# Patient Record
Sex: MALE | Race: OTHER | HISPANIC OR LATINO | Employment: FULL TIME | URBAN - METROPOLITAN AREA
[De-identification: names, ages, dates, MRNs, and addresses within clinical notes are randomized per-mention and may not be internally consistent; named-entity substitution may affect disease eponyms.]

---

## 2017-02-14 ENCOUNTER — HOSPITAL ENCOUNTER (EMERGENCY)
Facility: HOSPITAL | Age: 42
Discharge: HOME | End: 2017-02-14
Admitting: EMERGENCY MEDICINE
Payer: COMMERCIAL

## 2017-02-14 VITALS
HEART RATE: 73 BPM | SYSTOLIC BLOOD PRESSURE: 137 MMHG | OXYGEN SATURATION: 96 % | DIASTOLIC BLOOD PRESSURE: 82 MMHG | RESPIRATION RATE: 18 BRPM | WEIGHT: 225 LBS | TEMPERATURE: 98.3 F

## 2017-02-14 DIAGNOSIS — J02.0 STREP PHARYNGITIS: Primary | ICD-10-CM

## 2017-02-14 LAB — S PYO AG THROAT QL: POSITIVE

## 2017-02-14 PROCEDURE — 87430 STREP A AG IA: CPT

## 2017-02-14 PROCEDURE — 99283 EMERGENCY DEPT VISIT LOW MDM: CPT

## 2017-02-14 RX ORDER — AMOXICILLIN 500 MG/1
500 TABLET, FILM COATED ORAL 2 TIMES DAILY
Qty: 20 TABLET | Refills: 0 | Status: SHIPPED | OUTPATIENT
Start: 2017-02-14 | End: 2017-02-24

## 2017-02-14 RX ADMIN — DEXAMETHASONE SODIUM PHOSPHATE 10 MG: 10 INJECTION INTRAMUSCULAR; INTRAVENOUS at 09:14

## 2017-03-04 ENCOUNTER — HOSPITAL ENCOUNTER (EMERGENCY)
Facility: HOSPITAL | Age: 42
Discharge: HOME | End: 2017-03-04
Admitting: EMERGENCY MEDICINE
Payer: COMMERCIAL

## 2017-03-04 VITALS
HEART RATE: 69 BPM | TEMPERATURE: 97.8 F | RESPIRATION RATE: 18 BRPM | OXYGEN SATURATION: 98 % | WEIGHT: 225 LBS | SYSTOLIC BLOOD PRESSURE: 148 MMHG | DIASTOLIC BLOOD PRESSURE: 85 MMHG

## 2017-03-04 DIAGNOSIS — Z76.0 ENCOUNTER FOR MEDICATION REFILL: Primary | ICD-10-CM

## 2017-03-04 PROCEDURE — 99281 EMR DPT VST MAYX REQ PHY/QHP: CPT

## 2017-03-04 RX ORDER — ACYCLOVIR 400 MG/1
400 TABLET ORAL 3 TIMES DAILY
Qty: 21 TABLET | Refills: 0 | Status: SHIPPED | OUTPATIENT
Start: 2017-03-04 | End: 2017-10-26

## 2017-06-01 ENCOUNTER — HOSPITAL ENCOUNTER (EMERGENCY)
Facility: HOSPITAL | Age: 42
Discharge: HOME | End: 2017-06-01
Payer: COMMERCIAL

## 2017-06-01 VITALS
OXYGEN SATURATION: 98 % | RESPIRATION RATE: 18 BRPM | WEIGHT: 225 LBS | DIASTOLIC BLOOD PRESSURE: 71 MMHG | TEMPERATURE: 99.4 F | HEART RATE: 97 BPM | SYSTOLIC BLOOD PRESSURE: 137 MMHG

## 2017-06-01 DIAGNOSIS — S86.009A: Primary | ICD-10-CM

## 2017-06-01 PROCEDURE — 99283 EMERGENCY DEPT VISIT LOW MDM: CPT

## 2017-06-01 RX ORDER — NAPROXEN 500 MG/1
500 TABLET ORAL 2 TIMES DAILY WITH MEALS
Qty: 14 TABLET | Refills: 0 | Status: SHIPPED | OUTPATIENT
Start: 2017-06-01 | End: 2017-10-26

## 2017-06-01 RX ORDER — NAPROXEN 250 MG/1
500 TABLET ORAL ONCE
Status: COMPLETED | OUTPATIENT
Start: 2017-06-01 | End: 2017-06-01

## 2017-06-01 RX ADMIN — NAPROXEN 500 MG: 250 TABLET ORAL at 19:39

## 2017-07-19 ENCOUNTER — HOSPITAL ENCOUNTER (EMERGENCY)
Facility: HOSPITAL | Age: 42
Discharge: HOME | End: 2017-07-19
Admitting: EMERGENCY MEDICINE
Payer: COMMERCIAL

## 2017-07-19 ENCOUNTER — APPOINTMENT (EMERGENCY)
Dept: RADIOLOGY | Facility: HOSPITAL | Age: 42
End: 2017-07-19
Payer: COMMERCIAL

## 2017-07-19 VITALS
RESPIRATION RATE: 16 BRPM | TEMPERATURE: 98.4 F | OXYGEN SATURATION: 98 % | SYSTOLIC BLOOD PRESSURE: 128 MMHG | WEIGHT: 227 LBS | DIASTOLIC BLOOD PRESSURE: 89 MMHG | HEART RATE: 89 BPM

## 2017-07-19 DIAGNOSIS — M76.62 TENDONITIS, ACHILLES, LEFT: Primary | ICD-10-CM

## 2017-07-19 PROCEDURE — 99283 EMERGENCY DEPT VISIT LOW MDM: CPT

## 2017-07-19 PROCEDURE — 73610 X-RAY EXAM OF ANKLE: CPT

## 2017-07-19 PROCEDURE — 96372 THER/PROPH/DIAG INJ SC/IM: CPT

## 2017-07-19 PROCEDURE — 73590 X-RAY EXAM OF LOWER LEG: CPT

## 2017-07-19 RX ORDER — KETOROLAC TROMETHAMINE 30 MG/ML
15 INJECTION, SOLUTION INTRAMUSCULAR; INTRAVENOUS ONCE
Status: COMPLETED | OUTPATIENT
Start: 2017-07-19 | End: 2017-07-19

## 2017-07-19 RX ORDER — TRAMADOL HYDROCHLORIDE 50 MG/1
50 TABLET ORAL EVERY 6 HOURS PRN
Qty: 12 TABLET | Refills: 0 | Status: SHIPPED | OUTPATIENT
Start: 2017-07-19 | End: 2017-07-22

## 2017-07-19 RX ORDER — METHYLPREDNISOLONE 4 MG/1
TABLET ORAL
Qty: 21 TABLET | Refills: 0 | Status: SHIPPED | OUTPATIENT
Start: 2017-07-19 | End: 2017-10-26

## 2017-07-19 RX ADMIN — KETOROLAC TROMETHAMINE 15 MG: 30 INJECTION, SOLUTION INTRAMUSCULAR at 17:27

## 2017-10-26 ENCOUNTER — HOSPITAL ENCOUNTER (EMERGENCY)
Facility: HOSPITAL | Age: 42
Discharge: HOME | End: 2017-10-26
Admitting: EMERGENCY MEDICINE
Payer: COMMERCIAL

## 2017-10-26 VITALS
OXYGEN SATURATION: 99 % | SYSTOLIC BLOOD PRESSURE: 155 MMHG | DIASTOLIC BLOOD PRESSURE: 75 MMHG | WEIGHT: 227.51 LBS | HEART RATE: 76 BPM | TEMPERATURE: 98.3 F | RESPIRATION RATE: 20 BRPM

## 2017-10-26 DIAGNOSIS — B00.9 HERPES: ICD-10-CM

## 2017-10-26 DIAGNOSIS — Z20.2 POSSIBLE EXPOSURE TO STD: ICD-10-CM

## 2017-10-26 DIAGNOSIS — N48.1 BALANITIS: Primary | ICD-10-CM

## 2017-10-26 LAB
BACTERIA UR QL AUTO: ABNORMAL /HPF
BILIRUB UR QL STRIP: NEGATIVE
CLARITY UR: ABNORMAL
COLOR UR: YELLOW
GLUCOSE UR STRIP-MCNC: ABNORMAL MG/DL
HGB UR QL STRIP.AUTO: ABNORMAL
KETONES UR STRIP-MCNC: NEGATIVE MG/DL
LEUKOCYTE ESTERASE UR QL STRIP: ABNORMAL
NITRITE UR QL STRIP: NEGATIVE
NON-SQ EPI CELLS URNS QL MICRO: ABNORMAL /HPF
OTHER STN SPEC: ABNORMAL
PH UR STRIP.AUTO: 5.5 [PH] (ref 4.5–8)
PROT UR STRIP-MCNC: NEGATIVE MG/DL
RBC #/AREA URNS AUTO: ABNORMAL /HPF
SP GR UR STRIP.AUTO: 1.01 (ref 1–1.03)
UROBILINOGEN UR QL STRIP.AUTO: 0.2 E.U./DL
WBC #/AREA URNS AUTO: ABNORMAL /HPF

## 2017-10-26 PROCEDURE — 81002 URINALYSIS NONAUTO W/O SCOPE: CPT | Performed by: PHYSICIAN ASSISTANT

## 2017-10-26 PROCEDURE — 87086 URINE CULTURE/COLONY COUNT: CPT

## 2017-10-26 PROCEDURE — 81001 URINALYSIS AUTO W/SCOPE: CPT

## 2017-10-26 PROCEDURE — 96372 THER/PROPH/DIAG INJ SC/IM: CPT

## 2017-10-26 PROCEDURE — 99283 EMERGENCY DEPT VISIT LOW MDM: CPT

## 2017-10-26 PROCEDURE — 87491 CHLMYD TRACH DNA AMP PROBE: CPT | Performed by: PHYSICIAN ASSISTANT

## 2017-10-26 PROCEDURE — 87591 N.GONORRHOEAE DNA AMP PROB: CPT | Performed by: PHYSICIAN ASSISTANT

## 2017-10-26 RX ORDER — AZITHROMYCIN 250 MG/1
1000 TABLET, FILM COATED ORAL ONCE
Status: COMPLETED | OUTPATIENT
Start: 2017-10-26 | End: 2017-10-26

## 2017-10-26 RX ORDER — VALACYCLOVIR HYDROCHLORIDE 500 MG/1
500 TABLET, FILM COATED ORAL 2 TIMES DAILY
Qty: 6 TABLET | Refills: 0 | Status: SHIPPED | OUTPATIENT
Start: 2017-10-26 | End: 2018-06-18

## 2017-10-26 RX ORDER — CLOTRIMAZOLE 1 %
CREAM (GRAM) TOPICAL 2 TIMES DAILY
Qty: 30 G | Refills: 0 | Status: SHIPPED | OUTPATIENT
Start: 2017-10-26 | End: 2018-06-18

## 2017-10-26 RX ADMIN — AZITHROMYCIN 1000 MG: 250 TABLET, FILM COATED ORAL at 16:32

## 2017-10-26 RX ADMIN — CEFTRIAXONE SODIUM 250 MG: 250 INJECTION, POWDER, FOR SOLUTION INTRAMUSCULAR; INTRAVENOUS at 16:32

## 2017-10-26 NOTE — ED NOTES
Pt reports hx of genital herpes x 20 years  Pt reports recently developed new outbreak a few days ago   Pt reports "its different" because lesions itch and he is having yellow penile drainage     Lindsay Rivera RN  10/26/17 1518

## 2017-10-26 NOTE — DISCHARGE INSTRUCTIONS
Balanitis   WHAT YOU NEED TO KNOW:   Balanitis is inflammation of the glans (head) of the penis  It is usually caused by bacteria or a fungus  DISCHARGE INSTRUCTIONS:   Medicines:   · Medicines  help fight or prevent an infection caused by bacteria or a fungus  This medicine may be given as a pill or a cream     · Take your medicine as directed  Contact your healthcare provider if you think your medicine is not helping or if you have side effects  Tell him of her if you are allergic to any medicine  Keep a list of the medicines, vitamins, and herbs you take  Include the amounts, and when and why you take them  Bring the list or the pill bottles to follow-up visits  Carry your medicine list with you in case of an emergency  Clean your penis carefully:  Gently push back the foreskin 2 to 3 times a day and wash the infected area well with soap and water  If you have a catheter, ask how to keep it clean  Take a sitz bath:  Fill a bathtub with 4 to 6 inches of warm water  You may also use a sitz bath pan that fits over a toilet  Sit in the sitz bath for 20 minutes  Do this 2 to 3 times a day, or as directed  The warm water can help decrease pain and swelling  Maintain a healthy weight:  Ask your healthcare provider how much you should weigh  Ask him to help you create a weight loss plan if you are overweight  Follow up with your healthcare provider in 1 to 2 weeks:  Write down your questions so you remember to ask them during your visits  Contact your healthcare provider if:   · You have a fever  · You have pain when you urinate  · You have questions or concerns about your condition or care  Return to the emergency department if:   · You are not able to urinate  © 2017 [employer] Information is for End User's use only and may not be sold, redistributed or otherwise used for commercial purposes   All illustrations and images included in CareNotes® are the copyrighted property of A  [employer] M [employer] Inc  or Yomi Gorman  The above information is an  only  It is not intended as medical advice for individual conditions or treatments  Talk to your doctor, nurse or pharmacist before following any medical regimen to see if it is safe and effective for you  Postexposure Prophylaxis   WHAT YOU NEED TO KNOW:   Postexposure prophylaxis (PEP) is medical care given to prevent HIV, hepatitis B, and other diseases  PEP may include first aid, testing, and medicines  Exposure can occur when you have contact with certain body fluids from another person  These fluids include blood, semen, and vaginal fluid  They also include any other body fluid that may have blood in it, such as saliva or urine  You are exposed if these fluids touch an open area of your skin, such as a cut  You also are exposed if the fluids touch a mucus membrane  This is a moist area, such as in the eyes, nose, and mouth  You can be exposed by a needlestick through the skin  DISCHARGE INSTRUCTIONS:   Medicines:   · Antiretrovirals: These medicines help prevent HIV  They must be taken for 28 days, unless your healthcare provider tells you otherwise  You may be given a starter pack with enough medicine for 1 to 7 days  You may be given medicine for the full 28 days instead  If you receive a starter pack, you must return to your healthcare provider in 1 to 7 days  At this visit, you will receive the rest of the medicine  · Hepatitis B vaccine: This medicine helps prevent hepatitis B  You will need 3 doses (shots) of the vaccine  The second dose should be taken 1 to 2 months after the first dose  The third dose should be taken 4 to 6 months after the first dose  · Antibiotics: These are germ-killing medicines to help treat or prevent sexually transmitted diseases, such as gonorrhea  · Take your medicine as directed    Contact your healthcare provider if you think your medicine is not helping or if you have side effects  Tell him or her if you are allergic to any medicine  Keep a list of the medicines, vitamins, and herbs you take  Include the amounts, and when and why you take them  Bring the list or the pill bottles to follow-up visits  Carry your medicine list with you in case of an emergency  Follow up with your healthcare provider as directed: If you did not receive any treatment after the exposure, you will need to follow up with your healthcare provider within 1 week  If you were given antiretrovirals, you will need a follow-up visit in about 2 weeks  Further HIV testing will be needed 6 weeks, 3 months, and 6 months after the exposure  You may have HIV testing up to 12 months after the exposure  Precautions: In case you are infected, you will need to take steps to keep others from getting sick  These steps can help you avoid being exposed again:  · Avoid sex, or have safe sex  Safe sex means having sex only with one person who is not infected and who is only having sex with you  It also means using condoms each time you have sex  · Avoid injection drug use  If you cannot do this, always use needles that are sterile (germ-free)  · Follow all safety rules at your workplace if you are at risk of exposure  Be sure to use safety equipment as needed  · Get the hepatitis B vaccine if you have not already  · Do not breastfeed unless you know you are not infected  In case of future exposure: If you think you have been exposed, get first aid right away  If you are at work, follow work policy to report the exposure  The type of first aid you need depends on what part of your body was exposed:  · Open skin:  Wash the area right away with soap and water  Use a gel hand  if you do not have soap or running water  Do not use anything harsh, such as bleach  Do not squeeze or rub the skin  · Eyes:  Rinse your eyes with water or saline (a salt solution) right away   Be sure to clean well by moving your eyelids with your fingers as you rinse  Keep contact lenses in while rinsing your eyes, then remove and clean them as usual  Avoid soap or other   · Mouth:  Spit out the blood or body fluid right away  Rinse your mouth with water or saline several times  Avoid putting soap or other  in your mouth  For support and more information: It can be hard to cope if you think you have been exposed to a disease  You may feel scared and concerned about your health  This is normal  It can be helpful to find out all you can about the risk of exposure  Counseling or joining a support group also can help  Talk to your healthcare provider, or contact the following:   · [facility] for HIV/AIDS, Viral Hepatitis, STD, and TB Prevention, [facility]  Web Address: www cdc gov/[facility]/  · The [employer] Post-Exposure Prophylaxis Hotline  Web Address: www [facility]/about_[facility]/pepline/  Contact your healthcare provider if:   · You have nausea or diarrhea  · You are more tired than usual      · You have new headaches, or you feel dizzy  · You have trouble sleeping  · Your eyes or skin turn yellow  · You are not eating because of appetite loss  · You are or may be pregnant  Return to the emergency department if:   · You have a fever  · You have a rash  · You have new muscle pain or pain in your back or abdomen  · You urinate more often than usual, have blood in your urine, or have pain while urinating  · You are more thirsty than usual      · You have trouble swallowing or breathing  © 2017 [address] [employer] Information is for End User's use only and may not be sold, redistributed or otherwise used for commercial purposes  All illustrations and images included in CareNotes® are the copyrighted property of [employer]  or Yomi Gorman  The above information is an  only   It is not intended as medical advice for individual conditions or treatments  Talk to your doctor, nurse or pharmacist before following any medical regimen to see if it is safe and effective for you

## 2017-10-27 LAB
BACTERIA UR CULT: NORMAL
CHLAMYDIA DNA CVX QL NAA+PROBE: ABNORMAL
N GONORRHOEA DNA GENITAL QL NAA+PROBE: ABNORMAL

## 2017-10-28 NOTE — PROGRESS NOTES
Call patient regarding test results  Aware that he tested positive for gonorrhea but has been treated  Instructed to let any sexual partners no to get tested and/or treated

## 2018-01-31 ENCOUNTER — HOSPITAL ENCOUNTER (EMERGENCY)
Facility: HOSPITAL | Age: 43
Discharge: HOME | End: 2018-01-31
Attending: EMERGENCY MEDICINE
Payer: COMMERCIAL

## 2018-01-31 VITALS
RESPIRATION RATE: 16 BRPM | TEMPERATURE: 97.7 F | DIASTOLIC BLOOD PRESSURE: 63 MMHG | HEART RATE: 74 BPM | SYSTOLIC BLOOD PRESSURE: 127 MMHG | WEIGHT: 217.38 LBS | OXYGEN SATURATION: 99 %

## 2018-01-31 DIAGNOSIS — R68.89 FLU-LIKE SYMPTOMS: Primary | ICD-10-CM

## 2018-01-31 PROCEDURE — 99283 EMERGENCY DEPT VISIT LOW MDM: CPT

## 2018-01-31 RX ORDER — ONDANSETRON 4 MG/1
4 TABLET, ORALLY DISINTEGRATING ORAL EVERY 6 HOURS PRN
Qty: 20 TABLET | Refills: 0 | Status: SHIPPED | OUTPATIENT
Start: 2018-01-31 | End: 2018-06-18

## 2018-01-31 RX ORDER — ONDANSETRON 4 MG/1
4 TABLET, ORALLY DISINTEGRATING ORAL ONCE
Status: COMPLETED | OUTPATIENT
Start: 2018-01-31 | End: 2018-01-31

## 2018-01-31 RX ORDER — OSELTAMIVIR PHOSPHATE 75 MG/1
75 CAPSULE ORAL EVERY 12 HOURS
Qty: 10 CAPSULE | Refills: 0 | Status: SHIPPED | OUTPATIENT
Start: 2018-01-31 | End: 2018-02-05

## 2018-01-31 RX ADMIN — ONDANSETRON 4 MG: 4 TABLET, ORALLY DISINTEGRATING ORAL at 07:35

## 2018-01-31 NOTE — DISCHARGE INSTRUCTIONS
Since you have had symptoms for less than 48 hours, Tamiflu may be benificial  You can take this twice daily for the next 5 days  Take the zofran as needed for nausea, this can be placed under the tongue to dissolve if you are vomiting  Make sure to drink plenty of fluids to avoid dehydration  Influenza   WHAT YOU NEED TO KNOW:   Influenza (the flu) is an infection caused by the influenza virus  The flu is easily spread when an infected person coughs, sneezes, or has close contact with others  You may be able to spread the flu to others for 1 week or longer after signs or symptoms appear  DISCHARGE INSTRUCTIONS:   Call 911 for any of the following:   · You have trouble breathing, and your lips look purple or blue  · You have a seizure  Return to the emergency department if:   · You are dizzy, or you are urinating less or not at all  · You have a headache with a stiff neck, and you feel tired or confused  · You have new pain or pressure in your chest     · Your symptoms, such as shortness of breath, vomiting, or diarrhea, get worse  · Your symptoms, such as fever and coughing, seem to get better, but then get worse  Contact your healthcare provider if:   · You have new muscle pain or weakness  · You have questions or concerns about your condition or care  Medicines: You may need any of the following:  · Acetaminophen  decreases pain and fever  It is available without a doctor's order  Ask how much to take and how often to take it  Follow directions  Acetaminophen can cause liver damage if not taken correctly  · NSAIDs , such as ibuprofen, help decrease swelling, pain, and fever  This medicine is available with or without a doctor's order  NSAIDs can cause stomach bleeding or kidney problems in certain people  If you take blood thinner medicine, always ask your healthcare provider if NSAIDs are safe for you  Always read the medicine label and follow directions      · Antivirals help fight a viral infection  · Take your medicine as directed  Contact your healthcare provider if you think your medicine is not helping or if you have side effects  Tell him or her if you are allergic to any medicine  Keep a list of the medicines, vitamins, and herbs you take  Include the amounts, and when and why you take them  Bring the list or the pill bottles to follow-up visits  Carry your medicine list with you in case of an emergency  Rest  as much as you can to help you recover  Drink liquids as directed  to help prevent dehydration  Ask how much liquid to drink each day and which liquids are best for you  Prevent the spread of influenza:   · Wash your hands often  Use soap and water  Wash your hands after you use the bathroom, change a child's diapers, or sneeze  Wash your hands before you prepare or eat food  Use gel hand cleanser when soap and water are not available  Do not touch your eyes, nose, or mouth unless you have washed your hands first            · Cover your mouth when you sneeze or cough  Cough into a tissue or the bend of your arm  · Clean shared items with a germ-killing   Clean table surfaces, doorknobs, and light switches  Do not share towels, silverware, and dishes with people who are sick  Wash bed sheets, towels, silverware, and dishes with soap and water  · Wear a mask  over your mouth and nose if you are sick or are near anyone who is sick  · Stay away from others  if you are sick  · Influenza vaccine  helps prevent influenza (flu)  Everyone older than 6 months should get a yearly influenza vaccine  Get the vaccine as soon as it is available, usually in September or October each year  Follow up with your healthcare provider as directed:  Write down your questions so you remember to ask them during your visits     © 2017 [employer] Information is for End User's use only and may not be sold, redistributed or otherwise used for commercial purposes  All illustrations and images included in CareNotes® are the copyrighted property of A D A M , Inc  or Yomi Gorman  The above information is an  only  It is not intended as medical advice for individual conditions or treatments  Talk to your doctor, nurse or pharmacist before following any medical regimen to see if it is safe and effective for you

## 2018-01-31 NOTE — ED PROVIDER NOTES
History  Chief Complaint   Patient presents with    Flu Symptoms     Started yesterday  36 YO male presents with flu symptoms for the last day  States this began yesterday morning with fatigue, general malaise, body aches, diarrhea, subjective fevers and chills  States he did rest yesterday, got up to go to work but continues to not feel well  Pt states the diarrhea does seem to be improving and he is drinking fluids to avoid dehydration  Does have nausea, denies vomiting  Pt has had decreased appetite  No known sick contacts  Pt denies CP/SOB/V, no dysuria, burning on urination or blood in urine  History provided by:  Patient   used: No    Flu Symptoms   Presenting symptoms: diarrhea, fatigue, fever, myalgias and nausea    Presenting symptoms: no shortness of breath, no sore throat and no vomiting    Diarrhea:     Quality:  Semi-solid    Severity:  Moderate    Duration:  1 day    Timing:  Constant    Progression:  Improving  Fatigue:     Severity:  Moderate    Duration:  1 day    Timing:  Constant    Progression:  Unchanged  Fever:     Timing:  Intermittent    Temp source:  Subjective    Progression:  Unchanged  Myalgias:     Location:  Generalized    Quality:  Aching    Severity:  Moderate    Timing:  Constant    Progression:  Waxing and waning  Nausea:     Severity:  Mild    Onset quality:  Gradual    Timing:  Constant    Progression:  Waxing and waning  Severity:  Moderate  Onset quality:  Gradual  Progression:  Unchanged  Chronicity:  New  Relieved by:  Nothing  Worsened by:  Nothing  Ineffective treatments:  None tried  Associated symptoms: chills, decreased appetite and decreased physical activity    Associated symptoms: no mental status change, no neck stiffness and no syncope        Prior to Admission Medications   Prescriptions Last Dose Informant Patient Reported? Taking?    clotrimazole (LOTRIMIN) 1 % cream   No No   Sig: Apply topically 2 (two) times a day   metFORMIN (GLUCOPHAGE) 500 mg tablet   Yes No   Sig: Take 500 mg by mouth 2 (two) times a day with meals   sitaGLIPtin (JANUVIA) 100 mg tablet   Yes No   Sig: Take 100 mg by mouth daily   valACYclovir (VALTREX) 500 mg tablet   No No   Sig: Take 1 tablet by mouth 2 (two) times a day for 3 days      Facility-Administered Medications: None       Past Medical History:   Diagnosis Date    Diabetes mellitus ([facility] [address] )     Type II    Herpes        History reviewed  No pertinent surgical history  History reviewed  No pertinent family history  I have reviewed and agree with the history as documented  Social History   Substance Use Topics    Smoking status: Never Smoker    Smokeless tobacco: Never Used    Alcohol use No        Review of Systems   Constitutional: Positive for chills, decreased appetite, fatigue and fever  HENT: Negative for dental problem and sore throat  Eyes: Negative for visual disturbance  Respiratory: Negative for shortness of breath  Cardiovascular: Negative for chest pain  Gastrointestinal: Positive for diarrhea and nausea  Negative for abdominal pain and vomiting  Genitourinary: Negative for dysuria and frequency  Musculoskeletal: Positive for myalgias  Negative for neck pain and neck stiffness  Skin: Negative for rash  Neurological: Negative for dizziness, weakness and light-headedness  Psychiatric/Behavioral: Negative for agitation, behavioral problems and confusion  All other systems reviewed and are negative        Physical Exam  ED Triage Vitals [01/31/18 0722]   Temperature Pulse Respirations Blood Pressure SpO2   97 7 °F (36 5 °C) 74 16 127/63 99 %      Temp Source Heart Rate Source Patient Position - Orthostatic VS BP Location FiO2 (%)   Oral -- Sitting Right arm --      Pain Score       8           Orthostatic Vital Signs  Vitals:    01/31/18 0722   BP: 127/63   Pulse: 74   Patient Position - Orthostatic VS: Sitting       Physical Exam   Constitutional: He is oriented to person, place, and time  He appears well-developed and well-nourished  HENT:   Head: Normocephalic and atraumatic  Eyes: EOM are normal    Neck: Normal range of motion  Cardiovascular: Normal rate, regular rhythm and normal heart sounds  Pulmonary/Chest: Effort normal and breath sounds normal    Abdominal: Soft  There is no tenderness  Musculoskeletal: Normal range of motion  Neurological: He is alert and oriented to person, place, and time  Skin: Skin is warm and dry  Psychiatric: He has a normal mood and affect  His behavior is normal    Nursing note and vitals reviewed  ED Medications  Medications   ondansetron (ZOFRAN-ODT) dispersible tablet 4 mg (4 mg Oral Given 1/31/18 0735)       Diagnostic Studies  Results Reviewed     None                 No orders to display              Procedures  Procedures       Phone Contacts  ED Phone Contact    ED Course  ED Course                                MDM  Number of Diagnoses or Management Options  Flu-like symptoms: new and requires workup  Diagnosis management comments: 1  Flu like symptoms - Pt with mild symptoms, taking PO without difficulty  States diarrhea is improving  Will give Rx for Tamiflu as pt has had symptoms for less than 48 hours  Zofran for nausea  Patient Progress  Patient progress: stable    CritCare Time    Disposition  Final diagnoses:   Flu-like symptoms     Time reflects when diagnosis was documented in both MDM as applicable and the Disposition within this note     Time User Action Codes Description Comment    1/31/2018  7:30 AM [place] GIL Add [M35 10] Flu-like symptoms       ED Disposition     ED Disposition Condition Comment    Discharge  Verlena Zunilda discharge to home/self care      Condition at discharge: Stable        Follow-up Information    None       Patient's Medications   Discharge Prescriptions    ONDANSETRON (ZOFRAN-ODT) 4 MG DISINTEGRATING TABLET    Take 1 tablet (4 mg total) by mouth every 6 (six) hours as needed for nausea or vomiting       Start Date: 1/31/2018 End Date: --       Order Dose: 4 mg       Quantity: 20 tablet    Refills: 0    OSELTAMIVIR (TAMIFLU) 75 MG CAPSULE    Take 1 capsule (75 mg total) by mouth every 12 (twelve) hours for 5 days       Start Date: 1/31/2018 End Date: 2/5/2018       Order Dose: 75 mg       Quantity: 10 capsule    Refills: 0     No discharge procedures on file      ED Provider  Electronically Signed by           Neeru Lemons MD  01/31/18 7213

## 2018-04-04 LAB
ABSOL LYMPHOCYTES (HISTORICAL): 2.4 K/UL (ref 0.5–4)
ACETONE, QUANT (HISTORICAL): NEGATIVE
ALBUMIN SERPL BCP-MCNC: 4 G/DL (ref 3–5.2)
ALP SERPL-CCNC: 69 U/L (ref 43–122)
ALT SERPL W P-5'-P-CCNC: 30 U/L (ref 9–52)
ANION GAP SERPL CALCULATED.3IONS-SCNC: 10 MMOL/L (ref 5–14)
AST SERPL W P-5'-P-CCNC: 22 U/L (ref 17–59)
BASOPHILS # BLD AUTO: 0.1 K/UL (ref 0–0.1)
BASOPHILS # BLD AUTO: 1 % (ref 0–1)
BILIRUB SERPL-MCNC: 0.4 MG/DL
BUN SERPL-MCNC: 11 MG/DL (ref 5–25)
CALCIUM SERPL-MCNC: 9 MG/DL (ref 8.4–10.2)
CHLORIDE SERPL-SCNC: 100 MEQ/L (ref 97–108)
CO2 SERPL-SCNC: 25 MMOL/L (ref 22–30)
CREATINE, SERUM (HISTORICAL): 0.52 MG/DL (ref 0.7–1.5)
DEPRECATED RDW RBC AUTO: 13 %
EGFR (HISTORICAL): >60 ML/MIN/1.73 M2
EOSINOPHIL # BLD AUTO: 0.1 K/UL (ref 0–0.4)
EOSINOPHIL NFR BLD AUTO: 1 % (ref 0–6)
GLUCOSE SERPL-MCNC: 278 MG/DL (ref 70–99)
GLUCOSE SERPL-MCNC: 280 MG/DL (ref 70–99)
HCT VFR BLD AUTO: 42.7 % (ref 41–53)
HGB BLD-MCNC: 14.1 G/DL (ref 13.5–17.5)
LIPASE SERPL-CCNC: 283 U/L (ref 23–300)
LYMPHOCYTES NFR BLD AUTO: 33 % (ref 25–45)
MCH RBC QN AUTO: 26.7 PG (ref 26–34)
MCHC RBC AUTO-ENTMCNC: 32.9 % (ref 31–36)
MCV RBC AUTO: 81 FL (ref 80–100)
MONOCYTES # BLD AUTO: 0.5 K/UL (ref 0.2–0.9)
MONOCYTES NFR BLD AUTO: 6 % (ref 1–10)
NEUTROPHILS ABS COUNT (HISTORICAL): 4.4 K/UL (ref 1.8–7.8)
NEUTS SEG NFR BLD AUTO: 59 % (ref 45–65)
PLATELET # BLD AUTO: 212 K/MCL (ref 150–450)
POTASSIUM SERPL-SCNC: 4.2 MEQ/L (ref 3.6–5)
RBC # BLD AUTO: 5.27 M/MCL (ref 4.5–5.9)
SODIUM SERPL-SCNC: 135 MEQ/L (ref 137–147)
TOTAL PROTEIN (HISTORICAL): 6.9 G/DL (ref 5.9–8.4)
WBC # BLD AUTO: 7.4 K/MCL (ref 4.5–11)

## 2018-04-23 ENCOUNTER — HOSPITAL ENCOUNTER (EMERGENCY)
Facility: HOSPITAL | Age: 43
Discharge: HOME | End: 2018-04-23
Attending: EMERGENCY MEDICINE | Admitting: EMERGENCY MEDICINE
Payer: COMMERCIAL

## 2018-04-23 VITALS
TEMPERATURE: 97.2 F | WEIGHT: 220 LBS | HEART RATE: 75 BPM | DIASTOLIC BLOOD PRESSURE: 73 MMHG | OXYGEN SATURATION: 97 % | SYSTOLIC BLOOD PRESSURE: 129 MMHG | RESPIRATION RATE: 16 BRPM

## 2018-04-23 DIAGNOSIS — M27.3 DRY TOOTH SOCKET: Primary | ICD-10-CM

## 2018-04-23 PROCEDURE — 99282 EMERGENCY DEPT VISIT SF MDM: CPT

## 2018-04-23 RX ORDER — HYDROCODONE BITARTRATE AND ACETAMINOPHEN 5; 325 MG/1; MG/1
1 TABLET ORAL EVERY 6 HOURS PRN
Qty: 10 TABLET | Refills: 0 | Status: SHIPPED | OUTPATIENT
Start: 2018-04-23 | End: 2018-05-03

## 2018-04-23 RX ORDER — PENICILLIN V POTASSIUM 500 MG/1
500 TABLET ORAL 4 TIMES DAILY
Qty: 40 TABLET | Refills: 0 | Status: SHIPPED | OUTPATIENT
Start: 2018-04-23 | End: 2018-05-03

## 2018-04-23 NOTE — ED PROVIDER NOTES
History  Chief Complaint   Patient presents with    Dental Pain     Patient reports upper left dental pain, reports had tooth removed but didnt take care of surgical site  Denies fevers  This is a 24-year-old male patient who 5 days ago had tooth extracted tooth 4  By a dentist   Over the last 2 days he has had increased pain to the point and is now throbbing and radiating into his ear  Not relieved by over-the-counter remedies  No fever no chills  No blurred vision or double vision  No difficulty swallowing  No cough no congestion  Nothing makes it better or worse  Just constantly throbs  Patient has no other symptoms  At this point I will put dry socket paste start him on antibiotics and give him something for pain  Prior to Admission Medications   Prescriptions Last Dose Informant Patient Reported? Taking? clotrimazole (LOTRIMIN) 1 % cream   No No   Sig: Apply topically 2 (two) times a day   metFORMIN (GLUCOPHAGE) 500 mg tablet   Yes No   Sig: Take 500 mg by mouth 2 (two) times a day with meals   ondansetron (ZOFRAN-ODT) 4 mg disintegrating tablet   No No   Sig: Take 1 tablet (4 mg total) by mouth every 6 (six) hours as needed for nausea or vomiting   sitaGLIPtin (JANUVIA) 100 mg tablet   Yes No   Sig: Take 100 mg by mouth daily   valACYclovir (VALTREX) 500 mg tablet   No No   Sig: Take 1 tablet by mouth 2 (two) times a day for 3 days      Facility-Administered Medications: None       Past Medical History:   Diagnosis Date    Diabetes mellitus ([facility] [address] )     Type II    Herpes        History reviewed  No pertinent surgical history  History reviewed  No pertinent family history  I have reviewed and agree with the history as documented  Social History   Substance Use Topics    Smoking status: Never Smoker    Smokeless tobacco: Never Used    Alcohol use No        Review of Systems   All other systems reviewed and are negative        Physical Exam  ED Triage Vitals [04/23/18 1808] Temperature Pulse Respirations Blood Pressure SpO2   (!) 97 2 °F (36 2 °C) 75 16 129/73 97 %      Temp Source Heart Rate Source Patient Position - Orthostatic VS BP Location FiO2 (%)   Temporal Monitor Sitting Right arm --      Pain Score       Worst Possible Pain           Orthostatic Vital Signs  Vitals:    04/23/18 1808   BP: 129/73   Pulse: 75   Patient Position - Orthostatic VS: Sitting       Physical Exam   Constitutional: He appears well-developed and well-nourished  HENT:   Head: Normocephalic and atraumatic  Right Ear: External ear normal    Left Ear: External ear normal    Nose: Nose normal    Mouth/Throat: Oropharynx is clear and moist        Eyes: Conjunctivae are normal  Pupils are equal, round, and reactive to light  Neck: Normal range of motion  Neck supple  Cardiovascular: Normal rate and regular rhythm  Pulmonary/Chest: Effort normal and breath sounds normal    Abdominal: Soft  Bowel sounds are normal  There is no tenderness  Neurological: He is alert  Skin: Skin is warm  Psychiatric: He has a normal mood and affect  His behavior is normal    Nursing note and vitals reviewed  ED Medications  Medications - No data to display    Diagnostic Studies  Results Reviewed     None                 No orders to display              Procedures  Procedures       Phone Contacts  ED Phone Contact    ED Course  ED Course                                MDM  CritCare Time    Disposition  Final diagnoses:   Dry tooth socket     Time reflects when diagnosis was documented in both MDM as applicable and the Disposition within this note     Time User Action Codes Description Comment    4/23/2018  6:54 PM [place] [facility] Add [M27 3] Dry tooth socket       ED Disposition     ED Disposition Condition Comment    Discharge  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      Condition at discharge: Good        Follow-up Information     Follow up With Specialties Details Why Contact Info Follow-up with her dentist in next 1-2 days for recheck upper tooth         Patient's Medications   Discharge Prescriptions    DICLOFENAC SODIUM (VOLTAREN) 50 MG EC TABLET    Take 1 tablet (50 mg total) by mouth 2 (two) times a day for 5 days       Start Date: 4/23/2018 End Date: 4/28/2018       Order Dose: 50 mg       Quantity: 10 tablet    Refills: 0    HYDROCODONE-ACETAMINOPHEN (NORCO) 5-325 MG PER TABLET    Take 1 tablet by mouth every 6 (six) hours as needed for pain for up to 10 days Max Daily Amount: 4 tablets       Start Date: 4/23/2018 End Date: 5/3/2018       Order Dose: 1 tablet       Quantity: 10 tablet    Refills: 0    PENICILLIN V POTASSIUM (VEETID) 500 MG TABLET    Take 1 tablet (500 mg total) by mouth 4 (four) times a day for 10 days       Start Date: 4/23/2018 End Date: 5/3/2018       Order Dose: 500 mg       Quantity: 40 tablet    Refills: 0     No discharge procedures on file      ED Provider  Electronically Signed by           Rebeca Whittaker PA-C  04/23/18 2979

## 2018-04-23 NOTE — DISCHARGE INSTRUCTIONS
Dry Socket   WHAT YOU NEED TO KNOW:   A dry socket is a painful condition that develops 1 to 3 days after a permanent tooth has been extracted (removed)  It happens when the blood clot at the site of the extraction dissolves, exposing your jawbone  DISCHARGE INSTRUCTIONS:   Medicines:   · NSAIDs  help decrease swelling and pain or fever  This medicine is available with or without a doctor's order  NSAIDs can cause stomach bleeding or kidney problems in certain people  If you take blood thinner medicine, always ask your healthcare provider if NSAIDs are safe for you  Always read the medicine label and follow directions  · Prescription pain medicine  may be given  Ask your healthcare provider how to take this medicine safely  · Antibiotics  may be prescribed if you have an infection  · Take your medicine as directed  Contact your healthcare provider if you think your medicine is not helping or if you have side effects  Tell him of her if you are allergic to any medicine  Keep a list of the medicines, vitamins, and herbs you take  Include the amounts, and when and why you take them  Bring the list or the pill bottles to follow-up visits  Carry your medicine list with you in case of an emergency  Follow up with your dentist within 2 days, or as directed: Your dentist may need to change or take out the dressing  He will also check to see how your dry socket is healing  Write down your questions so you remember to ask them during your visits  Care for your dry socket:   · Do not smoke  Nicotine in cigarettes may prevent your blood from clotting properly  If you smoke, it is never too late to quit  Ask for information if you need help quitting  · Irrigate your dry socket  if you do not have a dressing  Cleanse your dry socket with a chlorhexidine mouthwash to help remove dead tissue or food  Talk with your dentist about how to use a syringe to irrigate your dry socket   Ask him where you can find an oral solution with chlorhexidine  Contact your dentist if:   · You continue to have pain even after you take pain medicine  · You have a fever  · You have questions or concerns about your condition or care  Return to the emergency department if:   · Your swelling is so bad that you cannot close or open your mouth  · You have trouble breathing  © 2017 [address] Information is for End User's use only and may not be sold, redistributed or otherwise used for commercial purposes  All illustrations and images included in CareNotes® are the copyrighted property of [employer] , [place]  or Yomi Gorman  The above information is an  only  It is not intended as medical advice for individual conditions or treatments  Talk to your doctor, nurse or pharmacist before following any medical regimen to see if it is safe and effective for you

## 2018-06-18 ENCOUNTER — HOSPITAL ENCOUNTER (EMERGENCY)
Facility: HOSPITAL | Age: 43
Discharge: HOME | End: 2018-06-19
Attending: EMERGENCY MEDICINE | Admitting: EMERGENCY MEDICINE
Payer: COMMERCIAL

## 2018-06-18 ENCOUNTER — APPOINTMENT (EMERGENCY)
Dept: RADIOLOGY | Facility: HOSPITAL | Age: 43
End: 2018-06-18
Payer: COMMERCIAL

## 2018-06-18 DIAGNOSIS — R73.9 HYPERGLYCEMIA: ICD-10-CM

## 2018-06-18 DIAGNOSIS — E86.0 DEHYDRATION: ICD-10-CM

## 2018-06-18 DIAGNOSIS — R07.89 LEFT-SIDED CHEST WALL PAIN: Primary | ICD-10-CM

## 2018-06-18 LAB
ALBUMIN SERPL BCP-MCNC: 3.6 G/DL (ref 3.5–5)
ALP SERPL-CCNC: 74 U/L (ref 46–116)
ALT SERPL W P-5'-P-CCNC: 31 U/L (ref 12–78)
ANION GAP SERPL CALCULATED.3IONS-SCNC: 11 MMOL/L (ref 4–13)
AST SERPL W P-5'-P-CCNC: 14 U/L (ref 5–45)
BASOPHILS # BLD AUTO: 0.04 THOUSANDS/ΜL (ref 0–0.1)
BASOPHILS NFR BLD AUTO: 0 % (ref 0–1)
BILIRUB SERPL-MCNC: 0.47 MG/DL (ref 0.2–1)
BUN SERPL-MCNC: 10 MG/DL (ref 5–25)
CALCIUM SERPL-MCNC: 8.7 MG/DL (ref 8.3–10.1)
CHLORIDE SERPL-SCNC: 99 MMOL/L (ref 100–108)
CO2 SERPL-SCNC: 25 MMOL/L (ref 21–32)
CREAT SERPL-MCNC: 1.35 MG/DL (ref 0.6–1.3)
DEPRECATED D DIMER PPP: <270 NG/ML (FEU) (ref 0–424)
EOSINOPHIL # BLD AUTO: 0.1 THOUSAND/ΜL (ref 0–0.61)
EOSINOPHIL NFR BLD AUTO: 1 % (ref 0–6)
ERYTHROCYTE [DISTWIDTH] IN BLOOD BY AUTOMATED COUNT: 13.1 % (ref 11.6–15.1)
GFR SERPL CREATININE-BSD FRML MDRD: 64 ML/MIN/1.73SQ M
GLUCOSE SERPL-MCNC: 419 MG/DL (ref 65–140)
HCT VFR BLD AUTO: 39.1 % (ref 36.5–49.3)
HGB BLD-MCNC: 13.4 G/DL (ref 12–17)
LYMPHOCYTES # BLD AUTO: 3.84 THOUSANDS/ΜL (ref 0.6–4.47)
LYMPHOCYTES NFR BLD AUTO: 42 % (ref 14–44)
MCH RBC QN AUTO: 28.2 PG (ref 26.8–34.3)
MCHC RBC AUTO-ENTMCNC: 34.3 G/DL (ref 31.4–37.4)
MCV RBC AUTO: 82 FL (ref 82–98)
MONOCYTES # BLD AUTO: 0.67 THOUSAND/ΜL (ref 0.17–1.22)
MONOCYTES NFR BLD AUTO: 7 % (ref 4–12)
NEUTROPHILS # BLD AUTO: 4.59 THOUSANDS/ΜL (ref 1.85–7.62)
NEUTS SEG NFR BLD AUTO: 50 % (ref 43–75)
NRBC BLD AUTO-RTO: 0 /100 WBCS
PLATELET # BLD AUTO: 216 THOUSANDS/UL (ref 149–390)
PMV BLD AUTO: 10.2 FL (ref 8.9–12.7)
POTASSIUM SERPL-SCNC: 3.5 MMOL/L (ref 3.5–5.3)
PROT SERPL-MCNC: 7.2 G/DL (ref 6.4–8.2)
RBC # BLD AUTO: 4.76 MILLION/UL (ref 3.88–5.62)
SODIUM SERPL-SCNC: 135 MMOL/L (ref 136–145)
TROPONIN I SERPL-MCNC: <0.02 NG/ML
WBC # BLD AUTO: 9.24 THOUSAND/UL (ref 4.31–10.16)

## 2018-06-18 PROCEDURE — 80053 COMPREHEN METABOLIC PANEL: CPT | Performed by: EMERGENCY MEDICINE

## 2018-06-18 PROCEDURE — 85379 FIBRIN DEGRADATION QUANT: CPT | Performed by: EMERGENCY MEDICINE

## 2018-06-18 PROCEDURE — 85025 COMPLETE CBC W/AUTO DIFF WBC: CPT | Performed by: EMERGENCY MEDICINE

## 2018-06-18 PROCEDURE — 71046 X-RAY EXAM CHEST 2 VIEWS: CPT

## 2018-06-18 PROCEDURE — 96374 THER/PROPH/DIAG INJ IV PUSH: CPT

## 2018-06-18 PROCEDURE — 93005 ELECTROCARDIOGRAM TRACING: CPT

## 2018-06-18 PROCEDURE — 84484 ASSAY OF TROPONIN QUANT: CPT | Performed by: EMERGENCY MEDICINE

## 2018-06-18 PROCEDURE — 96361 HYDRATE IV INFUSION ADD-ON: CPT

## 2018-06-18 PROCEDURE — 36415 COLL VENOUS BLD VENIPUNCTURE: CPT | Performed by: EMERGENCY MEDICINE

## 2018-06-18 RX ORDER — NAPROXEN 500 MG/1
500 TABLET ORAL 2 TIMES DAILY PRN
Qty: 20 TABLET | Refills: 0 | Status: SHIPPED | OUTPATIENT
Start: 2018-06-18 | End: 2018-07-16 | Stop reason: ALTCHOICE

## 2018-06-18 RX ORDER — KETOROLAC TROMETHAMINE 30 MG/ML
30 INJECTION, SOLUTION INTRAMUSCULAR; INTRAVENOUS ONCE
Status: COMPLETED | OUTPATIENT
Start: 2018-06-18 | End: 2018-06-18

## 2018-06-18 RX ADMIN — SODIUM CHLORIDE 1000 ML: 0.9 INJECTION, SOLUTION INTRAVENOUS at 23:15

## 2018-06-18 RX ADMIN — SODIUM CHLORIDE 1000 ML: 0.9 INJECTION, SOLUTION INTRAVENOUS at 22:38

## 2018-06-18 RX ADMIN — KETOROLAC TROMETHAMINE 30 MG: 30 INJECTION, SOLUTION INTRAMUSCULAR at 22:38

## 2018-06-19 VITALS
DIASTOLIC BLOOD PRESSURE: 68 MMHG | OXYGEN SATURATION: 95 % | RESPIRATION RATE: 16 BRPM | WEIGHT: 210 LBS | HEART RATE: 82 BPM | SYSTOLIC BLOOD PRESSURE: 129 MMHG | TEMPERATURE: 99.1 F

## 2018-06-19 LAB
ATRIAL RATE: 88 BPM
P AXIS: 72 DEGREES
PR INTERVAL: 154 MS
QRS AXIS: 57 DEGREES
QRSD INTERVAL: 86 MS
QT INTERVAL: 348 MS
QTC INTERVAL: 421 MS
T WAVE AXIS: 49 DEGREES
VENTRICULAR RATE: 88 BPM

## 2018-06-19 PROCEDURE — 99285 EMERGENCY DEPT VISIT HI MDM: CPT

## 2018-06-19 PROCEDURE — 93010 ELECTROCARDIOGRAM REPORT: CPT | Performed by: INTERNAL MEDICINE

## 2018-06-19 NOTE — DISCHARGE INSTRUCTIONS
Chest Wall Pain   WHAT YOU NEED TO KNOW:   Chest wall pain may be caused by problems with the muscles, cartilage, or bones of the chest wall  Chest wall pain may also be caused by pain that spreads to your chest from another part of your body  The pain may be aching, severe, dull, or sharp  It may come and go, or it may be constant  The pain may be worse when you move in certain ways, breathe deeply, or cough  DISCHARGE INSTRUCTIONS:   Call 911 if:   · You have any of the following signs of a heart attack:      ¨ Squeezing, pressure, or pain in your chest that lasts longer than 5 minutes or returns    ¨ Discomfort or pain in your back, neck, jaw, stomach, or arm     ¨ Trouble breathing    ¨ Nausea or vomiting    ¨ Lightheadedness or a sudden cold sweat, especially with chest pain or trouble breathing    Return to the emergency department if:   · You have severe pain  Contact your healthcare provider if:   · You develop a rash  · You have other new symptoms  · Your pain does not improve, even with treatment  · You have questions or concerns about your condition or care  Medicines: You may need any of the following:  · NSAIDs , such as ibuprofen, help decrease swelling, pain, and fever  This medicine is available with or without a doctor's order  NSAIDs can cause stomach bleeding or kidney problems in certain people  If you take blood thinner medicine, always ask your healthcare provider if NSAIDs are safe for you  Always read the medicine label and follow directions  · Acetaminophen  decreases pain  It is available without a doctor's order  Ask how much to take and how often to take it  Follow directions  Acetaminophen can cause liver damage if not taken correctly  · A cream  may be applied to your chest to decrease pain  · Take your medicine as directed  Contact your healthcare provider if you think your medicine is not helping or if you have side effects   Tell him of her if you are allergic to any medicine  Keep a list of the medicines, vitamins, and herbs you take  Include the amounts, and when and why you take them  Bring the list or the pill bottles to follow-up visits  Carry your medicine list with you in case of an emergency  Follow up with your healthcare provider as directed:  Write down your questions so you remember to ask them during your visits  Self-care:   · Rest  as needed  Avoid activities that make your chest wall pain worse  · Apply heat  on your chest for 20 to 30 minutes every 2 hours for as many days as directed  Heat helps decrease pain and muscle spasms  · Apply ice  on your chest for 15 to 20 minutes every hour or as directed  Use an ice pack, or put crushed ice in a plastic bag  Cover it with a towel  Ice helps prevent tissue damage and decreases swelling and pain  © 2017 [employer] [employer] Information is for End User's use only and may not be sold, redistributed or otherwise used for commercial purposes  All illustrations and images included in CareNotes® are the copyrighted property of A D A M [employer]  or Yomi Gorman  The above information is an  only  It is not intended as medical advice for individual conditions or treatments  Talk to your doctor, nurse or pharmacist before following any medical regimen to see if it is safe and effective for you  Dehydration   WHAT YOU NEED TO KNOW:   Dehydration is a condition that develops when your body does not have enough fluid  You may become dehydrated if you do not drink enough water or lose too much fluid  Fluid loss may also cause loss of electrolytes (minerals), such as sodium  DISCHARGE INSTRUCTIONS:   Return to the emergency department if:   · You have a seizure  · You are confused or cannot think clearly  · You are extremely sleepy, or another person cannot wake you  · You become dizzy or faint when you stand  · You are not able to urinate      · You have trouble breathing  · You have a fast or irregular heartbeat  · Your hands or feet are cold, or your face is pale  Contact your healthcare provider if:   · You have trouble drinking liquids because you are vomiting  · Your symptoms get worse  · You have a fever  · You feel very weak or tired  · You have questions or concerns about your condition or care  Follow up with your healthcare provider as directed:  Write down your questions so you remember to ask them during your visits  Prevent or manage dehydration:   · Drink liquids as directed  Liquids that contain water, sugar, and minerals can help your body hold in fluid and help prevent dehydration  Drink liquids throughout the day, not just when you feel thirsty  Men should drink about 3 liters (13 eight-ounce cups) of liquid each day  Women should drink about 2 liters (9 eight-ounce cups) of liquid each day  Drink even more liquid if you will be outdoors, in the sun for a long time, or exercising  · Stay cool  Limit the time you spend outdoors during the hottest part of the day  Dress in lightweight clothes  · Keep track of how often you urinate  If you urinate less than usual or your urine is darker, drink more liquids  © 2017 [address]  Information is for End User's use only and may not be sold, redistributed or otherwise used for commercial purposes  All illustrations and images included in CareNotes® are the copyrighted property of [employer]  or Yomi Gorman  The above information is an  only  It is not intended as medical advice for individual conditions or treatments  Talk to your doctor, nurse or pharmacist before following any medical regimen to see if it is safe and effective for you  How to Check Your Blood Sugar   WHAT YOU NEED TO KNOW:   High blood sugar levels increase your risk for heart attack, stroke, eye problems, and kidney problems   You can decrease your risk by controlling your blood sugar levels  Low blood sugar levels can also lead to serious health problems and must be treated right away  Check your blood sugar to help you learn how food, exercise, stress, and medicines affect your levels  Keep a record of your blood sugar levels  It can be used to adjust your meal plan, exercise routine, insulin doses, or diabetes medicine if needed  DISCHARGE INSTRUCTIONS:   How to check your blood sugar level:   · Check your blood sugar level with a glucose meter  This device uses a small drop of blood to measure your blood sugar level  Some glucose meters measure a drop of blood taken from your finger using a special lancet device  Other meters will also measure a drop of blood taken from your thigh, forearm, or the palm of your hand  · Blood sugar levels change quickly after meals, after you take insulin, during exercise, and when you feel stressed or ill  It is best to use blood from your finger to check your blood sugar level during these times  Your healthcare provider will teach you how to use a glucose meter to check your blood sugar level  Ask your healthcare provider for more information about taking blood samples from areas other than your finger  When and how often to check your blood sugar level:  Ask your healthcare provider when and how often you should check your blood sugar levels  If you check your blood sugar level before a meal , it should be between 80 and 130 mg/dL  If you check your blood sugar level 2 hours after a meal , it should be less than 180 mg/dL  Ask your healthcare provider if these are good goals for you  Blood sugar levels need to be checked more often when you are sick, there is a change to your medicine, or if you change your daily routine  Test your blood sugar level if you feel like it may be too high (hyperglycemia) or too low (hypoglycemia)     Keep a record of your blood sugar levels:  Write down your blood sugar level each time you test it  Write down the date, the time of the test (including if it was before or after a meal), and the result  Write down the time you took your insulin or diabetes pills  Record the type and amount of insulin or diabetes medicine you took  Write comments about anything that may have made your blood sugar level go up or down  Your blood sugar level can be affected by exercise, eating more or less than usual, or stress  Bring this record with you to your follow-up visits  How to care for your glucose meter and test strips:  Ask your healthcare provider how and how often to check the accuracy of your meter  You may need to do the following:  · Store your equipment properly  Keep the test strips away from heat, cold, and moisture  Do not take a test strip out of the container until you are ready to use it  Put the lid back tightly on the container  Do not use test strips that are damaged, wet, or bent  · Check the expiration date  on the test strip container to be sure the test strips have not   Your blood sugar readings may be wrong if you use  test strips  Use only the type of glucose test strips that work with your glucose meter  Wear medical alert identification:  Wear medical alert jewelry or carry a card that says you have diabetes  Ask your healthcare provider where to get these items  Follow up with your healthcare provider as directed:  Write down your questions so you remember to ask them during your visits  © 2017 [employer] Information is for End User's use only and may not be sold, redistributed or otherwise used for commercial purposes  All illustrations and images included in CareNotes® are the copyrighted property of A [employer] M [employer]  or Yomi Gorman  The above information is an  only  It is not intended as medical advice for individual conditions or treatments   Talk to your doctor, nurse or pharmacist before following any medical regimen to see if it is safe and effective for you  Diabetic Hyperglycemia   WHAT YOU NEED TO KNOW:   Diabetic hyperglycemia is a blood glucose (sugar) level that is higher than your healthcare provider recommends  You may have increased thirst and urinate more often than usual  Over time, uncontrolled diabetes can damage your nerves, blood vessels, tissues, and organs  That is why it is important to manage diabetic hyperglycemia  Without treatment, diabetic hyperglycemia can lead to diabetic ketoacidosis (DKA) or hyperglycemic hyperosmolar state (HHS)  These are serious conditions that can become life-threatening  DISCHARGE INSTRUCTIONS:   Seek care immediately if:   · You have shortness of breath  · Your breath smells fruity  · You have nausea and vomiting  · You have symptoms of dehydration, such as dark yellow urine, dry mouth and lips, and dry skin  Contact your healthcare provider if:   · You continue to have higher blood sugar levels than your healthcare provider recommends  · Your blood sugar level is over 240 mg/dl and  you have ketones in your urine  · You have questions or concerns about your condition or care  Medicines:   · Medicines  such as insulin and hypoglycemic medicine decrease blood sugar levels  · Take your medicine as directed  Contact your healthcare provider if you think your medicine is not helping or if you have side effects  Tell him or her if you are allergic to any medicine  Keep a list of the medicines, vitamins, and herbs you take  Include the amounts, and when and why you take them  Bring the list or the pill bottles to follow-up visits  Carry your medicine list with you in case of an emergency  Follow up with your healthcare provider or specialist as directed: Your healthcare provider may refer you to a dietitian or diabetes specialist  Write down your questions so you remember to ask them during your visits     Manage diabetic hyperglycemia:   · If you take diabetes medicine or insulin, take it as directed  Missed or wrong doses can cause your blood sugar to go up  · Tell your healthcare provider if you continue to have trouble managing your blood sugar  He may change the type, amount, or timing of your diabetes medicine or insulin  If you do not take diabetes medicine or insulin, you may need to start  · Work with your healthcare provider to develop a sick day plan  Illness can cause your blood sugar to rise  A sick day plan helps you control your blood sugar level when you are sick  Prevent diabetic hyperglycemia:   · Check your blood sugar levels regularly  Ask your healthcare provider how often to check your blood sugar and what your levels should be  · Follow your meal plan  Your blood sugar can go up if you eat a large meal or you eat more carbohydrates than recommended  Work with a dietitian to develop a meal plan that is right for you  · Exercise regularly  to help lower your blood sugar when it is high  It can also keep your blood sugar levels steady over time  Exercise for at least 30 minutes, 5 days a week  Include muscle strengthening activities 2 days each week  Do not sit for longer than 90 minutes at a time  Work with your healthcare provider to create an exercise plan  Children should get at least 60 minutes of physical activity each day  · Check your ketones before exercise  if your blood sugar level is above 240 mg/dl  Do not exercise if you have ketones in your urine,  because your blood sugar level may rise even more  Ask your healthcare provider how to lower your blood sugar when you have ketones  © 2017 [address] [employer] Information is for End User's use only and may not be sold, redistributed or otherwise used for commercial purposes  All illustrations and images included in CareNotes® are the copyrighted property of [employer]  or Yomi Gorman    The above information is an  only  It is not intended as medical advice for individual conditions or treatments  Talk to your doctor, nurse or pharmacist before following any medical regimen to see if it is safe and effective for you

## 2018-06-19 NOTE — ED PROVIDER NOTES
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 - worked from [address] [facility] today and it was extremely hot due to very high humidity outside, then went to second job as  man for [employer] and around 1600 noted some L sided chest discomfort that has persisted with mild SOB  A few hours ago noted it radiated from L ACW to L anterior shoulder/upper arm  History provided by:  Patient   used: No    Chest Pain   Pain location:  L chest  Pain quality: aching and dull    Pain radiates to:  L shoulder and L arm  Pain radiates to the back: no    Pain severity:  Moderate  Onset quality:  Gradual  Duration:  7 hours  Timing:  Constant  Progression:  Unchanged  Chronicity:  New  Context: movement    Relieved by:  Nothing  Worsened by: Movement  Ineffective treatments:  None tried  Associated symptoms: shortness of breath    Associated symptoms: no abdominal pain, no anxiety, no back pain, no claudication, no cough, no diaphoresis, no dizziness, no fatigue, no fever, no headache, no lower extremity edema, no nausea, no numbness, no palpitations, no syncope, not vomiting and no weakness    Risk factors: diabetes mellitus (niddm) and male sex    Risk factors: no coronary artery disease, no high cholesterol, no hypertension, not obese and no prior DVT/PE    Shortness of Breath   Associated symptoms: chest pain (L sided)    Associated symptoms: no abdominal pain, no claudication, no cough, no diaphoresis, no fever, no headaches, no neck pain, no rash, no sore throat, no syncope, no vomiting and no wheezing        Prior to Admission Medications   Prescriptions Last Dose Informant Patient Reported? Taking?   metFORMIN (GLUCOPHAGE) 500 mg tablet   Yes Yes   Sig: Take 500 mg by mouth 2 (two) times a day with meals   sitaGLIPtin (JANUVIA) 100 mg tablet   Yes Yes   Sig: Take 100 mg by mouth daily      Facility-Administered Medications: None       Past Medical History:   Diagnosis Date    [facility]     Type II    Herpes        History reviewed  No pertinent surgical history  History reviewed  No pertinent family history  I have reviewed and agree with the history as documented  Social History   Substance Use Topics    Smoking status: Never Smoker    Smokeless tobacco: Never Used    Alcohol use No        Review of Systems   Constitutional: Negative for chills, diaphoresis, fatigue and fever  HENT: Negative for congestion and sore throat  Eyes: Negative for visual disturbance  Respiratory: Positive for shortness of breath  Negative for cough and wheezing  Cardiovascular: Positive for chest pain (L sided)  Negative for palpitations, claudication and syncope  Gastrointestinal: Negative for abdominal pain, diarrhea, nausea and vomiting  Genitourinary: Negative for dysuria  Musculoskeletal: Negative for back pain, neck pain and neck stiffness  Skin: Negative for pallor and rash  Neurological: Negative for dizziness, weakness, numbness and headaches  Psychiatric/Behavioral: Negative for confusion  All other systems reviewed and are negative  Physical Exam  Physical Exam   Constitutional: He is oriented to person, place, and time  He appears well-developed and well-nourished  No distress  HENT:   Head: Normocephalic and atraumatic  Right Ear: External ear normal    Left Ear: External ear normal    Mouth/Throat: Oropharynx is clear and moist    Eyes: EOM are normal  Pupils are equal, round, and reactive to light  Neck: Normal range of motion  Neck supple  Cardiovascular: Normal rate and regular rhythm  No murmur heard    Pulmonary/Chest: Effort normal and breath sounds normal  He exhibits tenderness (L ACW tenderness)  Abdominal: Soft  Bowel sounds are normal  He exhibits no distension  There is no tenderness  Musculoskeletal: Normal range of motion  He exhibits no edema  Neurological: He is alert and oriented to person, place, and time  Skin: Skin is warm  Capillary refill takes less than 2 seconds  No rash noted  No pallor  Psychiatric: He has a normal mood and affect  His behavior is normal    Nursing note and vitals reviewed        Vital Signs  ED Triage Vitals   Temperature Pulse Respirations Blood Pressure SpO2   06/18/18 2222 06/18/18 2222 06/18/18 2222 06/18/18 2222 06/18/18 2222   99 1 °F (37 3 °C) 86 16 150/78 97 %      Temp Source Heart Rate Source Patient Position - Orthostatic VS BP Location FiO2 (%)   06/18/18 2222 06/18/18 2222 06/18/18 2222 06/19/18 0000 --   Oral Monitor Lying Right arm       Pain Score       06/18/18 2315       4           Vitals:    06/18/18 2222 06/19/18 0000   BP: 150/78 129/68   Pulse: 86 82   Patient Position - Orthostatic VS: Lying Lying       Visual Acuity      ED Medications  Medications   sodium chloride 0 9 % bolus 1,000 mL (0 mL Intravenous Stopped 6/19/18 0010)   ketorolac (TORADOL) injection 30 mg (30 mg Intravenous Given 6/18/18 2238)   sodium chloride 0 9 % bolus 1,000 mL (0 mL Intravenous Stopped 6/19/18 0010)       Diagnostic Studies  Results Reviewed     Procedure Component Value Units Date/Time    D-dimer, quantitative [[id number]]  (Normal) Collected:  06/18/18 2232    Lab Status:  Final result Specimen:  Blood from Arm, Left Updated:  06/18/18 2317     D-Dimer, Quant <270 ng/ml (FEU)     Troponin I [[id number]]  (Normal) Collected:  06/18/18 2232    Lab Status:  Final result Specimen:  Blood from Arm, Left Updated:  06/18/18 2256     Troponin I <0 02 ng/mL     Comprehensive metabolic panel [[id number]]  (Abnormal) Collected:  06/18/18 2232    Lab Status:  Final result Specimen:  Blood from Arm, Left Updated: 06/18/18 2254     Sodium 135 (L) mmol/L      Potassium 3 5 mmol/L      Chloride 99 (L) mmol/L      CO2 25 mmol/L      Anion Gap 11 mmol/L      BUN 10 mg/dL      Creatinine 1 35 (H) mg/dL      Glucose 419 (H) mg/dL      Calcium 8 7 mg/dL      AST 14 U/L      ALT 31 U/L      Alkaline Phosphatase 74 U/L      Total Protein 7 2 g/dL      Albumin 3 6 g/dL      Total Bilirubin 0 47 mg/dL      eGFR 64 ml/min/1 73sq m     Narrative:         National Kidney Disease Education Program recommendations are as follows:  GFR calculation is accurate only with a steady state creatinine  Chronic Kidney disease less than 60 ml/min/1 73 sq  meters  Kidney failure less than 15 ml/min/1 73 sq  meters  CBC and differential [[id number]] Collected:  06/18/18 2232    Lab Status:  Final result Specimen:  Blood from Arm, Left Updated:  06/18/18 2239     WBC 9 24 Thousand/uL      RBC 4 76 Million/uL      Hemoglobin 13 4 g/dL      Hematocrit 39 1 %      MCV 82 fL      MCH 28 2 pg      MCHC 34 3 g/dL      RDW 13 1 %      MPV 10 2 fL      Platelets 857 Thousands/uL      nRBC 0 /100 WBCs      Neutrophils Relative 50 %      Lymphocytes Relative 42 %      Monocytes Relative 7 %      Eosinophils Relative 1 %      Basophils Relative 0 %      Neutrophils Absolute 4 59 Thousands/µL      Lymphocytes Absolute 3 84 Thousands/µL      Monocytes Absolute 0 67 Thousand/µL      Eosinophils Absolute 0 10 Thousand/µL      Basophils Absolute 0 04 Thousands/µL                  X-ray chest 2 views    (Results Pending)              Procedures  Procedures       Phone Contacts  ED Phone Contact    ED Course  ED Course as of Jun 19 0204 Mon Jun 18, 2018   2227 Pt seen and examined  38 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 - worked from [facility] - [phone] today and it was extremely hot due to very high humidity outside, then went to second job as  man for [employer] and around 1600 noted some L sided chest discomfort that has persisted with mild SOB  A few hours ago noted it radiated from L ACW to L anterior shoulder/upper arm  Will check cardiac labs, ddimer, give IVF, toradol for what appears to be reproducible CP      2251 CBC WNL  2301 Na 135, creaT 1 35, glucose 419  2nd L IVF ordered  2310 Chest xray reviewed and WNL, labs ok - awaiting ddimer  2317 Ddimer neg, 2nd L IVF infusing  Pt pain free  Will d/c home after 2nd L IVF and promises to start checking blood sugars in am as he was doing and f/u with PCP  [facility]  [facility] Time    Disposition  Final diagnoses:   Left-sided chest wall pain   Hyperglycemia   Dehydration     Time reflects when diagnosis was documented in both MDM as applicable and the Disposition within this note     Time User Action Codes Description Comment    6/18/2018 11:18 PM Kenny Patient Add [R07 89] Left-sided chest wall pain     6/18/2018 11:18 PM Kenny Patient Add [R73 9] Hyperglycemia     6/18/2018 11:18 PM Kenny Patient Add [E86 0] Dehydration       ED Disposition     ED Disposition Condition Comment    Discharge  [place] discharge to home/self care      Condition at discharge: Good        Follow-up Information     Follow up With Specialties Details Why [address] Hasmukh Garrett MD Family Medicine Call  [address]  [phone]            Discharge Medication List as of 6/18/2018 11:19 PM      START taking these medications    Details   naproxen (NAPROSYN) 500 mg tablet Take 1 tablet (500 mg total) by mouth 2 (two) times a day as needed for mild pain or moderate pain for up to 10 days, Starting Mon 6/18/2018, Until u 6/28/2018, Print         CONTINUE these medications which have NOT CHANGED    Details   metFORMIN (GLUCOPHAGE) 500 mg tablet Take 500 mg by mouth 2 (two) times a day with meals, Until Discontinued, Historical Med      sitaGLIPtin (JANUVIA) 100 mg tablet Take 100 mg by mouth daily, Until Discontinued, Historical Med No discharge procedures on file      ED Provider  Electronically Signed by           Lori Dunlap DO  06/19/18 4025

## 2018-07-16 ENCOUNTER — HOSPITAL ENCOUNTER (EMERGENCY)
Facility: HOSPITAL | Age: 43
Discharge: HOME | End: 2018-07-16
Attending: EMERGENCY MEDICINE
Payer: COMMERCIAL

## 2018-07-16 VITALS
WEIGHT: 218.06 LBS | TEMPERATURE: 96.1 F | RESPIRATION RATE: 18 BRPM | HEART RATE: 60 BPM | OXYGEN SATURATION: 100 % | DIASTOLIC BLOOD PRESSURE: 78 MMHG | SYSTOLIC BLOOD PRESSURE: 135 MMHG

## 2018-07-16 DIAGNOSIS — S33.5XXA LUMBAR SPRAIN, INITIAL ENCOUNTER: Primary | ICD-10-CM

## 2018-07-16 PROCEDURE — 99283 EMERGENCY DEPT VISIT LOW MDM: CPT

## 2018-07-16 RX ORDER — METHOCARBAMOL 500 MG/1
500 TABLET, FILM COATED ORAL 2 TIMES DAILY
Qty: 20 TABLET | Refills: 0 | Status: SHIPPED | OUTPATIENT
Start: 2018-07-16 | End: 2021-12-21 | Stop reason: SDUPTHER

## 2018-07-16 RX ORDER — IBUPROFEN 600 MG/1
600 TABLET ORAL EVERY 6 HOURS PRN
Qty: 30 TABLET | Refills: 0 | Status: SHIPPED | OUTPATIENT
Start: 2018-07-16 | End: 2018-09-10 | Stop reason: ALTCHOICE

## 2018-07-16 NOTE — DISCHARGE INSTRUCTIONS
Acute Low Back Pain, Ambulatory Care   GENERAL INFORMATION:   Acute low back pain  is discomfort in your lower back area that lasts for less than 12 weeks  The word acute is used to describe pain that starts suddenly, worsens quickly, and lasts for a short time  Common symptoms include the following:   · Back stiffness or spasms    · Pain down the back or side of one leg    · Holding yourself in an unusual position or posture to decrease your back pain    · Not being able to find a sitting position that is comfortable    · Slow increase in your pain for 24 to 48 hours after you stress your back    · Tenderness on your lower back or severe pain when you move your back  Seek immediate care for the following symptoms:   · Severe pain    · Sudden stiffness and heaviness in both buttocks down to both legs    · Numbness or weakness in one leg, or pain in both legs    · Numbness in your genital area or across your lower back    · Unable to control your urine or bowel movements  Treatment for acute low back pain  may include any of the following:  · Medicines:      ¨ NSAIDs  help decrease swelling and pain or fever  This medicine is available with or without a doctor's order  NSAIDs can cause stomach bleeding or kidney problems in certain people  If you take blood thinner medicine, always ask your healthcare provider if NSAIDs are safe for you  Always read the medicine label and follow directions  ¨ Muscle relaxers  help decrease muscle spasms pain  ¨ Prescription pain medicine  may be given  Ask how to take this medicine safely  · Surgery  may be needed if your pain is severe and other treatments do not work  Surgery may be needed for conditions of the lumbar spine, such as herniated disc or spinal stenosis  Manage your symptoms:   · Sleep on a firm mattress  If you do not have a firm mattress, have someone move your mattress to the floor for a few days   A piece of plywood under your mattress can also help make it firmer  · Apply ice  on your lower back for 15 to 20 minutes every hour or as directed  Use an ice pack, or put crushed ice in a plastic bag  Cover it with a towel  Ice helps prevent tissue damage and decreases swelling and pain  You can alternate ice and heat  · Apply heat  on your lower back for 20 to 30 minutes every 2 hours for as many days as directed  Heat helps decrease pain and muscle spasms  · Go to physical therapy  A physical therapist teaches you exercises to help improve movement and strength, and to decrease pain  Prevent acute low back pain:   · Use proper body mechanics  ¨ Bend at the hips and knees when you  objects  Do not bend from the waist  Use your leg muscles as you lift the load  Do not use your back  Keep the object close to your chest as you lift it  Try not to twist or lift anything above your waist     ¨ Change your position often when you stand for long periods of time  Rest one foot on a small box or footrest, and then switch to the other foot often  ¨ Try not to sit for long periods of time  When you do, sit in a straight-backed chair with your feet flat on the floor  Never reach, pull, or push while you are sitting  · Exercise regularly  Warm up before you exercise  Do exercises that strengthen your back muscles  Ask about the best exercise plan for you  · Maintain a healthy weight  Ask your healthcare provider how much you should weigh  Ask him to help you create a weight loss plan if you are overweight  Follow up with your healthcare provider as directed:  Return for a follow-up visit if you still have pain after 1 to 3 weeks of treatment  You may need to visit an orthopedist if your back pain lasts more than 6 to 12 weeks  Write down your questions so you remember to ask them during your visits  CARE AGREEMENT:   You have the right to help plan your care  Learn about your health condition and how it may be treated   Discuss treatment options with your caregivers to decide what care you want to receive  You always have the right to refuse treatment  The above information is an  only  It is not intended as medical advice for individual conditions or treatments  Talk to your doctor, nurse or pharmacist before following any medical regimen to see if it is safe and effective for you  © 2014 [address] is for End User's use only and may not be sold, redistributed or otherwise used for commercial purposes  All illustrations and images included in CareNotes® are the copyrighted property of A D A M , Inc  or Yomi Gorman

## 2018-07-16 NOTE — ED PROVIDER NOTES
History  Chief Complaint   Patient presents with    Back Pain     I started with right lower back pain around 5 PM yesterday; no injury; does not radiate; I do not lift heavy objects but do work 2 jobs  No previous history of back pain  History provided by:  Patient   used: No    Medical Problem   Location:  Pt with right low back pain  Severity:  Mild  Duration:  3 days  Timing:  Intermittent  Associated symptoms: no abdominal pain, no chest pain, no congestion, no cough, no diarrhea, no ear pain, no fatigue, no fever, no headaches, no loss of consciousness, no myalgias, no nausea, no rash, no rhinorrhea, no shortness of breath, no sore throat, no vomiting and no wheezing        Prior to Admission Medications   Prescriptions Last Dose Informant Patient Reported? Taking?   metFORMIN (GLUCOPHAGE) 1000 MG tablet today  Yes Yes   Sig: Take 1,000 mg by mouth 2 (two) times a day with meals   sitaGLIPtin (JANUVIA) 100 mg tablet today  Yes Yes   Sig: Take 100 mg by mouth daily      Facility-Administered Medications: None       Past Medical History:   Diagnosis Date    [facility]     Type II    Herpes        History reviewed  No pertinent surgical history  History reviewed  No pertinent family history  I have reviewed and agree with the history as documented  Social History   Substance Use Topics    Smoking status: Never Smoker    Smokeless tobacco: Never Used    Alcohol use No        Review of Systems   Constitutional: Negative  Negative for fatigue and fever  HENT: Negative  Negative for congestion, ear pain, rhinorrhea and sore throat  Eyes: Negative  Respiratory: Negative  Negative for cough, shortness of breath and wheezing  Cardiovascular: Negative  Negative for chest pain  Gastrointestinal: Negative  Negative for abdominal pain, diarrhea, nausea and vomiting  Endocrine: Negative  Genitourinary: Negative      Musculoskeletal: Positive for back pain  Negative for myalgias  Skin: Negative  Negative for rash  Allergic/Immunologic: Negative  Neurological: Negative  Negative for loss of consciousness and headaches  Hematological: Negative  Psychiatric/Behavioral: Negative  All other systems reviewed and are negative  Physical Exam  Physical Exam   Constitutional: He appears well-developed and well-nourished  HENT:   Head: Normocephalic and atraumatic  Right Ear: External ear normal    Left Ear: External ear normal    Nose: Nose normal    Mouth/Throat: Oropharynx is clear and moist    Eyes: Conjunctivae and EOM are normal  Pupils are equal, round, and reactive to light  Neck: Normal range of motion  Neck supple  Cardiovascular: Normal rate, regular rhythm and normal heart sounds  Pulmonary/Chest: Effort normal and breath sounds normal    Abdominal: Soft  Bowel sounds are normal    Musculoskeletal: Normal range of motion  Right paraspinal tender  No sciatic notch pain dtr wnl great toe ext stong no paresthesias dp pulses strong bilat    Neurological: He is alert  Skin: Skin is warm  Psychiatric: He has a normal mood and affect  His behavior is normal  Judgment and thought content normal    Nursing note and vitals reviewed        Vital Signs  ED Triage Vitals [07/16/18 0701]   Temperature Pulse Respirations Blood Pressure SpO2   (!) 96 1 °F (35 6 °C) 60 18 135/78 100 %      Temp Source Heart Rate Source Patient Position - Orthostatic VS BP Location FiO2 (%)   Temporal Monitor Sitting Left arm --      Pain Score       9           Vitals:    07/16/18 0701   BP: 135/78   Pulse: 60   Patient Position - Orthostatic VS: Sitting       Visual Acuity      ED Medications  Medications - No data to display    Diagnostic Studies  Results Reviewed     None                 No orders to display              Procedures  Procedures       Phone Contacts  ED Phone Contact    ED Course                               MAKENNA Wise Time    Disposition  Final diagnoses:   Lumbar sprain, initial encounter     Time reflects when diagnosis was documented in both MDM as applicable and the Disposition within this note     Time User Action Codes Description Comment    7/16/2018  7:20 AM Nyal Griffin [L54  5XXA] Lumbar sprain, initial encounter       ED Disposition     ED Disposition Condition Comment    Discharge  [place] discharge to home/self care  Condition at discharge: Good        Follow-up Information     Follow up With Specialties Details Why [address], [address] Medicine Schedule an appointment as soon as possible for a visit  [address] [place] [address] 305, [address] [place] [id number] [phone]          Discharge Medication List as of 7/16/2018  7:23 AM      START taking these medications    Details   ibuprofen (MOTRIN) 600 mg tablet Take 1 tablet (600 mg total) by mouth every 6 (six) hours as needed (pain), Starting Mon 7/16/2018, Print      methocarbamol (ROBAXIN) 500 mg tablet Take 1 tablet (500 mg total) by mouth 2 (two) times a day, Starting Mon 7/16/2018, Print         CONTINUE these medications which have NOT CHANGED    Details   metFORMIN (GLUCOPHAGE) 1000 MG tablet Take 1,000 mg by mouth 2 (two) times a day with meals, Historical Med      sitaGLIPtin (JANUVIA) 100 mg tablet Take 100 mg by mouth daily, Until Discontinued, Historical Med           No discharge procedures on file      ED Provider  Electronically Signed by           Sujey Hdez PA-C  07/16/18 199 [facility] Sugey Asnari PA-C  07/16/18 3713

## 2018-07-30 ENCOUNTER — HOSPITAL ENCOUNTER (EMERGENCY)
Facility: HOSPITAL | Age: 43
Discharge: HOME | End: 2018-07-31
Attending: EMERGENCY MEDICINE
Payer: COMMERCIAL

## 2018-07-30 VITALS
DIASTOLIC BLOOD PRESSURE: 74 MMHG | RESPIRATION RATE: 16 BRPM | WEIGHT: 217.4 LBS | TEMPERATURE: 99.8 F | SYSTOLIC BLOOD PRESSURE: 114 MMHG | HEART RATE: 96 BPM | OXYGEN SATURATION: 98 %

## 2018-07-30 DIAGNOSIS — J02.9 PHARYNGITIS, UNSPECIFIED ETIOLOGY: Primary | ICD-10-CM

## 2018-07-30 DIAGNOSIS — R50.9 FEVER: ICD-10-CM

## 2018-07-30 LAB — S PYO AG THROAT QL: NEGATIVE

## 2018-07-30 PROCEDURE — 87430 STREP A AG IA: CPT | Performed by: NURSE PRACTITIONER

## 2018-07-30 RX ORDER — IBUPROFEN 400 MG/1
400 TABLET ORAL ONCE
Status: COMPLETED | OUTPATIENT
Start: 2018-07-30 | End: 2018-07-30

## 2018-07-30 RX ADMIN — IBUPROFEN 400 MG: 400 TABLET ORAL at 22:48

## 2018-07-31 PROCEDURE — 99283 EMERGENCY DEPT VISIT LOW MDM: CPT

## 2018-07-31 NOTE — ED PROVIDER NOTES
History  Chief Complaint   Patient presents with    Sore Throat     sore throat x2dasy  chills, HA started 5hrs ago     Patient is a 49-year-old male presenting to the emergency department complaining of sore throat, headache, fatigue, muscle aches, fever and chills  Patient states the symptoms started approximately 5:00 p m  today and progressively became worse  Patient states very painful swallowing  He denies any chest pain or shortness of breath  No cough  No abdominal pain  No nausea, vomiting, diarrhea  He reports his 9year-old daughter also has similar symptoms  Prior to Admission Medications   Prescriptions Last Dose Informant Patient Reported? Taking?   ibuprofen (MOTRIN) 600 mg tablet   No No   Sig: Take 1 tablet (600 mg total) by mouth every 6 (six) hours as needed (pain)   metFORMIN (GLUCOPHAGE) 1000 MG tablet   Yes No   Sig: Take 1,000 mg by mouth 2 (two) times a day with meals   methocarbamol (ROBAXIN) 500 mg tablet   No No   Sig: Take 1 tablet (500 mg total) by mouth 2 (two) times a day   sitaGLIPtin (JANUVIA) 100 mg tablet   Yes No   Sig: Take 100 mg by mouth daily      Facility-Administered Medications: None       Past Medical History:   Diagnosis Date    Diabetes mellitus ([facility] [address] )     Type II    Herpes        Past Surgical History:   Procedure Laterality Date    NO PAST SURGERIES         History reviewed  No pertinent family history  I have reviewed and agree with the history as documented  Social History   Substance Use Topics    Smoking status: Never Smoker    Smokeless tobacco: Never Used    Alcohol use No        Review of Systems   Constitutional: Positive for chills, fatigue and fever  Negative for appetite change  HENT: Positive for ear pain, sore throat and trouble swallowing  Negative for congestion, drooling, ear discharge, mouth sores, postnasal drip, rhinorrhea and voice change  Eyes: Negative for pain, discharge, redness and itching     Respiratory: Negative for cough, chest tightness, shortness of breath and wheezing  Cardiovascular: Negative for chest pain and palpitations  Gastrointestinal: Negative for constipation, diarrhea, nausea and vomiting  Genitourinary: Negative for dysuria  Musculoskeletal: Positive for arthralgias and myalgias  Negative for joint swelling, neck pain and neck stiffness  Skin: Negative for rash  Neurological: Negative for dizziness, seizures, syncope, weakness, light-headedness and headaches  Hematological: Negative for adenopathy  Does not bruise/bleed easily  Physical Exam  Physical Exam   Constitutional: He is oriented to person, place, and time  He appears well-developed and well-nourished  No distress  HENT:   Right Ear: Tympanic membrane, external ear and ear canal normal  Tympanic membrane is not injected, not erythematous and not bulging  Left Ear: Tympanic membrane, external ear and ear canal normal  Tympanic membrane is not injected, not erythematous and not bulging  Nose: Nose normal    Mouth/Throat: Uvula is midline and mucous membranes are normal  No trismus in the jaw  No uvula swelling  Posterior oropharyngeal erythema present  No oropharyngeal exudate, posterior oropharyngeal edema or tonsillar abscesses  Tonsils are 2+ on the right  Tonsils are 2+ on the left  No tonsillar exudate  Eyes: Conjunctivae are normal    Neck: Normal range of motion  Neck supple  Cardiovascular: Normal rate and regular rhythm  Pulmonary/Chest: Effort normal and breath sounds normal  No respiratory distress  Abdominal: Soft  Bowel sounds are normal  There is no tenderness  Lymphadenopathy:     He has no cervical adenopathy  Neurological: He is alert and oriented to person, place, and time  Skin: Skin is warm and dry  No rash noted  Psychiatric: He has a normal mood and affect  Nursing note and vitals reviewed        Vital Signs  ED Triage Vitals   Temperature Pulse Respirations Blood Pressure SpO2   07/30/18 2231 07/30/18 2231 07/30/18 2231 07/30/18 2231 07/30/18 2231   (!) 102 2 °F (39 °C) 90 18 114/74 97 %      Temp Source Heart Rate Source Patient Position - Orthostatic VS BP Location FiO2 (%)   07/30/18 2231 07/30/18 2333 07/30/18 2231 07/30/18 2231 --   Temporal Monitor Sitting Right arm       Pain Score       07/30/18 2231       Worst Possible Pain           Vitals:    07/30/18 2231 07/30/18 2333   BP: 114/74    Pulse: 90 96   Patient Position - Orthostatic VS: Sitting        Visual Acuity      ED Medications  Medications   ibuprofen (MOTRIN) tablet 400 mg (400 mg Oral Given 7/30/18 2248)       Diagnostic Studies  Results Reviewed     Procedure Component Value Units Date/Time    Rapid Strep A Screen Only, Adults [[id number]]  (Normal) Collected:  07/30/18 2251    Lab Status:  Final result Specimen:  Throat from Throat Updated:  07/30/18 2326     Rapid Strep A Screen Negative                 No orders to display              Procedures  Procedures       Phone Contacts  ED Phone Contact    ED Course  ED Course as of Jul 31 0007 Mon Jul 30, 2018   2356 Patient states feeling much better after receiving ibuprofen and resting  States his sore throat has reduced from a 10/10 to a 5/10, headache has improved, muscle aches have improved  Fever has decreased from 102 2-99 8  Patient is tolerating oral liquids without any difficulty  Patient continues report no difficulty breathing, chest discomfort, or abdominal pain/discomfort  Continues to deny nausea  Discussed negative group a strep result  Discussed likely cause of symptoms is viral in nature  Patient was instructed to use ibuprofen 600 mg up to 3 times daily with food for fevers and complaints of pain  Instructed to gargle with warm salt water, drink decaffeinated tea with lemon and honey  Patient was instructed to follow up with primary care provider as needed    Instructed to return to the ER if he develops difficulty swallowing, changes in voice, fevers above 102, or any other concerning symptom  Patient verbalized understanding of instructions  He agrees with discharge at this time  MDM  Number of Diagnoses or Management Options  Diagnosis management comments: A differential diagnosis would include, but was not limited to: viral infection, viral pharyngitis, strep pharyngitis, tonsillitis, influenza, otitis media, serous otitis  Pt's symptoms are consistent with viral phayrngitis  No evidence for more malignant etiology at this time  Amount and/or Complexity of Data Reviewed  Clinical lab tests: ordered and reviewed    Risk of Complications, Morbidity, and/or Mortality  Presenting problems: low  Diagnostic procedures: low  Management options: low    Patient Progress  Patient progress: stable    CritCare Time    Disposition  Final diagnoses:   Pharyngitis, unspecified etiology   Fever     Time reflects when diagnosis was documented in both MDM as applicable and the Disposition within this note     Time User Action Codes Description Comment    7/31/2018 12:02 AM Verl Liming Add [J02 9] Pharyngitis, unspecified etiology     7/31/2018 12:02 AM Verl Liming Add [R50 9] Fever       ED Disposition     ED Disposition Condition Comment    Discharge  Violet Narrow discharge to home/self care  Condition at discharge: Stable        Follow-up Information     Follow up With Specialties Details Why Contact Info Additional [address] [facility], [place] Family Medicine  As needed [address]  [address] Emergency Department Emergency Medicine  As needed, If symptoms worsen [address] [facility]  [phone] [place] ED, [address], [place], [address]          Patient's Medications   Discharge Prescriptions    No medications on file     No discharge procedures on file      ED Provider  Electronically Signed by           REDD Velez  07/31/18 5394

## 2018-07-31 NOTE — DISCHARGE INSTRUCTIONS
Ibuprofen (Advil or Motrin) - take up to 600 mg (3 tablets of over-the-counter Ibuprofen), up to 3 times daily (approx  every 8 hours), with food as needed for fever/chills, muslce aches/pain, and sore throat  Gargle with warm salt water  Drink decaffeinated tea with lemon and honey  Get plenty of rest  Drink plenty of clear fluids to keep yourself well hydrated  Pharyngitis   WHAT YOU NEED TO KNOW:   Pharyngitis, or sore throat, is inflammation of the tissues and structures in your pharynx (throat)  Pharyngitis is most often caused by bacteria  It may also be caused by a cold or flu virus  Other causes include smoking, allergies, or acid reflux  DISCHARGE INSTRUCTIONS:   Call 911 for any of the following:   · You have trouble breathing or swallowing because your throat is swollen or sore  Return to the emergency department if:   · You are drooling because it hurts too much to swallow  · Your fever is higher than 102? F (39?C) or lasts longer than 3 days  · You are confused  · You taste blood in your throat  Contact your healthcare provider if:   · Your throat pain gets worse  · You have a painful lump in your throat that does not go away after 5 days  · Your symptoms do not improve after 5 days  · You have questions or concerns about your condition or care  Medicines:  Viral pharyngitis will go away on its own without treatment  Your sore throat should start to feel better in 3 to 5 days for both viral and bacterial infections  You may need any of the following:  · Antibiotics  treat a bacterial infection  · NSAIDs , such as ibuprofen, help decrease swelling, pain, and fever  NSAIDs can cause stomach bleeding or kidney problems in certain people  If you take blood thinner medicine, always ask your healthcare provider if NSAIDs are safe for you  Always read the medicine label and follow directions  · Acetaminophen  decreases pain and fever   It is available without a doctor's order  Ask how much to take and how often to take it  Follow directions  Acetaminophen can cause liver damage if not taken correctly  · Take your medicine as directed  Contact your healthcare provider if you think your medicine is not helping or if you have side effects  Tell him or her if you are allergic to any medicine  Keep a list of the medicines, vitamins, and herbs you take  Include the amounts, and when and why you take them  Bring the list or the pill bottles to follow-up visits  Carry your medicine list with you in case of an emergency  Manage your symptoms:   · Gargle salt water  Mix ¼ teaspoon salt in an 8 ounce glass of warm water and gargle  This may help decrease swelling in your throat  · Drink liquids as directed  You may need to drink more liquids than usual  Liquids may help soothe your throat and prevent dehydration  Ask how much liquid to drink each day and which liquids are best for you  · Use a cool-steam humidifier  to help moisten the air in your room and calm your cough  · Soothe your throat  with cough drops, ice, soft foods, or popsicles  Prevent the spread of pharyngitis:  Cover your mouth and nose when you cough or sneeze  Do not share food or drinks  Wash your hands often  Use soap and water  If soap and water are unavailable, use an alcohol based hand   Follow up with your healthcare provider as directed:  Write down your questions so you remember to ask them during your visits  © 2017 [address] Information is for End User's use only and may not be sold, redistributed or otherwise used for commercial purposes  All illustrations and images included in CareNotes® are the copyrighted property of [employer] , [employer]  or Yomi Gorman  The above information is an  only  It is not intended as medical advice for individual conditions or treatments   Talk to your doctor, nurse or pharmacist before following any medical regimen to see if it is safe and effective for you  Fever in Adults   WHAT YOU NEED TO KNOW:   A fever is an increase in your body temperature  Normal body temperature is 98 6°F (37°C)  Fever is generally defined as greater than 100 4°F (38°C)  Common causes include an infection, injury, or disease such as arthritis  DISCHARGE INSTRUCTIONS:   Return to the emergency department if:   · Your fever does not go away or gets worse even after treatment  · You have a stiff neck and a bad headache  · You are confused  You may not be able to think clearly or remember things like you normally do  · Your heart beats faster than usual even after treatment  · You have shortness of breath or chest pain when you breathe  · You urinate small amounts or not at all  · Your skin, lips, or nails turn blue  Contact your healthcare provider if:   · You have abdominal pain or you feel bloated  · You have nausea or are vomiting  · You have pain or burning when you urinate, or you have pain in your back  · You have questions or concerns about your condition or care  Medicines: You may need any of the following:  · NSAIDs , such as ibuprofen, help decrease swelling, pain, and fever  This medicine is available with or without a doctor's order  NSAIDs can cause stomach bleeding or kidney problems in certain people  If you take blood thinner medicine, always ask if NSAIDs are safe for you  Always read the medicine label and follow directions  Do not give these medicines to children under 10months of age without direction from your child's healthcare provider  · Acetaminophen  decreases pain and fever  It is available without a doctor's order  Ask how much to take and how often to take it  Follow directions  Read the labels of all other medicines you are using to see if they also contain acetaminophen, or ask your doctor or pharmacist  Acetaminophen can cause liver damage if not taken correctly   Do not use more than 4 grams (4,000 milligrams) total of acetaminophen in one day  · Antibiotics  may be given if you have an infection caused by bacteria  · Take your medicine as directed  Contact your healthcare provider if you think your medicine is not helping or if you have side effects  Tell him of her if you are allergic to any medicine  Keep a list of the medicines, vitamins, and herbs you take  Include the amounts, and when and why you take them  Bring the list or the pill bottles to follow-up visits  Carry your medicine list with you in case of an emergency  Follow up with your healthcare provider as directed:  Write down your questions so you remember to ask them during your visits  Self-care:   · Drink more liquids as directed  A fever makes you sweat  This can increase your risk for dehydration  Liquids can help prevent dehydration  ¨ Drink at least 6 to 8 eight-ounce cups of clear liquids each day  Drink water, juice, or broth  Do not drink sports drinks  They may contain caffeine  ¨ Ask your healthcare provider if you should drink an oral rehydration solution (ORS)  An ORS has the right amounts of water, salts, and sugar you need to replace body fluids  · Dress in lightweight clothes  Shivers may be a sign that your fever is rising  Do not put extra blankets or clothes on  This may cause your fever to rise even higher  Dress in light, comfortable clothing  Use a lightweight blanket or sheet when you sleep  Change your clothes, blanket, or sheets if they get wet  · Cool yourself safely  Take a bath in cool or lukewarm water  Use an ice pack wrapped in a small towel or wet a washcloth with cool water  Place the ice pack or wet washcloth on your forehead or the back of your neck  © 2017 [facility] Information is for End User's use only and may not be sold, redistributed or otherwise used for commercial purposes   All illustrations and images included in CareNotes® are the copyrighted property of [employer]  or Yomi Gorman  The above information is an  only  It is not intended as medical advice for individual conditions or treatments  Talk to your doctor, nurse or pharmacist before following any medical regimen to see if it is safe and effective for you

## 2018-09-10 ENCOUNTER — APPOINTMENT (EMERGENCY)
Dept: RADIOLOGY | Facility: HOSPITAL | Age: 43
End: 2018-09-10
Payer: COMMERCIAL

## 2018-09-10 ENCOUNTER — HOSPITAL ENCOUNTER (EMERGENCY)
Facility: HOSPITAL | Age: 43
Discharge: HOME | End: 2018-09-10
Attending: EMERGENCY MEDICINE | Admitting: EMERGENCY MEDICINE
Payer: COMMERCIAL

## 2018-09-10 VITALS
WEIGHT: 210 LBS | DIASTOLIC BLOOD PRESSURE: 68 MMHG | RESPIRATION RATE: 17 BRPM | HEART RATE: 68 BPM | SYSTOLIC BLOOD PRESSURE: 118 MMHG | OXYGEN SATURATION: 97 %

## 2018-09-10 DIAGNOSIS — R07.89 CHEST WALL PAIN: Primary | ICD-10-CM

## 2018-09-10 LAB
ANION GAP BLD CALC-SCNC: 20 MMOL/L (ref 4–13)
BUN BLD-MCNC: 12 MG/DL (ref 5–25)
CA-I BLD-SCNC: 1.14 MMOL/L (ref 1.12–1.32)
CHLORIDE BLD-SCNC: 98 MMOL/L (ref 100–108)
CREAT BLD-MCNC: 0.5 MG/DL (ref 0.6–1.3)
GFR SERPL CREATININE-BSD FRML MDRD: 133 ML/MIN/1.73SQ M
GLUCOSE SERPL-MCNC: 269 MG/DL (ref 65–140)
HCT VFR BLD CALC: 46 % (ref 36.5–49.3)
HGB BLDA-MCNC: 15.6 G/DL (ref 12–17)
PCO2 BLD: 26 MMOL/L (ref 21–32)
POTASSIUM BLD-SCNC: 4.2 MMOL/L (ref 3.5–5.3)
SODIUM BLD-SCNC: 139 MMOL/L (ref 136–145)
SPECIMEN SOURCE: ABNORMAL
TROPONIN I SERPL-MCNC: <0.02 NG/ML

## 2018-09-10 PROCEDURE — 80047 BASIC METABLC PNL IONIZED CA: CPT

## 2018-09-10 PROCEDURE — 84484 ASSAY OF TROPONIN QUANT: CPT | Performed by: EMERGENCY MEDICINE

## 2018-09-10 PROCEDURE — 96374 THER/PROPH/DIAG INJ IV PUSH: CPT

## 2018-09-10 PROCEDURE — 93005 ELECTROCARDIOGRAM TRACING: CPT

## 2018-09-10 PROCEDURE — 36415 COLL VENOUS BLD VENIPUNCTURE: CPT | Performed by: EMERGENCY MEDICINE

## 2018-09-10 PROCEDURE — 85014 HEMATOCRIT: CPT

## 2018-09-10 PROCEDURE — 99285 EMERGENCY DEPT VISIT HI MDM: CPT

## 2018-09-10 PROCEDURE — 71046 X-RAY EXAM CHEST 2 VIEWS: CPT

## 2018-09-10 RX ORDER — NAPROXEN 500 MG/1
500 TABLET ORAL EVERY 12 HOURS PRN
Qty: 15 TABLET | Refills: 0 | Status: SHIPPED | OUTPATIENT
Start: 2018-09-10 | End: 2021-12-21 | Stop reason: HOSPADM

## 2018-09-10 RX ORDER — KETOROLAC TROMETHAMINE 30 MG/ML
30 INJECTION, SOLUTION INTRAMUSCULAR; INTRAVENOUS ONCE
Status: COMPLETED | OUTPATIENT
Start: 2018-09-10 | End: 2018-09-10

## 2018-09-10 RX ADMIN — KETOROLAC TROMETHAMINE 30 MG: 30 INJECTION, SOLUTION INTRAMUSCULAR at 07:34

## 2018-09-10 NOTE — DISCHARGE INSTRUCTIONS
Chest Pain   WHAT YOU NEED TO KNOW:   Chest pain can be caused by a range of conditions, from not serious to life-threatening  Chest pain can be a symptom of a digestive problem, such as acid reflux or a stomach ulcer  An anxiety attack or a strong emotion, such as anger, can also cause chest pain  Infection, inflammation, or a fracture in the bones or cartilage in your chest can cause pain or discomfort  Sometimes chest pain or pressure is caused by poor blood flow to your heart (angina)  Chest pain may also be caused by life-threatening conditions such as a heart attack or blood clot in your lungs  DISCHARGE INSTRUCTIONS:   Call 911 if:   · You have any of the following signs of a heart attack:      ¨ Squeezing, pressure, or pain in your chest that lasts longer than 5 minutes or returns    ¨ Discomfort or pain in your back, neck, jaw, stomach, or arm     ¨ Trouble breathing    ¨ Nausea or vomiting    ¨ Lightheadedness or a sudden cold sweat, especially with chest pain or trouble breathing    Return to the emergency department if:   · You have chest discomfort that gets worse, even with medicine  · You cough or vomit blood  · Your bowel movements are black or bloody  · You cannot stop vomiting, or it hurts to swallow  Contact your healthcare provider if:   · You have questions or concerns about your condition or care  Medicines:   · Medicines  may be given to treat the cause of your chest pain  Examples include pain medicine, anxiety medicine, or medicines to increase blood flow to your heart  · Do not take certain medicines without asking your healthcare provider first   These include NSAIDs, herbal or vitamin supplements, or hormones (estrogen or progestin)  · Take your medicine as directed  Contact your healthcare provider if you think your medicine is not helping or if you have side effects  Tell him or her if you are allergic to any medicine   Keep a list of the medicines, vitamins, and herbs you take  Include the amounts, and when and why you take them  Bring the list or the pill bottles to follow-up visits  Carry your medicine list with you in case of an emergency  Follow up with your healthcare provider within 72 hours, or as directed: You may need to return for more tests to find the cause of your chest pain  You may be referred to a specialist, such as a cardiologist or gastroenterologist  Write down your questions so you remember to ask them during your visits  Healthy living tips: The following are general healthy guidelines  If your chest pain is caused by a heart problem, your healthcare provider will give you specific guidelines to follow  · Do not smoke  Nicotine and other chemicals in cigarettes and cigars can cause lung and heart damage  Ask your healthcare provider for information if you currently smoke and need help to quit  E-cigarettes or smokeless tobacco still contain nicotine  Talk to your healthcare provider before you use these products  · Eat a variety of healthy, low-fat foods  Healthy foods include fruits, vegetables, whole-grain breads, low-fat dairy products, beans, lean meats, and fish  Ask for more information about a heart healthy diet  · Ask about activity  Your healthcare provider will tell you which activities to limit or avoid  Ask when you can drive, return to work, and have sex  Ask about the best exercise plan for you  · Maintain a healthy weight  Ask your healthcare provider how much you should weigh  Ask him or her to help you create a weight loss plan if you are overweight  · Get the flu and pneumonia vaccines  All adults should get the influenza (flu) vaccine  Get it every year as soon as it becomes available  The pneumococcal vaccine is given to adults aged 72 years or older  The vaccine is given every 5 years to prevent pneumococcal disease, such as pneumonia    © 2017 [employer] Information is for End User's use only and may not be sold, redistributed or otherwise used for commercial purposes  All illustrations and images included in CareNotes® are the copyrighted property of [employer]  or Yomi Gorman  The above information is an  only  It is not intended as medical advice for individual conditions or treatments  Talk to your doctor, nurse or pharmacist before following any medical regimen to see if it is safe and effective for you

## 2018-09-10 NOTE — ED PROCEDURE NOTE
PROCEDURE  ECG 12 Lead Documentation  Date/Time: 9/10/2018 7:01 AM  Performed by: Shawna Ceballos  Authorized by: Shawna Ceballos     Indications / Diagnosis:  Chest pain  ECG reviewed by me, the ED Provider: yes    Patient location:  ED  Interpretation:     Interpretation: non-specific    Rate:     ECG rate assessment: normal    Rhythm:     Rhythm: sinus rhythm    Ectopy:     Ectopy: none    Conduction:     Conduction: normal    ST segments:     ST segments:  Normal  T waves:     T waves: inverted      Inverted:  III         Arnaldo Willams DO  09/10/18 8870

## 2018-09-10 NOTE — ED PROVIDER NOTES
History  Chief Complaint   Patient presents with    Chest Pain     approx 4 hrs ago started with left sided chest pain  Denies radiation  Reports intermittent shortness of breath  No meds PTA     60-year-old male with a history of diabetes presents to the emergency department with sharp left-sided chest pain that started this morning after he woke  He states the pain is sharp in fleeting only lasting a few seconds at a time  It does not radiate  No shortness of breath or diaphoresis  He states it is worse if he turns towards the left  He did not take anything for the discomfort  History provided by:  Patient   used: No    Chest Pain   Pain location:  L chest  Pain quality: sharp    Pain radiates to:  Does not radiate  Pain radiates to the back: no    Pain severity:  Unable to specify  Onset quality:  Gradual  Duration: Seconds  Timing:  Intermittent  Chronicity:  New  Context: movement and at rest    Context: not eating, not lifting, no stress and no trauma    Relieved by:  None tried  Worsened by:  Certain positions  Ineffective treatments:  None tried  Associated symptoms: no abdominal pain, no altered mental status, no anorexia, no anxiety, no back pain, no claudication, no cough, no diaphoresis, no dizziness, no dysphagia, no fatigue, no fever, no headache, no heartburn, no lower extremity edema, no nausea, no near-syncope, no numbness, no orthopnea, no palpitations, no PND, no shortness of breath, no syncope, not vomiting and no weakness    Risk factors: diabetes mellitus and male sex    Risk factors: no coronary artery disease, no high cholesterol, no hypertension, no immobilization, not obese, no prior DVT/PE, no smoking and no surgery        Prior to Admission Medications   Prescriptions Last Dose Informant Patient Reported?  Taking?   ibuprofen (MOTRIN) 600 mg tablet   No No   Sig: Take 1 tablet (600 mg total) by mouth every 6 (six) hours as needed (pain)   metFORMIN (GLUCOPHAGE) 1000 MG tablet   Yes No   Sig: Take 1,000 mg by mouth 2 (two) times a day with meals   methocarbamol (ROBAXIN) 500 mg tablet   No No   Sig: Take 1 tablet (500 mg total) by mouth 2 (two) times a day   sitaGLIPtin (JANUVIA) 100 mg tablet   Yes No   Sig: Take 100 mg by mouth daily      Facility-Administered Medications: None       Past Medical History:   Diagnosis Date    Diabetes mellitus ([facility] [address] )     Type II    Herpes        Past Surgical History:   Procedure Laterality Date    NO PAST SURGERIES         History reviewed  No pertinent family history  I have reviewed and agree with the history as documented  Social History   Substance Use Topics    Smoking status: Never Smoker    Smokeless tobacco: Never Used    Alcohol use No        Review of Systems   Constitutional: Negative  Negative for diaphoresis, fatigue and fever  HENT: Negative  Negative for trouble swallowing  Eyes: Negative  Respiratory: Negative  Negative for cough and shortness of breath  Cardiovascular: Positive for chest pain  Negative for palpitations, orthopnea, claudication, leg swelling, syncope, PND and near-syncope  Gastrointestinal: Negative  Negative for abdominal pain, anorexia, heartburn, nausea and vomiting  Genitourinary: Negative  Musculoskeletal: Negative for back pain and neck pain  Skin: Negative  Allergic/Immunologic: Negative  Neurological: Negative  Negative for dizziness, weakness, numbness and headaches  Hematological: Negative  Psychiatric/Behavioral: Negative  All other systems reviewed and are negative  Physical Exam  Physical Exam   Constitutional: He is oriented to person, place, and time  He appears well-developed and well-nourished  No distress  HENT:   Head: Normocephalic and atraumatic     Right Ear: External ear normal    Left Ear: External ear normal    Mouth/Throat: Oropharynx is clear and moist    Eyes: Conjunctivae and EOM are normal  Pupils are equal, round, and reactive to light  Neck: Normal range of motion  Neck supple  No JVD present  No thyromegaly present  Cardiovascular: Normal rate, regular rhythm and normal heart sounds  Pulmonary/Chest: Effort normal and breath sounds normal    Abdominal: Soft  Bowel sounds are normal  He exhibits no distension and no mass  There is no tenderness  No hernia  Musculoskeletal: Normal range of motion  He exhibits no edema, tenderness or deformity  Lymphadenopathy:     He has no cervical adenopathy  Neurological: He is alert and oriented to person, place, and time  He has normal strength and normal reflexes  He exhibits normal muscle tone  Skin: Skin is warm and dry  No rash noted  He is not diaphoretic  No erythema  No pallor  Psychiatric: He has a normal mood and affect  Nursing note and vitals reviewed        Vital Signs  ED Triage Vitals [09/10/18 0648]   Temp Pulse Respirations Blood Pressure SpO2   -- 72 18 169/92 99 %      Temp src Heart Rate Source Patient Position - Orthostatic VS BP Location FiO2 (%)   -- Monitor Sitting -- --      Pain Score       6           Vitals:    09/10/18 0648   BP: 169/92   Pulse: 72   Patient Position - Orthostatic VS: Sitting       Visual Acuity      ED Medications  Medications   ketorolac (TORADOL) injection 30 mg (30 mg Intravenous Given 9/10/18 0734)       Diagnostic Studies  Results Reviewed     Procedure Component Value Units Date/Time    Troponin I [[id number]]  (Normal) Collected:  09/10/18 0713    Lab Status:  Final result Specimen:  Blood from Arm, Right Updated:  09/10/18 0801     Troponin I <0 02 ng/mL     POCT Chem 8+ [[id number]]  (Abnormal) Collected:  09/10/18 0718    Lab Status:  Final result Updated:  09/10/18 0721     SODIUM, I-STAT 139 mmol/l      Potassium, i-STAT 4 2 mmol/L      Chloride, istat 98 (L) mmol/L      CO2, i-STAT 26 mmol/L      Anion Gap, Istat 20 (H) mmol/L      Calcium, Ionized i-STAT 1 14 mmol/L      BUN, I-STAT 12 mg/dl      Creatinine, i-STAT 0 5 (L) mg/dl      eGFR 133 ml/min/1 73sq m      Glucose, i-STAT 269 (H) mg/dl      Hct, i-STAT 46 %      Hgb, i-STAT 15 6 g/dl      Specimen Type VENOUS                 XR chest 2 views   ED Interpretation by Arianne Ny DO (87/19 [id number])   nad                 Procedures  Procedures       Phone Contacts  ED Phone Contact    ED Course         HEART Risk Score      Most Recent Value   History  0 Filed at: 09/10/2018 0713   ECG  1 Filed at: 09/10/2018 2950   Age  0 Filed at: 09/10/2018 9691   Risk Factors  1 Filed at: 09/10/2018 0713   Troponin  0 Filed at: 09/10/2018 0713   Heart Score Risk Calculator   History  0 Filed at: 09/10/2018 0713   ECG  1 Filed at: 09/10/2018 2814   Age  0 Filed at: 09/10/2018 0161   Risk Factors  1 Filed at: 09/10/2018 0713   Troponin  0 Filed at: 09/10/2018 9396   HEART Score  2 Filed at: 09/10/2018 3850   HEART Score  2 Filed at: 09/10/2018 8668                      Wells' Criteria for PE      Most Recent Value   Wells' Criteria for PE   Clinical signs and symptoms of DVT  0 Filed at: 09/10/2018 0683   PE is primary diagnosis or equally likely  0 Filed at: 09/10/2018 0713   HR >100  0 Filed at: 09/10/2018 0713   Immobilization at least 3 days or Surgery in the previous 4 weeks  0 Filed at: 09/10/2018 2802   Previous, objectively diagnosed PE or DVT  0 Filed at: 09/10/2018 0713   Hemoptysis  0 Filed at: 09/10/2018 3958   Malignancy with treatment within 6 months or palliative  0 Filed at: 09/10/2018 5190   Wells' Criteria Total  0 Filed at: 09/10/2018 5131            MDM  Number of Diagnoses or Management Options  Diagnosis management comments: 77-year-old male presents with intermittent, sharp left-sided chest pain that started this morning  No exertional component  No associated symptoms  On exam he appears well in no distress  His EKG is normal other than 1 isolated flipped T-wave in lead 3  He does have a history of diabetes    Will do troponin, but given the fact that the pain is sharp and fleeting this does not sound like acute coronary syndrome  This is most likely chest wall pain  Amount and/or Complexity of Data Reviewed  Clinical lab tests: ordered and reviewed  Tests in the radiology section of CPT®: ordered and reviewed  Independent visualization of images, tracings, or specimens: yes      CritCare Time    Disposition  Final diagnoses:   Chest wall pain     Time reflects when diagnosis was documented in both MDM as applicable and the Disposition within this note     Time User Action Codes Description Comment    9/10/2018  8:45 AM Sergio Dickey Add [R07 89] Chest wall pain       ED Disposition     ED Disposition Condition Comment    Discharge  Franc Warren discharge to home/self care  Condition at discharge: Good        Follow-up Information     Follow up With Specialties Details Why Duey Boxer, MD Family Medicine Schedule an appointment as soon as possible for a visit in 2 days  [address]  [phone]            Patient's Medications   Discharge Prescriptions    NAPROXEN (NAPROSYN) 500 MG TABLET    Take 1 tablet (500 mg total) by mouth every 12 (twelve) hours as needed for mild pain or moderate pain       Start Date: 9/10/2018 End Date: --       Order Dose: 500 mg       Quantity: 15 tablet    Refills: 0       Outpatient Discharge Orders  Stress test only, exercise   Standing Status: Future  Standing Exp   Date: 09/10/22         ED Provider  Electronically Signed by           Dominick Tinoco DO  09/10/18 9802

## 2018-09-12 LAB
ATRIAL RATE: 65 BPM
P AXIS: 20 DEGREES
PR INTERVAL: 160 MS
QRS AXIS: 20 DEGREES
QRSD INTERVAL: 86 MS
QT INTERVAL: 406 MS
QTC INTERVAL: 422 MS
T WAVE AXIS: 10 DEGREES
VENTRICULAR RATE: 65 BPM

## 2018-09-12 PROCEDURE — 93010 ELECTROCARDIOGRAM REPORT: CPT | Performed by: INTERNAL MEDICINE

## 2019-01-07 ENCOUNTER — HOSPITAL ENCOUNTER (EMERGENCY)
Facility: HOSPITAL | Age: 44
Discharge: HOME | End: 2019-01-07
Attending: EMERGENCY MEDICINE

## 2019-01-07 VITALS
OXYGEN SATURATION: 100 % | TEMPERATURE: 97.8 F | SYSTOLIC BLOOD PRESSURE: 146 MMHG | BODY MASS INDEX: 29.78 KG/M2 | DIASTOLIC BLOOD PRESSURE: 83 MMHG | RESPIRATION RATE: 16 BRPM | HEIGHT: 71 IN | HEART RATE: 89 BPM | WEIGHT: 212.74 LBS

## 2019-01-07 DIAGNOSIS — J32.9 SINUSITIS: ICD-10-CM

## 2019-01-07 DIAGNOSIS — R73.9 HYPERGLYCEMIA: ICD-10-CM

## 2019-01-07 DIAGNOSIS — K52.9 GASTROENTERITIS: Primary | ICD-10-CM

## 2019-01-07 DIAGNOSIS — J40 BRONCHITIS: ICD-10-CM

## 2019-01-07 LAB — GLUCOSE SERPL-MCNC: 233 MG/DL (ref 70–99)

## 2019-01-07 PROCEDURE — 99283 EMERGENCY DEPT VISIT LOW MDM: CPT

## 2019-01-07 PROCEDURE — 82948 REAGENT STRIP/BLOOD GLUCOSE: CPT

## 2019-01-07 RX ORDER — ONDANSETRON 4 MG/1
4 TABLET, FILM COATED ORAL EVERY 6 HOURS
Qty: 12 TABLET | Refills: 0 | Status: SHIPPED | OUTPATIENT
Start: 2019-01-07 | End: 2021-12-21 | Stop reason: HOSPADM

## 2019-01-07 RX ORDER — GUAIFENESIN 100 MG/5ML
200 SYRUP ORAL 3 TIMES DAILY PRN
Qty: 120 ML | Refills: 0 | Status: SHIPPED | OUTPATIENT
Start: 2019-01-07 | End: 2019-01-17

## 2019-01-07 RX ORDER — AZITHROMYCIN 250 MG/1
TABLET, FILM COATED ORAL
Qty: 6 TABLET | Refills: 0 | Status: SHIPPED | OUTPATIENT
Start: 2019-01-07 | End: 2019-01-11

## 2019-01-07 NOTE — DISCHARGE INSTRUCTIONS
Acute Bronchitis   WHAT YOU NEED TO KNOW:   What is acute bronchitis? Acute bronchitis is swelling and irritation in the air passages of your lungs  This irritation may cause you to cough or have other breathing problems  Acute bronchitis often starts because of another illness, such as a cold or the flu  The illness spreads from your nose and throat to your windpipe and airways  Bronchitis is often called a chest cold  Acute bronchitis lasts about 3 to 6 weeks and is usually not a serious illness  What causes acute bronchitis? · Infection  caused by a virus, bacteria, or a fungus    · Polluted air  caused by chemical fumes, dust, smoke, allergens, or pollution  What increases my risk for acute bronchitis? · Age, usually older adults    · Smoking cigarettes or being around cigarette smoke    · Chronic lung diseases or chronic sinus infections    · Weakened immune system    · Gastroesophageal reflux disease    · Allergies and environmental changes  What are the signs and symptoms of acute bronchitis? · A cough with sputum that may be clear, yellow, or green    · Feeling more tired than usual, and body aches    · A fever and chills    · Wheezing when you breathe    · A tight chest or pain when you breathe or cough  How is acute bronchitis diagnosed? Your healthcare provider may diagnose bronchitis by your symptoms  If he is not sure, you may need the following:  · Blood tests  will be done to see if your symptoms are caused by an infection  · X-ray  pictures of your lungs and heart may show signs of infection, such as pneumonia  Chest x-rays may also show fluid around your heart and lungs  How is acute bronchitis treated? Your healthcare provider will treat any condition that has caused your acute bronchitis  He may also give you any of the following:  · Ibuprofen or acetaminophen  are medicines that help lower your fever  They are available without a doctor's order   Ask your healthcare provider which medicine is right for you  Ask how much to take and how often to take it  Follow directions  These medicines can cause stomach bleeding if not taken correctly  Ibuprofen can cause kidney damage  Do not take ibuprofen if you have kidney disease, an ulcer, or allergies to aspirin  Acetaminophen can cause liver damage  Do not take more than 4,000 milligrams in 24 hours  · Decongestants  help loosen mucus in your lungs and make it easier to cough up  This can help you breathe easier  · Cough suppressants  decrease your urge to cough  If your cough produces mucus, do not take a cough suppressant unless your healthcare provider tells you to  Your healthcare provider may suggest that you take a cough suppressant at night so you can rest     · Inhalers  may be given  Your healthcare provider may give you one or more inhalers to help you breathe easier and cough less  An inhaler gives your medicine to open your airways  Ask your healthcare provider to show you how to use your inhaler correctly  How can I care for myself when I have acute bronchitis? · Get more rest   Rest helps your body to heal  Slowly start to do more each day  Rest when you feel it is needed  · Avoid irritants in the air  Avoid chemicals, fumes, and dust  Wear a face mask if you must work around dust or fumes  Stay inside on days when air pollution levels are high  If you have allergies, stay inside when pollen counts are high  Do not use aerosol products, such as spray-on deodorant, bug spray, and hair spray  · Do not smoke or be around others who smoke  Nicotine and other chemicals in cigarettes and cigars damages the cilia that move mucus out of your lungs  Ask your healthcare provider for information if you currently smoke and need help to quit  E-cigarettes or smokeless tobacco still contain nicotine  Talk to your healthcare provider before you use these products  · Drink liquids as directed    Liquids help keep your air passages moist and help you cough up mucus  You may need to drink more liquids when you have acute bronchitis  Ask how much liquid to drink each day and which liquids are best for you  · Use a humidifier or vaporizer  Use a cool mist humidifier or a vaporizer to increase air moisture in your home  This may make it easier for you to breathe and help decrease your cough  How can I decrease my risk for acute bronchitis? · Get the vaccinations you need  Ask your healthcare provider if you should get vaccinated against the flu or pneumonia  · Prevent the spread of germs  You can decrease your risk of acute bronchitis and other illnesses by doing the following:     [facility] [place] often with soap and water  Carry germ-killing hand lotion or gel with you  You can use the lotion or gel to clean your hands when soap and water are not available  ¨ Do not touch your eyes, nose, or mouth unless you have washed your hands first     ¨ Always cover your mouth when you cough to prevent the spread of germs  It is best to cough into a tissue or your shirt sleeve instead of into your hand  Ask those around you cover their mouths when they cough  ¨ Try to avoid people who have a cold or the flu  If you are sick, stay away from others as much as possible  When should I seek immediate care? · You cough up blood  · Your lips or fingernails turn blue  · You feel like you are not getting enough air when you breathe  When should I contact my healthcare provider? · You have a fever  · Your breathing problems do not go away or get worse  · Your cough does not get better within 4 weeks  · You have questions or concerns about your condition or care  CARE AGREEMENT:   You have the right to help plan your care  Learn about your health condition and how it may be treated  Discuss treatment options with your caregivers to decide what care you want to receive  You always have the right to refuse treatment  The above information is an  only  It is not intended as medical advice for individual conditions or treatments  Talk to your doctor, nurse or pharmacist before following any medical regimen to see if it is safe and effective for you  © 2017 [employer] Information is for End User's use only and may not be sold, redistributed or otherwise used for commercial purposes  All illustrations and images included in CareNotes® are the copyrighted property of [employer]  or Yomi Dewitt   WHAT YOU NEED TO KNOW:   Gastroenteritis, or stomach flu, is an infection of the stomach and intestines  DISCHARGE INSTRUCTIONS:   Call 911 for any of the following:   · You have trouble breathing or a very fast pulse  Return to the emergency department if:   · You see blood in your diarrhea  · You cannot stop vomiting  · You have not urinated for 12 hours  · You feel like you are going to faint  Contact your healthcare provider if:   · You have a fever  · You continue to vomit or have diarrhea, even after treatment  · You see worms in your diarrhea  · Your mouth or eyes are dry  You are not urinating as much or as often  · You have questions or concerns about your condition or care  Medicines:   · Medicines  may be given to stop vomiting or diarrhea, decrease abdominal cramps, or treat an infection  · Take your medicine as directed  Contact your healthcare provider if you think your medicine is not helping or if you have side effects  Tell him or her if you are allergic to any medicine  Keep a list of the medicines, vitamins, and herbs you take  Include the amounts, and when and why you take them  Bring the list or the pill bottles to follow-up visits  Carry your medicine list with you in case of an emergency  Manage your symptoms:   · Drink liquids as directed    Ask your healthcare provider how much liquid to drink each day, and which liquids are best for you  You may also need to drink an oral rehydration solution (ORS)  An ORS has the right amounts of sugar, salt, and minerals in water to replace body fluids  · Eat bland foods  When you feel hungry, begin eating soft, bland foods  Examples are bananas, clear soup, potatoes, and applesauce  Do not have dairy products, alcohol, sugary drinks, or drinks with caffeine until you feel better  · Rest as much as possible  Slowly start to do more each day when you begin to feel better  Prevent the spread of gastroenteritis:  Gastroenteritis can spread easily  Keep yourself, your family, and your surroundings clean to help prevent the spread of gastroenteritis:  · Wash your hands often  Use soap and water  Wash your hands after you use the bathroom, change a child's diapers, or sneeze  Wash your hands before you prepare or eat food  · Clean surfaces and do laundry often  Wash your clothes and towels separately from the rest of the laundry  Clean surfaces in your home with antibacterial  or bleach  · Clean food thoroughly and cook safely  Wash raw vegetables before you cook  Cook meat, fish, and eggs fully  Do not use the same dishes for raw meat as you do for other foods  Refrigerate any leftover food immediately  · Be aware when you camp or travel  Drink only clean water  Do not drink from rivers or lakes unless you purify or boil the water first  When you travel, drink bottled water and do not add ice  Do not eat fruit that has not been peeled  Do not eat raw fish or meat that is not fully cooked  Follow up with your healthcare provider as directed:  Write down your questions so you remember to ask them during your visits  © 2017 [employer] Information is for End User's use only and may not be sold, redistributed or otherwise used for commercial purposes   All illustrations and images included in CareNotes® are the copyrighted property of [employer]  or Yomi Gorman  The above information is an  only  It is not intended as medical advice for individual conditions or treatments  Talk to your doctor, nurse or pharmacist before following any medical regimen to see if it is safe and effective for you  Hyperglycemia, Non-Diabetic   WHAT YOU NEED TO KNOW:   Hyperglycemia is a blood glucose (sugar) level that is higher than normal  Hyperglycemia can be short-term, or it can become a long-term condition that leads to diabetes  DISCHARGE INSTRUCTIONS:   Medicines: You may  need any of the following:  · Hypoglycemic medicine  helps to decrease the amount of sugar in your blood  This medicine helps your body move the sugar to your cells, where it is needed for energy  Your healthcare provider will tell you how often to take this medicine and how long to take it  · Insulin  helps to decrease blood sugar levels  You may need 1 or more shots of insulin each day  You or a family member will be taught how to give the insulin shots  Your healthcare provider will tell you how often you need to inject insulin each day  He will also tell you how long you will need to take it  · Take your medicine as directed  Contact your healthcare provider if you think your medicine is not helping or if you have side effects  Tell him or her if you are allergic to any medicine  Keep a list of the medicines, vitamins, and herbs you take  Include the amounts, and when and why you take them  Bring the list or the pill bottles to follow-up visits  Carry your medicine list with you in case of an emergency  Follow up with your healthcare provider as directed: You may need to return for more tests  Your healthcare provider may also need to refer you to a specialist, such as a dietitian  Write down your questions so you remember to ask them during your visits     Manage hyperglycemia:  The following may help keep your blood sugar at the level recommended by your healthcare provider:  · Get regular exercise  This can help to lower your blood sugar levels  It can also improve your heart health and help you stay at a healthy weight  Get at least 30 minutes of exercise 5 days each week  Ask your healthcare provider about the best exercise plan for you  · Lose weight if you are overweight  Even a small loss of 5% to 10% of your body weight can help to decrease your blood sugar levels  It can also improve your heart health  · Eat healthy foods  Include foods that are high in fiber, such as vegetables, fruits, whole grains, and beans  Also include foods that are low in fat, such as low-fat dairy (milk, yogurt, and cheese), fish, and lean meat  Limit foods that are high in calories and sugar, such as sweet desserts, potato chips, and candy  Limit foods that are high in sodium, such as table salt and salty foods  Your healthcare provider may suggest that you limit carbohydrates to lower your blood sugar levels  · Do not smoke  If you smoke, it is never too late to quit  Smoking can worsen the problems that can occur with hyperglycemia  Ask your healthcare provider for information if you need help quitting  · Limit alcohol  Women should limit alcohol to 1 drink a day  Men should limit alcohol to 2 drinks a day  A drink of alcohol is 12 ounces of beer, 5 ounces of wine, or 1½ ounces of liquor  How to check your blood sugar level: You may need to check your blood sugar level with a blood glucose meter  If you take insulin, you may need to check your blood sugar level at least 3 times each day  Ask your healthcare provider when and how often to check during the day  Ask what your blood sugar levels should be before and after you eat  You may need to check for ketones in your urine if your blood sugar level is high  Write down your results and show them to your healthcare provider  Contact your healthcare provider if:   · You have a fever       · Your blood sugar levels continue to be higher than you were told they should be  · You continue to urinate more often than usual      · You continue to be more thirsty than usual      · You continue to have nausea and vomiting  · You have a wound that has signs of infection, such as redness, swelling, and pus  · You have questions or concerns about your condition or care  Seek care immediately or call 911 if:   · Your arm or leg feels warm, tender, and painful  It may look swollen and red  · You feel lightheaded, short of breath, and have chest pain  · You cough up blood  © 2017 [address] [employer]  Information is for End User's use only and may not be sold, redistributed or otherwise used for commercial purposes  All illustrations and images included in CareNotes® are the copyrighted property of [employer]  or Yomi Gorman  The above information is an  only  It is not intended as medical advice for individual conditions or treatments  Talk to your doctor, nurse or pharmacist before following any medical regimen to see if it is safe and effective for you  Sinusitis   AMBULATORY CARE:   Sinusitis  is inflammation or infection of your sinuses  It is most often caused by a virus  Acute sinusitis may last up to 12 weeks  Chronic sinusitis lasts longer than 12 weeks  Recurrent sinusitis means you have 4 or more times in 1 year  Common symptoms include the following:   · Fever    · Pain, pressure, redness, or swelling around the forehead, cheeks, or eyes    · Thick yellow or green discharge from your nose    · Tenderness when you touch your face over your sinuses    · Dry cough that happens mostly at night or when you lie down    · Headache and face pain that is worse when you lean forward    · Tooth pain, or pain when you chew  Seek care immediately if:   · Your eye and eyelid are red, swollen, and painful  · You cannot open your eye       · You have vision changes, such as double vision  · Your eyeball bulges out or you cannot move your eye  · You are more sleepy than normal, or you notice changes in your ability to think, move, or talk  · You have a stiff neck, a fever, or a bad headache  · You have swelling of your forehead or scalp  Contact your healthcare provider if:   · Your symptoms do not improve after 3 days  · Your symptoms do not go away after 10 days  · You have nausea and are vomiting  · Your nose is bleeding  · You have questions or concerns about your condition or care  Treatment for sinusitis:  Your symptoms may go away on their own  Your healthcare provider may recommend watchful waiting for up to 10 days before starting antibiotics  You may  need any of the following:  · Acetaminophen  decreases pain and fever  It is available without a doctor's order  Ask how much to take and how often to take it  Follow directions  Read the labels of all other medicines you are using to see if they also contain acetaminophen, or ask your doctor or pharmacist  Acetaminophen can cause liver damage if not taken correctly  Do not use more than 4 grams (4,000 milligrams) total of acetaminophen in one day  · NSAIDs , such as ibuprofen, help decrease swelling, pain, and fever  This medicine is available with or without a doctor's order  NSAIDs can cause stomach bleeding or kidney problems in certain people  If you take blood thinner medicine, always ask your healthcare provider if NSAIDs are safe for you  Always read the medicine label and follow directions  · Nasal steroid sprays  may help decrease inflammation in your nose and sinuses  · Decongestants  help reduce swelling and drain mucus in the nose and sinuses  They may help you breathe easier  · Antihistamines  help dry mucus in the nose and relieve sneezing  · Antibiotics  help treat or prevent a bacterial infection  · Take your medicine as directed    Contact your healthcare provider if you think your medicine is not helping or if you have side effects  Tell him or her if you are allergic to any medicine  Keep a list of the medicines, vitamins, and herbs you take  Include the amounts, and when and why you take them  Bring the list or the pill bottles to follow-up visits  Carry your medicine list with you in case of an emergency  Self-care:   · Rinse your sinuses  Use a sinus rinse device to rinse your nasal passages with a saline (salt water) solution or distilled water  Do not use tap water  This will help thin the mucus in your nose and rinse away pollen and dirt  It will also help reduce swelling so you can breathe normally  Ask your healthcare provider how often to do this  · Breathe in steam   Heat a bowl of water until you see steam  Lean over the bowl and make a tent over your head with a large towel  Breathe deeply for about 20 minutes  Be careful not to get too close to the steam or burn yourself  Do this 3 times a day  You can also breathe deeply when you take a hot shower  · Sleep with your head elevated  Place an extra pillow under your head before you go to sleep to help your sinuses drain  · Drink liquids as directed  Ask your healthcare provider how much liquid to drink each day and which liquids are best for you  Liquids will thin the mucus in your nose and help it drain  Avoid drinks that contain alcohol or caffeine  · Do not smoke, and avoid secondhand smoke  Nicotine and other chemicals in cigarettes and cigars can make your symptoms worse  Ask your healthcare provider for information if you currently smoke and need help to quit  E-cigarettes or smokeless tobacco still contain nicotine  Talk to your healthcare provider before you use these products  Prevent the spread of germs that cause sinusitis:  Wash your hands often with soap and water  Wash your hands after you use the bathroom, change a child's diaper, or sneeze   Wash your hands before you prepare or eat food  Follow up with your healthcare provider as directed: You may be referred to an ear, nose, and throat specialist  Write down your questions so you remember to ask them during your visits  © 2017 [address] [employer] Information is for End User's use only and may not be sold, redistributed or otherwise used for commercial purposes  All illustrations and images included in CareNotes® are the copyrighted property of [employer]  or Yomi Gorman  The above information is an  only  It is not intended as medical advice for individual conditions or treatments  Talk to your doctor, nurse or pharmacist before following any medical regimen to see if it is safe and effective for you

## 2019-01-07 NOTE — ED PROVIDER NOTES
History  Chief Complaint   Patient presents with    Vomiting     "I was on my way to work this morning and I started with vomiting and diarrhea and body aches  History provided by:  Patient   used: No    Medical Problem   Location:  Pt with sore throat nasal congestion cough nausea vomiting  Severity:  Mild  Onset quality:  Gradual  Duration:  1 day  Timing:  Constant  Progression:  Unchanged  Chronicity:  New  Associated symptoms: congestion, cough, diarrhea, nausea and vomiting    Associated symptoms: no abdominal pain, no chest pain, no ear pain, no fatigue, no fever, no headaches, no loss of consciousness, no myalgias, no rash, no rhinorrhea, no shortness of breath, no sore throat and no wheezing        Prior to Admission Medications   Prescriptions Last Dose Informant Patient Reported? Taking?   metFORMIN (GLUCOPHAGE) 1000 MG tablet   Yes No   Sig: Take 1,000 mg by mouth 2 (two) times a day with meals   methocarbamol (ROBAXIN) 500 mg tablet   No No   Sig: Take 1 tablet (500 mg total) by mouth 2 (two) times a day   naproxen (NAPROSYN) 500 mg tablet   No No   Sig: Take 1 tablet (500 mg total) by mouth every 12 (twelve) hours as needed for mild pain or moderate pain   sitaGLIPtin (JANUVIA) 100 mg tablet   Yes No   Sig: Take 100 mg by mouth daily      Facility-Administered Medications: None       Past Medical History:   Diagnosis Date    Diabetes mellitus ([facility] [address] )     Type II    Herpes     Hyperlipidemia     Hypertension     Neoplasm of kidney     Obesity     BMI 37 2       Past Surgical History:   Procedure Laterality Date    NO PAST SURGERIES         Family History   Problem Relation Age of Onset    Diabetes Mother     Diabetes Father     Diabetes Family      I have reviewed and agree with the history as documented      Social History   Substance Use Topics    Smoking status: Never Smoker    Smokeless tobacco: Never Used    Alcohol use No        Review of Systems Constitutional: Negative  Negative for fatigue and fever  HENT: Positive for congestion  Negative for ear pain, rhinorrhea and sore throat  Eyes: Negative  Respiratory: Positive for cough  Negative for shortness of breath and wheezing  Cardiovascular: Negative  Negative for chest pain  Gastrointestinal: Positive for diarrhea, nausea and vomiting  Negative for abdominal pain  Endocrine: Negative  Genitourinary: Negative  Musculoskeletal: Negative  Negative for myalgias  Skin: Negative  Negative for rash  Allergic/Immunologic: Negative  Neurological: Negative  Negative for loss of consciousness and headaches  Hematological: Negative  Psychiatric/Behavioral: Negative  All other systems reviewed and are negative  Physical Exam  Physical Exam   Constitutional: He is oriented to person, place, and time  He appears well-developed and well-nourished  HENT:   Head: Normocephalic and atraumatic  Right Ear: External ear normal    Left Ear: External ear normal    Mouth/Throat: Oropharynx is clear and moist    Boggy nasal mucosa  Pharyngeal erythema    Eyes: Pupils are equal, round, and reactive to light  Conjunctivae and EOM are normal    Neck: Normal range of motion  Neck supple  Cardiovascular: Normal rate, regular rhythm and normal heart sounds  Pulmonary/Chest: Effort normal and breath sounds normal    Minor coarse sounds    Abdominal: Soft  Bowel sounds are normal    Musculoskeletal: Normal range of motion  Neurological: He is alert and oriented to person, place, and time  Skin: Skin is warm  Psychiatric: He has a normal mood and affect  Nursing note and vitals reviewed        Vital Signs  ED Triage Vitals [01/07/19 0830]   Temperature Pulse Respirations Blood Pressure SpO2   97 8 °F (36 6 °C) 89 16 146/83 100 %      Temp Source Heart Rate Source Patient Position - Orthostatic VS BP Location FiO2 (%)   Tympanic Monitor Standing Left arm --      Pain Score 7           Vitals:    01/07/19 0830   BP: 146/83   Pulse: 89   Patient Position - Orthostatic VS: Standing       Visual Acuity      ED Medications  Medications - No data to display    Diagnostic Studies  Results Reviewed     Procedure Component Value Units Date/Time    Fingerstick Glucose (POCT) [[id number]]  (Abnormal) Collected:  01/07/19 0848    Lab Status:  Final result Updated:  01/07/19 0909     POC Glucose 233 (H) mg/dl                  No orders to display              Procedures  Procedures       Phone Contacts  ED Phone Contact    ED Course                               MDM  CritCare Time    Disposition  Final diagnoses:   Gastroenteritis   Sinusitis   Bronchitis   Hyperglycemia     Time reflects when diagnosis was documented in both MDM as applicable and the Disposition within this note     Time User Action Codes Description Comment    1/7/2019  8:41 AM Jotabatha Jock  Add [K52 9] Gastroenteritis     1/7/2019  8:41 AM Farshad Johnson Lyudmila Cables  Add [J32 9] Sinusitis     1/7/2019  8:41 AM Vi Dancer Lyudmila Cables  Add [J40] Bronchitis     1/7/2019  8:50 AM Vi Dancer Lyudmila Cables  Add [R73 9] Hyperglycemia       ED Disposition     ED Disposition Condition Comment    Discharge  Suzangalen Celso discharge to home/self care      Condition at discharge: Good        Follow-up Information     Follow up With Specialties Details Why Contact Info    Lori Narayan MD Family Medicine Schedule an appointment as soon as possible for a visit  [address] [facility]  [phone]            Discharge Medication List as of 1/7/2019  8:52 AM      START taking these medications    Details   azithromycin (ZITHROMAX Z-RAUL) 250 mg tablet Take 2 tablets today then 1 tablet daily x 4 days, Print      guaiFENesin (ROBITUSSIN) 100 mg/5 mL syrup Take 10 mL (200 mg total) by mouth 3 (three) times a day as needed for cough for up to 10 days, Starting Mon 1/7/2019, Until Thu 1/17/2019, Print      ondansetron (ZOFRAN) 4 mg tablet Take 1 tablet (4 mg total) by mouth every 6 (six) hours, Starting Mon 1/7/2019, Print         CONTINUE these medications which have NOT CHANGED    Details   metFORMIN (GLUCOPHAGE) 1000 MG tablet Take 1,000 mg by mouth 2 (two) times a day with meals, Historical Med      methocarbamol (ROBAXIN) 500 mg tablet Take 1 tablet (500 mg total) by mouth 2 (two) times a day, Starting Mon 7/16/2018, Print      naproxen (NAPROSYN) 500 mg tablet Take 1 tablet (500 mg total) by mouth every 12 (twelve) hours as needed for mild pain or moderate pain, Starting Mon 9/10/2018, Print      sitaGLIPtin (JANUVIA) 100 mg tablet Take 100 mg by mouth daily, Until Discontinued, Historical Med           No discharge procedures on file      ED Provider  Electronically Signed by           Dilma Navarrete PA-C  01/07/19 7995

## 2019-05-23 ENCOUNTER — APPOINTMENT (EMERGENCY)
Dept: ULTRASOUND IMAGING | Facility: HOSPITAL | Age: 44
End: 2019-05-23

## 2019-05-23 ENCOUNTER — HOSPITAL ENCOUNTER (EMERGENCY)
Facility: HOSPITAL | Age: 44
Discharge: HOME | End: 2019-05-23
Attending: EMERGENCY MEDICINE | Admitting: EMERGENCY MEDICINE

## 2019-05-23 VITALS
WEIGHT: 209.66 LBS | RESPIRATION RATE: 18 BRPM | HEART RATE: 80 BPM | BODY MASS INDEX: 29.24 KG/M2 | SYSTOLIC BLOOD PRESSURE: 142 MMHG | OXYGEN SATURATION: 99 % | TEMPERATURE: 97.7 F | DIASTOLIC BLOOD PRESSURE: 75 MMHG

## 2019-05-23 DIAGNOSIS — N43.3 HYDROCELE, LEFT: Primary | ICD-10-CM

## 2019-05-23 DIAGNOSIS — N50.819 TESTICLE PAIN: ICD-10-CM

## 2019-05-23 LAB
BACTERIA UR QL AUTO: ABNORMAL /HPF
BILIRUB UR QL STRIP: NEGATIVE
CLARITY UR: CLEAR
COLOR UR: ABNORMAL
GLUCOSE UR STRIP-MCNC: ABNORMAL MG/DL
HGB UR QL STRIP.AUTO: NEGATIVE
KETONES UR STRIP-MCNC: NEGATIVE MG/DL
LEUKOCYTE ESTERASE UR QL STRIP: 25
NITRITE UR QL STRIP: NEGATIVE
NON-SQ EPI CELLS URNS QL MICRO: ABNORMAL /HPF
PH UR STRIP.AUTO: 6 [PH]
PROT UR STRIP-MCNC: NEGATIVE MG/DL
RBC #/AREA URNS AUTO: ABNORMAL /HPF
SP GR UR STRIP.AUTO: 1.01 (ref 1–1.04)
UROBILINOGEN UA: NEGATIVE MG/DL
WBC #/AREA URNS AUTO: ABNORMAL /HPF

## 2019-05-23 PROCEDURE — 76870 US EXAM SCROTUM: CPT

## 2019-05-23 PROCEDURE — 99284 EMERGENCY DEPT VISIT MOD MDM: CPT

## 2019-05-23 PROCEDURE — 99283 EMERGENCY DEPT VISIT LOW MDM: CPT | Performed by: EMERGENCY MEDICINE

## 2019-05-23 PROCEDURE — 81003 URINALYSIS AUTO W/O SCOPE: CPT | Performed by: EMERGENCY MEDICINE

## 2019-05-23 PROCEDURE — 81001 URINALYSIS AUTO W/SCOPE: CPT | Performed by: EMERGENCY MEDICINE

## 2019-08-27 ENCOUNTER — HOSPITAL ENCOUNTER (EMERGENCY)
Facility: HOSPITAL | Age: 44
Discharge: HOME | End: 2019-08-27
Attending: EMERGENCY MEDICINE

## 2019-08-27 VITALS
OXYGEN SATURATION: 98 % | WEIGHT: 205 LBS | BODY MASS INDEX: 28.7 KG/M2 | HEART RATE: 87 BPM | HEIGHT: 71 IN | SYSTOLIC BLOOD PRESSURE: 127 MMHG | RESPIRATION RATE: 16 BRPM | DIASTOLIC BLOOD PRESSURE: 92 MMHG | TEMPERATURE: 98.3 F

## 2019-08-27 DIAGNOSIS — R30.0 DYSURIA: ICD-10-CM

## 2019-08-27 DIAGNOSIS — Z20.2 POSSIBLE EXPOSURE TO STD: Primary | ICD-10-CM

## 2019-08-27 DIAGNOSIS — N48.89 PENILE IRRITATION: ICD-10-CM

## 2019-08-27 LAB
BACTERIA UR QL AUTO: ABNORMAL /HPF
BILIRUB UR QL STRIP: NEGATIVE
CLARITY UR: CLEAR
COLOR UR: YELLOW
GLUCOSE SERPL-MCNC: 302 MG/DL (ref 65–140)
GLUCOSE UR STRIP-MCNC: ABNORMAL MG/DL
HGB UR QL STRIP.AUTO: NEGATIVE
HIV 1+2 AB+HIV1 P24 AG SERPL QL IA: NORMAL
HIV1 P24 AG SER QL: NORMAL
HYALINE CASTS #/AREA URNS LPF: ABNORMAL /LPF
KETONES UR STRIP-MCNC: NEGATIVE MG/DL
LEUKOCYTE ESTERASE UR QL STRIP: ABNORMAL
NITRITE UR QL STRIP: NEGATIVE
NON-SQ EPI CELLS URNS QL MICRO: ABNORMAL /HPF
PH UR STRIP.AUTO: 5.5 [PH]
PROT UR STRIP-MCNC: NEGATIVE MG/DL
RBC #/AREA URNS AUTO: ABNORMAL /HPF
SP GR UR STRIP.AUTO: 1.04 (ref 1–1.03)
UROBILINOGEN UR QL STRIP.AUTO: 0.2 E.U./DL
WBC #/AREA URNS AUTO: ABNORMAL /HPF

## 2019-08-27 PROCEDURE — 87491 CHLMYD TRACH DNA AMP PROBE: CPT | Performed by: EMERGENCY MEDICINE

## 2019-08-27 PROCEDURE — 99283 EMERGENCY DEPT VISIT LOW MDM: CPT

## 2019-08-27 PROCEDURE — 36415 COLL VENOUS BLD VENIPUNCTURE: CPT | Performed by: EMERGENCY MEDICINE

## 2019-08-27 PROCEDURE — 86593 SYPHILIS TEST NON-TREP QUANT: CPT | Performed by: EMERGENCY MEDICINE

## 2019-08-27 PROCEDURE — 99284 EMERGENCY DEPT VISIT MOD MDM: CPT | Performed by: EMERGENCY MEDICINE

## 2019-08-27 PROCEDURE — 87806 HIV AG W/HIV1&2 ANTB W/OPTIC: CPT | Performed by: EMERGENCY MEDICINE

## 2019-08-27 PROCEDURE — 86780 TREPONEMA PALLIDUM: CPT | Performed by: EMERGENCY MEDICINE

## 2019-08-27 PROCEDURE — 82948 REAGENT STRIP/BLOOD GLUCOSE: CPT

## 2019-08-27 PROCEDURE — 96372 THER/PROPH/DIAG INJ SC/IM: CPT

## 2019-08-27 PROCEDURE — 86592 SYPHILIS TEST NON-TREP QUAL: CPT | Performed by: EMERGENCY MEDICINE

## 2019-08-27 PROCEDURE — 87591 N.GONORRHOEAE DNA AMP PROB: CPT | Performed by: EMERGENCY MEDICINE

## 2019-08-27 PROCEDURE — 81001 URINALYSIS AUTO W/SCOPE: CPT | Performed by: EMERGENCY MEDICINE

## 2019-08-27 RX ORDER — AZITHROMYCIN 250 MG/1
1000 TABLET, FILM COATED ORAL ONCE
Status: COMPLETED | OUTPATIENT
Start: 2019-08-27 | End: 2019-08-27

## 2019-08-27 RX ORDER — AZITHROMYCIN 250 MG/1
2000 TABLET, FILM COATED ORAL ONCE
Status: DISCONTINUED | OUTPATIENT
Start: 2019-08-27 | End: 2019-08-27

## 2019-08-27 RX ADMIN — LIDOCAINE HYDROCHLORIDE 250 MG: 10 INJECTION, SOLUTION EPIDURAL; INFILTRATION; INTRACAUDAL; PERINEURAL at 19:50

## 2019-08-27 RX ADMIN — AZITHROMYCIN 1000 MG: 250 TABLET, FILM COATED ORAL at 19:50

## 2019-08-27 NOTE — ED PROVIDER NOTES
History  Chief Complaint   Patient presents with    Exposure to STD     pt reports burning when he urinates but denies discharge  pt reports hes had STD's in the past and "this is definitely one"      77-year-old male with past medical history of type 2 diabetes mellitus and hyperlipidemia who is presenting with possible exposure to STD  Patient states that last night, he began to feel an aching sensation in his bilateral testes and penis  He also reports that he had dysuria  He states that he had similar symptoms with prior STDs  Patient reports that in the past he has had both gonorrhea and chlamydia  He denies a history of HIV or syphilis  Patient otherwise denies any fever, chills, nausea, vomiting, abdominal pain, rectal pain, diarrhea, constipation, hematuria, or any other complaints at this time  Patient reports that he has been having unprotected sex with his current significant other  He is not certain if she has any STDs  Patient also reports chronic irritation of his penis  Patient is uncircumcised and has had swelling of his foreskin and open wounds on his foreskin for the past several years  No other complaints noted on review of systems  Prior to Admission Medications   Prescriptions Last Dose Informant Patient Reported?  Taking?   metFORMIN (GLUCOPHAGE) 1000 MG tablet   Yes No   Sig: Take 1,000 mg by mouth 2 (two) times a day with meals   methocarbamol (ROBAXIN) 500 mg tablet   No No   Sig: Take 1 tablet (500 mg total) by mouth 2 (two) times a day   naproxen (NAPROSYN) 500 mg tablet   No No   Sig: Take 1 tablet (500 mg total) by mouth every 12 (twelve) hours as needed for mild pain or moderate pain   ondansetron (ZOFRAN) 4 mg tablet   No No   Sig: Take 1 tablet (4 mg total) by mouth every 6 (six) hours   sitaGLIPtin (JANUVIA) 100 mg tablet   Yes No   Sig: Take 100 mg by mouth daily      Facility-Administered Medications: None       Past Medical History:   Diagnosis Date    Diabetes mellitus ([facility] [address] )     Type II    Herpes     Hyperlipidemia     Hypertension     Neoplasm of kidney     Obesity     BMI 37 2       Past Surgical History:   Procedure Laterality Date    NO PAST SURGERIES         Family History   Problem Relation Age of Onset    Diabetes Mother     Diabetes Father     Diabetes Family      I have reviewed and agree with the history as documented  Social History     Tobacco Use    Smoking status: Never Smoker    Smokeless tobacco: Never Used   Substance Use Topics    Alcohol use: No    Drug use: No        Review of Systems   Constitutional: Negative for diaphoresis, fever and unexpected weight change  HENT: Negative for congestion, rhinorrhea and sore throat  Eyes: Negative for pain, discharge and visual disturbance  Respiratory: Negative for cough, shortness of breath and wheezing  Cardiovascular: Negative for chest pain, palpitations and leg swelling  Gastrointestinal: Negative for abdominal pain, blood in stool, constipation, diarrhea, nausea and vomiting  Genitourinary: Positive for dysuria, penile pain and testicular pain (bilateral)  Negative for flank pain and hematuria  Musculoskeletal: Negative for arthralgias and myalgias  Skin: Negative for rash and wound  Allergic/Immunologic: Negative for environmental allergies and food allergies  Neurological: Negative for dizziness, seizures, weakness and numbness  Hematological: Negative for adenopathy  Psychiatric/Behavioral: Negative for confusion and hallucinations         Physical Exam  ED Triage Vitals [08/27/19 1900]   Temperature Pulse Respirations Blood Pressure SpO2   98 3 °F (36 8 °C) 87 16 127/92 98 %      Temp src Heart Rate Source Patient Position - Orthostatic VS BP Location FiO2 (%)   -- -- Sitting Right arm --      Pain Score       Worst Possible Pain             Orthostatic Vital Signs  Vitals:    08/27/19 1900   BP: 127/92   Pulse: 87   Patient Position - Orthostatic VS: Sitting       Physical Exam   Constitutional: He is oriented to person, place, and time  He appears well-developed and well-nourished  HENT:   Head: Normocephalic and atraumatic  Right Ear: External ear normal    Left Ear: External ear normal    Nose: Nose normal    Eyes: Pupils are equal, round, and reactive to light  EOM are normal    Neck: Normal range of motion  Neck supple  Cardiovascular: Normal rate, regular rhythm and normal heart sounds  No murmur heard  Pulmonary/Chest: Effort normal and breath sounds normal  No respiratory distress  He has no wheezes  He has no rales  Abdominal: Soft  Bowel sounds are normal  He exhibits no distension  There is no tenderness  There is no guarding  Genitourinary:   Genitourinary Comments: There is erythema and irritation of the foreskin with open wounds of the foreskin  Testes are nontender bilaterally  There is no scrotal swelling  Cremasteric reflex present bilaterally  Musculoskeletal: Normal range of motion  He exhibits no deformity  Neurological: He is alert and oriented to person, place, and time  No gross motor deficits noted  Cranial nerves II-XII are intact  Speech is fluent without dysarthria or aphasia  Skin: Skin is warm and dry  Capillary refill takes less than 2 seconds  He is not diaphoretic  There is erythema  There is erythema and irritation of the foreskin  There are numerous open wounds noted to the foreskin  No purulent drainage  Psychiatric: He has a normal mood and affect  His behavior is normal    Nursing note and vitals reviewed        ED Medications  Medications   cefTRIAXone (ROCEPHIN) 250 mg in lidocaine (PF) (XYLOCAINE-MPF) 1 % IM only syringe (250 mg Intramuscular Given 8/27/19 1950)   azithromycin (ZITHROMAX) tablet 1,000 mg (1,000 mg Oral Given 8/27/19 1950)       Diagnostic Studies  Results Reviewed     Procedure Component Value Units Date/Time    Rapid HIV 1/2 AB-AG Combo [[id number]]  (Normal) Collected: 08/27/19 1944    Lab Status:  Final result Specimen:  Blood from Arm, Right Updated:  08/27/19 2034     Rapid HIV 1 AND 2 Non-Reactive     HIV-1 P24 Ag Screen Non-Reactive    Narrative:       Negative for HIV-1 p24 Antigen  Negative for HIV-1 and/or HIV-2 Antibody  [address] [place] [address] amplified DNA by PCR [[id number]] Collected:  08/27/19 1959    Lab Status: In process Specimen:  Urine, Other Updated:  08/27/19 2005    RPR [[id number]] Collected:  08/27/19 1944    Lab Status: In process Specimen:  Blood from Arm, Right Updated:  08/27/19 1947    Fingerstick Glucose (POCT) [[id number]]  (Abnormal) Collected:  08/27/19 1924    Lab Status:  Final result Updated:  08/27/19 1925     POC Glucose 302 mg/dl     Urine Microscopic [[id number]]  (Abnormal) Collected:  08/27/19 1904    Lab Status:  Final result Specimen:  Urine, Clean Catch Updated:  08/27/19 1919     RBC, UA None Seen /hpf      WBC, UA 4-10 /hpf      Epithelial Cells None Seen /hpf      Bacteria, UA None Seen /hpf      Hyaline Casts, UA None Seen /lpf     UA w Reflex to Microscopic w Reflex to Culture [[id number]]  (Abnormal) Collected:  08/27/19 1904    Lab Status:  Final result Specimen:  Urine, Clean Catch Updated:  08/27/19 1916     Color, UA Yellow     Clarity, UA Clear     Specific Gravity, UA 1 043     pH, UA 5 5     Leukocytes, UA Elevated glucose may cause decreased leukocyte values   See urine microscopic for [facility] result/     Nitrite, UA Negative     Protein, UA Negative mg/dl      Glucose, UA >=1000 (1%) mg/dl      Ketones, UA Negative mg/dl      Urobilinogen, UA 0 2 E U /dl      Bilirubin, UA Negative     Blood, UA Negative                 No orders to display         Procedures  Procedures        ED Course  ED Course as of Aug 28 0000   Tue Aug 27, 2019   1921 WBC, UA(!): 4-10   1930 POC Glucose(!): 302                               MDM  Number of Diagnoses or Management Options  Dysuria: new and requires workup  Penile irritation: established and worsening  Possible exposure to STD: new and requires workup  Diagnosis management comments:     As above, patient presented due to concern for STD exposure  Triage nurse ordered urinalysis which demonstrated sterile pyuria  Patient was agreeable to HIV and syphilis screening; these were sent as well  Patient provided a 2nd urine sample for GC and chlamydia testing  He was empirically treated with Rocephin and azithromycin  Advised patient to refrain from sexual activity until he receives the results of his STD screening  Also advised that his sexual partners be tested and treated  Incidentally, patient also reported a several year history of foreskin irritation with open wounds  I provided the patient with Urology follow-up for this issue as needed  Amount and/or Complexity of Data Reviewed  Clinical lab tests: ordered and reviewed  Decide to obtain previous medical records or to obtain history from someone other than the patient: yes  Review and summarize past medical records: yes    Risk of Complications, Morbidity, and/or Mortality  Presenting problems: low  Diagnostic procedures: minimal  Management options: minimal    Patient Progress  Patient progress: improved      Disposition  Final diagnoses:   Possible exposure to STD   Dysuria   Penile irritation     Time reflects when diagnosis was documented in both MDM as applicable and the Disposition within this note     Time User Action Codes Description Comment    8/27/2019  8:11 PM El Wilkinson Add [Z20 2] Possible exposure to STD     8/27/2019  8:11 PM El Wilkinson Add [R30 0] Dysuria     8/27/2019  8:11 PM El Wilkinson Add [N48 89] Penile irritation       ED Disposition     ED Disposition Condition Date/Time Comment    Discharge Good [place] Aug 27, 2019  8:11 PM Lubna Ricketts discharge to home/self care              Follow-up Information     Follow up With Specialties Details Why Contact Info Additional Information    Dread Connor Marv Luong MD Family Medicine Call  As needed  [address]  [phone]       [facility] Call  Call as needed for follow-up regarding your penile irritation  [address] [facility] [address]  70  [facility], [address], [place], [place], [address] [facility] Emergency Medicine Go to  If symptoms worsen  [address]  [phone]  ED, [address] [facility] [address] [facility] [address], [place], [address]          Discharge Medication List as of 8/27/2019  8:12 PM      CONTINUE these medications which have NOT CHANGED    Details   metFORMIN (GLUCOPHAGE) 1000 MG tablet Take 1,000 mg by mouth 2 (two) times a day with meals, Historical Med      methocarbamol (ROBAXIN) 500 mg tablet Take 1 tablet (500 mg total) by mouth 2 (two) times a day, Starting Mon 7/16/2018, Print      naproxen (NAPROSYN) 500 mg tablet Take 1 tablet (500 mg total) by mouth every 12 (twelve) hours as needed for mild pain or moderate pain, Starting Mon 9/10/2018, Print      ondansetron (ZOFRAN) 4 mg tablet Take 1 tablet (4 mg total) by mouth every 6 (six) hours, Starting Mon 1/7/2019, Print      sitaGLIPtin (JANUVIA) 100 mg tablet Take 100 mg by mouth daily, Until Discontinued, Historical Med           No discharge procedures on file  ED Provider  Attending physically available and evaluated Josephine Oshea I managed the patient along with the ED Attending      Electronically Signed by         Cecilia Tan MD  08/28/19 9861

## 2019-08-28 LAB
C TRACH DNA SPEC QL NAA+PROBE: POSITIVE
N GONORRHOEA DNA SPEC QL NAA+PROBE: NEGATIVE
RPR SER QL: REACTIVE
RPR SER-TITR: ABNORMAL {TITER}

## 2019-08-28 NOTE — DISCHARGE INSTRUCTIONS
Today, you were treated prophylactically for gonorrhea and chlamydia  We will call you if the results of your HIV and/or syphilis screening tests are positive  You may follow up with Urology as needed regarding the irritation on your foreskin

## 2019-08-29 LAB — T PALLIDUM AB SER QL IF: REACTIVE

## 2019-09-12 NOTE — ED ATTENDING ATTESTATION
Mulu Reynaga MD, saw and evaluated the patient  I have discussed the patient with the resident/non-physician practitioner and agree with the resident's/non-physician practitioner's findings, Plan of Care, and MDM as documented in the resident's/non-physician practitioner's note, except where noted  All available labs and Radiology studies were reviewed  I was present for key portions of any procedure(s) performed by the resident/non-physician practitioner and I was immediately available to provide assistance  At this point I agree with the current assessment done in the Emergency Department  I have conducted an independent evaluation of this patient a history and physical is as follows:    Patient presents for evaluation due to burning with urination  Patient is concerned he may have been exposed to an STD  He denies any penile discharge or rashes  Patient is uncircumcised and does report having recurrent problems with skin tears around his ureteral meatus as well as pain with urination and pain with intercourse  No additional complaints  Exam: AAOx3, NAD, no penile rash, multiple skin tears and open wounds on of foreskin  A/P:  Possible STD exposure  Will check for GC/chlamydia, syphilis, and HIV per patient request   Recommend Urology follow-up for likely need for circumcision      Critical Care Time  Procedures

## 2020-10-15 ENCOUNTER — OFFICE VISIT (OUTPATIENT)
Dept: URGENT CARE | Age: 45
End: 2020-10-15
Payer: COMMERCIAL

## 2020-10-15 VITALS
DIASTOLIC BLOOD PRESSURE: 80 MMHG | TEMPERATURE: 99 F | RESPIRATION RATE: 20 BRPM | SYSTOLIC BLOOD PRESSURE: 130 MMHG | HEART RATE: 80 BPM | OXYGEN SATURATION: 96 %

## 2020-10-15 DIAGNOSIS — Z20.822 COVID-19 RULED OUT: Primary | ICD-10-CM

## 2020-10-15 DIAGNOSIS — J02.9 SORE THROAT: ICD-10-CM

## 2020-10-15 LAB — S PYO AG THROAT QL: NEGATIVE

## 2020-10-15 PROCEDURE — 99203 OFFICE O/P NEW LOW 30 MIN: CPT | Performed by: NURSE PRACTITIONER

## 2020-10-15 PROCEDURE — G0382 LEV 3 HOSP TYPE B ED VISIT: HCPCS | Performed by: NURSE PRACTITIONER

## 2020-10-15 PROCEDURE — 87070 CULTURE OTHR SPECIMN AEROBIC: CPT | Performed by: NURSE PRACTITIONER

## 2020-10-15 PROCEDURE — U0003 INFECTIOUS AGENT DETECTION BY NUCLEIC ACID (DNA OR RNA); SEVERE ACUTE RESPIRATORY SYNDROME CORONAVIRUS 2 (SARS-COV-2) (CORONAVIRUS DISEASE [COVID-19]), AMPLIFIED PROBE TECHNIQUE, MAKING USE OF HIGH THROUGHPUT TECHNOLOGIES AS DESCRIBED BY CMS-2020-01-R: HCPCS | Performed by: NURSE PRACTITIONER

## 2020-10-15 PROCEDURE — 87880 STREP A ASSAY W/OPTIC: CPT | Performed by: NURSE PRACTITIONER

## 2020-10-15 PROCEDURE — 99283 EMERGENCY DEPT VISIT LOW MDM: CPT | Performed by: NURSE PRACTITIONER

## 2020-10-16 LAB — SARS-COV-2 RNA SPEC QL NAA+PROBE: NOT DETECTED

## 2020-10-17 ENCOUNTER — TELEPHONE (OUTPATIENT)
Dept: OTHER | Facility: OTHER | Age: 45
End: 2020-10-17

## 2020-10-17 LAB — BACTERIA THROAT CULT: NORMAL

## 2020-11-05 ENCOUNTER — OPTICAL OFFICE (OUTPATIENT)
Dept: URBAN - METROPOLITAN AREA CLINIC 143 | Facility: CLINIC | Age: 45
Setting detail: OPHTHALMOLOGY
End: 2020-11-05
Payer: COMMERCIAL

## 2020-11-05 ENCOUNTER — DOCTOR'S OFFICE (OUTPATIENT)
Dept: URBAN - METROPOLITAN AREA CLINIC 136 | Facility: CLINIC | Age: 45
Setting detail: OPHTHALMOLOGY
End: 2020-11-05
Payer: COMMERCIAL

## 2020-11-05 DIAGNOSIS — E11.9: ICD-10-CM

## 2020-11-05 DIAGNOSIS — H52.223: ICD-10-CM

## 2020-11-05 DIAGNOSIS — H52.13: ICD-10-CM

## 2020-11-05 PROCEDURE — V2020 VISION SVCS FRAMES PURCHASES: HCPCS | Performed by: OPTOMETRIST

## 2020-11-05 PROCEDURE — 92250 FUNDUS PHOTOGRAPHY W/I&R: CPT | Performed by: OPTOMETRIST

## 2020-11-05 PROCEDURE — 92310 CONTACT LENS FITTING OU: CPT | Performed by: OPTOMETRIST

## 2020-11-05 PROCEDURE — 92015 DETERMINE REFRACTIVE STATE: CPT | Performed by: OPTOMETRIST

## 2020-11-05 PROCEDURE — V2103 SPHEROCYLINDR 4.00D/12-2.00D: HCPCS | Performed by: OPTOMETRIST

## 2020-11-05 PROCEDURE — 92004 COMPRE OPH EXAM NEW PT 1/>: CPT | Performed by: OPTOMETRIST

## 2020-11-05 ASSESSMENT — CONFRONTATIONAL VISUAL FIELD TEST (CVF)
OS_FINDINGS: FULL
OD_FINDINGS: FULL

## 2020-11-05 ASSESSMENT — VISUAL ACUITY
OS_BCVA: 20/20-1
OD_BCVA: 20/30-1

## 2020-11-05 ASSESSMENT — REFRACTION_MANIFEST
OS_VA1: 20/20
OD_SPHERE: -2.50
OD_VA1: 20/20
OD_AXIS: 090
OS_AXIS: 090
OU_VA: 20/20
OS_SPHERE: -2.75
OD_CYLINDER: -0.50
OS_CYLINDER: -0.50

## 2020-11-05 ASSESSMENT — REFRACTION_CURRENTRX
OS_SPHERE: -2.50
OD_SPHERE: -2.50
OS_AXIS: 090
OS_VPRISM_DIRECTION: SV
OD_AXIS: 090
OD_CYLINDER: -0.50
OS_CYLINDER: -0.50
OS_OVR_VA: 20/
OD_VPRISM_DIRECTION: SV
OD_OVR_VA: 20/

## 2020-11-05 ASSESSMENT — REFRACTION_AUTOREFRACTION
OD_CYLINDER: SPHERE
OS_SPHERE: -3.75
OD_SPHERE: -3.00
OS_CYLINDER: SPHERE

## 2020-11-05 ASSESSMENT — SPHEQUIV_DERIVED
OS_SPHEQUIV: -3
OD_SPHEQUIV: -2.75

## 2020-11-05 ASSESSMENT — TONOMETRY
OD_IOP_MMHG: 11
OS_IOP_MMHG: 14

## 2020-12-12 ENCOUNTER — OFFICE VISIT (OUTPATIENT)
Dept: URGENT CARE | Age: 45
End: 2020-12-12
Payer: COMMERCIAL

## 2020-12-12 VITALS
HEIGHT: 71 IN | OXYGEN SATURATION: 98 % | BODY MASS INDEX: 28 KG/M2 | RESPIRATION RATE: 18 BRPM | HEART RATE: 78 BPM | WEIGHT: 200 LBS | TEMPERATURE: 98.6 F

## 2020-12-12 DIAGNOSIS — Z20.822 COVID-19 RULED OUT: Primary | ICD-10-CM

## 2020-12-12 PROCEDURE — U0003 INFECTIOUS AGENT DETECTION BY NUCLEIC ACID (DNA OR RNA); SEVERE ACUTE RESPIRATORY SYNDROME CORONAVIRUS 2 (SARS-COV-2) (CORONAVIRUS DISEASE [COVID-19]), AMPLIFIED PROBE TECHNIQUE, MAKING USE OF HIGH THROUGHPUT TECHNOLOGIES AS DESCRIBED BY CMS-2020-01-R: HCPCS

## 2020-12-12 PROCEDURE — 99283 EMERGENCY DEPT VISIT LOW MDM: CPT | Performed by: PREVENTIVE MEDICINE

## 2020-12-12 PROCEDURE — 99213 OFFICE O/P EST LOW 20 MIN: CPT | Performed by: PREVENTIVE MEDICINE

## 2020-12-12 PROCEDURE — G0382 LEV 3 HOSP TYPE B ED VISIT: HCPCS | Performed by: PREVENTIVE MEDICINE

## 2020-12-21 LAB — SARS-COV-2 RNA SPEC QL NAA+PROBE: NOT DETECTED

## 2021-04-17 NOTE — PROGRESS NOTES
[id number]        NAME: Jason Stallworth is a 55 y o  male  : 1975    MRN: [id number]  DATE: 2021  TIME: 9:46 AM    Assessment and Plan   COVID-19 ruled out [Z03 818]  1  COVID-19 ruled out  Novel Coronavirus (COVID-19), PCR [facility] Office Collection         Patient Instructions       Follow up with PCP in 3-5 days  Proceed to  ER if symptoms worsen  Chief Complaint     Chief Complaint   Patient presents with    Headache     and diarrhea just a couple of hours work told him he needed to be tested          History of Present Illness       Headache   This is a new problem  The current episode started today  The problem occurs constantly  The problem has been unchanged  The pain is located in the frontal region  The pain does not radiate  The pain quality is not similar to prior headaches  The quality of the pain is described as shooting and throbbing  The pain is at a severity of 5/10  The pain is moderate  Associated symptoms comments: diarrhea  Nothing aggravates the symptoms  He has tried nothing for the symptoms  The treatment provided no relief  Review of Systems   Review of Systems   Constitutional: Negative  Respiratory: Negative  Cardiovascular: Negative  Gastrointestinal: Positive for diarrhea  Neurological: Positive for headaches  All other systems reviewed and are negative          Current Medications       Current Outpatient Medications:     sitaGLIPtin (JANUVIA) 100 mg tablet, Take 100 mg by mouth daily, Disp: , Rfl:     metFORMIN (GLUCOPHAGE) 1000 MG tablet, Take 1,000 mg by mouth 2 (two) times a day with meals, Disp: , Rfl:     methocarbamol (ROBAXIN) 500 mg tablet, Take 1 tablet (500 mg total) by mouth 2 (two) times a day (Patient not taking: Reported on 2020), Disp: 20 tablet, Rfl: 0    naproxen (NAPROSYN) 500 mg tablet, Take 1 tablet (500 mg total) by mouth every 12 (twelve) hours as needed for mild pain or moderate pain (Patient not taking: Reported on 12/12/2020), Disp: 15 tablet, Rfl: 0    ondansetron (ZOFRAN) 4 mg tablet, Take 1 tablet (4 mg total) by mouth every 6 (six) hours (Patient not taking: Reported on 12/12/2020), Disp: 12 tablet, Rfl: 0    Current Allergies     Allergies as of 12/12/2020 - Reviewed 12/12/2020   Allergen Reaction Noted    Metformin Diarrhea 12/14/2015            The following portions of the patient's history were reviewed and updated as appropriate: allergies, current medications, past family history, past medical history, past social history, past surgical history and problem list      Past Medical History:   Diagnosis Date    Diabetes mellitus ([facility] [address] )     Type II    Herpes     Hyperlipidemia     Hypertension     Neoplasm of kidney     Obesity     BMI 37 2       Past Surgical History:   Procedure Laterality Date    NO PAST SURGERIES         Family History   Problem Relation Age of Onset    Diabetes Mother     Diabetes Father     Diabetes Family          Medications have been verified  Objective   Pulse 78   Temp 98 6 °F (37 °C)   Resp 18   Ht 5' 11" (1 803 m)   Wt 90 7 kg (200 lb)   SpO2 98%   BMI 27 89 kg/m²        Physical Exam     Physical Exam  Vitals signs and nursing note reviewed  Constitutional:       Appearance: He is well-developed  HENT:      Head: Normocephalic  Right Ear: Tympanic membrane normal       Left Ear: Tympanic membrane normal       Nose: Nose normal       Mouth/Throat:      Mouth: Mucous membranes are moist    Cardiovascular:      Rate and Rhythm: Normal rate and regular rhythm  Pulmonary:      Effort: Pulmonary effort is normal       Breath sounds: Normal breath sounds  Abdominal:      General: Bowel sounds are normal       Palpations: Abdomen is soft

## 2021-04-21 ENCOUNTER — TELEPHONE (OUTPATIENT)
Dept: SURGERY | Facility: CLINIC | Age: 46
End: 2021-04-21

## 2021-04-21 NOTE — TELEPHONE ENCOUNTER
Patient called and requested to speak with Dorie  Pt asked information about the program and housing assistance  Pt was very emotional and expressed how he felt like he was drowning in worry  I listened to the pt and gave him words of encouragement  I told the patient he was in good hands and I will have alex reach out to him  Pt stated he was recently diagnosed with HIV and currently receives care at [facility]  Pt would like to relocate to [place] because he would like his 8year old daughter to attend the [facility] district  Pt is willing to have care in either office [place] or [place]  Pt expressed that his family is unaware of his diagnosis and wishes to remain discreet because of his mother being judgmental  He feels ashamed but has been educated on his diagnosis and is aware that there is hope and he can live a normal life as long as he takes care of himself  Pt chart is updated with his insurance info and medication  I told him someone will reach out to him soon and to remain calm because he is in good hands  Pt ws very appreciative of the time we spent talking and feels a little more at ease

## 2021-04-22 ENCOUNTER — PATIENT OUTREACH (OUTPATIENT)
Dept: SURGERY | Facility: CLINIC | Age: 46
End: 2021-04-22

## 2021-04-22 NOTE — PROGRESS NOTES
CM called and spoke with CT  Intake was scheduled for next Tuesday at 3:30pm  CT was told what supporting documents were needed in order to complete intake

## 2021-04-23 ENCOUNTER — PATIENT OUTREACH (OUTPATIENT)
Dept: SURGERY | Facility: CLINIC | Age: 46
End: 2021-04-23

## 2021-04-26 ENCOUNTER — PATIENT OUTREACH (OUTPATIENT)
Dept: SURGERY | Facility: CLINIC | Age: 46
End: 2021-04-26
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
## 2021-04-28 ENCOUNTER — PATIENT OUTREACH (OUTPATIENT)
Dept: SURGERY | Facility: CLINIC | Age: 46
End: 2021-04-28

## 2021-04-28 NOTE — PROGRESS NOTES
New Intake    1  Name: Kimberly LAMB#                         OYN:[id number]  2  : 1975  3  Gender: Male  4  Intake Date: 2021  5  Preferred language:English  6  Insurance (including anything pending): Medical Assistance  7  Medications: Biktarvy   8   What services are they going to receive: CM Services    9  New  to [place]?: No unknown mechanism

## 2021-04-30 ENCOUNTER — PATIENT OUTREACH (OUTPATIENT)
Dept: SURGERY | Facility: CLINIC | Age: 46
End: 2021-04-30

## 2021-04-30 NOTE — PROGRESS NOTES
Cm spoke to client in regards to housing referral that was sent by Bunny Ochoa CM went over income and expenses with client  Client is eligible for TBRA and was placed on TBRA waitlist   The client with be Charity's housing client as he wants to be seen in [facility]  DARRELL notified Therese Hansen on the update and phone call he had with the client

## 2021-05-03 ENCOUNTER — PATIENT OUTREACH (OUTPATIENT)
Dept: SURGERY | Facility: CLINIC | Age: 46
End: 2021-05-03

## 2021-05-03 NOTE — PROGRESS NOTES
Eleuterio sent housing referral to [facility]  Cm was rerouted to Eleuterio Antonio due to location of Ct  Eleuterio Owusu called states that he will work with Ct and follow up regarding TBRA assistance

## 2021-05-21 ENCOUNTER — PATIENT OUTREACH (OUTPATIENT)
Dept: SURGERY | Facility: CLINIC | Age: 46
End: 2021-05-21

## 2021-05-21 NOTE — PROGRESS NOTES
Cm called Ct to check in  Ct overall doing well still struggling mentally with his diagnosis  Cm referred to [facility]  Ct states he is interested in mobile market program  Cm will refer to dietician

## 2021-05-24 ENCOUNTER — TELEPHONE (OUTPATIENT)
Dept: SURGERY | Facility: CLINIC | Age: 46
End: 2021-05-24

## 2021-05-24 NOTE — TELEPHONE ENCOUNTER
Called pt per CM's referral re:struggling mentally with new d/x  Pt was at work and said he will call back later

## 2021-06-03 ENCOUNTER — PATIENT OUTREACH (OUTPATIENT)
Dept: SURGERY | Facility: CLINIC | Age: 46
End: 2021-06-03

## 2021-06-03 NOTE — PROGRESS NOTES
Eleuterio calls Ct to check in  Ct states he wants to keep his medical care with [facility] states he did apply for food stamps and was denied even though he has primary custody of his daughter    states will get in touch with Dietician to follow up about mobile market program

## 2021-07-06 ENCOUNTER — PATIENT OUTREACH (OUTPATIENT)
Dept: SURGERY | Facility: CLINIC | Age: 46
End: 2021-07-06

## 2021-07-06 NOTE — PROGRESS NOTES
Ct called Eleuterio and requested more information on his housing wait list      Eleuterio called Eleuterio Antonio to follow up waiting to hear back

## 2021-07-08 ENCOUNTER — PATIENT OUTREACH (OUTPATIENT)
Dept: SURGERY | Facility: CLINIC | Age: 46
End: 2021-07-08

## 2021-07-08 NOTE — PROGRESS NOTES
Cm had discussion with Eleuterio Antonio regarding housing  Cm had discussion with Rossana Bustamante regarding housing  Cm called Ct to follow up who states he was considering moving  Cm states that housing market is saturated and suggests to look into more before making that decision

## 2021-07-08 NOTE — PROGRESS NOTES
[facility] spoke with DARRELL Moe in regards to the client  HC informed Payal that he sent the client's contact information to Alissa Chow and she will be getting in touch with the client in the next few weeks to set up an appointment for housing  Alissa Chow will be setting up an appointment to start the client's TBRA

## 2021-07-08 NOTE — PROGRESS NOTES
[facility]) spoke with client in regards to housing waitlist  [facility]  informed the client that the client's [facility]  Rudolph Duong would be in contact with him in the next few weeks to begin the process of potentially starting TBRA  HC gave Rudolph Filter the client's information to reach out to him

## 2021-07-12 ENCOUNTER — PATIENT OUTREACH (OUTPATIENT)
Dept: SURGERY | Facility: OTHER | Age: 46
End: 2021-07-12

## 2021-07-12 ENCOUNTER — PATIENT OUTREACH (OUTPATIENT)
Dept: SURGERY | Facility: CLINIC | Age: 46
End: 2021-07-12

## 2021-07-12 NOTE — PROGRESS NOTES
Cm had a discussion with ct's tristan Moe regarding client's living situation  Cm will call client by the end of this week to schedule TBRA appointment

## 2021-07-15 ENCOUNTER — PATIENT OUTREACH (OUTPATIENT)
Dept: SURGERY | Facility: CLINIC | Age: 46
End: 2021-07-15

## 2021-07-15 ENCOUNTER — PATIENT OUTREACH (OUTPATIENT)
Dept: SURGERY | Facility: OTHER | Age: 46
End: 2021-07-15

## 2021-07-15 NOTE — PROGRESS NOTES
Cm called client to speak about future TBRA assistance  Ct and cm went over client's income vs expenses in detail  Ct is eligible for the TBRA program  Ct stated he has not mentioned this assitance to his landlord yet  Ct has to renew his lease agreement starting August 1st,2021 so he will call the landlord this weekend to renew the lease agreement and to let him know about the [place] assistance  Ct expressed concern that he does not wish the landlord to know of his HIV status  Cm ensured client that client's status will not be disclosed  Cm explained to client that the landlord will only know that the assistance is coming from [facility] will follow up with cm on Monday, July 19 after he speaks to the landlord  Ct will provide cm with landlord's contact information, and cm will reach out to the landlord to discuss Residents to [address] [place] [address]  Ct requested for cm to send him a text message with the list of required documentation ct needs to gather for the [place] application  Cm sent client the text with detailed list of required documentation for the TBRA application  Cm updated client's cm Payal on the above

## 2021-07-15 NOTE — PROGRESS NOTES
Eleuterio had discussion with Eleuterio Garcia regarding Ct's housing  Eleuterio updated Ct that he will be getting contacted by Darrel Brennan soon  Eleuterio Garcia updated and states she spoke with him and he will need to talk to his landlord regarding the new assistance

## 2021-07-16 ENCOUNTER — PATIENT OUTREACH (OUTPATIENT)
Dept: SURGERY | Facility: CLINIC | Age: 46
End: 2021-07-16

## 2021-07-16 ENCOUNTER — PATIENT OUTREACH (OUTPATIENT)
Dept: SURGERY | Facility: OTHER | Age: 46
End: 2021-07-16

## 2021-07-16 NOTE — PROGRESS NOTES
Ct stated he talked to the landlord regarding the rent assistance he will be receiving if approved  Ct stated he provided the landlord with cm's name and told him to expect a call on Monday, July 19,2021  Ct stated the landlord replied to his text stating "sounds good thanks for the heads up"  Cm asked client to provide the landlord's name and phone number so cm can contact him on Monday  Ct sent text message stating "[employer] [place] [phone]"  Cm will reach out to the landlord next week on Monday

## 2021-07-19 ENCOUNTER — PATIENT OUTREACH (OUTPATIENT)
Dept: SURGERY | Facility: OTHER | Age: 46
End: 2021-07-19

## 2021-07-19 NOTE — PROGRESS NOTES
Ct called stating he has all TBRA required documentation ready  Eleuterio told client that cm will be calling ct's landlord today  Once the landlord returns the signed residence to rent form cm will schedule an appointment with client to complete the [facility] application  Cm called client's landlord  Landlord's name is DJ  Eleuterio told landlord that if approved ct will be getting rent assistance from [address] agreed to work with   Landlord stated he will fill out the residence to rent form and have client sign the new lease agreement and e-mail them back to cm  Eleuterio sent residence to rent form to ct's landlord via e-mail stating "Clance Peabody is the residence to rent form  Please fill it out and e-mail it back to me  Also the new lease agreement when ready  Thank you for your time  Regards"  Ct called to ask if cm spoke to his landlord  Cm told client that ct's landlord agreed to work with the funding agency and eleuterio is waiting on the paperwork deana needs to complete

## 2021-07-29 ENCOUNTER — PATIENT OUTREACH (OUTPATIENT)
Dept: SURGERY | Facility: CLINIC | Age: 46
End: 2021-07-29

## 2021-07-29 ENCOUNTER — PATIENT OUTREACH (OUTPATIENT)
Dept: SURGERY | Facility: OTHER | Age: 46
End: 2021-07-29

## 2021-07-29 NOTE — PROGRESS NOTES
[facility] spoke with [facility] [employer] [facility]  in regards to client's housing  She stated that the landlord isnt going to renew the client's lease  The client will need to look for a new place  The landlord will allow the client time to find a new place

## 2021-07-29 NOTE — PROGRESS NOTES
Ct called to ask if cm has received the completed residence to rent form from the landlord  Cm said cm has not received any form from his landlord yet  Ct stated the landlord has not tried to contact him regarding renewing the lease agreement  Ct will call the landlord today and get back to cm  Ct talked to his landlord regarding renewing his lease agreement  Per ct, the landlord stated that ct's lease ends by the end of August  Landlorjd confirmed speaking to writer  Monse told client that at this point since ct will be getting rental assistance, it's best for ct to find another apartment  Landlord stated that he needs to catch up and renovate the property  Landlord told client he will work with him and allow extra time until ct finds a new unit  Ct is not very surprised by this landlord's actions since ct states that the landlord has been trying to increase the rent pricing by a lot  Since ct has to look for another apartment, cm went over [place] requirements with client  Cm stated ct has to look for a 2 bedroom unit and went over [facility] pricing  Ct will keep cm updated on apartment search process  Cm updated housing tristan Antonio

## 2021-07-30 ENCOUNTER — PATIENT OUTREACH (OUTPATIENT)
Dept: SURGERY | Facility: CLINIC | Age: 46
End: 2021-07-30

## 2021-07-30 NOTE — PROGRESS NOTES
Eleuterio called Ct to check in  Ct reports that his landlord has stated its best that Ct move due to his lease being up and he wants to make renovations  Ct and Eleuterio Garcia working together to possibly find a new apartment  Eleuterio called Eleuterio garcia and discussed Ct's housing case

## 2021-08-03 ENCOUNTER — PATIENT OUTREACH (OUTPATIENT)
Dept: SURGERY | Facility: OTHER | Age: 46
End: 2021-08-03

## 2021-08-03 ENCOUNTER — PATIENT OUTREACH (OUTPATIENT)
Dept: SURGERY | Facility: CLINIC | Age: 46
End: 2021-08-03

## 2021-08-03 NOTE — PROGRESS NOTES
Cm had discussion with Eleuterio Guerrero regarding Ct's housing case  Cm notified Ct may have found a home in [facility]  Ct still working with Eleuterio Guerrero Cm updated Cm supervisor

## 2021-08-03 NOTE — PROGRESS NOTES
Cm had missed call from ct, so cm returned ct's phone call  Ct stated that he found a 2 bedroom house in [facility]  Ct stated that he drove past the house and it looked good from the outside  Ct states the house is in a nice neighborhood with lots of yard room for his kid to play  Ct is going to look at the house tomorrow afternoon and will let cm know if he likes it  Ct stated that the house has 2 bedrooms and the monthly rent is $1,000  Ct stated he already spoke to the landlord regarding potential rent assistance from Diann Summers and the landlord is willing to work with him on this and has no issues  Cm updated ct's  Payal on the above  Cm wrote e-mail to Gissel Zee to double check FMR and eligibility for ct's unit stating "[place],  So I have a  potential TBRA client who found a 2 bedroom house in [facility] for him and his daughter  Client says the rent will be $1,000 per month  I just want to make sure that the unit will be eligible   What is the [facility] for a 2-bedroom house in [place]? "

## 2021-08-09 ENCOUNTER — PATIENT OUTREACH (OUTPATIENT)
Dept: SURGERY | Facility: OTHER | Age: 46
End: 2021-08-09

## 2021-08-09 ENCOUNTER — PATIENT OUTREACH (OUTPATIENT)
Dept: SURGERY | Facility: CLINIC | Age: 46
End: 2021-08-09

## 2021-08-09 NOTE — PROGRESS NOTES
Frantz Murphy replied to cm's e-mail regarding [employer] FMR and utility stating "[address] [place] [address],  [facility]  [address] [place], [address] [facility]  We should use [employer]  So sorry  I will fax to you now "  Cm received the [employer] from Shameka Alcantara also called cm and cm and Shameka Alcantara had a discussion regarding [employer] and Utility  Shameka Alcantara stated the [facility] for [employer] is $1,139 for a 2 bedroom unit  Shameka Alcantara stated we can add 10% if unit is above $1,139 with utilities, which will make the max $1,252 90  Shameka Alcantara and eleuterio discussed HUD Waivers which  on 2021  Cm will contact Shameka Alcantara if ct's unit is above $1,252 90 so we can utilize the [employer]  Ct called stating the house he looked at [employer] is a no go  Ct will continue looking for another unit and keep cm updated  Eleuterio discussed above with ct's cm Payal Mcclellan updated  Rodrigue Perez on client's apartment search

## 2021-08-09 NOTE — PROGRESS NOTES
[facility] spoke with Sal Mehta in regards to the client's apartment search  She informed  that the client went to see a 2 bedroom house in [address] but it fell through  Client is going to continue to search for a place

## 2021-08-10 ENCOUNTER — PATIENT OUTREACH (OUTPATIENT)
Dept: SURGERY | Facility: CLINIC | Age: 46
End: 2021-08-10

## 2021-08-10 NOTE — PROGRESS NOTES
Eleuterio had discussion with Roxanne Noyola regarding ct's case  Cm called Ct to check in who states he's still on a search

## 2021-08-16 ENCOUNTER — PATIENT OUTREACH (OUTPATIENT)
Dept: SURGERY | Facility: OTHER | Age: 46
End: 2021-08-16

## 2021-08-17 ENCOUNTER — PATIENT OUTREACH (OUTPATIENT)
Dept: SURGERY | Facility: CLINIC | Age: 46
End: 2021-08-17

## 2021-08-17 NOTE — PROGRESS NOTES
Eleuterio Garcia called Cm to update that Ct is still in search of apartment and having a hard time  Cm text Ct to check in to see if he needed anything else

## 2021-08-20 ENCOUNTER — PATIENT OUTREACH (OUTPATIENT)
Dept: SURGERY | Facility: OTHER | Age: 46
End: 2021-08-20

## 2021-08-20 NOTE — PROGRESS NOTES
Ct called stating he worked things out with his landlord  Monse agreed to renew ct's lease agreement, however the rent will go up to $1200 per month  Cm stated that's fine and it still fits TBRA requirements  Ct said he will be renewing his lease agreement today  Ct also said monse is already working on completing the Residence to [facility] form tristan had e-mailed to him previously  Monse will submit new lease and other completed documentation to cm by next week  Once tristan receives all docs from monse, cm will call client and go over required TBRA documentation again and schedule an appointment to complete the application

## 2021-08-23 ENCOUNTER — PATIENT OUTREACH (OUTPATIENT)
Dept: SURGERY | Facility: OTHER | Age: 46
End: 2021-08-23

## 2021-08-23 ENCOUNTER — PATIENT OUTREACH (OUTPATIENT)
Dept: SURGERY | Facility: CLINIC | Age: 46
End: 2021-08-23

## 2021-08-23 NOTE — PROGRESS NOTES
Eleuterio had discussion with Eleuterio Garcia regarding Ct's housing  Ct's landlord has agreed to [facility] assistance but will be increasing the rent

## 2021-08-23 NOTE — PROGRESS NOTES
Cm received ct's completed Residence to Rent form and updated lease agreement from ct's landlorjd MCKEON  Cm looked over the documentation and everything looks good  Ct called to ask if cm received all the paperwork  Cm stated that cm received everything and it looks good  Cm encouraged client to start gathering recent bills and docs for [facility] application  Cm will call client tomorrow and go over required TBRA paperwork in detail  Cm discussed above with ct's cm Payal

## 2021-08-24 ENCOUNTER — PATIENT OUTREACH (OUTPATIENT)
Dept: SURGERY | Facility: OTHER | Age: 46
End: 2021-08-24

## 2021-08-24 ENCOUNTER — PATIENT OUTREACH (OUTPATIENT)
Dept: SURGERY | Facility: CLINIC | Age: 46
End: 2021-08-24

## 2021-08-24 NOTE — PROGRESS NOTES
Cm called client and went over required TBRA documentation client will have to submit  Cm and ct scheduled an appointment to complete TBRA application on Thursday August 26 at 3:30pm over the phone  Ct called stating he has all required documentation ready to go and will stop by [facility] office today to drop it off  Ct will give the paperwork to his cm Payal, and Payal will scan it to   Cm called Payal and discussed above  Payal will scan all paperwork once ct brings it in  Cm and Arely Estrella had a discussion regarding client's TBRA  Based off FMR +10%+ utilities ct has to pay, ct's unit cannot exceed $1315 60 per month  Lyndsay and eleuterio made calculations based off ct's residence to rent form and lease agreement  Ct's unit adds up to $1334 monthly which is above the maximum allowance  Lul Palacio stated that we can use Hud waivers for this TBRA,however in order to obtain permission to use the waivers, we have to find rent reasonable apartments for rent in client's area  Lyndsay and eleuterio browsed for 2 bedroom apartments near by client's unit,but were not able to find anything  Cm will continue to work on looking for apartments  Cm received ct's TBRA required paperwork from [place] [address]  See [facility] file for both  Cm called client and discussed the above  Eleuterio told client that his unit is $19 above the maximum allowance  Eleuterio stated that cm will try to obtain HUD waivers  Cm will keep client updated on matter  Ct stated that he lives near by [employer] and [employer] in [facility]  Cm will search these areas for apartments

## 2021-08-24 NOTE — PROGRESS NOTES
Eletuerio Garcia called and discussed Ct's housing  Ct came in with documentation for Eleuterio Garcia Cm e-mailed Ct's documents  Cm and Ct checked in and overall doing well

## 2021-08-25 ENCOUNTER — PATIENT OUTREACH (OUTPATIENT)
Dept: SURGERY | Facility: OTHER | Age: 46
End: 2021-08-25

## 2021-08-25 NOTE — PROGRESS NOTES
Eleuterio received multiple e-mails from Ayo Pool with available units for rent in the [place] area near ct's home  Ayo Pool also sent e-mail stating "Kiran Toth,  I tried calling you, but your mailbox is full  I also looked on Craigslist in [place]  I did not find 2-bedroom units that would be in line with amenities in the $1200 price range  They are all falling well below the $1200  Some questions:  1  Review what I did send you and see if it will work regarding the [address]  2  Are you able to speak with the landlord to see if s/he will accept lower than $1200/month? You can explain that the going rental rate in that area does not seem to be in line with $1200  I know this is a big leap, as it could cost the client the unit  3  There are other 2 bedrooms available, but we dont know if the landlord will work with the program   4  What is the clients total household income?"    Eleuterio called Ayo Pool to discuss the above questions  Anibal calculated everything all over again  Client maximum allowance is $1315 60 with 10% above FMR included  With utilities and rent client's rent adds up to $1341  Lyndsay and eleuterio noticed that ct is responsible for paying all electric  The landlord noted on the Residence to rent form that landlord pays cooking which is also electric but ct pays all other electric appliances  Cm will follow up with landlord to see if ct or landlord pays for cooking  If cooking is paid by landlord, ct will be $25 40 over the maximum allowance,but if ct pays for cooking then ct will be $34 above the maximum allowance  Eleuterio will call landlord and ask if the landlord is willing to reduce the rent by $35 dollars  Eleuterio called land and deana stated he made a mistake and that client pays all electric utilities  Cm explained the above to the landlord and he agreed to reduce the rent by $35  Hemanth MCKEON will correct the lease agreement and send it to eleuterio Mcclellan updated Ayo Pool on [employer]   Cm stated landlord agreed to reduce the rent by $35   Cm called client stating his landlord agreed to reduce the unit by $35, and landlord will be sending cm an updated lease agreement

## 2021-08-26 ENCOUNTER — PATIENT OUTREACH (OUTPATIENT)
Dept: SURGERY | Facility: OTHER | Age: 46
End: 2021-08-26

## 2021-08-26 NOTE — PROGRESS NOTES
Cm received ct's updated lease agreement from deana MCKEON  See [address] file  Cm looked through client's documentation for TBRA application and noticed ct's pay stubs are from June/July  Cm called client stating the funding needs most recent pay stubs  Ct said he will e-mail his latest pay stubs to cm today  Cm received two pay stubs from ct for the month of August  See [address] file  Ct called and completed TBRA application with tristan  Cm will work on Sheridan Cheung and get back to client with exact amounts

## 2021-08-31 ENCOUNTER — PATIENT OUTREACH (OUTPATIENT)
Dept: SURGERY | Facility: CLINIC | Age: 46
End: 2021-08-31

## 2021-09-01 ENCOUNTER — PATIENT OUTREACH (OUTPATIENT)
Dept: SURGERY | Facility: CLINIC | Age: 46
End: 2021-09-01

## 2021-09-01 ENCOUNTER — PATIENT OUTREACH (OUTPATIENT)
Dept: SURGERY | Facility: OTHER | Age: 46
End: 2021-09-01

## 2021-09-01 NOTE — PROGRESS NOTES
[facility])Charity updated [facility]  on the client's current housing situation  She is looking to schedule an appointment to do HQS for the client for his [facility] authorization  HC will assist Charity with the home inspection

## 2021-09-01 NOTE — PROGRESS NOTES
Cm worked on ct's TBRA application calculations  Cm called client and went over the calculations regarding potential rent assistance  Cm scheduled housing inspection with ct for September 9,2021 at 3:30 pm  Cm called housing cm Paco to confirm availability for housing inspection   [address] [place] [address] and cm will go do housing inspection together  Cm discussed above with ct's tristan Moe

## 2021-09-02 ENCOUNTER — PATIENT OUTREACH (OUTPATIENT)
Dept: SURGERY | Facility: CLINIC | Age: 46
End: 2021-09-02

## 2021-09-03 ENCOUNTER — PATIENT OUTREACH (OUTPATIENT)
Dept: SURGERY | Facility: CLINIC | Age: 46
End: 2021-09-03

## 2021-09-03 ENCOUNTER — PATIENT OUTREACH (OUTPATIENT)
Dept: SURGERY | Facility: OTHER | Age: 46
End: 2021-09-03

## 2021-09-03 NOTE — PROGRESS NOTES
Cm and  Stephanie Nicole discussed ct's housing inspection  Cm confirmed housing inspection date and time with vazquez Antonio

## 2021-09-03 NOTE — PROGRESS NOTES
[facility] Iqra Ann called Isiahibeth  She is going to be scheduling client for his home inspection for his TBRA application  Iqra Ann will be sending HC date and time via [employer] calendar  HC will assist Charity with the home inspection

## 2021-09-07 NOTE — PROGRESS NOTES
[facility]), Guevara Nicholas spoke with [facility] [place]  Guevara Nicholas wanted to confirm appointment for next week for the client's home inspection  HC's will do home inspection next week on 9/9

## 2021-09-09 ENCOUNTER — PATIENT OUTREACH (OUTPATIENT)
Dept: SURGERY | Facility: OTHER | Age: 46
End: 2021-09-09

## 2021-09-09 NOTE — PROGRESS NOTES
Cm called client to confirm housing inspection address and time for today at 3:30pm   Cm confirmed address and housing inspection time with  [place]   Paco and tristan went to ct's home and completed housing inspection for TBRA application today

## 2021-09-10 ENCOUNTER — PATIENT OUTREACH (OUTPATIENT)
Dept: SURGERY | Facility: CLINIC | Age: 46
End: 2021-09-10

## 2021-09-10 ENCOUNTER — PATIENT OUTREACH (OUTPATIENT)
Dept: SURGERY | Facility: OTHER | Age: 46
End: 2021-09-10

## 2021-09-10 NOTE — PROGRESS NOTES
Cm called Ct to see if he was interested in coats for kids  Ct states he is interested for both of his children  Cm updated Cm supervisor

## 2021-09-10 NOTE — PROGRESS NOTES
Cm submitted ct's Housing Inspection HQS to supervisor [employer]  Cm put together client's TBRA application and submitted it to [employer] today via e-mail  Eleuterio put together ct's [address] file  See [address] file

## 2021-09-13 ENCOUNTER — PATIENT OUTREACH (OUTPATIENT)
Dept: SURGERY | Facility: OTHER | Age: 46
End: 2021-09-13

## 2021-09-13 ENCOUNTER — PATIENT OUTREACH (OUTPATIENT)
Dept: SURGERY | Facility: CLINIC | Age: 46
End: 2021-09-13

## 2021-09-13 NOTE — PROGRESS NOTES
Binh Ambrose from [place] spoke with  [facility]) in regards to the [facility] Alissa Chow sent  She is currently looking over the auth now and will be emailing [facility]  and Alissa Chow questions that she has in regards to the client's TBRA application

## 2021-09-13 NOTE — PROGRESS NOTES
Robin Meraz from [facility] sent e-mail with Julien Lopez application questions stating "Sandip Dumont,  Please call when you can  Based on what I read, the below should be considered:  1  FYI- The $250 IRS child tax credit is not being counted as income  This tax advance payment is typically done during annual tax season, and it is understood as not being income  I did reference the Internet for confirmation  2  Does the client receive child support from the mother- person listed as the defendant within the custody agreement? 3  Client pays child support, and Harsh Friedman mentioned it is for another child  Just confirming this Marquise Burnham and I spoke this morning)  4  How had the client been affording his unit- he has been there four years- prior to needing TBRA? What changed? 5  The daughter seems to be listed as a tenant on the lease, and she is a minor   6  Clause 17, but page 6, is explaining that the lease is a Joint and Several Lease, which typically is verbiage that all tenants are responsible for the legality of the lease  A minor cannot be held responsible  a  Was there/is there another adult living in the household? 7  Client must provide declaration pages of car insurance   8  Client has lived in unit for four years, but the PP&L statement details usage since March of 2021  Whose name was the PPL bill in prior to February/March 2021? 9  Phone bill statement   10  I cannot fully read the bank statements due to how they may have been faxed to you  a  Just observing that the client has additional monthly expenses for eating out and entertainment "    Cm received another e-mail from Robin Meraz stating "Batsheva, also:  1  The Residence to [facility] is completed to show the client rent is $1200 per month  Please have the landlord fully complete, without any crossing out of utilities or rent amounts, with all updates and refax to [facility] when you received the updated document    2  The lease is dated to begin 10/1/2021, so it would be helpful to also have the lease updated with the monthly rent amount without have it just crossed out  If we cannot get this, please express to both the landlord and the client that it is not allowed under this [facility] funded program for the client to pay more in monthly rent than what is determined per the [facility] calculation   "  Eleuterio called client and went over Lyndsay's questions with him  Eleuterio wrote e-mail to ct's landlord DJ attaching a blank residence to rent form and stating "Good morning LINDA,  I submitted Charlie's housing application to the funding agency  They have three requests    -One is they want you to re-write the lease with the updated rent amount instead of the crossing it out with initial  I'm so sorry I know I told you this should be okay, but the  wants it updated so it meets the requirements  - Two she mentioned that the residence to rent form has to be recompleted as well with the new rent amount and correct utilities  I'll attach the residence to rent form here  When you have both these completed please e-mail them to me   -And lastly, the funding mentioned that Clause 17, but page 6 on the lease agreement, is explaining that the lease is a Joint and Several Lease, which typically is verbiage that all tenants are responsible for the legality of the lease  A minor cannot be held responsible  Was there/is there another adult living in the household? (Besides Client's daughter)  Once the application is approved I'll also send you the W-9 form that needs to be completed so [facility] can start making payments  Please let me know if you have any questions  Thank you so much!"  [facility]io also [facility] via text message to be on the look out for eleuterio's e-mail  [facility] stating he will work on lease and residence to rent form and submit them to  as soon as possible  Eleuterio received ct's car declaration policy    Eleuterio wrote e-mail to [facility] with client's responses and with ct's car declaration policy attached stating "Junior Peters,  I called the client and went over the below  1  Thank you for this information  2  No the client does not receive child support  3  Yes client pays child support for his son  This child does not live with client only stays with client four times a month  4  The client rent was $800 until March of 2021  Client was able to afford it  The landlord then increased clients rent to $1000 thats when the client started struggling and contacted [facility]  Client tried looking for other units in [address] but stated it was impossible to find anything right now due to very high rental prices  The landlord increased cts rent to what it is now last month  5  Yes the daughter is a minor  Client has no idea why she is listed as a tenant  6  Client says nobody lives with him besides his daughter  I also e-mailed the landlord regarding this so he can verify nobody else lives there  7  Car insurance declaration was faxed to me by ct today  See attachment  8  client stated that he was behind on paying his PPL bill and was going to be shut off, so he had to transfer the bill to his brothers name  The brother helped client pay for some of the bill  Client also said he then had to put it under his childs mothers name  She helped him with the rest of the bill  That was all before March 2021  In March ct was able to get the bill back to his name  9  Client said he doesnt have a phone bill statement because its a pre-paid phone  I believe its on his bank statements  10  Noted  Also I reached out to the landlord regarding the lease agreement and residence to rent form  Landlord will correct them tonight and e-mail them to me  Rodrigo Peters please call me if you have any additional questions  Ill be in [place] all day tomorrow but Ill try to submit the lease agreement and residence to rent form  Ill be off on Wednesday all day    Thank you!" See [address] file for all e-mail print outs and car declaration policy  Shannen May sent e-mail stating "Sloan Aguirre,  I hope you are feeling well  Thank you for all the answers  Did you review the insurance document? I think we need to ask the client why there is another person on his policy  This implies that the person is driving the car/living at the same address  What are your thoughts? Also, because I cannot read all the monthly bank statements, would you help me on if they show the client pays his PPL and [address] car insurance expenses from the bank account "  Cm called client and client explained that the name on his car declaration is his mom  Ct stated that the car used to be under his mom's name but ct pays the bills now  Cm called Shannen May and Shannen May and cm went over ct's bank statements and car declaration policy  Shannen May noticed that ct's car insurance payment was not paid from his bank account  Shannen May stated that we will use this as income if someone else is paying ct's car insurance  Shannen May also noticed an additional cell phone payment from [facility], so this will be calculated as an additional monthly expense  Cm called client and ct stated that his mom paid the last payment for his car insurance, and two months ago is when they switched the policy to his name  Ct will be paying for it going forward  Cm e-mailed Shannen May stating "Adair Osgood,  I followed up with the client he said that his mom did pay for the car in July  Ct said that they switched the policy to his name only two months ago so going forward he will be paying for it from his bank account  He said he can provide proof that the policy was switched from mom to his name if needed    Let me know ??"

## 2021-09-16 ENCOUNTER — PATIENT OUTREACH (OUTPATIENT)
Dept: SURGERY | Facility: OTHER | Age: 46
End: 2021-09-16

## 2021-09-16 ENCOUNTER — PATIENT OUTREACH (OUTPATIENT)
Dept: SURGERY | Facility: CLINIC | Age: 46
End: 2021-09-16

## 2021-09-16 NOTE — PROGRESS NOTES
Ct's landlord DJ sent the corrected lease agreement and residence to rent form via e-mail to cm  See [address] file  Cm submitted the above to Jasson Sheets from [facility]  Cm received e-mail from Jasson Sheets in reply to previous e-mail regarding ct's car declaration payment stating "Unknown Tavernier,  We dont need proof on the change  People can have anyone they want on their insurance, but we would have to ensure that we recognize the scenario per the [place]OP[place]/TBRA guidelines  What we will need to do is consider it income until he shows proof that he is paying the premium  This is just to be consistent and not penalize anyone  Please update the TBRA calculation to show that the annual premium is income  Thanks so much,"  Cm called client and explained the above  Ct stated he can provide cm with proof that he is the one paying for the insurance  Cm sent e-mail to Jasson Sheets stating "I talked to the client today and explained the below  Client said he can provide me proof that he has been paying the insurance  Ill wait on his fax and submit to you to review and see if we can accept it  If not, Ill do the recalculations  Thank you Jasson Sheets!"  Cm received ct's insurance responsibility card via fax  Cm called client stating that this still does not proof that ct made payments to the insurance company  Ct stated he has been making payments for the past two months so the August payment should definitely be on his bank statement  Cm and ct looked over ct's bank statements and cm was able to find the payment of $53 42 to the insurance company in August  Eleuterio called Jasson Sheets and went over the above  Lyndsay and eleuterio looked over ct's bank statements and Jasson Sheets was able to see the payment to the insurance company made on 8/17/2021  Jasson Sheets stated ct has to apply for public housing once the [place] list opens  Cm received approved [facility] #[id number], expires 4/30/22  See [address] file  Cm called client stating ct was approved   Cm explained the funding will be paying $482 97 towards his rent and ct will be responsible for the rest  Cm explained ct needs to apply for public housing and that he will need to submit proof of income in six months to renew the [facility]  Ct understood  Cm sent e-mail to ct's landlord with a blank W-9 form attached stating "Sharonda Nascimento was approved by the funding for rental assistance  Please complete the attached W-9 form and return to me so Heaven Husbands can start making payments  We will be sending you a check of $482 97 monthly  Jessica Patterson is responsible for the rest of the rent  His application will be reviewed again in six months for renewal  Let me know if you have any questions  Feel free to call me as well  Thank you!"  See [address] [facility] file for docs and e-mail print outs  Cm updated client's cm Payal that ct has been approved for the [place] program   Cm discussed case with  Henri Grant

## 2021-09-17 ENCOUNTER — PATIENT OUTREACH (OUTPATIENT)
Dept: SURGERY | Facility: OTHER | Age: 46
End: 2021-09-17

## 2021-09-17 NOTE — PROGRESS NOTES
Eleuterio received ct's completed W-9 form from ct's landlord  Cm wrote check req and letter for cts October 2021 rent payment and submitted them to supervisor Walter Hart for processing along with Jerald Martinez, W-9 form, and rent page  See [facility] file  Supervisor submitted TBRA October 21 package to accounts payable

## 2021-09-20 ENCOUNTER — PATIENT OUTREACH (OUTPATIENT)
Dept: SURGERY | Facility: CLINIC | Age: 46
End: 2021-09-20

## 2021-09-20 ENCOUNTER — PATIENT OUTREACH (OUTPATIENT)
Dept: SURGERY | Facility: OTHER | Age: 46
End: 2021-09-20

## 2021-09-20 NOTE — PROGRESS NOTES
Supervisor Neal Duffy noticed that there is a SS-4 form attached instead of a W-9 to client's October TBRA rent payment package  Cm realized cm had e-mailed the wrong form to ct's landlord to complete  Cm e-mailed correct W-9 form to landlord LINDA Mcclellan will wait for landlord to return the completed W-9 form and submit it to Jacek from accounts payable  Accounts [facility] is aware that [facility] is working on the W-9 form

## 2021-09-21 ENCOUNTER — PATIENT OUTREACH (OUTPATIENT)
Dept: SURGERY | Facility: OTHER | Age: 46
End: 2021-09-21

## 2021-09-21 NOTE — PROGRESS NOTES
Eleuterio received ct's completed W-9 Form from ct's landlord DJ  See [address] [place] file  Cm submitted the completed W-9 form to Vitaliy Rodriguez from [employer]  Ct called to ask if his landlord has completed the W-9 form  Cm stated that yes,and the check req was sent to [employer] yesterday

## 2021-09-22 ENCOUNTER — PATIENT OUTREACH (OUTPATIENT)
Dept: SURGERY | Facility: CLINIC | Age: 46
End: 2021-09-22

## 2021-09-22 ENCOUNTER — PATIENT OUTREACH (OUTPATIENT)
Dept: SURGERY | Facility: OTHER | Age: 46
End: 2021-09-22

## 2021-09-22 NOTE — PROGRESS NOTES
Ct's eleuterio Moe called to follow up on ct's TBRA status  Eleuterio stated ct's TBRA was approved and eleuterio sent out ct's first check req for payment for October rent

## 2021-09-27 ENCOUNTER — PATIENT OUTREACH (OUTPATIENT)
Dept: SURGERY | Facility: OTHER | Age: 46
End: 2021-09-27

## 2021-09-27 ENCOUNTER — PATIENT OUTREACH (OUTPATIENT)
Dept: SURGERY | Facility: CLINIC | Age: 46
End: 2021-09-27

## 2021-09-27 NOTE — PROGRESS NOTES
Ct called stating he is planning on paying his portion of his TBRA rent to his landlord today  Ct wanted to go over what he owes and make sure [facility] sent the remaining check  Cm stated that the check req for October's rent was sent out to accounts payable on 9/17/2021  Cm and ct went over ct's part of the rent owed to landlord   Paco and cm discussed client's housing  Cm mailed maternity leave notice letter to cts home  See [address] [place] [address] file

## 2021-09-28 NOTE — PROGRESS NOTES
[facility]) spoke with client in regards to housing  HC is calling in regards to a voicemail he received from client  Client was looking to see what his rent portion was  Client stated that he did get it already from [facility], [facility] , [address]

## 2021-09-29 ENCOUNTER — PATIENT OUTREACH (OUTPATIENT)
Dept: SURGERY | Facility: CLINIC | Age: 46
End: 2021-09-29

## 2021-10-04 ENCOUNTER — PATIENT OUTREACH (OUTPATIENT)
Dept: SURGERY | Facility: CLINIC | Age: 46
End: 2021-10-04

## 2021-10-14 ENCOUNTER — PATIENT OUTREACH (OUTPATIENT)
Dept: SURGERY | Facility: CLINIC | Age: 46
End: 2021-10-14

## 2021-10-20 ENCOUNTER — PATIENT OUTREACH (OUTPATIENT)
Dept: SURGERY | Facility: CLINIC | Age: 46
End: 2021-10-20

## 2021-10-28 ENCOUNTER — PATIENT OUTREACH (OUTPATIENT)
Dept: SURGERY | Facility: CLINIC | Age: 46
End: 2021-10-28

## 2021-11-01 ENCOUNTER — PATIENT OUTREACH (OUTPATIENT)
Dept: SURGERY | Facility: CLINIC | Age: 46
End: 2021-11-01

## 2021-11-02 ENCOUNTER — PATIENT OUTREACH (OUTPATIENT)
Dept: SURGERY | Facility: CLINIC | Age: 46
End: 2021-11-02

## 2021-11-08 ENCOUNTER — PATIENT OUTREACH (OUTPATIENT)
Dept: SURGERY | Facility: CLINIC | Age: 46
End: 2021-11-08

## 2021-11-10 ENCOUNTER — PATIENT OUTREACH (OUTPATIENT)
Dept: SURGERY | Facility: CLINIC | Age: 46
End: 2021-11-10

## 2021-11-15 ENCOUNTER — PATIENT OUTREACH (OUTPATIENT)
Dept: SURGERY | Facility: CLINIC | Age: 46
End: 2021-11-15

## 2021-11-23 ENCOUNTER — PATIENT OUTREACH (OUTPATIENT)
Dept: SURGERY | Facility: CLINIC | Age: 46
End: 2021-11-23

## 2021-12-07 ENCOUNTER — PATIENT OUTREACH (OUTPATIENT)
Dept: SURGERY | Facility: CLINIC | Age: 46
End: 2021-12-07

## 2021-12-09 ENCOUNTER — CLINICAL SUPPORT (OUTPATIENT)
Dept: SURGERY | Facility: CLINIC | Age: 46
End: 2021-12-09

## 2021-12-09 DIAGNOSIS — B20 HIV INFECTION, UNSPECIFIED SYMPTOM STATUS (HCC): Primary | ICD-10-CM

## 2021-12-09 RX ORDER — ASPIRIN 81 MG/1
TABLET, COATED ORAL
COMMUNITY
Start: 2021-12-06 | End: 2021-12-21 | Stop reason: SDUPTHER

## 2021-12-10 RX ORDER — IBUPROFEN 600 MG/1
600 TABLET ORAL EVERY 6 HOURS PRN
COMMUNITY
Start: 2021-05-20 | End: 2021-12-21 | Stop reason: SDUPTHER

## 2021-12-10 RX ORDER — DULAGLUTIDE 0.75 MG/.5ML
0.75 INJECTION, SOLUTION SUBCUTANEOUS WEEKLY
COMMUNITY
Start: 2021-12-06 | End: 2021-12-21 | Stop reason: SDUPTHER

## 2021-12-10 RX ORDER — BICTEGRAVIR SODIUM, EMTRICITABINE, AND TENOFOVIR ALAFENAMIDE FUMARATE 50; 200; 25 MG/1; MG/1; MG/1
TABLET ORAL DAILY
COMMUNITY
Start: 2021-11-18 | End: 2021-12-21 | Stop reason: SDUPTHER

## 2021-12-10 RX ORDER — INSULIN GLARGINE 100 [IU]/ML
INJECTION, SOLUTION SUBCUTANEOUS
COMMUNITY
Start: 2021-09-14 | End: 2021-12-21 | Stop reason: SDUPTHER

## 2021-12-10 RX ORDER — VALACYCLOVIR HYDROCHLORIDE 1 G/1
TABLET, FILM COATED ORAL DAILY
COMMUNITY
Start: 2021-12-06 | End: 2021-12-21 | Stop reason: SDUPTHER

## 2021-12-10 RX ORDER — PEN NEEDLE, DIABETIC 32GX 5/32"
NEEDLE, DISPOSABLE MISCELLANEOUS
COMMUNITY
Start: 2021-12-06 | End: 2022-07-19 | Stop reason: SDUPTHER

## 2021-12-10 RX ORDER — ATORVASTATIN CALCIUM 20 MG/1
TABLET, FILM COATED ORAL DAILY
COMMUNITY
Start: 2021-12-06 | End: 2021-12-21 | Stop reason: SDUPTHER

## 2021-12-11 ENCOUNTER — APPOINTMENT (OUTPATIENT)
Dept: LAB | Facility: HOSPITAL | Age: 46
End: 2021-12-11
Payer: COMMERCIAL

## 2021-12-11 DIAGNOSIS — B20 HUMAN IMMUNODEFICIENCY VIRUS (HIV) DISEASE (HCC): Primary | ICD-10-CM

## 2021-12-11 PROCEDURE — 87536 HIV-1 QUANT&REVRSE TRNSCRPJ: CPT

## 2021-12-13 ENCOUNTER — PATIENT OUTREACH (OUTPATIENT)
Dept: SURGERY | Facility: CLINIC | Age: 46
End: 2021-12-13

## 2021-12-14 ENCOUNTER — APPOINTMENT (OUTPATIENT)
Dept: LAB | Facility: HOSPITAL | Age: 46
End: 2021-12-14
Payer: COMMERCIAL

## 2021-12-14 LAB
HIV1 RNA # SERPL NAA+PROBE: <20 COPIES/ML
HIV1 RNA SERPL NAA+PROBE-LOG#: NORMAL LOG10COPY/ML

## 2021-12-16 ENCOUNTER — PATIENT OUTREACH (OUTPATIENT)
Dept: SURGERY | Facility: CLINIC | Age: 46
End: 2021-12-16

## 2021-12-21 ENCOUNTER — OFFICE VISIT (OUTPATIENT)
Dept: SURGERY | Facility: CLINIC | Age: 46
End: 2021-12-21
Payer: COMMERCIAL

## 2021-12-21 VITALS
BODY MASS INDEX: 30.74 KG/M2 | TEMPERATURE: 98.3 F | HEART RATE: 84 BPM | HEIGHT: 71 IN | WEIGHT: 219.6 LBS | DIASTOLIC BLOOD PRESSURE: 87 MMHG | SYSTOLIC BLOOD PRESSURE: 128 MMHG

## 2021-12-21 DIAGNOSIS — E78.2 MIXED HYPERLIPIDEMIA: ICD-10-CM

## 2021-12-21 DIAGNOSIS — M54.50 CHRONIC LOW BACK PAIN, UNSPECIFIED BACK PAIN LATERALITY, UNSPECIFIED WHETHER SCIATICA PRESENT: ICD-10-CM

## 2021-12-21 DIAGNOSIS — B20 HUMAN IMMUNODEFICIENCY VIRUS (HIV) DISEASE (HCC): ICD-10-CM

## 2021-12-21 DIAGNOSIS — E11.65 UNCONTROLLED TYPE 2 DIABETES MELLITUS WITH HYPERGLYCEMIA (HCC): ICD-10-CM

## 2021-12-21 DIAGNOSIS — A60.9 HSV (HERPES SIMPLEX VIRUS) ANOGENITAL INFECTION: ICD-10-CM

## 2021-12-21 DIAGNOSIS — E66.3 OVERWEIGHT (BMI 25.0-29.9): ICD-10-CM

## 2021-12-21 DIAGNOSIS — Z23 NEED FOR PNEUMOCOCCAL VACCINATION: ICD-10-CM

## 2021-12-21 DIAGNOSIS — G89.29 CHRONIC LOW BACK PAIN, UNSPECIFIED BACK PAIN LATERALITY, UNSPECIFIED WHETHER SCIATICA PRESENT: ICD-10-CM

## 2021-12-21 DIAGNOSIS — Z23 NEED FOR INFLUENZA VACCINATION: ICD-10-CM

## 2021-12-21 DIAGNOSIS — S33.5XXA LUMBAR SPRAIN, INITIAL ENCOUNTER: ICD-10-CM

## 2021-12-21 DIAGNOSIS — B20 HIV INFECTION, UNSPECIFIED SYMPTOM STATUS (HCC): Primary | ICD-10-CM

## 2021-12-21 DIAGNOSIS — M79.604 RIGHT LEG PAIN: ICD-10-CM

## 2021-12-21 DIAGNOSIS — M79.672 LEFT FOOT PAIN: ICD-10-CM

## 2021-12-21 PROBLEM — A52.8 SYPHILIS, LATE LATENT: Status: ACTIVE | Noted: 2020-07-24

## 2021-12-21 PROBLEM — N52.9 VASCULOGENIC ERECTILE DYSFUNCTION: Status: ACTIVE | Noted: 2021-09-21

## 2021-12-21 PROBLEM — M76.62 TENDONITIS, ACHILLES, LEFT: Status: ACTIVE | Noted: 2017-06-20

## 2021-12-21 PROBLEM — B35.1 ONYCHOMYCOSIS OF GREAT TOE: Status: ACTIVE | Noted: 2020-11-20

## 2021-12-21 PROBLEM — L84 CORNS AND CALLUS: Status: ACTIVE | Noted: 2020-11-20

## 2021-12-21 PROCEDURE — 90471 IMMUNIZATION ADMIN: CPT

## 2021-12-21 PROCEDURE — 99204 OFFICE O/P NEW MOD 45 MIN: CPT | Performed by: NURSE PRACTITIONER

## 2021-12-21 PROCEDURE — 90472 IMMUNIZATION ADMIN EACH ADD: CPT

## 2021-12-21 PROCEDURE — 90732 PPSV23 VACC 2 YRS+ SUBQ/IM: CPT

## 2021-12-21 PROCEDURE — 90682 RIV4 VACC RECOMBINANT DNA IM: CPT

## 2021-12-21 RX ORDER — METHOCARBAMOL 500 MG/1
500 TABLET, FILM COATED ORAL 2 TIMES DAILY
Qty: 30 TABLET | Refills: 0 | Status: SHIPPED | OUTPATIENT
Start: 2021-12-21 | End: 2022-03-15 | Stop reason: SDUPTHER

## 2021-12-21 RX ORDER — INSULIN GLARGINE 100 [IU]/ML
30 INJECTION, SOLUTION SUBCUTANEOUS DAILY
Qty: 15 ML | Refills: 2 | Status: SHIPPED | OUTPATIENT
Start: 2021-12-21 | End: 2022-03-15 | Stop reason: SDUPTHER

## 2021-12-21 RX ORDER — ATORVASTATIN CALCIUM 20 MG/1
20 TABLET, FILM COATED ORAL DAILY
Qty: 30 TABLET | Refills: 1 | Status: SHIPPED | OUTPATIENT
Start: 2021-12-21 | End: 2022-03-15 | Stop reason: SDUPTHER

## 2021-12-21 RX ORDER — ASPIRIN 81 MG/1
81 TABLET, COATED ORAL DAILY
Qty: 30 TABLET | Refills: 2 | Status: SHIPPED | OUTPATIENT
Start: 2021-12-21 | End: 2022-03-15 | Stop reason: SDUPTHER

## 2021-12-21 RX ORDER — IBUPROFEN 600 MG/1
600 TABLET ORAL EVERY 6 HOURS PRN
Qty: 30 TABLET | Refills: 2 | Status: SHIPPED | OUTPATIENT
Start: 2021-12-21 | End: 2022-03-15 | Stop reason: SDUPTHER

## 2021-12-21 RX ORDER — METHOCARBAMOL 500 MG/1
500 TABLET, FILM COATED ORAL 2 TIMES DAILY
Qty: 20 TABLET | Refills: 0 | Status: SHIPPED | OUTPATIENT
Start: 2021-12-21 | End: 2021-12-21 | Stop reason: SDUPTHER

## 2021-12-21 RX ORDER — DULAGLUTIDE 0.75 MG/.5ML
0.75 INJECTION, SOLUTION SUBCUTANEOUS WEEKLY
Qty: 2 ML | Refills: 0 | Status: SHIPPED | OUTPATIENT
Start: 2021-12-21 | End: 2022-03-07 | Stop reason: SDUPTHER

## 2021-12-21 RX ORDER — VALACYCLOVIR HYDROCHLORIDE 1 G/1
1000 TABLET, FILM COATED ORAL DAILY
Qty: 30 TABLET | Refills: 2 | Status: SHIPPED | OUTPATIENT
Start: 2021-12-21 | End: 2022-01-24 | Stop reason: SDUPTHER

## 2021-12-21 RX ORDER — INSULIN ASPART 100 [IU]/ML
INJECTION, SOLUTION INTRAVENOUS; SUBCUTANEOUS
COMMUNITY
Start: 2021-12-06 | End: 2021-12-21 | Stop reason: HOSPADM

## 2021-12-21 RX ORDER — BICTEGRAVIR SODIUM, EMTRICITABINE, AND TENOFOVIR ALAFENAMIDE FUMARATE 50; 200; 25 MG/1; MG/1; MG/1
1 TABLET ORAL DAILY
Qty: 30 TABLET | Refills: 2 | Status: SHIPPED | OUTPATIENT
Start: 2021-12-21 | End: 2022-03-15 | Stop reason: SDUPTHER

## 2021-12-28 ENCOUNTER — HOSPITAL ENCOUNTER (OUTPATIENT)
Dept: RADIOLOGY | Facility: HOSPITAL | Age: 46
Discharge: HOME | End: 2021-12-28
Payer: COMMERCIAL

## 2021-12-28 DIAGNOSIS — M79.672 LEFT FOOT PAIN: ICD-10-CM

## 2021-12-28 PROCEDURE — 73610 X-RAY EXAM OF ANKLE: CPT

## 2022-01-12 NOTE — ED PROVIDER NOTES
History  Chief Complaint   Patient presents with    Exposure to STD     States "I have and STD"  puss, burning w/urination, itching  hx genital herpes x 20 years  43year old male PMH DM presents today complaining of "genital herpes"  Patient reports yellow discharge and dysuria  He states that his foreskin is cracked and irritated and therefore burns when he urinates  Feels that this appears like his usual herpes, but his PCP does not regularly prescribe his Valtrex because he "doesn't have outbreaks enough," per patient  Denies fevers, pain, redness  Prior to Admission Medications   Prescriptions Last Dose Informant Patient Reported? Taking?   metFORMIN (GLUCOPHAGE) 500 mg tablet   Yes Yes   Sig: Take 500 mg by mouth 2 (two) times a day with meals   sitaGLIPtin (JANUVIA) 100 mg tablet   Yes Yes   Sig: Take 100 mg by mouth daily      Facility-Administered Medications: None       Past Medical History:   Diagnosis Date    [facility]     Type II    Herpes        History reviewed  No pertinent surgical history  History reviewed  No pertinent family history  I have reviewed and agree with the history as documented  Social History   Substance Use Topics    Smoking status: Never Smoker    Smokeless tobacco: Never Used    Alcohol use No        Review of Systems   Genitourinary: Positive for dysuria  Penile irritation   All other systems reviewed and are negative  Physical Exam  ED Triage Vitals [10/26/17 1435]   Temperature Pulse Respirations Blood Pressure SpO2   98 3 °F (36 8 °C) 76 20 155/75 99 %      Temp src Heart Rate Source Patient Position - Orthostatic VS BP Location FiO2 (%)   -- -- -- -- --      Pain Score       5           Orthostatic Vital Signs  Vitals:    10/26/17 1435   BP: 155/75   Pulse: 76       Physical Exam   Constitutional: He appears well-developed and well-nourished  No distress  HENT:   Head: Normocephalic and atraumatic     Eyes: Conjunctivae and EOM are normal    Neck: Normal range of motion  Cardiovascular: Normal rate  Pulmonary/Chest: Effort normal    Abdominal: Soft  Genitourinary:   Genitourinary Comments: Irritation of foreskin without erythema, drainage  No obvious open sores  Musculoskeletal: Normal range of motion  Neurological: He is alert  Skin: Skin is warm and dry  Capillary refill takes less than 2 seconds  He is not diaphoretic  Psychiatric: He has a normal mood and affect  ED Medications  Medications   azithromycin (ZITHROMAX) tablet 1,000 mg (1,000 mg Oral Given 10/26/17 1632)   cefTRIAXone (ROCEPHIN) 250 mg in sterile water IM only syringe (250 mg Intramuscular Given 10/26/17 1632)       Diagnostic Studies  Results Reviewed     Procedure Component Value Units Date/Time    Urine Microscopic [[id number]] Collected:  10/26/17 1627    Lab Status: In process Specimen:  Urine from Urine, Other Updated:  10/26/17 1616    Chlamydia/GC amplified DNA by PCR [[id number]] Collected:  10/26/17 1609    Lab Status:   In process Specimen:  Urine from Urine, Other Updated:  10/26/17 1616    POCT urinalysis dipstick [[id number]]  (Abnormal) Resulted:  10/26/17 1614    Lab Status:  Final result Updated:  10/26/17 1614    ED Urine Macroscopic [[id number]]  (Abnormal) Collected:  10/26/17 1627    Lab Status:  Final result Specimen:  Urine Updated:  10/26/17 1612     Color, UA Yellow     Clarity, UA Cloudy     pH, UA 5 5     Leukocytes, UA Trace (A)     Nitrite, UA Negative     Protein, UA Negative mg/dl      Glucose,  (1/2%) (A) mg/dl      Ketones, UA Negative mg/dl      Urobilinogen, UA 0 2 E U /dl      Bilirubin, UA Negative     Blood, UA Trace (A)     Specific [place], UA 1 010    Narrative:       CLINITEK RESULT                 No orders to display              Procedures  Procedures       Phone Contacts  ED Phone Contact    ED Course  ED Course                                MDM  Number of Diagnoses or Management Options  Balanitis:   Herpes:   Possible exposure to STD:   Diagnosis management comments: Will Rx lotrimin to apply to open cracked areas of foreskin  Follow-up with PCP  Criare Time    Disposition  Final diagnoses:   Balanitis   Herpes   Possible exposure to STD     Time reflects when diagnosis was documented in both MDM as applicable and the Disposition within this note     Time User Action Codes Description Comment    10/26/2017  4:28 PM Inés Cough Add [N48 1] Balanitis     10/26/2017  4:28 PM Pukwana Karen M Add [B00 9] Herpes     10/26/2017  4:28 PM Inés Cough Add [Z20 2] Possible exposure to STD       ED Disposition     ED Disposition Condition Comment    Discharge  Brayden Lentz discharge to home/self care  Condition at discharge: Good        Follow-up Information     Follow up With Specialties Details Why Contact Info    Anali Anaya MD Family Medicine Schedule an appointment as soon as possible for a visit  [address]  [phone]          Discharge Medication List as of 10/26/2017  4:31 PM      START taking these medications    Details   clotrimazole (LOTRIMIN) 1 % cream Apply topically 2 (two) times a day, Starting Thu 10/26/2017, Print      valACYclovir (VALTREX) 500 mg tablet Take 1 tablet by mouth 2 (two) times a day for 3 days, Starting Thu 10/26/2017, Until Sun 10/29/2017, Print         CONTINUE these medications which have NOT CHANGED    Details   metFORMIN (GLUCOPHAGE) 500 mg tablet Take 500 mg by mouth 2 (two) times a day with meals, Until Discontinued, Historical Med      sitaGLIPtin (JANUVIA) 100 mg tablet Take 100 mg by mouth daily, Until Discontinued, Historical Med           No discharge procedures on file      ED Provider  Electronically Signed by           Cherie David PA-C  10/26/17 2606 good minus

## 2022-01-24 ENCOUNTER — TELEPHONE (OUTPATIENT)
Dept: SURGERY | Facility: CLINIC | Age: 47
End: 2022-01-24

## 2022-01-24 DIAGNOSIS — A60.9 HSV (HERPES SIMPLEX VIRUS) ANOGENITAL INFECTION: ICD-10-CM

## 2022-01-24 DIAGNOSIS — N52.9 ERECTILE DYSFUNCTION, UNSPECIFIED ERECTILE DYSFUNCTION TYPE: Primary | ICD-10-CM

## 2022-01-24 RX ORDER — VALACYCLOVIR HYDROCHLORIDE 1 G/1
1000 TABLET, FILM COATED ORAL DAILY
Qty: 30 TABLET | Refills: 2 | Status: SHIPPED | OUTPATIENT
Start: 2022-01-24 | End: 2022-01-24 | Stop reason: SDUPTHER

## 2022-01-24 RX ORDER — VALACYCLOVIR HYDROCHLORIDE 1 G/1
1000 TABLET, FILM COATED ORAL DAILY
Qty: 30 TABLET | Refills: 2 | Status: SHIPPED | OUTPATIENT
Start: 2022-01-24 | End: 2022-03-15 | Stop reason: SDUPTHER

## 2022-01-24 RX ORDER — SILDENAFIL 100 MG/1
100 TABLET, FILM COATED ORAL DAILY PRN
Qty: 10 TABLET | Refills: 0 | Status: SHIPPED | OUTPATIENT
Start: 2022-01-24 | End: 2022-01-24

## 2022-01-24 NOTE — TELEPHONE ENCOUNTER
Pt called about medication refills, but could not remember the name of the medications he needed refilled

## 2022-01-26 ENCOUNTER — PATIENT OUTREACH (OUTPATIENT)
Dept: SURGERY | Facility: CLINIC | Age: 47
End: 2022-01-26

## 2022-01-26 NOTE — PROGRESS NOTES
Ct text Cm for clinic information  Ct then text Cm about information regarding BH  Cm notified that [address] will be returning 2/14/2022

## 2022-01-28 ENCOUNTER — PATIENT OUTREACH (OUTPATIENT)
Dept: SURGERY | Facility: CLINIC | Age: 47
End: 2022-01-28

## 2022-01-31 ENCOUNTER — PATIENT OUTREACH (OUTPATIENT)
Dept: SURGERY | Facility: OTHER | Age: 47
End: 2022-01-31

## 2022-01-31 NOTE — PROGRESS NOTES
Cm and  Javier Habermann discussed clients housing file  Paco updated cm on changes and work done during cms maternity leave  No changes to ct's housing chart

## 2022-02-01 ENCOUNTER — OFFICE VISIT (OUTPATIENT)
Dept: SURGERY | Facility: CLINIC | Age: 47
End: 2022-02-01
Payer: COMMERCIAL

## 2022-02-01 VITALS
BODY MASS INDEX: 30.73 KG/M2 | HEIGHT: 71 IN | TEMPERATURE: 98 F | HEART RATE: 80 BPM | WEIGHT: 219.5 LBS | SYSTOLIC BLOOD PRESSURE: 142 MMHG | DIASTOLIC BLOOD PRESSURE: 90 MMHG | OXYGEN SATURATION: 97 %

## 2022-02-01 DIAGNOSIS — A52.8 SYPHILIS, LATE LATENT: ICD-10-CM

## 2022-02-01 DIAGNOSIS — B20 HUMAN IMMUNODEFICIENCY VIRUS (HIV) DISEASE (HCC): Primary | ICD-10-CM

## 2022-02-01 DIAGNOSIS — E66.9 OBESITY (BMI 30.0-34.9): ICD-10-CM

## 2022-02-01 DIAGNOSIS — Z71.89 EDUCATED ABOUT COVID-19 VIRUS INFECTION: ICD-10-CM

## 2022-02-01 DIAGNOSIS — E11.65 UNCONTROLLED TYPE 2 DIABETES MELLITUS WITH HYPERGLYCEMIA (HCC): ICD-10-CM

## 2022-02-01 PROCEDURE — 99205 OFFICE O/P NEW HI 60 MIN: CPT | Performed by: INTERNAL MEDICINE

## 2022-02-01 NOTE — PROGRESS NOTES
Consultation - Infectious Disease   Glenn Kamara 52 y o  male MRN: [id number]  Unit/Bed#:  Encounter: [id number]      IMPRESSION & RECOMMENDATIONS:   1  HIV-doing well on Biktarvy with an undetectable viral load and a CD4 count of 83  Continue ART, recheck labs in 2 months, follow-up in 3 months  Stressed adherence    2  Diabetes mellitus-poorly controlled  Refer to endocrinology  Discussed this issue with the primary    3  Morbid obesity-continues to stressed the importance of diet and exercise    4  Syphilis-patient status post treatment with good response  Continue to monitor RPR annually      5  Healthcare maintenance-stressed the importance of COVID-19 booster vaccination       Patient was provided medication, adherence and prevention education    HISTORY OF PRESENT ILLNESS:  Reason for Consult: HIV  HPI: Glenn Kamara is a 52y o  year old male here for new patient evaluation for HIV  Patient initially diagnosed in July of 2020 and had initial viral load of over 1,000,000 copies and a CD4 count of 250  According the patient he feels like he acquired HIV through heterosexual contact  He was started on Biktarvy soon after his diagnosis  He has done well with the [facility] [place] [place] with an undetectable viral load and a CD4 count in the 800s  He has struggled with poorly controlled diabetes and obesity  Patient transferred care to our clinic due to convenient proximity  The patient has remained basically healthy overall despite the poorly controlled diabetes  He continues to work full time at [employer]  Denies any fever chills or sweats, denies any nausea vomiting or diarrhea      REVIEW OF SYSTEMS:  A complete review of systems is negative other than that noted in the HPI    PAST MEDICAL HISTORY:  Past Medical History:   Diagnosis Date    Chlamydia     Diabetes mellitus ([facility] )     Type II    Gonorrhea     Herpes     HIV disease ([facility] ) 07/2020    mode of transmission: heterosexual    Hyperlipidemia     Hypertension     Neoplasm of kidney     Obesity     BMI 37 2    Syphilis      Past Surgical History:   Procedure Laterality Date    NO PAST SURGERIES         FAMILY HISTORY:  Non-contributory    SOCIAL HISTORY:  Social History   Social History     Substance and Sexual Activity   Alcohol Use No     Social History     Substance and Sexual Activity   Drug Use No     Social History     Tobacco Use   Smoking Status Never Smoker   Smokeless Tobacco Never Used       ALLERGIES:  Allergies   Allergen Reactions    Metformin Diarrhea       MEDICATIONS:  All current active medications have been reviewed  PHYSICAL EXAM:  Vitals:    02/01/22 1730   BP: 142/90   Pulse: 80   Temp: 98 °F (36 7 °C)   SpO2: 97%   Weight: 99 6 kg (219 lb 8 oz)   Height: 5' 11" (1 803 m)         General Appearance:  Appearing well, nontoxic, and in no distress   Head:  Normocephalic, without obvious abnormality, atraumatic   Eyes:  Conjunctiva pink and sclera anicteric, both eyes   Nose: Nares normal, mucosa normal, no drainage   Throat: Oropharynx moist without lesions   Neck: Supple, symmetrical, no adenopathy, no tenderness/mass/nodules   Back:   Symmetric, no curvature, ROM normal, no CVA tenderness   Lungs:   Clear to auscultation bilaterally, respirations unlabored   Chest Wall:  No tenderness or deformity   Heart:  RRR; no murmur, rub or gallop   Abdomen:   Soft, non-tender, non-distended, positive bowel sounds,    Extremities: No cyanosis, clubbing or edema   Skin: No rashes or lesions  No draining wounds noted  Lymph nodes: Cervical, supraclavicular nodes normal   Neurologic: Alert and oriented times 3, extremity strength 5/5 and symmetric       LABS, IMAGING, & OTHER STUDIES:  Lab Results:  I have personally reviewed pertinent labs    Lab Results   Component Value Date     (L) 04/04/2018    K 4 2 12/11/2021    CL 99 12/11/2021    CO2 31 (H) 12/11/2021    ANIONGAP 10 04/04/2018    BUN 12 12/11/2021 CREATININE 0 65 (L) 12/11/2021    GLUCOSE 269 (H) 09/10/2018    GLUF 217 (H) 12/11/2021    CALCIUM 9 6 12/11/2021    AST 20 12/11/2021    ALT 17 12/11/2021    ALKPHOS 67 12/11/2021    PROT 6 9 04/04/2018    BILITOT 0 4 04/04/2018    EGFR 117 12/11/2021     Lab Results   Component Value Date    WBC 6 90 12/11/2021    WBC 7 0 12/11/2021    HGB 14 3 12/11/2021    HGB 14 6 12/11/2021    HCT 44 1 12/11/2021    HCT 44 4 12/11/2021    MCV 88 12/11/2021    MCV 88 12/11/2021     12/11/2021     12/11/2021     Lab Results   Component Value Date    HEPCAB Non-reactive 12/11/2021     Lab Results   Component Value Date    HAV Reactive (A) 12/11/2021    HEPCAB Non-reactive 12/11/2021     Lab Results   Component Value Date    RPR Reactive (A) 12/11/2021    RPR Reactive 2 dils (A) 12/11/2021     CD4 ABS   Date/Time Value Ref Range Status   12/11/2021 11:45  359 - 1519 /uL Final     HIV-1 RNA by PCR, Qn   Date/Time Value Ref Range Status   12/11/2021 11:45 AM <20 copies/mL Final     Comment:     HIV-1 RNA detected  The reportable range for this assay is 20 to 10,000,000  copies HIV-1 RNA/mL  Imaging Studies:   I have personally reviewed pertinent imaging study reports and images in PACS  Other Studies:   I have personally reviewed pertinent reports

## 2022-02-02 ENCOUNTER — PATIENT OUTREACH (OUTPATIENT)
Dept: SURGERY | Facility: CLINIC | Age: 47
End: 2022-02-02

## 2022-02-02 NOTE — PROGRESS NOTES
Follow-up Nutrition Note:  Patient's weight is stable  He states he is "trying to eat better"  He states he now is only drinking one can of soda/ week  He admits to still drinking gatorade, and loves sweets  I encouraged him to continue to cut back on high sugar beverages and snack items  Offered to meet with him for follow-up when he comes back to clinic to meet with PCP Dona Thompson, he agreed    Ronald Hatch, RDN, LDN, CDCES

## 2022-02-02 NOTE — PROGRESS NOTES
Pastoral Care Progress Note    2022  Patient: Sheri Neff : 1975  Encounter Date & Time: 2022   MRN: [id number]        The  followed up with Mr Nivia Tay from previous visit  Mr Nivia Tay shared he has been crying daily after praying  Mr Nivia Tay shared his prayer with the   He reported feeling disconnected and trapped into a daily routine with no sense of purpose  "I do the same thing everyday, go to work come home and repeat    I need to be around positive people "  The  affirmed his concerns and assisted Mr Nivia Tay identifying spiritual support systems, including reaching out to men in the [employer]reg[employer], volunteering, and getting involved in community  The  gave Mr Nigel Hart a card to call if he needed to talk  As follow-up to above encounter the  reached to case management about a men's support group in [facility]  [facility] None  (Anglican)   Current Jew Involvement Patient active with Congregational   Spiritual Beliefs/Perceptions   Concept of God Accepting   God's Role in Disease Natural   Relationship with God Close   Conflicts/Concerns   (Feels disconnnected and loneness )   Spiritual Strengths   (Forgiveness of self and others are aprt of his margaret )   Psychosocial   Psychosocial (WDL) WDL   Length of Time/Family Visitation 6-15 min   Stress Factors   Patient Stress Factors Other (Comment)  (loss sense of purpose and connection to others)   Coping Responses   Patient Coping Depression; Sadness   Patient Spiritual Assessment   Feelings of Disconnected/Cut-off Doesn't have any positive connections   Feelings of Loneliness I feel alone   Plan of Care   Care Plan Initiated Yes   Monitor Spiritual Support (Phone) 1 time   Assessment Completed by: Unit visit  (Id clinic)        22 1700   Clinical Encounter Type   Visited With Patient   Routine Visit Follow-up   Continue Visiting Yes Scientology Encounters   Scientology Needs Prayer   Patient Spiritual Encounters   Spiritual Assessment 3   Feelings of Loneliness I feel alone   Coping 4   Grief Resolution 3  (Loss of relationship)   Spiritual Encounter Notes See progress note

## 2022-02-03 ENCOUNTER — PATIENT OUTREACH (OUTPATIENT)
Dept: SURGERY | Facility: CLINIC | Age: 47
End: 2022-02-03

## 2022-02-03 ENCOUNTER — TELEPHONE (OUTPATIENT)
Dept: SURGERY | Facility: CLINIC | Age: 47
End: 2022-02-03

## 2022-02-03 NOTE — PROGRESS NOTES
Eleuterio received notice from [employer] that she had some support options for Ct and will follow up  Cm text Ct to check in

## 2022-02-03 NOTE — TELEPHONE ENCOUNTER
The  initiated a call to follow-up on support group referral   Mr  Venice Bonds reported he was interested in attending the men's support group in [facility] at [address] [place] [address]  Following the call the  texted patient the e-mail information  Contact for support group is [email or web address]  The  will follow-up with patient for further support

## 2022-02-03 NOTE — PROGRESS NOTES
Cm received notice that Ct called Clinic in search of a eye doctor  Cm text Ct to follow up he already found one and scheduled  requested more information regarding therapy  Cm updated that Alpa Sotelo will be returning 2/14 and Cm will put the referral in

## 2022-02-11 ENCOUNTER — PATIENT OUTREACH (OUTPATIENT)
Dept: SURGERY | Facility: CLINIC | Age: 47
End: 2022-02-11

## 2022-02-15 ENCOUNTER — PATIENT OUTREACH (OUTPATIENT)
Dept: SURGERY | Facility: CLINIC | Age: 47
End: 2022-02-15

## 2022-02-15 NOTE — PROGRESS NOTES
[facility]

## 2022-02-16 ENCOUNTER — TELEPHONE (OUTPATIENT)
Dept: SURGERY | Facility: CLINIC | Age: 47
End: 2022-02-16

## 2022-02-16 NOTE — TELEPHONE ENCOUNTER
[facility] contacted pt at 9:15 a m  to get acquainted, along with exploring pt's request of [address] services and completing the PHQ-9 screening  Pt disclosed having difficulties to understand what [address] services are since he described that he was raised with the understanding that [address] services are only for "crazy people"  A brief psycho-educational intervention was provided to clarify the definition of [address] services which allowed pt to elaborate more about his psychological needs  Pt described being under "a lot of stress" and being "on his feelings and head" all the time due to current life's circumstances, needing assistance to talk about his core issues in an ongoing basis  It was suggested to patient to coordinate at least 2-3 sessions to explore options towards referral for [address] services  It was explored pt's considerations to get medication management assistance which he refused at the time by mentioning that he "isn't in need to take psych pills"  Before session's completion, patient received positive reinforcements and accolades for his proactive willingness to seek for assistance  A f/u session was coordinated for 2/22/22 at 3:10 p m  Patient expressed gratitude for the approach at the end of contact  No suicidal or homicidal ideations were disclosed, nor displayed throughout contact  PHQ-9 was completed, scoring 14 as a display of mild traits of Major Depression

## 2022-02-22 ENCOUNTER — PATIENT OUTREACH (OUTPATIENT)
Dept: SURGERY | Facility: OTHER | Age: 47
End: 2022-02-22

## 2022-02-22 ENCOUNTER — DOCUMENTATION (OUTPATIENT)
Dept: SURGERY | Facility: CLINIC | Age: 47
End: 2022-02-22

## 2022-02-22 NOTE — PROGRESS NOTES
Cm wrote check req and letter for March 2022 rent payment  Cm submitted [employer] March 2022 package to supervisor for processing  Supervisor submitted [employer] March package to accounts payable  See [address] file
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
## 2022-02-24 ENCOUNTER — PATIENT OUTREACH (OUTPATIENT)
Dept: SURGERY | Facility: CLINIC | Age: 47
End: 2022-02-24

## 2022-03-07 ENCOUNTER — TELEPHONE (OUTPATIENT)
Dept: SURGERY | Facility: CLINIC | Age: 47
End: 2022-03-07

## 2022-03-07 DIAGNOSIS — E11.65 UNCONTROLLED TYPE 2 DIABETES MELLITUS WITH HYPERGLYCEMIA (HCC): ICD-10-CM

## 2022-03-07 RX ORDER — DULAGLUTIDE 0.75 MG/.5ML
0.75 INJECTION, SOLUTION SUBCUTANEOUS WEEKLY
Qty: 2 ML | Refills: 0 | Status: SHIPPED | OUTPATIENT
Start: 2022-03-07 | End: 2022-03-15 | Stop reason: SDUPTHER

## 2022-03-07 NOTE — TELEPHONE ENCOUNTER
Pt called and asked for a refill of his Trulicty, he's been out of it for a couple of weeks    He still uses [facility] [address][[facility]]

## 2022-03-11 ENCOUNTER — TELEPHONE (OUTPATIENT)
Dept: SURGERY | Facility: CLINIC | Age: 47
End: 2022-03-11

## 2022-03-11 DIAGNOSIS — E11.65 UNCONTROLLED TYPE 2 DIABETES MELLITUS WITH HYPERGLYCEMIA (HCC): ICD-10-CM

## 2022-03-11 RX ORDER — INSULIN GLARGINE 100 [IU]/ML
30 INJECTION, SOLUTION SUBCUTANEOUS DAILY
Qty: 15 ML | Refills: 2 | Status: CANCELLED | OUTPATIENT
Start: 2022-03-11

## 2022-03-11 NOTE — TELEPHONE ENCOUNTER
Pt called Thurs  Evening and asked for a refill of his Lantus prescription, but I think that still has refills

## 2022-03-14 ENCOUNTER — PATIENT OUTREACH (OUTPATIENT)
Dept: SURGERY | Facility: CLINIC | Age: 47
End: 2022-03-14

## 2022-03-15 ENCOUNTER — OFFICE VISIT (OUTPATIENT)
Dept: SURGERY | Facility: CLINIC | Age: 47
End: 2022-03-15
Payer: COMMERCIAL

## 2022-03-15 ENCOUNTER — PATIENT OUTREACH (OUTPATIENT)
Dept: SURGERY | Facility: OTHER | Age: 47
End: 2022-03-15

## 2022-03-15 VITALS
HEART RATE: 82 BPM | SYSTOLIC BLOOD PRESSURE: 128 MMHG | HEIGHT: 71 IN | OXYGEN SATURATION: 96 % | TEMPERATURE: 98.9 F | BODY MASS INDEX: 30.57 KG/M2 | DIASTOLIC BLOOD PRESSURE: 68 MMHG | WEIGHT: 218.4 LBS

## 2022-03-15 DIAGNOSIS — Z79.899 OTHER LONG TERM (CURRENT) DRUG THERAPY: ICD-10-CM

## 2022-03-15 DIAGNOSIS — N52.9 ERECTILE DYSFUNCTION, UNSPECIFIED ERECTILE DYSFUNCTION TYPE: ICD-10-CM

## 2022-03-15 DIAGNOSIS — B20 HIV INFECTION, UNSPECIFIED SYMPTOM STATUS (HCC): ICD-10-CM

## 2022-03-15 DIAGNOSIS — S33.5XXA LUMBAR SPRAIN, INITIAL ENCOUNTER: ICD-10-CM

## 2022-03-15 DIAGNOSIS — Z00.00 ANNUAL PHYSICAL EXAM: ICD-10-CM

## 2022-03-15 DIAGNOSIS — G89.29 CHRONIC LOW BACK PAIN, UNSPECIFIED BACK PAIN LATERALITY, UNSPECIFIED WHETHER SCIATICA PRESENT: ICD-10-CM

## 2022-03-15 DIAGNOSIS — Z13.1 SCREENING FOR DIABETES MELLITUS: Primary | ICD-10-CM

## 2022-03-15 DIAGNOSIS — E78.2 MIXED HYPERLIPIDEMIA: ICD-10-CM

## 2022-03-15 DIAGNOSIS — M54.50 CHRONIC LOW BACK PAIN, UNSPECIFIED BACK PAIN LATERALITY, UNSPECIFIED WHETHER SCIATICA PRESENT: ICD-10-CM

## 2022-03-15 DIAGNOSIS — E66.9 OBESITY (BMI 30.0-34.9): ICD-10-CM

## 2022-03-15 DIAGNOSIS — A60.9 HSV (HERPES SIMPLEX VIRUS) ANOGENITAL INFECTION: ICD-10-CM

## 2022-03-15 DIAGNOSIS — B20 HUMAN IMMUNODEFICIENCY VIRUS (HIV) DISEASE (HCC): ICD-10-CM

## 2022-03-15 DIAGNOSIS — E11.65 UNCONTROLLED TYPE 2 DIABETES MELLITUS WITH HYPERGLYCEMIA (HCC): ICD-10-CM

## 2022-03-15 PROBLEM — M76.62 TENDONITIS, ACHILLES, LEFT: Status: RESOLVED | Noted: 2017-06-20 | Resolved: 2022-03-15

## 2022-03-15 PROBLEM — B35.1 ONYCHOMYCOSIS OF GREAT TOE: Status: RESOLVED | Noted: 2020-11-20 | Resolved: 2022-03-15

## 2022-03-15 PROBLEM — L84 CORNS AND CALLUS: Status: RESOLVED | Noted: 2020-11-20 | Resolved: 2022-03-15

## 2022-03-15 PROCEDURE — 99396 PREV VISIT EST AGE 40-64: CPT | Performed by: NURSE PRACTITIONER

## 2022-03-15 RX ORDER — DULAGLUTIDE 0.75 MG/.5ML
0.75 INJECTION, SOLUTION SUBCUTANEOUS WEEKLY
Qty: 2 ML | Refills: 0 | Status: SHIPPED | OUTPATIENT
Start: 2022-03-15 | End: 2022-07-19 | Stop reason: HOSPADM

## 2022-03-15 RX ORDER — ATORVASTATIN CALCIUM 20 MG/1
20 TABLET, FILM COATED ORAL DAILY
Qty: 30 TABLET | Refills: 1 | Status: SHIPPED | OUTPATIENT
Start: 2022-03-15 | End: 2022-07-19 | Stop reason: SDUPTHER

## 2022-03-15 RX ORDER — VALACYCLOVIR HYDROCHLORIDE 1 G/1
1000 TABLET, FILM COATED ORAL DAILY
Qty: 30 TABLET | Refills: 2 | Status: SHIPPED | OUTPATIENT
Start: 2022-03-15 | End: 2022-07-05

## 2022-03-15 RX ORDER — ASPIRIN 81 MG/1
81 TABLET, COATED ORAL DAILY
Qty: 30 TABLET | Refills: 2 | Status: SHIPPED | OUTPATIENT
Start: 2022-03-15 | End: 2022-07-19 | Stop reason: SDUPTHER

## 2022-03-15 RX ORDER — SILDENAFIL 100 MG/1
100 TABLET, FILM COATED ORAL DAILY PRN
Qty: 30 TABLET | Refills: 2 | Status: SHIPPED | OUTPATIENT
Start: 2022-03-15 | End: 2022-07-19 | Stop reason: SDUPTHER

## 2022-03-15 RX ORDER — METHOCARBAMOL 500 MG/1
500 TABLET, FILM COATED ORAL 2 TIMES DAILY
Qty: 30 TABLET | Refills: 0 | Status: SHIPPED | OUTPATIENT
Start: 2022-03-15 | End: 2022-07-19 | Stop reason: SDUPTHER

## 2022-03-15 RX ORDER — BICTEGRAVIR SODIUM, EMTRICITABINE, AND TENOFOVIR ALAFENAMIDE FUMARATE 50; 200; 25 MG/1; MG/1; MG/1
1 TABLET ORAL DAILY
Qty: 30 TABLET | Refills: 2 | Status: SHIPPED | OUTPATIENT
Start: 2022-03-15 | End: 2022-07-19 | Stop reason: SDUPTHER

## 2022-03-15 RX ORDER — IBUPROFEN 600 MG/1
600 TABLET ORAL EVERY 6 HOURS PRN
Qty: 30 TABLET | Refills: 2 | Status: SHIPPED | OUTPATIENT
Start: 2022-03-15 | End: 2022-07-19 | Stop reason: SDUPTHER

## 2022-03-15 RX ORDER — INSULIN GLARGINE 100 [IU]/ML
30 INJECTION, SOLUTION SUBCUTANEOUS DAILY
Qty: 15 ML | Refills: 2 | Status: SHIPPED | OUTPATIENT
Start: 2022-03-15 | End: 2022-07-19 | Stop reason: SDUPTHER

## 2022-03-15 NOTE — PROGRESS NOTES
[address]    NAME: Mary Fairbanks  AGE: 52 y o  SEX: male  : 1975     DATE: 3/15/2022     Assessment and Plan:     Problem List Items Addressed This Visit        Endocrine    Uncontrolled type 2 diabetes mellitus with hyperglycemia ([facility] )       Lab Results   Component Value Date    HGBA1C 10 5 (H) 2021     Lab Results   Component Value Date/Time    HGBA1C 10 5 (H) 2021 11:45 AM    HGBA1C 13 7 (H) 09/15/2021 03:42 PM        Continue current medication  Not taking Trulicity weekly  Instructed to take medication every week  Schedule for endocrinology later this month  Medication adjustment per their guidance  Educated to follow diabetic diet, maintain a healthy weight, and exercise regularly  Referred to dietician for additional education  Foot exam at next office visit  Relevant Medications    Dulaglutide (Trulicity) 2 68 CC/6 7AA SOPN    Aspirin Low Dose 81 MG EC tablet    Empagliflozin 25 MG TABS    Lantus SoloStar 100 units/mL injection pen       Other    Human immunodeficiency virus (HIV) disease (HCC)    Relevant Medications    Biktarvy -25 MG tablet    valACYclovir (VALTREX) 1,000 mg tablet    Obesity (BMI 30 0-34  9)    HSV (herpes simplex virus) anogenital infection    Relevant Medications    Biktarvy -25 MG tablet    valACYclovir (VALTREX) 1,000 mg tablet      Other Visit Diagnoses     Screening for diabetes mellitus    -  Primary    Other long term (current) drug therapy        Mixed hyperlipidemia        Relevant Medications    atorvastatin (LIPITOR) 20 mg tablet    HIV infection, unspecified symptom status (HCC)        Relevant Medications    Biktarvy -25 MG tablet    valACYclovir (VALTREX) 1,000 mg tablet    Chronic low back pain, unspecified back pain laterality, unspecified whether sciatica present        Relevant Medications    ibuprofen (MOTRIN) 600 mg tablet    Lumbar sprain, initial encounter        Relevant Medications    methocarbamol (ROBAXIN) 500 mg tablet    Erectile dysfunction, unspecified erectile dysfunction type        Relevant Medications    sildenafil (VIAGRA) 100 mg tablet    Annual physical exam              Immunizations and preventive care screenings were discussed with patient today  Appropriate education was printed on patient's after visit summary  Counseling:  · Exercise: the importance of regular exercise/physical activity was discussed  Recommend exercise 3-5 times per week for at least 30 minutes  No follow-ups on file  Chief Complaint:     Chief Complaint   Patient presents with    Follow-up     PT OFFERS NOO C/O AT [facility]  History of Present Illness:     Adult Annual Physical   Patient here for a comprehensive physical exam  The patient reports no problems  Diet and Physical Activity  · Diet/Nutrition: well balanced diet  · Exercise: no formal exercise  Depression Screening  PHQ-2/9 Depression Screening         General Health  · Sleep: sleeps well  · Hearing: normal - bilateral   · Vision: wears glasses  · Dental: needs dental exam  [facility]  · Symptoms include: erectile dysfunction     Review of Systems:     Review of Systems   Constitutional: Negative for activity change, appetite change, chills, diaphoresis, fatigue, fever and unexpected weight change  HENT: Negative for congestion, dental problem, ear pain, hearing loss, mouth sores, rhinorrhea and sore throat  Eyes: Negative for pain, redness and visual disturbance  Respiratory: Negative for shortness of breath and wheezing  Cardiovascular: Negative for chest pain and leg swelling  Gastrointestinal: Negative for abdominal pain, constipation, diarrhea, nausea and vomiting  Endocrine: Negative for polydipsia, polyphagia and polyuria     Genitourinary: Negative for difficulty urinating and dysuria  Musculoskeletal: Negative for back pain, joint swelling and myalgias  Skin: Negative for rash  Neurological: Negative for syncope and headaches  Psychiatric/Behavioral: Negative for behavioral problems and suicidal ideas        Past Medical History:     Past Medical History:   Diagnosis Date    Chlamydia     Diabetes mellitus ([place] [address] )     Type II    Gonorrhea     Herpes     HIV disease ([place] [address] ) 07/2020    mode of transmission: heterosexual    Hyperlipidemia     Hypertension     Neoplasm of kidney     Obesity     BMI 37 2    Syphilis       Past Surgical History:     Past Surgical History:   Procedure Laterality Date    NO PAST SURGERIES        Family History:     Family History   Problem Relation Age of Onset    Diabetes Mother     Diabetes Father     Diabetes Family       Social History:     Social History     Socioeconomic History    Marital status: Single     Spouse name: None    Number of children: None    Years of education: None    Highest education level: None   Occupational History    None   Tobacco Use    Smoking status: Never Smoker    Smokeless tobacco: Never Used   Vaping Use    Vaping Use: Never used   Substance and Sexual Activity    Alcohol use: No    Drug use: No    Sexual activity: Not Currently     Partners: Female     Birth control/protection: None   Other Topics Concern    None   Social History Narrative    Activity level- sedentary     Wears Safety belt     Social Determinants of Health     Financial Resource Strain: Not on file   Food Insecurity: No Food Insecurity    Worried About Running Out of Food in the Last Year: Never true    Yudy of Food in the Last Year: Never true   Transportation Needs: Not on file   Physical Activity: Not on file   Stress: Not on file   Social Connections: Not on file   Intimate Partner Violence: Not on file   Housing Stability: Not on file      Current Medications:     Current Outpatient Medications   Medication Sig Dispense Refill    Aspirin Low Dose 81 MG EC tablet Take 1 tablet (81 mg total) by mouth daily 30 tablet 2    atorvastatin (LIPITOR) 20 mg tablet Take 1 tablet (20 mg total) by mouth daily 30 tablet 1    BD Pen Needle Bela 2nd Gen 32G X 4 MM MISC       Biktarvy -25 MG tablet Take 1 tablet by mouth daily 30 tablet 2    Dulaglutide (Trulicity) 2 80 TF/5 3GL SOPN Inject 0 5 mL (0 75 mg total) under the skin once a week 2 mL 0    Empagliflozin 25 MG TABS Take 1 tablet (25 mg total) by mouth daily 30 tablet 1    ibuprofen (MOTRIN) 600 mg tablet Take 1 tablet (600 mg total) by mouth every 6 (six) hours as needed for moderate pain 30 tablet 2    Lantus SoloStar 100 units/mL injection pen Inject 30 Units under the skin daily 15 mL 2    methocarbamol (ROBAXIN) 500 mg tablet Take 1 tablet (500 mg total) by mouth 2 (two) times a day 30 tablet 0    sildenafil (VIAGRA) 100 mg tablet Take 1 tablet (100 mg total) by mouth daily as needed for erectile dysfunction 30 tablet 2    valACYclovir (VALTREX) 1,000 mg tablet Take 1 tablet (1,000 mg total) by mouth daily 30 tablet 2     No current facility-administered medications for this visit  Allergies: Allergies   Allergen Reactions    Metformin Diarrhea      Physical Exam:     /68   Pulse 82   Temp 98 9 °F (37 2 °C)   Ht 5' 11" (1 803 m)   Wt 99 1 kg (218 lb 6 4 oz)   SpO2 96%   BMI 30 46 kg/m²     Physical Exam  Vitals and nursing note reviewed  Constitutional:       Appearance: He is well-developed  HENT:      Head: Normocephalic and atraumatic  Eyes:      Conjunctiva/sclera: Conjunctivae normal    Cardiovascular:      Rate and Rhythm: Normal rate and regular rhythm  Heart sounds: No murmur heard  Pulmonary:      Effort: Pulmonary effort is normal  No respiratory distress  Breath sounds: Normal breath sounds  Abdominal:      Palpations: Abdomen is soft  Tenderness: There is no abdominal tenderness     Musculoskeletal: Cervical back: Neck supple  Skin:     General: Skin is warm and dry  Neurological:      Mental Status: He is alert            REDD Watkins  ASC AT [facility]

## 2022-03-15 NOTE — PATIENT INSTRUCTIONS
Wellness Visit for Adults   AMBULATORY CARE:   A wellness visit  is when you see your healthcare provider to get screened for health problems  Your healthcare provider will also give you advice on how to stay healthy  Write down your questions so you remember to ask them  Ask your healthcare provider how often you should have a wellness visit  What happens at a wellness visit:  Your healthcare provider will ask about your health, and your family history of health problems  This includes high blood pressure, heart disease, and cancer  He or she will ask if you have symptoms that concern you, if you smoke, and about your mood  You may also be asked about your intake of medicines, supplements, food, and alcohol  Any of the following may be done:  · Your weight  will be checked  Your height may also be checked so your body mass index (BMI) can be calculated  Your BMI shows if you are at a healthy weight  · Your blood pressure  and heart rate will be checked  Your temperature may also be checked  · Blood and urine tests  may be done  Blood tests may be done to check your cholesterol levels  Abnormal cholesterol levels increase your risk for heart disease and stroke  You may also need a blood or urine test to check for diabetes if you are at increased risk  Urine tests may be done to look for signs of an infection or kidney disease  · A physical exam  includes checking your heartbeat and lungs with a stethoscope  Your healthcare provider may also check your skin to look for sun damage  · Screening tests  may be recommended  A screening test is done to check for diseases that may not cause symptoms  The screening tests you may need depend on your age, gender, family history, and lifestyle habits  For example, colorectal screening may be recommended if you are 48years old or older  Screening tests you need if you are a woman:   · A Pap smear  is used to screen for cervical cancer   Pap smears are usually done every 3 to 5 years depending on your age  You may need them more often if you have had abnormal Pap smear test results in the past  Ask your healthcare provider how often you should have a Pap smear  · A mammogram  is an x-ray of your breasts to screen for breast cancer  Experts recommend mammograms every 2 years starting at age 48 years  You may need a mammogram at age 52 years or younger if you have an increased risk for breast cancer  Talk to your healthcare provider about when you should start having mammograms and how often you need them  Vaccines you may need:   · Get an influenza vaccine  every year  The influenza vaccine protects you from the flu  Several types of viruses cause the flu  The viruses change over time, so new vaccines are made each year  · Get a tetanus-diphtheria (Td) booster vaccine  every 10 years  This vaccine protects you against tetanus and diphtheria  Tetanus is a severe infection that may cause painful muscle spasms and lockjaw  Diphtheria is a severe bacterial infection that causes a thick covering in the back of your mouth and throat  · Get a human papillomavirus (HPV) vaccine  if you are female and aged 23 to 32 or male 23 to 24 and never received it  This vaccine protects you from HPV infection  HPV is the most common infection spread by sexual contact  HPV may also cause vaginal, penile, and anal cancers  · Get a pneumococcal vaccine  if you are aged 72 years or older  The pneumococcal vaccine is an injection given to protect you from pneumococcal disease  Pneumococcal disease is an infection caused by pneumococcal bacteria  The infection may cause pneumonia, meningitis, or an ear infection  · Get a shingles vaccine  if you are 60 or older, even if you have had shingles before  The shingles vaccine is an injection to protect you from the varicella-zoster virus  This is the same virus that causes chickenpox   Shingles is a painful rash that develops in people who had chickenpox or have been exposed to the virus  How to eat healthy:  My Plate is a model for planning healthy meals  It shows the types and amounts of foods that should go on your plate  Fruits and vegetables make up about half of your plate, and grains and protein make up the other half  A serving of dairy is included on the side of your plate  The amount of calories and serving sizes you need depends on your age, gender, weight, and height  Examples of healthy foods are listed below:  · Eat a variety of vegetables  such as dark green, red, and orange vegetables  You can also include canned vegetables low in sodium (salt) and frozen vegetables without added butter or sauces  · Eat a variety of fresh fruits , canned fruit in 100% juice, frozen fruit, and dried fruit  · Include whole grains  At least half of the grains you eat should be whole grains  Examples include whole-wheat bread, wheat pasta, brown rice, and whole-grain cereals such as oatmeal     · Eat a variety of protein foods such as seafood (fish and shellfish), lean meat, and poultry without skin (turkey and chicken)  Examples of lean meats include pork leg, shoulder, or tenderloin, and beef round, sirloin, tenderloin, and extra lean ground beef  Other protein foods include eggs and egg substitutes, beans, peas, soy products, nuts, and seeds  · Choose low-fat dairy products such as skim or 1% milk or low-fat yogurt, cheese, and cottage cheese  · Limit unhealthy fats  such as butter, hard margarine, and shortening  Exercise:  Exercise at least 30 minutes per day on most days of the week  Some examples of exercise include walking, biking, dancing, and swimming  You can also fit in more physical activity by taking the stairs instead of the elevator or parking farther away from stores  Include muscle strengthening activities 2 days each week  Regular exercise provides many health benefits   It helps you manage your weight, and decreases your risk for type 2 diabetes, heart disease, stroke, and high blood pressure  Exercise can also help improve your mood  Ask your healthcare provider about the best exercise plan for you  General health and safety guidelines:   · Do not smoke  Nicotine and other chemicals in cigarettes and cigars can cause lung damage  Ask your healthcare provider for information if you currently smoke and need help to quit  E-cigarettes or smokeless tobacco still contain nicotine  Talk to your healthcare provider before you use these products  · Limit alcohol  A drink of alcohol is 12 ounces of beer, 5 ounces of wine, or 1½ ounces of liquor  · Lose weight, if needed  Being overweight increases your risk of certain health conditions  These include heart disease, high blood pressure, type 2 diabetes, and certain types of cancer  · Protect your skin  Do not sunbathe or use tanning beds  Use sunscreen with a SPF 15 or higher  Apply sunscreen at least 15 minutes before you go outside  Reapply sunscreen every 2 hours  Wear protective clothing, hats, and sunglasses when you are outside  · Drive safely  Always wear your seatbelt  Make sure everyone in your car wears a seatbelt  A seatbelt can save your life if you are in an accident  Do not use your cell phone when you are driving  This could distract you and cause an accident  Pull over if you need to make a call or send a text message  · Practice safe sex  Use latex condoms if are sexually active and have more than one partner  Your healthcare provider may recommend screening tests for sexually transmitted infections (STIs)  · Wear helmets, lifejackets, and protective gear  Always wear a helmet when you ride a bike or motorcycle, go skiing, or play sports that could cause a head injury  Wear protective equipment when you play sports  Wear a lifejacket when you are on a boat or doing water sports      © Copyright [employer] 2022 Information is for End User's use only and may not be sold, redistributed or otherwise used for commercial purposes  All illustrations and images included in CareNotes® are the copyrighted property of A D A M , Inc  or Ginny Guerrero  The above information is an  only  It is not intended as medical advice for individual conditions or treatments  Talk to your doctor, nurse or pharmacist before following any medical regimen to see if it is safe and effective for you  Cholesterol and Your Health   AMBULATORY CARE:   Cholesterol  is a waxy, fat-like substance  Your body uses cholesterol to make hormones and new cells, and to protect nerves  Cholesterol is made by your body  It also comes from certain foods you eat, such as meat and dairy products  Your healthcare provider can help you set goals for your cholesterol levels  He or she can help you create a plan to meet your goals  Cholesterol level goals: Your cholesterol level goals depend on your risk for heart disease, your age, and your other health conditions  The following are general guidelines:  · Total cholesterol  includes low-density lipoprotein (LDL), high-density lipoprotein (HDL), and triglyceride levels  The total cholesterol level should be lower than 200 mg/dL and is best at about 150 mg/dL  · LDL cholesterol  is called bad cholesterol  because it forms plaque in your arteries  As plaque builds up, your arteries become narrow, and less blood flows through  When plaque decreases blood flow to your heart, you may have chest pain  If plaque completely blocks an artery that brings blood to your heart, you may have a heart attack  Plaque can break off and form blood clots  Blood clots may block arteries in your brain and cause a stroke  The level should be less than 130 mg/dL and is best at about 100 mg/dL  · HDL cholesterol  is called good cholesterol  because it helps remove LDL cholesterol from your arteries   It does this by attaching to LDL cholesterol and carrying it to your liver  Your liver breaks down LDL cholesterol so your body can get rid of it  High levels of HDL cholesterol can help prevent a heart attack and stroke  Low levels of HDL cholesterol can increase your risk for heart disease, heart attack, and stroke  The level should be 60 mg/dL or higher  · Triglycerides  are a type of fat that store energy from foods you eat  High levels of triglycerides also cause plaque buildup  This can increase your risk for a heart attack or stroke  If your triglyceride level is high, your LDL cholesterol level may also be high  The level should be less than 150 mg/dL  Any of the following can increase your risk for high cholesterol:   · Smoking cigarettes    · Being overweight or obese, or not getting enough exercise    · Drinking large amounts of alcohol    · A medical condition such as hypertension (high blood pressure) or diabetes    · Certain genes passed from your parents to you    · Age older than 65 years    What you need to know about having your cholesterol levels checked: Adults 21to 39years of age should have their cholesterol levels checked every 4 to 6 years  Adults 45 years or older should have their cholesterol checked every 1 to 2 years  You may need your cholesterol checked more often, or at a younger age, if you have risk factors for heart disease  You may also need to have your cholesterol checked more often if you have other health conditions, such as diabetes  Blood tests are used to check cholesterol levels  Blood tests measure your levels of triglycerides, LDL cholesterol, and HDL cholesterol  How healthy fats affect your cholesterol levels:  Healthy fats, also called unsaturated fats, help lower LDL cholesterol and triglyceride levels  Healthy fats include the following:  · Monounsaturated fats  are found in foods such as olive oil, canola oil, avocado, nuts, and olives      · Polyunsaturated fats,  such as omega 3 fats, are found in fish, such as salmon, trout, and tuna  They can also be found in plant foods such as flaxseed, walnuts, and soybeans  How unhealthy fats affect your cholesterol levels:  Unhealthy fats increase LDL cholesterol and triglyceride levels  They are found in foods high in cholesterol, saturated fat, and trans fat:  · Cholesterol  is found in eggs, dairy, and meat  · Saturated fat  is found in butter, cheese, ice cream, whole milk, and coconut oil  Saturated fat is also found in meat, such as sausage, hot dogs, and bologna  · Trans fat  is found in liquid oils and is used in fried and baked foods  Foods that contain trans fats include chips, crackers, muffins, sweet rolls, microwave popcorn, and cookies  Treatment  for high cholesterol will also decrease your risk of heart disease, heart attack, and stroke  Treatment may include any of the following:  · Lifestyle changes  may include food, exercise, weight loss, and quitting smoking  You may also need to decrease the amount of alcohol you drink  Your healthcare provider will want you to start with lifestyle changes  Other treatment may be added if lifestyle changes are not enough  Your healthcare provider may recommend you work with a team to manage hyperlipidemia  The team may include medical experts such as a dietitian, an exercise or physical therapist, and a behavior therapist  Your family members may be included in helping you create lifestyle changes  · Medicines  may be given to lower your LDL cholesterol, triglyceride levels, or total cholesterol level  You may need medicines to lower your cholesterol if any of the following is true:    ? You have a history of stroke, TIA, unstable angina, or a heart attack  ? Your LDL cholesterol level is 190 mg/dL or higher  ? You are age 36 to 76 years, have diabetes or heart disease risk factors, and your LDL cholesterol is 70 mg/dL or higher      · Supplements  include fish oil, red yeast rice, and garlic  Fish oil may help lower your triglyceride and LDL cholesterol levels  It may also increase your HDL cholesterol level  Red yeast rice may help decrease your total cholesterol level and LDL cholesterol level  Garlic may help lower your total cholesterol level  Do not take any supplements without talking to your healthcare provider  Food changes you can make to lower your cholesterol levels:  A dietitian can help you create a healthy eating plan  He or she can show you how to read food labels and choose foods low in saturated fat, trans fats, and cholesterol  · Decrease the total amount of fat you eat  Choose lean meats, fat-free or 1% fat milk, and low-fat dairy products, such as yogurt and cheese  Try to limit or avoid red meats  Limit or do not eat fried foods or baked goods, such as cookies  · Replace unhealthy fats with healthy fats  Cook foods in olive oil or canola oil  Choose soft margarines that are low in saturated fat and trans fat  Seeds, nuts, and avocados are other examples of healthy fats  · Eat foods with omega-3 fats  Examples include salmon, tuna, mackerel, walnuts, and flaxseed  Eat fish 2 times per week  Pregnant women should not eat fish that have high levels of mercury, such as shark, swordfish, and stanislav mackerel  · Increase the amount of high-fiber foods you eat  High-fiber foods can help lower your LDL cholesterol  Aim to get between 20 and 30 grams of fiber each day  Fruits and vegetables are high in fiber  Eat at least 5 servings each day  Other high-fiber foods are whole-grain or whole-wheat breads, pastas, or cereals, and brown rice  Eat 3 ounces of whole-grain foods each day  Increase fiber slowly  You may have abdominal discomfort, bloating, and gas if you add fiber to your diet too quickly  · Eat healthy protein foods  Examples include low-fat dairy products, skinless chicken and turkey, fish, and nuts      · Limit foods and drinks that are high in sugar  Your dietitian or healthcare provider can help you create daily limits for high-sugar foods and drinks  The limit may be lower if you have diabetes or another health condition  Limits can also help you lose weight if needed  Lifestyle changes you can make to lower your cholesterol levels:   · Maintain a healthy weight  Ask your healthcare provider what a healthy weight is for you  Ask him or her to help you create a weight loss plan if needed  Weight loss can decrease your total cholesterol and triglyceride levels  Weight loss may also help keep your blood pressure at a healthy level  · Be physically active throughout the day  Physical activity, such as exercise, can help lower your total cholesterol level and maintain a healthy weight  Physical activity can also help increase your HDL cholesterol level  Work with your healthcare provider to create an program that is right for you  Get at least 30 to 40 minutes of moderate physical activity most days of the week  Examples of exercise include brisk walking, swimming, or biking  Also include strength training at least 2 times each week  Your healthcare providers can help you create a physical activity plan  · Do not smoke  Nicotine and other chemicals in cigarettes and cigars can raise your cholesterol levels  Ask your healthcare provider for information if you currently smoke and need help to quit  E-cigarettes or smokeless tobacco still contain nicotine  Talk to your healthcare provider before you use these products  · Limit or do not drink alcohol  Alcohol can increase your triglyceride levels  Ask your healthcare provider before you drink alcohol  Ask how much is okay for you to drink in 24 hours or 1 week  Follow up with your doctor as directed:  Write down your questions so you remember to ask them during your visits    © Copyright [employer] 2022 Information is for End User's use only and may not be sold, redistributed or otherwise used for commercial purposes  All illustrations and images included in CareNotes® are the copyrighted property of A D A M , Inc  or Ginny Guerrero  The above information is an  only  It is not intended as medical advice for individual conditions or treatments  Talk to your doctor, nurse or pharmacist before following any medical regimen to see if it is safe and effective for you

## 2022-03-15 NOTE — ASSESSMENT & PLAN NOTE
Lab Results   Component Value Date    HGBA1C 10 5 (H) 12/11/2021     Lab Results   Component Value Date/Time    HGBA1C 10 5 (H) 12/11/2021 11:45 AM    HGBA1C 13 7 (H) 09/15/2021 03:42 PM        Continue current medication  Not taking Trulicity weekly  Instructed to take medication every week  Schedule for endocrinology later this month  Medication adjustment per their guidance  Educated to follow diabetic diet, maintain a healthy weight, and exercise regularly  Referred to dietician for additional education  Foot exam at next office visit

## 2022-03-15 NOTE — PROGRESS NOTES
Cm wrote check req and letter for cts April rent payment  Cm submitted April TBRA check req, letter, Jesus Carranza, and rent page to supervisor for processing  Supervisor submitted April TBRA package to accounts payable  See [address] file

## 2022-03-16 ENCOUNTER — PATIENT OUTREACH (OUTPATIENT)
Dept: SURGERY | Facility: CLINIC | Age: 47
End: 2022-03-16

## 2022-03-16 NOTE — PROGRESS NOTES
Pastoral Care Progress Note    3/16/2022  Patient: Davi Logan : 1975  Encounter Date & Time: 3/15/2022   MRN: [id number]      The  followed up with patient for continued pastoral support  Mr Alexa Ibarra advised he was doing well and continues his daily routine  He shared he has been going to his therapist and it has been helpful for him  Mr Alexa Ibarra shared his margaret in God keeps him grounded and hopeful  Reporting, "I just continued to pray "  Mr Alexa Ibarra advised he has a strained relationship with his child's mother and has realized the need to set boundaries  Reporting understanding boundaries has improved his mood  The  encouraged Mr Alexa Ibarra to consider the men's group for additional support if he needs to talk to a group of [employer] men  The  offered ways margaret can help him with his relationship and offered Mr Alexa Ibarra a prayer    Further support as needed                 Chaplaincy Interventions Utilized:   Empowerment: Provided education regarding spiritual practice(s) and Reframed experience of patient/family    Exploration: Explored spiritual needs & resources    Relationship Building: Cultivated a relationship of care and support, Listened empathically and Provided relationship counseling    Ritual: Provided prayer and Read sacred text      Chaplaincy Outcomes Achieved:  Expressed gratitude and Identified priorities      Spiritual Coping Strategies Utilized:   Connectedness, Spiritual practices, Spiritual empowerment, Spiritual meaning and Positive spiritual reframing

## 2022-03-16 NOTE — PROGRESS NOTES
Cm received a notification from PCP Ct in need of dental care  Cm text Ct information for [employer]

## 2022-03-18 NOTE — PROGRESS NOTES
Follow-up Nutrition Note:  Brief follow-up visit with patient  Weight is stable  Asked patient how he is doing with Diabetic meal plan, patient laughed  He states:" I don't drink anymore regular soda, I don't buy it anymore, so not a temptation " Gave patient positive reinforcement for this  However, patient admits that he still eats "[employer]" and some other high sugar snacks  Reminded him of importance of planning ahead to bring healthy snacks with him to work, he verbalized understanding  He states he is going to meet with the "Diabetes special doctor" soon, reminded him of importance of this  Will continue to provide support / education for better control of his DM    Ronald Hatch, WILBURN, LDN, CDCES

## 2022-03-21 ENCOUNTER — PATIENT OUTREACH (OUTPATIENT)
Dept: SURGERY | Facility: OTHER | Age: 47
End: 2022-03-21

## 2022-03-21 NOTE — PROGRESS NOTES
Cm called client to give ct heads up that client's TBRA needs to be renewed by the end of next month  Cm went over required [employer]  Cm will call client next week to schedule TBRA renewal appointment

## 2022-03-24 ENCOUNTER — PATIENT OUTREACH (OUTPATIENT)
Dept: SURGERY | Facility: CLINIC | Age: 47
End: 2022-03-24

## 2022-03-24 NOTE — PROGRESS NOTES
Ct presented to recert  Ct is currently still residing in [place] has recently gained full custody of his daughter  Ct's current housing is stable  Ct has assistance with [employer]  Ct reports he is sexually active states thats disclosed and does not use protection  Ct is currently in good health  CM and Ct discussed at risk behaviors and importance of using protection and maintaining medication adherence  Ct is currently medically adherent to his medications and his appointments  Ct also reports he has diabetes and it is under control  Ct states that he is currently working with Dr Shaina Mcguire and Alonso Engel for PCP  Ct reports he currently does not have any issues with transportation and drives himself  Ct currently does work full time as a  form [employer]   Ct does not smoke cigarettes  Ct reports he does have some MH issues and in tx with [facility] specialist  Isela Swenson states there are no legal issues  Ct reports no past issues with drugs or alcohol  Ct states there are no domestic violence issues  Ct believes that his last dental appointment was sometime in 2020 and requested a referral for [facility] again  Ct states he scheduled his vision appointment for 3/29/2022 at [facility] [address] [place] [address] [email or web address]  There are no signatures due to COVID-19  Cm completed acuity scale his score is 17  Cm awaiting Ct's income for RW application and for recert

## 2022-04-04 ENCOUNTER — PATIENT OUTREACH (OUTPATIENT)
Dept: SURGERY | Facility: OTHER | Age: 47
End: 2022-04-04

## 2022-04-04 ENCOUNTER — PATIENT OUTREACH (OUTPATIENT)
Dept: SURGERY | Facility: CLINIC | Age: 47
End: 2022-04-04

## 2022-04-04 NOTE — PROGRESS NOTES
Cm called client to schedule TBRA renewal appt  Cm will call client on April 8 and will go over the required documentation  Cm and ct will also complete the TBRA renewal application

## 2022-04-08 ENCOUNTER — PATIENT OUTREACH (OUTPATIENT)
Dept: SURGERY | Facility: OTHER | Age: 47
End: 2022-04-08

## 2022-04-08 NOTE — PROGRESS NOTES
Cm called client and went over client's income vs expenses  Cm went over the required docs ct has to submit for his TBRA renewal application  Ct and cm will meet at [facility] on April 12 at 3:30 pm to complete the [facility] renewal keaton  Per ct's request, cm sent client a text message with cm's fax number and list of required docs

## 2022-04-11 ENCOUNTER — TELEPHONE (OUTPATIENT)
Dept: SURGERY | Facility: CLINIC | Age: 47
End: 2022-04-11

## 2022-04-11 ENCOUNTER — PATIENT OUTREACH (OUTPATIENT)
Dept: SURGERY | Facility: OTHER | Age: 47
End: 2022-04-11

## 2022-04-12 ENCOUNTER — PATIENT OUTREACH (OUTPATIENT)
Dept: SURGERY | Facility: OTHER | Age: 47
End: 2022-04-12

## 2022-04-12 NOTE — PROGRESS NOTES
Ct called stating he is off today and can meet cm earlier than 3:30  Cm rescheduled ct for 1 pm   Ct sent detailed Internet bill copy  See [address] file  Cm drove to [place] and met with ct  Cm and ct completed ct's TBRA renewal application  See [address] file  Cm provided ct with [employer] termination policy and [facility] [place] freud and noncompliance policy  Cm did not do TBRA calculations at the time of appointment  Cm will calculate TBRA calculations and go over them with ct over the phone

## 2022-04-18 ENCOUNTER — PATIENT OUTREACH (OUTPATIENT)
Dept: SURGERY | Facility: OTHER | Age: 47
End: 2022-04-18

## 2022-04-18 NOTE — PROGRESS NOTES
Cm worked on ct's Sheridan Cheung  Cm called [employer] to go over utility allowance calculations since they're way different than the ones last year  Cm used [employer] was supposed to use [place]  Cm and Poncho Ruiz went over utility allowance for [place] and realized the allowances went down this year  Cm corrected the utility allowances calculations using [place] guidelines  Cm went over the TBRA calculations with client  Cm submitted ct's TBRA renewal to [employer] today via e-mail  Ct asked if cm can get him an [employer] card  Per ct, he received a $60 [employer] gift card at his last appointment with [address] [place] [address]  Cm stated cm will follow up and get back to ct  Cm discussed above with dietician Ronald Cardenas stated that her department doesn't provide gift cards and other funds must have been used  Cm discussed above with supervisor Tani Juarez  Supervisor stated other funds can be used to get these gift cards  Cm provided supervisor with ct's name  Supervisor will get ct a gift card and will give it to cm Dahlia Akhtar to mail to ct

## 2022-04-19 ENCOUNTER — PATIENT OUTREACH (OUTPATIENT)
Dept: SURGERY | Facility: OTHER | Age: 47
End: 2022-04-19

## 2022-04-19 NOTE — PROGRESS NOTES
Jayson Sigala left a voicemail stating the childcare deduction on the TBRA calculation sheet should be a different number based off what client submitted  Parul Cota also sent an e-mail stating "When you have time, please call me on this auth  a few things:  1  I believe the  deduction should be greater than annual $1440   2  Client has new/different car? Does client have two cars? 3  Does someone pay for his car insurance ? I dont see the [employer] paid from his account  maybe I missed it  4  Clients net income is far less than what he reports as paying for monthly expenses  Can we please review this together"  Cm called client and went over the above  Ct stated he only has one car and his car insurance is [address] which shows up on the bank statement  Cm called Parul Cota to discuss the above  Lyndsay and eleuterio went over client's TBRA calculations  Eleuterio made a mistake when calculating ct's  expenses  Eleuterio corrected the mistake and re-did the calculations  Ct and Parul Cota discussed the above  Cm stated ct has one car and his new insurance is [address] which is pointed out on ct's bank statements  Lyndsay and eleuterio discussed ct's income vs expenses  Cm will bring it up to ct that he is reporting expenses way above his net income  Parul Cota also stated ct has to provide the rejection letter from public housing  Cm received approved Lauren Nicole #[id number], expires 9/30/22, memorandum ,and new TBRA calculation that Parul Cota also re-did  See [address] file  Cm called client and went over the above  Eleuterio went over the new rent calculations with ct and what he has to pay to the landlord  Cm stated ct has to provide a denial letter from public housing  Ct stated he will call [employer] and request one  Cm also stated that ct's reported expenses are more than his net income and the funding agency may question that at next renewal  Ct expressed understanding  See [address] file    Cm received ct's [employer] [place] scale fee application approval letter  See media  Cm called client and stated cm spoke to supervisor regarding an [facility] gift card and supervisor is working on it  Cm stated ct will receive it via mail

## 2022-04-21 ENCOUNTER — PATIENT OUTREACH (OUTPATIENT)
Dept: SURGERY | Facility: OTHER | Age: 47
End: 2022-04-21

## 2022-04-21 NOTE — PROGRESS NOTES
Cm wrote TBRA check req and letter for ct's May 2022 rent payment and submitted them to supervisor Caroline Weiss along with Trista and rent page  Supervisor submitted May 2022 TBRA package to accounts payable for processing  See [address] file

## 2022-05-02 ENCOUNTER — APPOINTMENT (OUTPATIENT)
Dept: LAB | Facility: HOSPITAL | Age: 47
End: 2022-05-02
Attending: INTERNAL MEDICINE
Payer: COMMERCIAL

## 2022-05-02 LAB
ALBUMIN SERPL BCP-MCNC: 4.4 G/DL (ref 3–5.2)
ALP SERPL-CCNC: 62 U/L (ref 43–122)
ALT SERPL W P-5'-P-CCNC: 15 U/L
ANION GAP SERPL CALCULATED.3IONS-SCNC: 8 MMOL/L (ref 5–14)
AST SERPL W P-5'-P-CCNC: 17 U/L (ref 17–59)
BASOPHILS # BLD AUTO: 0.07 THOUSANDS/ΜL (ref 0–0.1)
BASOPHILS NFR BLD AUTO: 1 % (ref 0–1)
BILIRUB SERPL-MCNC: 0.97 MG/DL
BUN SERPL-MCNC: 11 MG/DL (ref 5–25)
CALCIUM SERPL-MCNC: 9 MG/DL (ref 8.4–10.2)
CHLORIDE SERPL-SCNC: 103 MMOL/L (ref 97–108)
CO2 SERPL-SCNC: 28 MMOL/L (ref 22–30)
CREAT SERPL-MCNC: 0.61 MG/DL (ref 0.7–1.5)
EOSINOPHIL # BLD AUTO: 0.06 THOUSAND/ΜL (ref 0–0.61)
EOSINOPHIL NFR BLD AUTO: 1 % (ref 0–6)
ERYTHROCYTE [DISTWIDTH] IN BLOOD BY AUTOMATED COUNT: 12.9 % (ref 11.6–15.1)
GFR SERPL CREATININE-BSD FRML MDRD: 118 ML/MIN/1.73SQ M
GLUCOSE SERPL-MCNC: 236 MG/DL (ref 70–99)
HCT VFR BLD AUTO: 44.2 % (ref 36.5–49.3)
HGB BLD-MCNC: 13.8 G/DL (ref 12–17)
IMM GRANULOCYTES # BLD AUTO: 0.02 THOUSAND/UL (ref 0–0.2)
IMM GRANULOCYTES NFR BLD AUTO: 0 % (ref 0–2)
LYMPHOCYTES # BLD AUTO: 2.71 THOUSANDS/ΜL (ref 0.6–4.47)
LYMPHOCYTES NFR BLD AUTO: 35 % (ref 14–44)
MCH RBC QN AUTO: 28.2 PG (ref 26.8–34.3)
MCHC RBC AUTO-ENTMCNC: 31.2 G/DL (ref 31.4–37.4)
MCV RBC AUTO: 90 FL (ref 82–98)
MONOCYTES # BLD AUTO: 0.48 THOUSAND/ΜL (ref 0.17–1.22)
MONOCYTES NFR BLD AUTO: 6 % (ref 4–12)
NEUTROPHILS # BLD AUTO: 4.37 THOUSANDS/ΜL (ref 1.85–7.62)
NEUTS SEG NFR BLD AUTO: 57 % (ref 43–75)
NRBC BLD AUTO-RTO: 0 /100 WBCS
PLATELET # BLD AUTO: 277 THOUSANDS/UL (ref 149–390)
PMV BLD AUTO: 9.9 FL (ref 8.9–12.7)
POTASSIUM SERPL-SCNC: 4.2 MMOL/L (ref 3.6–5)
PROT SERPL-MCNC: 7.8 G/DL (ref 5.9–8.4)
RBC # BLD AUTO: 4.9 MILLION/UL (ref 3.88–5.62)
SODIUM SERPL-SCNC: 139 MMOL/L (ref 137–147)
WBC # BLD AUTO: 7.71 THOUSAND/UL (ref 4.31–10.16)

## 2022-05-02 PROCEDURE — 86360 T CELL ABSOLUTE COUNT/RATIO: CPT | Performed by: INTERNAL MEDICINE

## 2022-05-02 PROCEDURE — 80053 COMPREHEN METABOLIC PANEL: CPT | Performed by: INTERNAL MEDICINE

## 2022-05-02 PROCEDURE — 87536 HIV-1 QUANT&REVRSE TRNSCRPJ: CPT | Performed by: INTERNAL MEDICINE

## 2022-05-02 PROCEDURE — 85025 COMPLETE CBC W/AUTO DIFF WBC: CPT | Performed by: INTERNAL MEDICINE

## 2022-05-02 PROCEDURE — 36415 COLL VENOUS BLD VENIPUNCTURE: CPT | Performed by: INTERNAL MEDICINE

## 2022-05-03 LAB
BASOPHILS # BLD AUTO: 0.1 X10E3/UL (ref 0–0.2)
BASOPHILS NFR BLD AUTO: 1 %
CD3+CD4+ CELLS # BLD: 867 /UL (ref 359–1519)
CD3+CD4+ CELLS NFR BLD: 32.1 % (ref 30.8–58.5)
CD3+CD4+ CELLS/CD3+CD8+ CLL BLD: 0.93 % (ref 0.92–3.72)
CD3+CD8+ CELLS # BLD: 934 /UL (ref 109–897)
CD3+CD8+ CELLS NFR BLD: 34.6 % (ref 12–35.5)
EOSINOPHIL # BLD AUTO: 0 X10E3/UL (ref 0–0.4)
EOSINOPHIL NFR BLD AUTO: 1 %
ERYTHROCYTE [DISTWIDTH] IN BLOOD BY AUTOMATED COUNT: 12.3 % (ref 11.6–15.4)
HCT VFR BLD AUTO: 42.8 % (ref 37.5–51)
HGB BLD-MCNC: 13.7 G/DL (ref 13–17.7)
IMM GRANULOCYTES # BLD: 0 X10E3/UL (ref 0–0.1)
IMM GRANULOCYTES NFR BLD: 0 %
LYMPHOCYTES # BLD AUTO: 2.7 X10E3/UL (ref 0.7–3.1)
LYMPHOCYTES NFR BLD AUTO: 35 %
MCH RBC QN AUTO: 28.6 PG (ref 26.6–33)
MCHC RBC AUTO-ENTMCNC: 32 G/DL (ref 31.5–35.7)
MCV RBC AUTO: 89 FL (ref 79–97)
MONOCYTES # BLD AUTO: 0.6 X10E3/UL (ref 0.1–0.9)
MONOCYTES NFR BLD AUTO: 7 %
NEUTROPHILS # BLD AUTO: 4.4 X10E3/UL (ref 1.4–7)
NEUTROPHILS NFR BLD AUTO: 56 %
PLATELET # BLD AUTO: 305 X10E3/UL (ref 150–450)
RBC # BLD AUTO: 4.79 X10E6/UL (ref 4.14–5.8)
WBC # BLD AUTO: 7.7 X10E3/UL (ref 3.4–10.8)

## 2022-05-05 LAB
HIV1 RNA # SERPL NAA+PROBE: <20 COPIES/ML
HIV1 RNA SERPL NAA+PROBE-LOG#: NORMAL LOG10COPY/ML

## 2022-05-19 ENCOUNTER — PATIENT OUTREACH (OUTPATIENT)
Dept: SURGERY | Facility: OTHER | Age: 47
End: 2022-05-19

## 2022-05-19 NOTE — PROGRESS NOTES
Cm wrote check req and letter for cts June 2022 rent payment and submitted them to supervisor along with auth and rent page  See [address] file  Supervisor submitted TBRA June 2022 package to accounts payable for processing

## 2022-05-24 ENCOUNTER — DOCUMENTATION (OUTPATIENT)
Dept: SURGERY | Facility: CLINIC | Age: 47
End: 2022-05-24

## 2022-05-24 ENCOUNTER — OFFICE VISIT (OUTPATIENT)
Dept: SURGERY | Facility: CLINIC | Age: 47
End: 2022-05-24
Payer: COMMERCIAL

## 2022-05-24 VITALS
HEIGHT: 71 IN | DIASTOLIC BLOOD PRESSURE: 85 MMHG | SYSTOLIC BLOOD PRESSURE: 140 MMHG | OXYGEN SATURATION: 96 % | TEMPERATURE: 98.6 F | BODY MASS INDEX: 31.19 KG/M2 | HEART RATE: 82 BPM | WEIGHT: 222.8 LBS

## 2022-05-24 DIAGNOSIS — Z13.1 SCREENING FOR DIABETES MELLITUS: ICD-10-CM

## 2022-05-24 DIAGNOSIS — A52.8 SYPHILIS, LATE LATENT: ICD-10-CM

## 2022-05-24 DIAGNOSIS — Z79.899 OTHER LONG TERM (CURRENT) DRUG THERAPY: ICD-10-CM

## 2022-05-24 DIAGNOSIS — B20 HUMAN IMMUNODEFICIENCY VIRUS (HIV) DISEASE (HCC): Primary | ICD-10-CM

## 2022-05-24 DIAGNOSIS — E66.9 OBESITY (BMI 30.0-34.9): ICD-10-CM

## 2022-05-24 DIAGNOSIS — I10 PRIMARY HYPERTENSION: ICD-10-CM

## 2022-05-24 DIAGNOSIS — E11.65 UNCONTROLLED TYPE 2 DIABETES MELLITUS WITH HYPERGLYCEMIA (HCC): ICD-10-CM

## 2022-05-24 PROCEDURE — 99215 OFFICE O/P EST HI 40 MIN: CPT | Performed by: INTERNAL MEDICINE

## 2022-05-24 NOTE — PROGRESS NOTES
Progress Note - Infectious Disease   Yfn Senior 52 y o  male MRN: [id number]  Unit/Bed#:  Encounter: [id number]      Impression/Plan:  1  HIV-doing well on Biktarvy with an undetectable viral load and a CD4 count of 867  Continue ART, recheck labs in 2 months, follow-up in 3 months  Stressed adherence     2  Diabetes mellitus-poorly controlled  Failed to go endocrinology  Stressed the importance of tight control  He will start by stopping soda and only drink water  Discussed this issue with the primary     3  Morbid obesity-continues to stressed the importance of diet and exercise     4  Syphilis-patient status post treatment with good response  Seems to be stable at 1:2 dilution  Continue to monitor RPR annually      5  Hypertension-discussed with the primary who will address      Patient was provided medication, adherence and prevention education    Subjective:  Routine follow-up for HIV  Patient claims 100% adherence with Biktarvy    Patient denies any notable side effects  Overall the feeling well  The patient denies any fever chills or sweats, denies any nausea vomiting or diarrhea, denies any cough or shortness of breath  ROS:  A complete review of systems is negative other than that noted above in the subjective    Followup portions patient history reviewed and updated as: Allergies, current medications, past medical history, past social history, past surgical history, and the problem list    Objective:  Vitals:  Vitals:    05/24/22 1552   BP: 140/85   Pulse: 82   Temp: 98 6 °F (37 °C)   SpO2: 96%   Weight: 101 kg (222 lb 12 8 oz)   Height: 5' 11" (1 803 m)       Physical Exam:   General Appearance:  Alert, interactive, appearing well,  nontoxic, no acute distress  Neck:   Supple without lymphadenopathy, no thyromegaly or masses   Throat: Oropharynx moist without lesions      Lungs:   Clear to auscultation bilaterally; no wheezes, rhonchi or rales; respirations unlabored   Heart:  RRR; no murmur, rub or gallop   Abdomen:   Soft, non-tender, non-distended, positive bowel sounds  Extremities: No clubbing, cyanosis or edema   Skin: No new rashes or lesions  No draining wounds noted  Labs, Imaging, & Other studies:   All pertinent labs and imaging studies were personally reviewed    Lab Results   Component Value Date     (L) 04/04/2018    K 4 2 05/02/2022     05/02/2022    CO2 28 05/02/2022    ANIONGAP 10 04/04/2018    BUN 11 05/02/2022    CREATININE 0 61 (L) 05/02/2022    GLUCOSE 269 (H) 09/10/2018    GLUF 217 (H) 12/11/2021    CALCIUM 9 0 05/02/2022    AST 17 05/02/2022    ALT 15 05/02/2022    ALKPHOS 62 05/02/2022    PROT 6 9 04/04/2018    BILITOT 0 4 04/04/2018    EGFR 118 05/02/2022     Lab Results   Component Value Date    WBC 7 71 05/02/2022    WBC 7 7 05/02/2022    HGB 13 8 05/02/2022    HGB 13 7 05/02/2022    HCT 44 2 05/02/2022    HCT 42 8 05/02/2022    MCV 90 05/02/2022    MCV 89 05/02/2022     05/02/2022     05/02/2022     Lab Results   Component Value Date    HEPCAB Non-reactive 12/11/2021     Lab Results   Component Value Date    HAV Reactive (A) 12/11/2021    HEPCAB Non-reactive 12/11/2021     Lab Results   Component Value Date    RPR Reactive (A) 12/11/2021    RPR Reactive 2 dils (A) 12/11/2021     CD4 ABS   Date/Time Value Ref Range Status   05/02/2022 10:25  359 - 1519 /uL Final     HIV-1 RNA by PCR, Qn   Date/Time Value Ref Range Status   05/02/2022 10:25 AM <20 copies/mL Final     Comment:     HIV-1 RNA detected  The reportable range for this assay is 20 to 10,000,000  copies HIV-1 RNA/mL             Current Outpatient Medications:     Aspirin Low Dose 81 MG EC tablet, Take 1 tablet (81 mg total) by mouth daily, Disp: 30 tablet, Rfl: 2    atorvastatin (LIPITOR) 20 mg tablet, Take 1 tablet (20 mg total) by mouth daily, Disp: 30 tablet, Rfl: 1    BD Pen Needle Bela 2nd Gen 32G X 4 MM MISC, , Disp: , Rfl:     Biktarvy -25 MG tablet, Take 1 tablet by mouth daily, Disp: 30 tablet, Rfl: 2    Dulaglutide (Trulicity) 8 83 FR/0 4KE SOPN, Inject 0 5 mL (0 75 mg total) under the skin once a week, Disp: 2 mL, Rfl: 0    Empagliflozin 25 MG TABS, Take 1 tablet (25 mg total) by mouth daily, Disp: 30 tablet, Rfl: 1    ibuprofen (MOTRIN) 600 mg tablet, Take 1 tablet (600 mg total) by mouth every 6 (six) hours as needed for moderate pain, Disp: 30 tablet, Rfl: 2    Lantus SoloStar 100 units/mL injection pen, Inject 30 Units under the skin daily, Disp: 15 mL, Rfl: 2    methocarbamol (ROBAXIN) 500 mg tablet, Take 1 tablet (500 mg total) by mouth 2 (two) times a day, Disp: 30 tablet, Rfl: 0    sildenafil (VIAGRA) 100 mg tablet, Take 1 tablet (100 mg total) by mouth daily as needed for erectile dysfunction, Disp: 30 tablet, Rfl: 2    valACYclovir (VALTREX) 1,000 mg tablet, Take 1 tablet (1,000 mg total) by mouth daily, Disp: 30 tablet, Rfl: 2

## 2022-05-24 NOTE — PROGRESS NOTES
Pastoral Care Progress Note    2022  Patient: Austin Guillen : 1975  Encounter Date & Time: 2022   MRN: [id number]        The  met with Mr Tamiko Goss to maintain a relationship of care and trust   Mr Tamiko Goss shared he was well but felt frustrated by his relationship with his daughter's mother  The  offered a listening ear and encouraged the patient to be mindful of what he has control over  Mr Tamiko Goss agreed he could not do anything about the mother's addiction  The  offered Mr Tamiko Goss a bible stick mp3  He thanked [employer] and will let her know how he likes it                 Chaplaincy Interventions Utilized:     Ritual: Provided [facility] resources

## 2022-05-25 ENCOUNTER — PATIENT OUTREACH (OUTPATIENT)
Dept: SURGERY | Facility: CLINIC | Age: 47
End: 2022-05-25

## 2022-05-25 NOTE — PROGRESS NOTES
BHS met with pt to complete PHQ-9 as required  Pt scored 6 on his PHQ-9 displaying minimal symptoms  BHS and pt discussed interventions to manage his stress  Pt was educated to call if symptoms become worse, pt agreed  Pt did not display nor express any SI/HI during this encounter  Pt expressed concerns with co-parenting his son, stating he struggles with the mother of his child respecting his boundaries  BHS and pt discussed boundaries and ways to maintain them in difficult situations  Pt also stated he feels he should be further in his life at this age ie:career, finances and relationships  BHS and pt explored this feeling and BHS validated pts feelings,  BHS utilized active listening during this encounter  Pt stated he has never smoked

## 2022-06-07 ENCOUNTER — PATIENT OUTREACH (OUTPATIENT)
Dept: SURGERY | Facility: OTHER | Age: 47
End: 2022-06-07

## 2022-06-07 NOTE — PROGRESS NOTES
Ct called stating the [employer] gift card he requested in April was never mailed to him  Cm called supervisor Dequan Khanna to inquire on the situation since tristan Acosta was supposed to get the gift card from supervisor and mail it to ct  Per supervisor, ct was given a $100 [employer] gift card last week  Per supervisor, we no longer give [employer] gift cards  Cm sent ct a text stating the above  Ct replied stating [employer] is not a food store I need food  Cm spoke to supervisor again  Per  does have a produce section and food aisles  Per manager, [employer] gift cards are no longer available  Cm explained the above to client  Ct expressed understanding

## 2022-06-10 ENCOUNTER — PATIENT OUTREACH (OUTPATIENT)
Dept: SURGERY | Facility: OTHER | Age: 47
End: 2022-06-10

## 2022-06-10 NOTE — PROGRESS NOTES
Cm reminded ct he still has to submit his public housing denial letter  Ct stated he has been calling the housing authority but is not getting an answer from anyone  Ct stated he will go today in person to ask for the denial letter  Ct called stating the [employer] is closed and working from home due to [employer] still  Ct stated he will continue to call and have them fax the denial letter directly to cm  Cm provided ct with cm's fax number

## 2022-06-14 ENCOUNTER — PATIENT OUTREACH (OUTPATIENT)
Dept: SURGERY | Facility: OTHER | Age: 47
End: 2022-06-14

## 2022-06-14 NOTE — PROGRESS NOTES
Ct reached out stating he needs a favor  Ct stated he needs a copy of his daughter's custody papers  Ct stated he misplaced his copy, and needs a copy of the paperwork by Thursday for a mental health appt for his daughter  Cm sent ct a copy of his custody papers via e-mail

## 2022-06-15 ENCOUNTER — PATIENT OUTREACH (OUTPATIENT)
Dept: SURGERY | Facility: OTHER | Age: 47
End: 2022-06-15

## 2022-06-15 NOTE — PROGRESS NOTES
Cm received ct's [facility] application denial letter via fax  Ct called cm stating he reviewed the letter and noticed there is a mistake  Cm saw the same mistake  Letter states reason ct was denied was because he reported family size too small for a 2BR apartment  Ct has custody of his daughter  Cm told ct to e-mail the housing supervisor at [address] [place] [address] and ask them to fix their mistake  Ct called stating he spoke to the supervisor and she acknowledge the mistake on their end,however stated regardless of family size ct's income is too high for public housing  Ct provided cm with supervisor's e-mail address  Cm sent e-mail to [facility] stating Sabrina Carrero  at [facility]  As we are payer of last resort, the client has to submit an application for alternative housing  My funding agency is requesting the denial letter for his [address] [place] [address] application  The denial letter he sent me unfortunately is not going to work because there was a mistake on there  It says he was denied due to family size reported too small for a 2BR  The client has custody of his child  I am aware he was denied due to income being too high but the letter has to say that  Sorry to bother you with this I would appreciate your help if you can send me the new denial letter via e-mail or fax  If more comfortable e-mail the new denial letter directly to MARIA LUISA and he will submit it to me  "    Cm received the corrected denial letter from [facility]  See [address] file for all

## 2022-06-24 ENCOUNTER — PATIENT OUTREACH (OUTPATIENT)
Dept: SURGERY | Facility: OTHER | Age: 47
End: 2022-06-24

## 2022-06-24 NOTE — PROGRESS NOTES
Cm prepared July check req and letter and submitted July TBRA package to supervisor for payment  Supervisor sent July TBRA package to accounts payable for processing

## 2022-07-05 DIAGNOSIS — A60.9 HSV (HERPES SIMPLEX VIRUS) ANOGENITAL INFECTION: ICD-10-CM

## 2022-07-05 RX ORDER — VALACYCLOVIR HYDROCHLORIDE 1 G/1
TABLET, FILM COATED ORAL
Qty: 30 TABLET | Refills: 2 | Status: SHIPPED | OUTPATIENT
Start: 2022-07-05 | End: 2022-07-19 | Stop reason: SDUPTHER

## 2022-07-11 NOTE — PROGRESS NOTES
Assessment/Plan:   BHS met with pt to complete PHQ-9 as required  Pt scored 6 on his PHQ-9 displaying minimal symptoms  BHS and pt discussed interventions to manage his stress  Pt was educated to call if symptoms become worse, pt agreed  Pt did not display nor express any SI/HI during this encounter       Pt expressed concerns with co-parenting his son, stating he struggles with the mother of his child respecting his boundaries  BHS and pt discussed boundaries and ways to maintain them in difficult situations  Pt also stated he feels he should be further in his life at this age ie:career, finances and relationships  BHS and pt explored this feeling and BHS validated pts feelings,  BHS utilized active listening during this encounter       Pt stated he has never smoked  [employer]     Today patient present with   Chief Complaint   Patient presents with    Follow-up     Pt offers no c/o at this time  Patient would likely benefit from education for managing stress and anxiety  Consider/focus/continue  Management of anxiety and stress  Stage of change:   Plan/ Behavioral Recommendations: contact S if symptoms of anxiety and stress increase      Diagnoses and all orders for this visit:    Human immunodeficiency virus (HIV) disease ([facility] [address] )  -     CBC and differential  -     T-helper cells CD4/CD8 %  -     Comprehensive metabolic panel  -     HIV-1 RNA, quantitative, PCR  -     Hemoglobin A1C    Screening for diabetes mellitus  -     Hemoglobin A1C    Other long term (current) drug therapy  -     Hemoglobin A1C    Uncontrolled type 2 diabetes mellitus with hyperglycemia (HCC)    Obesity (BMI 30 0-34  9)    Syphilis, late latent    Primary hypertension          Subjective:     Patient ID: Gabriela Nina is a 52 y o  male  HPI    History of Present Illness:      Patient is being seen for annual behavioral health assessment  Patient reports new behavioral health concerns      [unfilled]       Review of Systems      Objective:     Physical Exam      [facility]    Orientation     Person: yes    Place: yes    Time: yes    Appearance    Well Developed: yes healthy    Uncomfortable: no    Normal Body Odor: yes    Smells of Feces: no    Smells of Urine: no    Disheveled: no    Well Nourished: yes weight WNL of ideal    Grooming Unkempt: no    Poor Eye Contact: no    Hirsute: no    Looks Tired: yes    Acutely Exhausted: noappearance reflects stated age    Mood and Affect:     Appropriate: yes    Euthymicyes    Irritable: no    Angry: no    Anxious: yes    Depressed:no    Blunted:no    Labile: no    Restricted: no    Harm to Self or Others: pt denies SI/HI    Substance Abuse: pt denies

## 2022-07-19 ENCOUNTER — OFFICE VISIT (OUTPATIENT)
Dept: SURGERY | Facility: CLINIC | Age: 47
End: 2022-07-19
Payer: COMMERCIAL

## 2022-07-19 ENCOUNTER — PATIENT OUTREACH (OUTPATIENT)
Dept: SURGERY | Facility: CLINIC | Age: 47
End: 2022-07-19

## 2022-07-19 ENCOUNTER — PATIENT OUTREACH (OUTPATIENT)
Dept: SURGERY | Facility: OTHER | Age: 47
End: 2022-07-19

## 2022-07-19 VITALS
HEIGHT: 71 IN | WEIGHT: 217 LBS | TEMPERATURE: 98.2 F | SYSTOLIC BLOOD PRESSURE: 113 MMHG | HEART RATE: 91 BPM | OXYGEN SATURATION: 91 % | BODY MASS INDEX: 30.38 KG/M2 | DIASTOLIC BLOOD PRESSURE: 72 MMHG

## 2022-07-19 DIAGNOSIS — A60.9 HSV (HERPES SIMPLEX VIRUS) ANOGENITAL INFECTION: ICD-10-CM

## 2022-07-19 DIAGNOSIS — S33.5XXA LUMBAR SPRAIN, INITIAL ENCOUNTER: ICD-10-CM

## 2022-07-19 DIAGNOSIS — E11.65 UNCONTROLLED TYPE 2 DIABETES MELLITUS WITH HYPERGLYCEMIA (HCC): ICD-10-CM

## 2022-07-19 DIAGNOSIS — M54.50 CHRONIC LOW BACK PAIN, UNSPECIFIED BACK PAIN LATERALITY, UNSPECIFIED WHETHER SCIATICA PRESENT: ICD-10-CM

## 2022-07-19 DIAGNOSIS — B20 HIV INFECTION, UNSPECIFIED SYMPTOM STATUS (HCC): ICD-10-CM

## 2022-07-19 DIAGNOSIS — B20 HUMAN IMMUNODEFICIENCY VIRUS (HIV) DISEASE (HCC): Primary | ICD-10-CM

## 2022-07-19 DIAGNOSIS — E78.2 MIXED HYPERLIPIDEMIA: ICD-10-CM

## 2022-07-19 DIAGNOSIS — I10 PRIMARY HYPERTENSION: ICD-10-CM

## 2022-07-19 DIAGNOSIS — N52.9 ERECTILE DYSFUNCTION, UNSPECIFIED ERECTILE DYSFUNCTION TYPE: ICD-10-CM

## 2022-07-19 DIAGNOSIS — G89.29 CHRONIC LOW BACK PAIN, UNSPECIFIED BACK PAIN LATERALITY, UNSPECIFIED WHETHER SCIATICA PRESENT: ICD-10-CM

## 2022-07-19 DIAGNOSIS — E66.9 OBESITY (BMI 30.0-34.9): ICD-10-CM

## 2022-07-19 DIAGNOSIS — Z12.11 ENCOUNTER FOR SCREENING COLONOSCOPY: ICD-10-CM

## 2022-07-19 DIAGNOSIS — T17.320A CHOKING DUE TO FOOD IN LARYNX, INITIAL ENCOUNTER: ICD-10-CM

## 2022-07-19 PROCEDURE — 99214 OFFICE O/P EST MOD 30 MIN: CPT | Performed by: NURSE PRACTITIONER

## 2022-07-19 RX ORDER — METHOCARBAMOL 500 MG/1
500 TABLET, FILM COATED ORAL 2 TIMES DAILY
Qty: 30 TABLET | Refills: 0 | Status: SHIPPED | OUTPATIENT
Start: 2022-07-19 | End: 2022-09-20 | Stop reason: SDUPTHER

## 2022-07-19 RX ORDER — ATORVASTATIN CALCIUM 20 MG/1
20 TABLET, FILM COATED ORAL DAILY
Qty: 30 TABLET | Refills: 1 | Status: SHIPPED | OUTPATIENT
Start: 2022-07-19 | End: 2022-09-20 | Stop reason: SDUPTHER

## 2022-07-19 RX ORDER — DULAGLUTIDE 1.5 MG/.5ML
1.5 INJECTION, SOLUTION SUBCUTANEOUS WEEKLY
Qty: 1.5 ML | Refills: 2 | Status: SHIPPED | OUTPATIENT
Start: 2022-07-19 | End: 2022-09-20 | Stop reason: HOSPADM

## 2022-07-19 RX ORDER — VALACYCLOVIR HYDROCHLORIDE 1 G/1
1000 TABLET, FILM COATED ORAL DAILY
Qty: 30 TABLET | Refills: 2 | Status: SHIPPED | OUTPATIENT
Start: 2022-07-19 | End: 2022-09-20 | Stop reason: SDUPTHER

## 2022-07-19 RX ORDER — IBUPROFEN 600 MG/1
600 TABLET ORAL EVERY 6 HOURS PRN
Qty: 30 TABLET | Refills: 2 | Status: SHIPPED | OUTPATIENT
Start: 2022-07-19 | End: 2022-09-20 | Stop reason: SDUPTHER

## 2022-07-19 RX ORDER — SILDENAFIL 100 MG/1
100 TABLET, FILM COATED ORAL DAILY PRN
Qty: 30 TABLET | Refills: 2 | Status: SHIPPED | OUTPATIENT
Start: 2022-07-19 | End: 2022-09-20 | Stop reason: SDUPTHER

## 2022-07-19 RX ORDER — PEN NEEDLE, DIABETIC 32GX 5/32"
NEEDLE, DISPOSABLE MISCELLANEOUS 2 TIMES DAILY
Qty: 100 EACH | Refills: 2 | Status: SHIPPED | OUTPATIENT
Start: 2022-07-19 | End: 2022-09-20 | Stop reason: SDUPTHER

## 2022-07-19 RX ORDER — INSULIN GLARGINE 100 [IU]/ML
30 INJECTION, SOLUTION SUBCUTANEOUS DAILY
Qty: 15 ML | Refills: 2 | Status: SHIPPED | OUTPATIENT
Start: 2022-07-19 | End: 2022-09-20 | Stop reason: SDUPTHER

## 2022-07-19 RX ORDER — BICTEGRAVIR SODIUM, EMTRICITABINE, AND TENOFOVIR ALAFENAMIDE FUMARATE 50; 200; 25 MG/1; MG/1; MG/1
1 TABLET ORAL DAILY
Qty: 30 TABLET | Refills: 2 | Status: SHIPPED | OUTPATIENT
Start: 2022-07-19 | End: 2022-09-20 | Stop reason: SDUPTHER

## 2022-07-19 RX ORDER — ASPIRIN 81 MG/1
81 TABLET, COATED ORAL DAILY
Qty: 30 TABLET | Refills: 2 | Status: SHIPPED | OUTPATIENT
Start: 2022-07-19 | End: 2022-09-20 | Stop reason: SDUPTHER

## 2022-07-19 NOTE — ASSESSMENT & PLAN NOTE
Body mass index is 30 27 kg/m²  Wt Readings from Last 3 Encounters:   07/19/22 98 4 kg (217 lb)   05/24/22 101 kg (222 lb 12 8 oz)   03/15/22 99 1 kg (218 lb 6 4 oz)     Height: 5' 11" (180 3 cm)           Lab Results   Component Value Date    HGBA1C 10 5 (H) 12/11/2021     Lab Results   Component Value Date    GLUCOSE 269 (H) 09/10/2018   ;    Educated on the health risks of obesity  Advised to lose weight  Encouraged eating a healthy diet and daily cardiac exercise  Recommended increasing fruits and vegetables and limiting processed foods  Refer to dietitian for additional education

## 2022-07-19 NOTE — PROGRESS NOTES
Cm wrote check req and letter for cts August 2022 rent payment and submitted them to supervisor Hari Avitia for payment along with auth and rent page  Supervisor submitted August rent package to accounts payable  See [address] file  Cm sent anabel letter to ct  see [address] file

## 2022-07-19 NOTE — ASSESSMENT & PLAN NOTE
No results found for: SN9IXTM  CD4 ABS   Date/Time Value Ref Range Status   2022 10:25  359 - 1519 /uL Final     HIV-1 RNA by PCR, Qn   Date/Time Value Ref Range Status   2022 10:25 AM <20 copies/mL Final     Comment:     HIV-1 RNA detected  The reportable range for this assay is 20 to 10,000,000  copies HIV-1 RNA/mL  HIV-1 RNA Viral Load Log   Date/Time Value Ref Range Status   2022 10:25 AM COMMENT [id number]/mL Final     Comment:     Unable to calculate result since non-numeric result obtained for  component test          ART: Laurel Villegas        Denies side effects  Stressed the importance of adherence  Continue follow up with ID clinic  Reviewed most recent labs, including Cd4 and viral load  Discussed the risks and benefits of treatment options, instructions for management, importance of treatment adherence, and reduction of risk factor  Educated on possible  medication side effects  Counseled on routes of HIV transmission, including the risk of  infection  Emphasized that viral suppression is the best method to prevent HIV transmission  At this time pt denies the need for HIV testing of anyone in their life  Total encounter time was 45 minutes  Greater then 20 minutes were spent on counseling and patient education  Pt voices understanding and agreement with treatment plan

## 2022-07-19 NOTE — ASSESSMENT & PLAN NOTE
Blood pressure:   BP Readings from Last 3 Encounters:   07/19/22 113/72   05/24/22 140/85   03/15/22 128/68       Continue current antihypertensive  Educated on the following lifestyle modifications to lower BP and decrease cardiovascular risk factors  limit alcohol intake, reduce salt in diet, maintain a healthy weight, engage in 30 minutes of cardiovascular exercise daily, and not smoke

## 2022-07-19 NOTE — ASSESSMENT & PLAN NOTE
Lab Results   Component Value Date    HGBA1C 10 5 (H) 12/11/2021       Needs to get repeat HgBA1C  Did not f/u with endocrinology due to work schedule  GLP-1 dose increased  Complete HgBA1C with labs

## 2022-07-19 NOTE — PROGRESS NOTES
Writer informed by [facility] clinic staff that client was going to cx his upcoming vision appt  Writer called and spoke with client per client does not have money to pay for contacts, per client has glasses but purchases eye contacts due to his job, states it is easier to wear contacts than glasses at his job  Per client also not sure if office will charge him for the exam, per ct he will call and inquire as he has medical insurance , writer discussed  with [facility]  to see for possible assistance for eye contacts as funding agency does not cover this as it's considered cosmetic  Writer to call client tomorrow to follow up on the above       [facility]  Manager Case Management

## 2022-07-19 NOTE — PROGRESS NOTES
Diabetic Foot Exam    Patient's shoes and socks removed  Right Foot/Ankle   Right Foot Inspection  Skin Exam: skin intact, dry skin, callus and callus  Toe Exam: ROM and strength within normal limits and right toe deformity (multiple callouses on toes)  No swelling, no tenderness and erythema    Sensory   Vibration: intact  Proprioception: intact  Monofilament testing: intact    Vascular  Capillary refills: < 3 seconds  The right DP pulse is 2+  The right PT pulse is 2+  Left Foot/Ankle  Left Foot Inspection  Skin Exam: skin intact, dry skin and callus  Toe Exam: left toe deformity (meltiple callus on toes)  No swelling, no tenderness and no erythema  Sensory   Vibration: intact  Proprioception: intact  Monofilament testing: intact    Vascular  Capillary refills: < 3 seconds  The left DP pulse is 2+  The left PT pulse is 2+  Assign Risk Category  Deformity present  No loss of protective sensation  No weak pulses  Risk: 0  Assessment/Plan:    Primary hypertension  Blood pressure:   BP Readings from Last 3 Encounters:   07/19/22 113/72   05/24/22 140/85   03/15/22 128/68       Continue current antihypertensive  Educated on the following lifestyle modifications to lower BP and decrease cardiovascular risk factors  limit alcohol intake, reduce salt in diet, maintain a healthy weight, engage in 30 minutes of cardiovascular exercise daily, and not smoke  Human immunodeficiency virus (HIV) disease ([facility] )  No results found for: RE9HROL  CD4 ABS   Date/Time Value Ref Range Status   05/02/2022 10:25  359 - 1519 /uL Final     HIV-1 RNA by PCR, Qn   Date/Time Value Ref Range Status   05/02/2022 10:25 AM <20 copies/mL Final     Comment:     HIV-1 RNA detected  The reportable range for this assay is 20 to 10,000,000  copies HIV-1 RNA/mL       HIV-1 RNA Viral Load Log   Date/Time Value Ref Range Status   05/02/2022 10:25 AM COMMENT [id number]/mL Final     Comment:     Unable to calculate result since non-numeric result obtained for  component test          ART: Venessa        Denies side effects  Stressed the importance of adherence  Continue follow up with ID clinic  Reviewed most recent labs, including Cd4 and viral load  Discussed the risks and benefits of treatment options, instructions for management, importance of treatment adherence, and reduction of risk factor  Educated on possible  medication side effects  Counseled on routes of HIV transmission, including the risk of  infection  Emphasized that viral suppression is the best method to prevent HIV transmission  At this time pt denies the need for HIV testing of anyone in their life  Total encounter time was 45 minutes  Greater then 20 minutes were spent on counseling and patient education  Pt voices understanding and agreement with treatment plan  Obesity (BMI 30 0-34  9)  Body mass index is 30 27 kg/m²  Wt Readings from Last 3 Encounters:   22 98 4 kg (217 lb)   22 101 kg (222 lb 12 8 oz)   03/15/22 99 1 kg (218 lb 6 4 oz)     Height: 5' 11" (180 3 cm)           Lab Results   Component Value Date    HGBA1C 10 5 (H) 2021     Lab Results   Component Value Date    GLUCOSE 269 (H) 09/10/2018   ;    Educated on the health risks of obesity  Advised to lose weight  Encouraged eating a healthy diet and daily cardiac exercise  Recommended increasing fruits and vegetables and limiting processed foods  Refer to dietitian for additional education  Uncontrolled type 2 diabetes mellitus with hyperglycemia ([facility] )    Lab Results   Component Value Date    HGBA1C 10 5 (H) 2021       Needs to get repeat HgBA1C  Did not f/u with endocrinology due to work schedule  GLP-1 dose increased  Complete HgBA1C with labs          Diagnoses and all orders for this visit:    Human immunodeficiency virus (HIV) disease ([address] )  -     Microalbumin / creatinine urine ratio    Uncontrolled type 2 diabetes mellitus with hyperglycemia (HCC)  -     Microalbumin / creatinine urine ratio  -     Ambulatory referral to Podiatry; Future  -     Dulaglutide (Trulicity) 1 5 XE/6 4IQ SOPN; Inject 0 5 mL (1 5 mg total) under the skin once a week  -     Aspirin Low Dose 81 MG EC tablet; Take 1 tablet (81 mg total) by mouth daily  -     BD Pen Needle Bela 2nd Gen 32G X 4 MM MISC; Inject under the skin 2 (two) times a day  -     Lantus SoloStar 100 units/mL injection pen; Inject 30 Units under the skin daily  -     Empagliflozin 25 MG TABS; Take 1 tablet (25 mg total) by mouth daily    Primary hypertension    Mixed hyperlipidemia  -     atorvastatin (LIPITOR) 20 mg tablet; Take 1 tablet (20 mg total) by mouth daily    HIV infection, unspecified symptom status (HCC)  -     Biktarvy -25 MG tablet; Take 1 tablet by mouth daily    Chronic low back pain, unspecified back pain laterality, unspecified whether sciatica present  -     ibuprofen (MOTRIN) 600 mg tablet; Take 1 tablet (600 mg total) by mouth every 6 (six) hours as needed for moderate pain    Lumbar sprain, initial encounter  -     methocarbamol (ROBAXIN) 500 mg tablet; Take 1 tablet (500 mg total) by mouth 2 (two) times a day    Erectile dysfunction, unspecified erectile dysfunction type  -     sildenafil (VIAGRA) 100 mg tablet; Take 1 tablet (100 mg total) by mouth daily as needed for erectile dysfunction    HSV (herpes simplex virus) anogenital infection  -     valACYclovir (VALTREX) 1,000 mg tablet; Take 1 tablet (1,000 mg total) by mouth daily    Encounter for screening colonoscopy  -     Ambulatory referral to Gastroenterology; Future    Choking due to food in larynx, initial encounter  -     Ambulatory referral to Gastroenterology; Future    Obesity (BMI 30 0-34  9)          Subjective:      Patient ID: Shannan Bruce is a 52 y o  male  Mr Shannan Bruce is a 59-year-old male who presents to the clinic today for 3 month f/u PCP visit    Past medical history significant for HIV, uncontrolled type 2 diabetes, hyperlipidemia, and obesity  Mr Ted Alvarado is doing well and offers no acute complaints  The following portions of the patient's history were reviewed and updated as appropriate: allergies, current medications, past family history, past medical history, past social history, past surgical history and problem list     Review of Systems   Constitutional: Negative for chills and fever  HENT: Negative for ear pain and sore throat  Eyes: Negative for pain and visual disturbance  Respiratory: Negative for cough and shortness of breath  Cardiovascular: Negative for chest pain and palpitations  Gastrointestinal: Negative for abdominal pain and vomiting  Genitourinary: Negative for dysuria and hematuria  Musculoskeletal: Negative for arthralgias and back pain  Skin: Negative for color change and rash  Neurological: Negative for seizures and syncope  All other systems reviewed and are negative          Objective:      /72   Pulse 91   Temp 98 2 °F (36 8 °C)   Ht 5' 11" (1 803 m)   Wt 98 4 kg (217 lb)   SpO2 91%   BMI 30 27 kg/m²       Lab Results   Component Value Date     (L) 04/04/2018    K 4 2 05/02/2022     05/02/2022    CO2 28 05/02/2022    ANIONGAP 10 04/04/2018    BUN 11 05/02/2022    CREATININE 0 61 (L) 05/02/2022    GLUCOSE 269 (H) 09/10/2018    GLUF 217 (H) 12/11/2021    CALCIUM 9 0 05/02/2022    AST 17 05/02/2022    ALT 15 05/02/2022    ALKPHOS 62 05/02/2022    PROT 6 9 04/04/2018    BILITOT 0 4 04/04/2018    EGFR 118 05/02/2022     Lab Results   Component Value Date    WBC 7 71 05/02/2022    WBC 7 7 05/02/2022    HGB 13 8 05/02/2022    HGB 13 7 05/02/2022    HCT 44 2 05/02/2022    HCT 42 8 05/02/2022    MCV 90 05/02/2022    MCV 89 05/02/2022     05/02/2022     05/02/2022     Lab Results   Component Value Date    HEPCAB Non-reactive 12/11/2021     Lab Results   Component Value Date    HAV Reactive (A) 12/11/2021    HEPCAB Non-reactive 12/11/2021     Lab Results   Component Value Date    RPR Reactive (A) 12/11/2021    RPR Reactive 2 dils (A) 12/11/2021            Physical Exam  Constitutional:       General: He is not in acute distress  Appearance: He is well-developed  HENT:      Head: Normocephalic  Right Ear: External ear normal       Left Ear: External ear normal       Nose: Nose normal       Mouth/Throat:      Pharynx: No oropharyngeal exudate  Eyes:      General:         Right eye: No discharge  Left eye: No discharge  Conjunctiva/sclera: Conjunctivae normal       Pupils: Pupils are equal, round, and reactive to light  Neck:      Thyroid: No thyromegaly  Cardiovascular:      Rate and Rhythm: Normal rate and regular rhythm  Pulses: no weak pulses          Dorsalis pedis pulses are 2+ on the right side and 2+ on the left side  Posterior tibial pulses are 2+ on the right side and 2+ on the left side  Heart sounds: Normal heart sounds  No murmur heard  Pulmonary:      Effort: Pulmonary effort is normal       Breath sounds: Normal breath sounds  No wheezing  Abdominal:      General: Bowel sounds are normal       Palpations: Abdomen is soft  There is no mass  Tenderness: There is no abdominal tenderness  Musculoskeletal:         General: No tenderness  Normal range of motion  Cervical back: Normal range of motion  Feet:      Right foot:      Skin integrity: Callus and dry skin present  Left foot:      Skin integrity: Callus and dry skin present  Lymphadenopathy:      Cervical: No cervical adenopathy  Skin:     General: Skin is warm and dry  Findings: No rash  Neurological:      Mental Status: He is alert and oriented to person, place, and time     Psychiatric:         Behavior: Behavior normal

## 2022-07-20 NOTE — PROGRESS NOTES
Assessment/Plan:   BHS met with pt during PCP appointment for follow up  Pt stated he is doing ok  Still struggling with anxiety over relationship issues and financial stressors  Pt stated he is utilizing prayer and family and friends support  S provided contact information and encouraged pt to reach out when needed  [facility]     Today patient present with   Chief Complaint   Patient presents with    Follow-up     Pt has no c/o at this time  Patient would likely benefit from follow up BHS  Consider/focus/continue none  Stage of change:   Plan/ Behavioral Recommendations: none      Diagnoses and all orders for this visit:    Human immunodeficiency virus (HIV) disease ([facility] )  -     Microalbumin / creatinine urine ratio    Uncontrolled type 2 diabetes mellitus with hyperglycemia ([facility] )  -     Microalbumin / creatinine urine ratio  -     Ambulatory referral to Podiatry; Future  -     Dulaglutide (Trulicity) 1 5 OX/2 2RT SOPN; Inject 0 5 mL (1 5 mg total) under the skin once a week  -     Aspirin Low Dose 81 MG EC tablet; Take 1 tablet (81 mg total) by mouth daily  -     BD Pen Needle Bela 2nd Gen 32G X 4 MM MISC; Inject under the skin 2 (two) times a day  -     Lantus SoloStar 100 units/mL injection pen; Inject 30 Units under the skin daily  -     Empagliflozin 25 MG TABS; Take 1 tablet (25 mg total) by mouth daily    Primary hypertension    Mixed hyperlipidemia  -     atorvastatin (LIPITOR) 20 mg tablet; Take 1 tablet (20 mg total) by mouth daily    HIV infection, unspecified symptom status (HCC)  -     Biktarvy -25 MG tablet; Take 1 tablet by mouth daily    Chronic low back pain, unspecified back pain laterality, unspecified whether sciatica present  -     ibuprofen (MOTRIN) 600 mg tablet; Take 1 tablet (600 mg total) by mouth every 6 (six) hours as needed for moderate pain    Lumbar sprain, initial encounter  -     methocarbamol (ROBAXIN) 500 mg tablet;  Take 1 tablet (500 mg total) by [Patient] : patient [FreeTextEntry1] : lives with aunt mouth 2 (two) times a day    Erectile dysfunction, unspecified erectile dysfunction type  -     sildenafil (VIAGRA) 100 mg tablet; Take 1 tablet (100 mg total) by mouth daily as needed for erectile dysfunction    HSV (herpes simplex virus) anogenital infection  -     valACYclovir (VALTREX) 1,000 mg tablet; Take 1 tablet (1,000 mg total) by mouth daily    Encounter for screening colonoscopy  -     Ambulatory referral to Gastroenterology; Future    Choking due to food in larynx, initial encounter  -     Ambulatory referral to Gastroenterology; Future    Obesity (BMI 30 0-34  9)          Discussion:     Patient mood is stable  Subjective:     Patient ID: Allyssa Cantu is a 52 y o  male  HPI    History of Present Illness: The patient is seeing the [facility] today for a routine behavioral health follow up      Review of Systems      Objective:     Physical Exam      [facility]    Orientation     Person: yes    Place: yes    Time: yes    Appearance    Well Developed: yes healthy    Uncomfortable: no    Normal Body Odor: yes    Smells of Feces: no    Smells of Urine: no    Disheveled: no    Well Nourished: yes weight WNL of ideal    Grooming Unkempt: no    Poor Eye Contact: no    Hirsute: no    Looks Tired: no    Acutely Exhausted: noappearance reflects stated age    Mood and Affect:     Appropriate: yes    Euthymicyes    Irritable: no    Angry: no    Anxious: no    Depressed:no    Blunted:no    Labile: no    Restricted: no    Harm to Self or Others: pt denies SI/HI    Substance Abuse: pt denies

## 2022-07-26 ENCOUNTER — PATIENT OUTREACH (OUTPATIENT)
Dept: SURGERY | Facility: CLINIC | Age: 47
End: 2022-07-26

## 2022-07-26 NOTE — PROGRESS NOTES
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

## 2022-08-02 ENCOUNTER — APPOINTMENT (OUTPATIENT)
Dept: LAB | Facility: HOSPITAL | Age: 47
End: 2022-08-02
Attending: INTERNAL MEDICINE
Payer: COMMERCIAL

## 2022-08-02 LAB
ALBUMIN SERPL BCP-MCNC: 4.5 G/DL (ref 3.5–5)
ALP SERPL-CCNC: 70 U/L (ref 43–122)
ALT SERPL W P-5'-P-CCNC: 20 U/L
ANION GAP SERPL CALCULATED.3IONS-SCNC: 12 MMOL/L (ref 5–14)
AST SERPL W P-5'-P-CCNC: 25 U/L (ref 17–59)
BASOPHILS # BLD AUTO: 0.08 THOUSANDS/ΜL (ref 0–0.1)
BASOPHILS NFR BLD AUTO: 1 % (ref 0–1)
BILIRUB SERPL-MCNC: 0.43 MG/DL (ref 0.2–1)
BUN SERPL-MCNC: 9 MG/DL (ref 5–25)
CALCIUM SERPL-MCNC: 9 MG/DL (ref 8.4–10.2)
CHLORIDE SERPL-SCNC: 103 MMOL/L (ref 96–108)
CO2 SERPL-SCNC: 23 MMOL/L (ref 21–32)
CREAT SERPL-MCNC: 0.56 MG/DL (ref 0.7–1.5)
CREAT UR-MCNC: 119 MG/DL
EOSINOPHIL # BLD AUTO: 0.08 THOUSAND/ΜL (ref 0–0.61)
EOSINOPHIL NFR BLD AUTO: 1 % (ref 0–6)
ERYTHROCYTE [DISTWIDTH] IN BLOOD BY AUTOMATED COUNT: 12.2 % (ref 11.6–15.1)
GFR SERPL CREATININE-BSD FRML MDRD: 123 ML/MIN/1.73SQ M
GLUCOSE SERPL-MCNC: 276 MG/DL (ref 70–99)
HCT VFR BLD AUTO: 42.6 % (ref 36.5–49.3)
HGB BLD-MCNC: 13.6 G/DL (ref 12–17)
IMM GRANULOCYTES # BLD AUTO: 0.02 THOUSAND/UL (ref 0–0.2)
IMM GRANULOCYTES NFR BLD AUTO: 0 % (ref 0–2)
LYMPHOCYTES # BLD AUTO: 2.52 THOUSANDS/ΜL (ref 0.6–4.47)
LYMPHOCYTES NFR BLD AUTO: 37 % (ref 14–44)
MCH RBC QN AUTO: 27.8 PG (ref 26.8–34.3)
MCHC RBC AUTO-ENTMCNC: 31.9 G/DL (ref 31.4–37.4)
MCV RBC AUTO: 87 FL (ref 82–98)
MICROALBUMIN UR-MCNC: 27.2 MG/L (ref 0–20)
MICROALBUMIN/CREAT 24H UR: 23 MG/G CREATININE (ref 0–30)
MONOCYTES # BLD AUTO: 0.48 THOUSAND/ΜL (ref 0.17–1.22)
MONOCYTES NFR BLD AUTO: 7 % (ref 4–12)
NEUTROPHILS # BLD AUTO: 3.67 THOUSANDS/ΜL (ref 1.85–7.62)
NEUTS SEG NFR BLD AUTO: 54 % (ref 43–75)
NRBC BLD AUTO-RTO: 0 /100 WBCS
PLATELET # BLD AUTO: 272 THOUSANDS/UL (ref 149–390)
PMV BLD AUTO: 10.2 FL (ref 8.9–12.7)
POTASSIUM SERPL-SCNC: 3.7 MMOL/L (ref 3.5–5.3)
PROT SERPL-MCNC: 8 G/DL (ref 6.4–8.4)
RBC # BLD AUTO: 4.89 MILLION/UL (ref 3.88–5.62)
SODIUM SERPL-SCNC: 138 MMOL/L (ref 135–147)
WBC # BLD AUTO: 6.85 THOUSAND/UL (ref 4.31–10.16)

## 2022-08-02 PROCEDURE — 36415 COLL VENOUS BLD VENIPUNCTURE: CPT | Performed by: INTERNAL MEDICINE

## 2022-08-02 PROCEDURE — 85025 COMPLETE CBC W/AUTO DIFF WBC: CPT | Performed by: INTERNAL MEDICINE

## 2022-08-02 PROCEDURE — 80053 COMPREHEN METABOLIC PANEL: CPT | Performed by: INTERNAL MEDICINE

## 2022-08-02 PROCEDURE — 82570 ASSAY OF URINE CREATININE: CPT | Performed by: NURSE PRACTITIONER

## 2022-08-02 PROCEDURE — 87536 HIV-1 QUANT&REVRSE TRNSCRPJ: CPT | Performed by: INTERNAL MEDICINE

## 2022-08-02 PROCEDURE — 86360 T CELL ABSOLUTE COUNT/RATIO: CPT | Performed by: INTERNAL MEDICINE

## 2022-08-02 PROCEDURE — 83036 HEMOGLOBIN GLYCOSYLATED A1C: CPT | Performed by: INTERNAL MEDICINE

## 2022-08-02 PROCEDURE — 82043 UR ALBUMIN QUANTITATIVE: CPT | Performed by: NURSE PRACTITIONER

## 2022-08-03 ENCOUNTER — PATIENT OUTREACH (OUTPATIENT)
Dept: SURGERY | Facility: CLINIC | Age: 47
End: 2022-08-03

## 2022-08-03 LAB
BASOPHILS # BLD AUTO: 0.1 X10E3/UL (ref 0–0.2)
BASOPHILS NFR BLD AUTO: 1 %
CD3+CD4+ CELLS # BLD: 871 /UL (ref 359–1519)
CD3+CD4+ CELLS NFR BLD: 36.3 % (ref 30.8–58.5)
CD3+CD4+ CELLS/CD3+CD8+ CLL BLD: 1.02 % (ref 0.92–3.72)
CD3+CD8+ CELLS # BLD: 852 /UL (ref 109–897)
CD3+CD8+ CELLS NFR BLD: 35.5 % (ref 12–35.5)
EOSINOPHIL # BLD AUTO: 0 X10E3/UL (ref 0–0.4)
EOSINOPHIL NFR BLD AUTO: 1 %
ERYTHROCYTE [DISTWIDTH] IN BLOOD BY AUTOMATED COUNT: 12.5 % (ref 11.6–15.4)
EST. AVERAGE GLUCOSE BLD GHB EST-MCNC: 243 MG/DL
HBA1C MFR BLD: 10.1 %
HCT VFR BLD AUTO: 41 % (ref 37.5–51)
HGB BLD-MCNC: 13.6 G/DL (ref 13–17.7)
HIV1 RNA # SERPL NAA+PROBE: 30 COPIES/ML
HIV1 RNA SERPL NAA+PROBE-LOG#: 1.48 LOG10COPY/ML
IMM GRANULOCYTES # BLD: 0 X10E3/UL (ref 0–0.1)
IMM GRANULOCYTES NFR BLD: 0 %
LYMPHOCYTES # BLD AUTO: 2.4 X10E3/UL (ref 0.7–3.1)
LYMPHOCYTES NFR BLD AUTO: 36 %
MCH RBC QN AUTO: 28.5 PG (ref 26.6–33)
MCHC RBC AUTO-ENTMCNC: 33.2 G/DL (ref 31.5–35.7)
MCV RBC AUTO: 86 FL (ref 79–97)
MONOCYTES # BLD AUTO: 0.5 X10E3/UL (ref 0.1–0.9)
MONOCYTES NFR BLD AUTO: 7 %
NEUTROPHILS # BLD AUTO: 3.7 X10E3/UL (ref 1.4–7)
NEUTROPHILS NFR BLD AUTO: 55 %
PLATELET # BLD AUTO: 286 X10E3/UL (ref 150–450)
RBC # BLD AUTO: 4.77 X10E6/UL (ref 4.14–5.8)
WBC # BLD AUTO: 6.7 X10E3/UL (ref 3.4–10.8)

## 2022-08-03 NOTE — PROGRESS NOTES
Client called with housing concerns  Per client, his lease is up for renewal, and his landlord is raising the rent from $1,165 to $1,300  Per client his rent was raised about $500 within the last 18 months because he was originally paying around $800 before it went up to $1,165  Per client states he is dealing with a lot  Per client, despite being undetectable, his diagnosis is still taking a mental toll on him  Writer will be informing behavior health consultant on his mental health status  Writer informed client, that Angela Guy,  will be contacting him

## 2022-08-04 ENCOUNTER — PATIENT OUTREACH (OUTPATIENT)
Dept: SURGERY | Facility: CLINIC | Age: 47
End: 2022-08-04

## 2022-08-04 NOTE — PROGRESS NOTES
CM spoke with CM about his case and how she spoke with him yesterday about his stress and issues with the landlord raising the rent  CM agreed to reach out to him  CM called ct to introduce self as interim   CM asked about the issues with the landlord and ct explained that the landlord is intending to raise the rent from 1165 to 1300, and he is worried about it  CM explained that if his income has not changed, the program will absorb that cost  Ct then explained that the landlord just bought that property last year and he as already raised the rent from 800 to 1,00, then from 1,00 to 1,165  Now the landlord intends to raise the rent again  CM looked over the housing payment standard while on the phone with ct and explained that the unit cannot go over 1,300 including utility credits  Cm suggested calling the landlord and working with him to come to a solution  DARRLEL then made ct aware that he was due to renew in September and he has to come in to renew  Ct stated that he knows everything that is  Needed and he will bring it in when needed  CM told ct that cm will reach out to  in the next few days to get his resolved

## 2022-08-04 NOTE — PROGRESS NOTES
Ct called writer to do a follow-up on when he would be able to be in contact with a   Writer expressed to client that , Deandra Swan will be in contact with client

## 2022-08-08 ENCOUNTER — PATIENT OUTREACH (OUTPATIENT)
Dept: SURGERY | Facility: CLINIC | Age: 47
End: 2022-08-08

## 2022-08-08 NOTE — PROGRESS NOTES
HC spoke to deana about the unit and how he was not allowed to raise the rent up to 1300 because of the program guidelines  HC explained that we follow HUD and that the program has to take utility credits into account  HC also said that ct cannot pay the difference in rent if he decides to move forward with the 1300  The unit may not qualify  HC told him that the most the rent can go up is 1221 00 Jose MCKEON stated that he understood and that he will look at the numbers and get back to [facility]  LINDA stated that he didn't want to lose ct as tenant  HC then called ct and told him that [facility]  called and the landlord initially agreed to amount  HC told him that if DJ decides to pursue more then than what is allowed, to not worry about it  HC will go to supervisor to see what could be done  HC briefly spoke about renewal next month

## 2022-08-16 ENCOUNTER — PATIENT OUTREACH (OUTPATIENT)
Dept: SURGERY | Facility: CLINIC | Age: 47
End: 2022-08-16

## 2022-08-17 ENCOUNTER — PATIENT OUTREACH (OUTPATIENT)
Dept: SURGERY | Facility: CLINIC | Age: 47
End: 2022-08-17

## 2022-08-17 NOTE — PROGRESS NOTES
HC spoke with ct regarding the rent change for October  Ct stated that he spoke with the landlord and the landlord agreed to accept the rent at 1221 00  Ct also mentioned that he has all the documents needed for the TBRA renewal  [facility]  agreed to call him on Friday to formally schedule appointment

## 2022-08-17 NOTE — PROGRESS NOTES
Cm updated Cm supervisor regarding Ct requesting more assistance  Cm supervisor updated Ct that Ct has received assistance with gift cards, contacts and a food box  Cm will update Ct with more food resources such as food pantry  Cm attempted to call Ct's phone and it is still inactive

## 2022-08-17 NOTE — PROGRESS NOTES
Cm text Ct to check in and inform that Cm has returned from leave if any questions or concerns to contact Eleuterio Moe going forward  Ct states he needs assistance with gift cards  Eleuterio called office to follow up if Ct can receive a gift card  Jacoby Christians states that ct can not receive any due to Ct already receiving 2 cards in the past 6 months  Cm text Ct and message failed  Cm called Ct and his phone is off

## 2022-08-18 ENCOUNTER — PATIENT OUTREACH (OUTPATIENT)
Dept: SURGERY | Facility: CLINIC | Age: 47
End: 2022-08-18

## 2022-08-18 NOTE — PROGRESS NOTES
HC got TBRA check requisition signed  HC sent to [facility]  gave billing to Felix Gill

## 2022-08-19 ENCOUNTER — PATIENT OUTREACH (OUTPATIENT)
Dept: SURGERY | Facility: CLINIC | Age: 47
End: 2022-08-19

## 2022-08-22 NOTE — PROGRESS NOTES
Ct called to update Cm  Eleuterio explains Ct is not able to received another gift card  Ct states he understands  Cm updated Cm Supervisor

## 2022-08-23 ENCOUNTER — PATIENT OUTREACH (OUTPATIENT)
Dept: SURGERY | Facility: CLINIC | Age: 47
End: 2022-08-23

## 2022-08-23 NOTE — PROGRESS NOTES
HC called to schedule appointment for renewal  [facility] [place]  scheduled for this Thursday at 2pm at the [facility] office

## 2022-08-25 ENCOUNTER — PATIENT OUTREACH (OUTPATIENT)
Dept: SURGERY | Facility: CLINIC | Age: 47
End: 2022-08-25

## 2022-08-25 NOTE — PROGRESS NOTES
HC met with Ct to do housing renewal application for September  HC collected all his current documents and ct mentioned that he was getting help from his cousin, but when he gets paid, he pays him back and you can see it in his bank  HC stated that it was ok  HC completed application and gave new rent amount ct  HC still needs new lease to pay the correct rent in October

## 2022-08-30 ENCOUNTER — PATIENT OUTREACH (OUTPATIENT)
Dept: SURGERY | Facility: CLINIC | Age: 47
End: 2022-08-30

## 2022-08-30 ENCOUNTER — OFFICE VISIT (OUTPATIENT)
Dept: SURGERY | Facility: CLINIC | Age: 47
End: 2022-08-30
Payer: COMMERCIAL

## 2022-08-30 VITALS
TEMPERATURE: 99.2 F | DIASTOLIC BLOOD PRESSURE: 77 MMHG | BODY MASS INDEX: 30.74 KG/M2 | OXYGEN SATURATION: 94 % | WEIGHT: 219.6 LBS | SYSTOLIC BLOOD PRESSURE: 130 MMHG | HEART RATE: 93 BPM | HEIGHT: 71 IN

## 2022-08-30 DIAGNOSIS — E66.9 OBESITY (BMI 30.0-34.9): ICD-10-CM

## 2022-08-30 DIAGNOSIS — A52.8 SYPHILIS, LATE LATENT: ICD-10-CM

## 2022-08-30 DIAGNOSIS — B20 HUMAN IMMUNODEFICIENCY VIRUS (HIV) DISEASE (HCC): Primary | ICD-10-CM

## 2022-08-30 DIAGNOSIS — E11.65 UNCONTROLLED TYPE 2 DIABETES MELLITUS WITH HYPERGLYCEMIA (HCC): ICD-10-CM

## 2022-08-30 DIAGNOSIS — I10 PRIMARY HYPERTENSION: ICD-10-CM

## 2022-08-30 PROCEDURE — 99215 OFFICE O/P EST HI 40 MIN: CPT | Performed by: INTERNAL MEDICINE

## 2022-08-30 NOTE — PROGRESS NOTES
Progress Note - Infectious Disease   Kadie Houston 52 y o  male MRN: [id number]  Unit/Bed#:  Encounter: [id number]      Impression/Plan:  1  HIV-doing well on Biktarvy with a near undetectable viral load and a CD4 count in the 800s   Continue ART, recheck labs in 2 months, follow-up in 3 months   Stressed adherence     2  Diabetes mellitus-poorly controlled  Refused endocrinology previously  Stressed the importance of tight control  He will start by stopping soda and only drink water  Discussed this issue with the primary     3  Morbid obesity-continues to stressed the importance of diet and exercise     4  Syphilis-patient status post treatment with good response    Continue to monitor RPR annually       5  Hypertension-improved control      Patient was provided medication, adherence and prevention education    Subjective:  Routine follow-up for HIV  Patient claims 100% adherence with Biktarvy    Patient denies any notable side effects  Overall the feeling well  The patient denies any fever chills or sweats, denies any nausea vomiting or diarrhea, denies any cough or shortness of breath  ROS:  A complete review of systems is negative other than that noted above in the subjective    Followup portions patient history reviewed and updated as: Allergies, current medications, past medical history, past social history, past surgical history, and the problem list    Objective:  Vitals:  Vitals:    08/30/22 1632   BP: 130/77   Pulse: 93   Temp: 99 2 °F (37 3 °C)   SpO2: 94%   Weight: 99 6 kg (219 lb 9 6 oz)   Height: 5' 11" (1 803 m)       Physical Exam:   General Appearance:  Alert, interactive, appearing well,  nontoxic, no acute distress  Neck:   Supple without lymphadenopathy, no thyromegaly or masses   Throat: Oropharynx moist without lesions      Lungs:   Clear to auscultation bilaterally; no wheezes, rhonchi or rales; respirations unlabored   Heart:  RRR; no murmur, rub or gallop   Abdomen:   Soft, non-tender, non-distended, positive bowel sounds  Extremities: No clubbing, cyanosis or edema   Skin: No new rashes or lesions  No draining wounds noted  Labs, Imaging, & Other studies:   All pertinent labs and imaging studies were personally reviewed    Lab Results   Component Value Date     (L) 04/04/2018    K 3 7 08/02/2022     08/02/2022    CO2 23 08/02/2022    ANIONGAP 10 04/04/2018    BUN 9 08/02/2022    CREATININE 0 56 (L) 08/02/2022    GLUCOSE 269 (H) 09/10/2018    GLUF 217 (H) 12/11/2021    CALCIUM 9 0 08/02/2022    AST 25 08/02/2022    ALT 20 08/02/2022    ALKPHOS 70 08/02/2022    PROT 6 9 04/04/2018    BILITOT 0 4 04/04/2018    EGFR 123 08/02/2022     Lab Results   Component Value Date    WBC 6 85 08/02/2022    WBC 6 7 08/02/2022    HGB 13 6 08/02/2022    HGB 13 6 08/02/2022    HCT 42 6 08/02/2022    HCT 41 0 08/02/2022    MCV 87 08/02/2022    MCV 86 08/02/2022     08/02/2022     08/02/2022     Lab Results   Component Value Date    HEPCAB Non-reactive 12/11/2021     Lab Results   Component Value Date    HAV Reactive (A) 12/11/2021    HEPCAB Non-reactive 12/11/2021     Lab Results   Component Value Date    RPR Reactive (A) 12/11/2021    RPR Reactive 2 dils (A) 12/11/2021     CD4 ABS   Date/Time Value Ref Range Status   08/02/2022 04:05  359 - 1519 /uL Final     HIV-1 RNA by PCR, Qn   Date/Time Value Ref Range Status   08/02/2022 04:05 PM 30 copies/mL Final     Comment:     The reportable range for this assay is 20 to 10,000,000  copies HIV-1 RNA/mL             Current Outpatient Medications:     Aspirin Low Dose 81 MG EC tablet, Take 1 tablet (81 mg total) by mouth daily, Disp: 30 tablet, Rfl: 2    atorvastatin (LIPITOR) 20 mg tablet, Take 1 tablet (20 mg total) by mouth daily, Disp: 30 tablet, Rfl: 1    BD Pen Needle Bela 2nd Gen 32G X 4 MM MISC, Inject under the skin 2 (two) times a day, Disp: 100 each, Rfl: 2    Biktarvy -25 MG tablet, Take 1 tablet by mouth daily, Disp: 30 tablet, Rfl: 2    Dulaglutide (Trulicity) 1 5 SB/8 8GE SOPN, Inject 0 5 mL (1 5 mg total) under the skin once a week, Disp: 1 5 mL, Rfl: 2    Empagliflozin 25 MG TABS, Take 1 tablet (25 mg total) by mouth daily, Disp: 30 tablet, Rfl: 1    ibuprofen (MOTRIN) 600 mg tablet, Take 1 tablet (600 mg total) by mouth every 6 (six) hours as needed for moderate pain, Disp: 30 tablet, Rfl: 2    Lantus SoloStar 100 units/mL injection pen, Inject 30 Units under the skin daily, Disp: 15 mL, Rfl: 2    methocarbamol (ROBAXIN) 500 mg tablet, Take 1 tablet (500 mg total) by mouth 2 (two) times a day, Disp: 30 tablet, Rfl: 0    sildenafil (VIAGRA) 100 mg tablet, Take 1 tablet (100 mg total) by mouth daily as needed for erectile dysfunction, Disp: 30 tablet, Rfl: 2    valACYclovir (VALTREX) 1,000 mg tablet, Take 1 tablet (1,000 mg total) by mouth daily, Disp: 30 tablet, Rfl: 2

## 2022-08-31 ENCOUNTER — DOCUMENTATION (OUTPATIENT)
Dept: SURGERY | Facility: CLINIC | Age: 47
End: 2022-08-31

## 2022-08-31 ENCOUNTER — PATIENT OUTREACH (OUTPATIENT)
Dept: SURGERY | Facility: CLINIC | Age: 47
End: 2022-08-31

## 2022-09-01 ENCOUNTER — PATIENT OUTREACH (OUTPATIENT)
Dept: SURGERY | Facility: CLINIC | Age: 47
End: 2022-09-01

## 2022-09-01 NOTE — PROGRESS NOTES
HC looked over application again for TBRA renewal to make sure all is received and there is nothing to follow up on

## 2022-09-02 NOTE — PROGRESS NOTES
Ct was discussed at team meeting regarding upcoming appointment  Cm updated staff on Ct's request for another gift card but that Ct was understanding

## 2022-09-06 ENCOUNTER — PATIENT OUTREACH (OUTPATIENT)
Dept: SURGERY | Facility: CLINIC | Age: 47
End: 2022-09-06

## 2022-09-06 NOTE — PROGRESS NOTES
Follow-up note (in conjunction with ID clinic):  Patient's weight fairly stable  Over past several months  Patient's blood sugars not in good control, spoke to patient about this  He shook his head, and stated: "It's my fault, I have slipped up, drinking some soda again, I know what I am supposed to do, I'm just not doing it " Offered to meet with patient, also to provide more written education materials  Patient declined    Ronald Hatch, RDN, LDN, [facility]

## 2022-09-06 NOTE — PROGRESS NOTES
HC looked over the application again and noticed that the lease was still not received  HC called ct and explained that [facility]  needs the lease  [facility]  sent the e-mail to ct  HC then noticed that the bank statements were missing pages 3 and 4  Ct did not understand, HC then explained that the complete statements were needed  The ct agreed to get it as soon as possible

## 2022-09-08 ENCOUNTER — CONSULT (OUTPATIENT)
Dept: GASTROENTEROLOGY | Facility: CLINIC | Age: 47
End: 2022-09-08
Payer: COMMERCIAL

## 2022-09-08 VITALS
BODY MASS INDEX: 29.82 KG/M2 | HEART RATE: 77 BPM | DIASTOLIC BLOOD PRESSURE: 83 MMHG | SYSTOLIC BLOOD PRESSURE: 136 MMHG | HEIGHT: 71 IN | WEIGHT: 213 LBS | TEMPERATURE: 98.1 F

## 2022-09-08 DIAGNOSIS — T17.320A CHOKING DUE TO FOOD IN LARYNX, INITIAL ENCOUNTER: ICD-10-CM

## 2022-09-08 DIAGNOSIS — Z12.11 ENCOUNTER FOR SCREENING COLONOSCOPY: Primary | ICD-10-CM

## 2022-09-08 DIAGNOSIS — R13.10 DYSPHAGIA, UNSPECIFIED TYPE: ICD-10-CM

## 2022-09-08 PROCEDURE — 99243 OFF/OP CNSLTJ NEW/EST LOW 30: CPT | Performed by: INTERNAL MEDICINE

## 2022-09-08 NOTE — PATIENT INSTRUCTIONS
Scheduled date of colonoscopy/egd  (as of today): 11/22/22  Physician performing colonoscopy: Dr Micaela Almonte  Location of colonoscopy: [place]   Bowel prep reviewed with patient: miralax/dulcolax  Instructions reviewed with patient by: francie   Clearances:   n/a

## 2022-09-08 NOTE — PROGRESS NOTES
[facility] - Outpatient Consultation  Mary Fairbanks 52 y o  male MRN: [id number]  Encounter: [id number]      ASSESSMENT & PLAN:      Problem List Items Addressed This Visit    None     Visit Diagnoses     Encounter for screening colonoscopy    -  Primary    Relevant Orders    Colonoscopy    Dysphagia, unspecified type        Relevant Orders    EGD    Choking due to food in larynx, initial encounter            Orders Placed This Encounter   Procedures    Colonoscopy     Standing Status:   Future     Standing Expiration Date:   9/8/2023     Order Specific Question:   Requested Site     Answer: [facility]     Order Specific Question:   Performing Location     Answer:   Endoscopy Dept     Order Specific Question:   Anesthesia required? Answer: Yes    EGD     Standing Status:   Future     Standing Expiration Date:   9/8/2023     Order Specific Question:   Requested Site     Answer: [facility]     Order Specific Question:   Performing Location     Answer:   Endoscopy Dept     Order Specific Question:   Anesthesia required? Answer:   Yes     -colonoscopy for colon cancer screening in average risk patient  -EGD to evaluate for dysphagia   ______________________________________________________________________    HPI:  51 y/o male with HIV on biktarvy, uncontrolled DM presents for colonoscopy consult and dysphagia  No prior EGD/colonoscopy  Diagnosed with HIV 2 years ago  Last CD4 count 871 8/2/22  No family hx of colon cancer  No change in bowel habits  No abdominal pain  Choking sensation x1 year  Dysphagia to solid foods only  No weight loss  No reflux symptoms  No lesions in mouth   Denies alcohol use or prior hx of smoking    Last EGD: n/a  Last colonoscopy: n/a    REVIEW OF SYSTEMS:    CONSTITUTIONAL: Denies any fever, chills, rigors, and weight loss  HEENT: No earache or tinnitus, denies hearing loss or visual disturbances  CARDIOVASCULAR: No chest pain or palpitations  RESPIRATORY: Denies any cough, hemoptysis, shortness of breath or dyspnea on exertion  GASTROINTESTINAL: As noted in the History of Present Illness  GENITOURINARY: No problems with urination, denies any hematuria or dysuria  NEUROLOGIC: No dizziness or vertigo, denies headaches   MUSCULOSKELETAL: Denies any muscle or joint pain   SKIN: Denies skin rashes or itching  ENDOCRINE: Denies excessive thirst, denies intolerance to heat or cold  PSYCHOSOCIAL: Denies depression or anxiety, denies any recent memory loss     Historical Information   Past Medical History:   Diagnosis Date    Chlamydia     Diabetes mellitus ([facility] )     Type II    Gonorrhea     Herpes     HIV disease ([facility] ) 07/2020    mode of transmission: heterosexual    Hyperlipidemia     Hypertension     Neoplasm of kidney     Obesity     BMI 37 2    Syphilis      Past Surgical History:   Procedure Laterality Date    NO PAST SURGERIES       Social History   Social History     Substance and Sexual Activity   Alcohol Use No     Social History     Substance and Sexual Activity   Drug Use No     Social History     Tobacco Use   Smoking Status Never Smoker   Smokeless Tobacco Never Used     Family History   Problem Relation Age of Onset    Diabetes Mother     Diabetes Father     Diabetes Family        MEDICATIONS & ALLERGIES:    Current Outpatient Medications   Medication Instructions    Aspirin Low Dose 81 mg, Oral, Daily    atorvastatin (LIPITOR) 20 mg, Oral, Daily    BD Pen Needle Bela 2nd Gen 32G X 4 MM MISC Subcutaneous, 2 times daily    Biktarvy -25 MG tablet 1 tablet, Oral, Daily    Empagliflozin 25 mg, Oral, Daily    ibuprofen (MOTRIN) 600 mg, Oral, Every 6 hours PRN    Lantus SoloStar 30 Units, Subcutaneous, Daily    methocarbamol (ROBAXIN) 500 mg, Oral, 2 times daily    sildenafil (VIAGRA) 100 mg, Oral, Daily PRN    Trulicity 1 5 mg, Subcutaneous, Weekly    valACYclovir (VALTREX) 1,000 mg, Oral, Daily     Allergies Allergen Reactions    Metformin Diarrhea       PHYSICAL EXAM:      Objective   Blood pressure 136/83, pulse 77, temperature 98 1 °F (36 7 °C), temperature source Tympanic, height 5' 11" (1 803 m), weight 96 6 kg (213 lb)  Body mass index is 29 71 kg/m²  General Appearance:   Alert, cooperative, no distress   HEENT:   Normocephalic, atraumatic, anicteric     Neck:   Supple, symmetrical, trachea midline   Lungs:   Equal chest rise, respirations unlabored    Heart:   Regular rate and rhythm   Abdomen:   Soft, non-tender, non-distended; normal bowel sounds; no masses, no organomegaly    Rectal:   Deferred    Extremities:   No cyanosis, clubbing or edema    Neuro: Moves all 4 extremities    Skin:   No jaundice, rashes, or lesions      LAB RESULTS:     No visits with results within 1 Day(s) from this visit  Latest known visit with results is:   Office Visit on 07/19/2022   Component Date Value    Creatinine, Ur 08/02/2022 119 0     Microalbum  ,U,Random 08/02/2022 27 2 (A)    Microalb Creat Ratio 08/02/2022 23        RADIOLOGY RESULTS: I have personally reviewed pertinent imaging studies        Charlie Francois DO PGY-2  [facility]

## 2022-09-14 ENCOUNTER — PATIENT OUTREACH (OUTPATIENT)
Dept: SURGERY | Facility: CLINIC | Age: 47
End: 2022-09-14

## 2022-09-14 NOTE — PROGRESS NOTES
HC spoke to ct about his documents for his application  Ct stated that he has the bank documents  HC asked about the updated lease and he stated that the landlord wants to speak to [facility]  HC then asked for his number and then agreed to call him for it tomorrow

## 2022-09-15 ENCOUNTER — PATIENT OUTREACH (OUTPATIENT)
Dept: SURGERY | Facility: CLINIC | Age: 47
End: 2022-09-15

## 2022-09-15 NOTE — PROGRESS NOTES
HC spoke to ct about giving his documents and also talking to his landlord about the new lease  HC then sent message to ct with HC's email for him to sent it over to [facility]  HC suggested that he scan it over instead of pictures

## 2022-09-16 ENCOUNTER — TELEPHONE (OUTPATIENT)
Dept: SURGERY | Facility: CLINIC | Age: 47
End: 2022-09-16

## 2022-09-16 ENCOUNTER — PATIENT OUTREACH (OUTPATIENT)
Dept: SURGERY | Facility: CLINIC | Age: 47
End: 2022-09-16

## 2022-09-16 NOTE — PROGRESS NOTES
HC called landlord number to get the new lease  HC was on hold for an extended period of time  HC then received a call from the ct  Ct stated that he spoke to the landlord and he asked for HC's number  HC told ct that [facility]  didn't receive any calls from the landlord and [facility]  was just on the other line trying to call  Ct asked if [facility]  received his bank statements and HC told him yes  Ct was very frustrated because he knows the landlords are impossible to get a hold of and they never answer the phone  Ct has stated that he has to physically go into the office to talk to them  Ct stated he even gave HC's email for landlord to email the lease  HC denied getting any lease via email either  Ct asked for [facility]  to follow up if the landlord does not send the lease or reach out to [facility]  Ct stated he will go to the leasing office and express his concerns because he doesn't want this to effect his rent being paid  HC agreed to reach out

## 2022-09-16 NOTE — TELEPHONE ENCOUNTER
Pt left message asking for his pills to be refilled at the [facility] on [address]  He did not specify which pills

## 2022-09-19 ENCOUNTER — PATIENT OUTREACH (OUTPATIENT)
Dept: SURGERY | Facility: CLINIC | Age: 47
End: 2022-09-19

## 2022-09-19 NOTE — PROGRESS NOTES
[facility]  sent a message to ct telling him that deana still has not sent the lease to [facility]  HC then called ct after he called to say that rent has been already requested for other ct's and that it is a concern that this lease has not been received

## 2022-09-20 ENCOUNTER — OFFICE VISIT (OUTPATIENT)
Dept: SURGERY | Facility: CLINIC | Age: 47
End: 2022-09-20
Payer: COMMERCIAL

## 2022-09-20 ENCOUNTER — PATIENT OUTREACH (OUTPATIENT)
Dept: SURGERY | Facility: CLINIC | Age: 47
End: 2022-09-20

## 2022-09-20 VITALS
HEART RATE: 91 BPM | WEIGHT: 220.2 LBS | HEIGHT: 71 IN | OXYGEN SATURATION: 96 % | BODY MASS INDEX: 30.83 KG/M2 | DIASTOLIC BLOOD PRESSURE: 85 MMHG | TEMPERATURE: 99.3 F | SYSTOLIC BLOOD PRESSURE: 124 MMHG

## 2022-09-20 DIAGNOSIS — B20 HUMAN IMMUNODEFICIENCY VIRUS (HIV) DISEASE (HCC): Primary | ICD-10-CM

## 2022-09-20 DIAGNOSIS — N52.9 ERECTILE DYSFUNCTION, UNSPECIFIED ERECTILE DYSFUNCTION TYPE: ICD-10-CM

## 2022-09-20 DIAGNOSIS — M54.50 CHRONIC LOW BACK PAIN, UNSPECIFIED BACK PAIN LATERALITY, UNSPECIFIED WHETHER SCIATICA PRESENT: ICD-10-CM

## 2022-09-20 DIAGNOSIS — E11.65 UNCONTROLLED TYPE 2 DIABETES MELLITUS WITH HYPERGLYCEMIA (HCC): ICD-10-CM

## 2022-09-20 DIAGNOSIS — B20 HIV INFECTION, UNSPECIFIED SYMPTOM STATUS (HCC): ICD-10-CM

## 2022-09-20 DIAGNOSIS — S33.5XXA LUMBAR SPRAIN, INITIAL ENCOUNTER: ICD-10-CM

## 2022-09-20 DIAGNOSIS — E66.9 OBESITY (BMI 30.0-34.9): ICD-10-CM

## 2022-09-20 DIAGNOSIS — A60.9 HSV (HERPES SIMPLEX VIRUS) ANOGENITAL INFECTION: ICD-10-CM

## 2022-09-20 DIAGNOSIS — I10 PRIMARY HYPERTENSION: ICD-10-CM

## 2022-09-20 DIAGNOSIS — E78.2 MIXED HYPERLIPIDEMIA: ICD-10-CM

## 2022-09-20 DIAGNOSIS — G89.29 CHRONIC LOW BACK PAIN, UNSPECIFIED BACK PAIN LATERALITY, UNSPECIFIED WHETHER SCIATICA PRESENT: ICD-10-CM

## 2022-09-20 PROCEDURE — 99214 OFFICE O/P EST MOD 30 MIN: CPT | Performed by: NURSE PRACTITIONER

## 2022-09-20 RX ORDER — PEN NEEDLE, DIABETIC 32GX 5/32"
NEEDLE, DISPOSABLE MISCELLANEOUS 2 TIMES DAILY
Qty: 100 EACH | Refills: 2 | Status: SHIPPED | OUTPATIENT
Start: 2022-09-20

## 2022-09-20 RX ORDER — INSULIN GLARGINE 100 [IU]/ML
30 INJECTION, SOLUTION SUBCUTANEOUS DAILY
Qty: 15 ML | Refills: 2 | Status: SHIPPED | OUTPATIENT
Start: 2022-09-20

## 2022-09-20 RX ORDER — METHOCARBAMOL 500 MG/1
500 TABLET, FILM COATED ORAL 2 TIMES DAILY
Qty: 30 TABLET | Refills: 0 | Status: SHIPPED | OUTPATIENT
Start: 2022-09-20 | End: 2022-10-24

## 2022-09-20 RX ORDER — ATORVASTATIN CALCIUM 20 MG/1
20 TABLET, FILM COATED ORAL DAILY
Qty: 30 TABLET | Refills: 1 | Status: SHIPPED | OUTPATIENT
Start: 2022-09-20

## 2022-09-20 RX ORDER — VALACYCLOVIR HYDROCHLORIDE 1 G/1
1000 TABLET, FILM COATED ORAL DAILY
Qty: 30 TABLET | Refills: 2 | Status: SHIPPED | OUTPATIENT
Start: 2022-09-20 | End: 2022-10-20

## 2022-09-20 RX ORDER — DULAGLUTIDE 3 MG/.5ML
3 INJECTION, SOLUTION SUBCUTANEOUS WEEKLY
Qty: 15 ML | Refills: 2 | Status: SHIPPED | OUTPATIENT
Start: 2022-09-20

## 2022-09-20 RX ORDER — IBUPROFEN 600 MG/1
600 TABLET ORAL EVERY 6 HOURS PRN
Qty: 30 TABLET | Refills: 2 | Status: SHIPPED | OUTPATIENT
Start: 2022-09-20 | End: 2023-09-20

## 2022-09-20 RX ORDER — SILDENAFIL 100 MG/1
100 TABLET, FILM COATED ORAL DAILY PRN
Qty: 30 TABLET | Refills: 2 | Status: SHIPPED | OUTPATIENT
Start: 2022-09-20

## 2022-09-20 RX ORDER — BICTEGRAVIR SODIUM, EMTRICITABINE, AND TENOFOVIR ALAFENAMIDE FUMARATE 50; 200; 25 MG/1; MG/1; MG/1
1 TABLET ORAL DAILY
Qty: 30 TABLET | Refills: 2 | Status: SHIPPED | OUTPATIENT
Start: 2022-09-20

## 2022-09-20 RX ORDER — ASPIRIN 81 MG/1
81 TABLET, COATED ORAL DAILY
Qty: 30 TABLET | Refills: 2 | Status: SHIPPED | OUTPATIENT
Start: 2022-09-20

## 2022-09-20 NOTE — PATIENT INSTRUCTIONS
Heart Healthy Diet   AMBULATORY CARE:   A heart healthy diet  is an eating plan low in unhealthy fats and sodium (salt)  The plan is high in healthy fats and fiber  A heart healthy diet helps improve your cholesterol levels and lowers your risk for heart disease and stroke  A dietitian will teach you how to read and understand food labels  Heart healthy diet guidelines to follow:   Choose foods that contain healthy fats  Unsaturated fats  include monounsaturated and polyunsaturated fats  Unsaturated fat is found in foods such as soybean, canola, olive, corn, and safflower oils  It is also found in soft tub margarine that is made with liquid vegetable oil  Omega-3 fat  is found in certain fish, such as salmon, tuna, and trout, and in walnuts and flaxseed  Eat fish high in omega-3 fats at least 2 times a week  Get 20 to 30 grams of fiber each day  Fruits, vegetables, whole-grain foods, and legumes (cooked beans) are good sources of fiber  Limit or do not have unhealthy fats  Cholesterol  is found in animal foods, such as eggs and lobster, and in dairy products made from whole milk  Limit cholesterol to less than 200 mg each day  Saturated fat  is found in meats, such as sutton and hamburger  It is also found in chicken or turkey skin, whole milk, and butter  Limit saturated fat to less than 7% of your total daily calories  Trans fat  is found in packaged foods, such as potato chips and cookies  It is also in hard margarine, some fried foods, and shortening  Do not eat foods that contain trans fats  Limit sodium as directed  You may be told to limit sodium to 2,000 to 2,300 mg each day  Choose low-sodium or no-salt-added foods  Add little or no salt to food you prepare  Use herbs and spices in place of salt         Include the following in your heart healthy plan:  Ask your dietitian or healthcare provider how many servings to have from each of the following food groups:  Grains: Whole-wheat breads, cereals, and pastas, and brown rice    Low-fat, low-sodium crackers and chips    Vegetables:      Broccoli, green beans, green peas, and spinach    Collards, kale, and lima beans    Carrots, sweet potatoes, tomatoes, and peppers    Canned vegetables with no salt added    Fruits:      Bananas, peaches, pears, and pineapple    Grapes, raisins, and dates    Oranges, tangerines, grapefruit, orange juice, and grapefruit juice    Apricots, mangoes, melons, and papaya    Raspberries and strawberries    Canned fruit with no added sugar    Low-fat dairy:      Nonfat (skim) milk, 1% milk, and low-fat almond, cashew, or soy milks fortified with calcium    Low-fat cheese, regular or frozen yogurt, and cottage cheese    Meats and proteins:      Lean cuts of beef and pork (loin, leg, round), skinless chicken and turkey    Legumes, soy products, egg whites, or nuts    Limit or do not include the following in your heart healthy plan:   Unhealthy fats and oils:      Whole or 2% milk, cream cheese, sour cream, or cheese    High-fat cuts of beef (T-bone steaks, ribs), chicken or turkey with skin, and organ meats such as liver    Butter, stick margarine, shortening, and cooking oils such as coconut or palm oil    Foods and liquids high in sodium:      Packaged foods, such as frozen dinners, cookies, macaroni and cheese, and cereals with more than 300 mg of sodium per serving    Vegetables with added sodium, such as instant potatoes, vegetables with added sauces, or regular canned vegetables    Cured or smoked meats, such as hot dogs, sutton, and sausage    High-sodium ketchup, barbecue sauce, salad dressing, pickles, olives, soy sauce, or miso    Foods and liquids high in sugar:      Candy, cake, cookies, pies, or doughnuts    Soft drinks (soda), sports drinks, or sweetened tea    Canned or dry mixes for cakes, soups, sauces, or gravies    Other healthy heart guidelines:   Do not smoke    Nicotine and other chemicals in cigarettes and cigars can cause lung and heart damage  Ask your healthcare provider for information if you currently smoke and need help to quit  E-cigarettes or smokeless tobacco still contain nicotine  Talk to your healthcare provider before you use these products  Limit or do not drink alcohol as directed  Alcohol can damage your heart and raise your blood pressure  Your healthcare provider may give you specific daily and weekly limits  The general recommended limit is 1 drink a day for women 21 or older and for men 72 or older  Do not have more than 3 drinks in a day or 7 in a week  The recommended limit is 2 drinks a day for men 24to 59years of age  Do not have more than 4 drinks in a day or 14 in a week  A drink of alcohol is 12 ounces of beer, 5 ounces of wine, or 1½ ounces of liquor  Exercise regularly  Exercise can help you maintain a healthy weight and improve your blood pressure and cholesterol levels  Regular exercise can also decrease your risk for heart problems  Ask your healthcare provider about the best exercise plan for you  Do not start an exercise program without asking your healthcare provider  Follow up with your doctor or cardiologist as directed:  Write down your questions so you remember to ask them during your visits  © [employer] 2022 Information is for End User's use only and may not be sold, redistributed or otherwise used for commercial purposes  All illustrations and images included in CareNotes® are the copyrighted property of [employer]  or Ginny Biggs   The above information is an  only  It is not intended as medical advice for individual conditions or treatments  Talk to your doctor, nurse or pharmacist before following any medical regimen to see if it is safe and effective for you

## 2022-09-20 NOTE — ASSESSMENT & PLAN NOTE
No results found for: [id number]  CD4 ABS   Date/Time Value Ref Range Status   2022 04:05  359 - 1519 /uL Final     HIV-1 RNA by PCR, Qn   Date/Time Value Ref Range Status   2022 04:05 PM 30 copies/mL Final     Comment:     The reportable range for this assay is 20 to 10,000,000  copies HIV-1 RNA/mL  HIV-1 RNA Viral Load Log   Date/Time Value Ref Range Status   2022 04:05 PM 1 477 [id number]/mL Final         ART: [address]        Denies side effects  Stressed the importance of adherence  Continue follow up with ID clinic  Reviewed most recent labs, including Cd4 and viral load  Discussed the risks and benefits of treatment options, instructions for management, importance of treatment adherence, and reduction of risk factor  Educated on possible  medication side effects  Counseled on routes of HIV transmission, including the risk of  infection  Emphasized that viral suppression is the best method to prevent HIV transmission  At this time pt denies the need for HIV testing of anyone in their life  Total encounter time was 45 minutes  Greater then 20 minutes were spent on counseling and patient education  Pt voices understanding and agreement with treatment plan

## 2022-09-20 NOTE — PROGRESS NOTES
HC received the new lease and printed out  HC looked over the application for the last time before sending to supervisor for review  HC then spoke to ct about receiving it and sending his application over for review

## 2022-09-20 NOTE — ASSESSMENT & PLAN NOTE
Lab Results   Component Value Date/Time    HGBA1C 10 1 (H) 08/02/2022 04:05 PM    HGBA1C 13 7 (H) 09/15/2021 03:42 PM        Continue current medication  Increase dulaglutide dose to 3mg  Educated to follow diabetic diet, maintain a healthy weight, and exercise regularly  Referred to dietician for additional education          Refer to opthalmology for yearly retinal exam                Lab Results   Component Value Date    HGBA1C 10 1 (H) 08/02/2022

## 2022-09-20 NOTE — ASSESSMENT & PLAN NOTE
Blood pressure:   BP Readings from Last 3 Encounters:   09/20/22 124/85   09/08/22 136/83   08/30/22 130/77       Continue current antihypertensive  Educated on the following lifestyle modifications to lower BP and decrease cardiovascular risk factors  limit alcohol intake, reduce salt in diet, maintain a healthy weight, engage in 30 minutes of cardiovascular exercise daily, and not smoke

## 2022-09-20 NOTE — ASSESSMENT & PLAN NOTE
Body mass index is 30 71 kg/m²  Wt Readings from Last 3 Encounters:   09/20/22 99 9 kg (220 lb 3 2 oz)   09/08/22 96 6 kg (213 lb)   08/30/22 99 6 kg (219 lb 9 6 oz)     Height: 5' 11" (180 3 cm)     Weight is stable  Lab Results   Component Value Date    HGBA1C 10 1 (H) 08/02/2022     Lab Results   Component Value Date    GLUCOSE 269 (H) 09/10/2018   ;    Educated on the health risks of obesity  Advised to lose weight  Encouraged eating a healthy diet and daily cardiac exercise  Recommended increasing fruits and vegetables and limiting processed foods  Refer to dietitian for additional education

## 2022-09-20 NOTE — PROGRESS NOTES
Assessment/Plan:    Stressed the importance of medication adherence  Medications sent to mail order pharmacy to increase adherence  Human immunodeficiency virus (HIV) disease ([facility] [address] )  No results found for: [id number]  CD4 ABS   Date/Time Value Ref Range Status   2022 04:05  359 - 1519 /uL Final     HIV-1 RNA by PCR, Qn   Date/Time Value Ref Range Status   2022 04:05 PM 30 copies/mL Final     Comment:     The reportable range for this assay is 20 to 10,000,000  copies HIV-1 RNA/mL  HIV-1 RNA Viral Load Log   Date/Time Value Ref Range Status   2022 04:05 PM 1 477 [id number]/mL Final         ART: Rosaline So        Denies side effects  Stressed the importance of adherence  Continue follow up with ID clinic  Reviewed most recent labs, including Cd4 and viral load  Discussed the risks and benefits of treatment options, instructions for management, importance of treatment adherence, and reduction of risk factor  Educated on possible  medication side effects  Counseled on routes of HIV transmission, including the risk of  infection  Emphasized that viral suppression is the best method to prevent HIV transmission  At this time pt denies the need for HIV testing of anyone in their life  Total encounter time was 45 minutes  Greater then 20 minutes were spent on counseling and patient education  Pt voices understanding and agreement with treatment plan  Obesity (BMI 30 0-34  9)  Body mass index is 30 71 kg/m²  Wt Readings from Last 3 Encounters:   22 99 9 kg (220 lb 3 2 oz)   22 96 6 kg (213 lb)   22 99 6 kg (219 lb 9 6 oz)     Height: 5' 11" (180 3 cm)     Weight is stable  Lab Results   Component Value Date    HGBA1C 10 1 (H) 2022     Lab Results   Component Value Date    GLUCOSE 269 (H) 09/10/2018   ;    Educated on the health risks of obesity  Advised to lose weight  Encouraged eating a healthy diet and daily cardiac exercise    Recommended increasing fruits and vegetables and limiting processed foods  Refer to dietitian for additional education  Diagnoses and all orders for this visit:    Human immunodeficiency virus (HIV) disease ([facility] [address] )    Uncontrolled type 2 diabetes mellitus with hyperglycemia (HCC)  -     Aspirin Low Dose 81 MG EC tablet; Take 1 tablet (81 mg total) by mouth daily  -     BD Pen Needle Bela 2nd Gen 32G X 4 MM MISC; Inject under the skin 2 (two) times a day  -     Dulaglutide (Trulicity) 3 BC/4 5LR SOPN; Inject 0 5 mL (3 mg total) under the skin once a week  -     Lantus SoloStar 100 units/mL SOPN; Inject 0 3 mL (30 Units total) under the skin daily  -     HEMOGLOBIN A1C W/ EAG ESTIMATION; Future    Mixed hyperlipidemia  -     atorvastatin (LIPITOR) 20 mg tablet; Take 1 tablet (20 mg total) by mouth daily    HIV infection, unspecified symptom status (HCC)  -     Biktarvy -25 MG tablet; Take 1 tablet by mouth daily    Primary hypertension    Chronic low back pain, unspecified back pain laterality, unspecified whether sciatica present  -     ibuprofen (MOTRIN) 600 mg tablet; Take 1 tablet (600 mg total) by mouth every 6 (six) hours as needed for moderate pain    Lumbar sprain, initial encounter  -     methocarbamol (ROBAXIN) 500 mg tablet; Take 1 tablet (500 mg total) by mouth 2 (two) times a day    HSV (herpes simplex virus) anogenital infection  -     valACYclovir (VALTREX) 1,000 mg tablet; Take 1 tablet (1,000 mg total) by mouth daily    Erectile dysfunction, unspecified erectile dysfunction type  -     sildenafil (VIAGRA) 100 mg tablet; Take 1 tablet (100 mg total) by mouth daily as needed for erectile dysfunction    Obesity (BMI 30 0-34  9)          Subjective:      Patient ID: Flor Jett is a 52 y o  male  Mr  Flor Jett is a 49-year-old male who presents to the clinic today for 3 month f/u PCP visit    Past medical history significant for HIV, uncontrolled type 2 diabetes, hyperlipidemia, and obesity  Mr  Suri Friend is doing well and offers no acute complaints  He has not been taking some of his medication but reports consistent adherence to his ART  The following portions of the patient's history were reviewed and updated as appropriate: allergies, current medications, past family history, past medical history, past social history, past surgical history and problem list     Review of Systems   Constitutional: Negative for chills and fever  HENT: Negative for ear pain and sore throat  Eyes: Negative for pain and visual disturbance  Respiratory: Negative for cough and shortness of breath  Cardiovascular: Negative for chest pain and palpitations  Gastrointestinal: Negative for abdominal pain and vomiting  Genitourinary: Negative for dysuria and hematuria  Musculoskeletal: Negative for arthralgias and back pain  Skin: Negative for color change and rash  Neurological: Negative for seizures and syncope  All other systems reviewed and are negative          Objective:      /85   Pulse 91   Temp 99 3 °F (37 4 °C)   Ht 5' 11" (1 803 m)   Wt 99 9 kg (220 lb 3 2 oz)   SpO2 96%   BMI 30 71 kg/m²       Lab Results   Component Value Date     (L) 04/04/2018    K 3 7 08/02/2022     08/02/2022    CO2 23 08/02/2022    ANIONGAP 10 04/04/2018    BUN 9 08/02/2022    CREATININE 0 56 (L) 08/02/2022    GLUCOSE 269 (H) 09/10/2018    GLUF 217 (H) 12/11/2021    CALCIUM 9 0 08/02/2022    AST 25 08/02/2022    ALT 20 08/02/2022    ALKPHOS 70 08/02/2022    PROT 6 9 04/04/2018    BILITOT 0 4 04/04/2018    EGFR 123 08/02/2022     Lab Results   Component Value Date    WBC 6 85 08/02/2022    WBC 6 7 08/02/2022    HGB 13 6 08/02/2022    HGB 13 6 08/02/2022    HCT 42 6 08/02/2022    HCT 41 0 08/02/2022    MCV 87 08/02/2022    MCV 86 08/02/2022     08/02/2022     08/02/2022     Lab Results   Component Value Date    HEPCAB Non-reactive 12/11/2021     Lab Results   Component Value Date    HAV Reactive (A) 12/11/2021    HEPCAB Non-reactive 12/11/2021     Lab Results   Component Value Date    RPR Reactive (A) 12/11/2021    RPR Reactive 2 dils (A) 12/11/2021            Physical Exam  Constitutional:       General: He is not in acute distress  Appearance: He is well-developed  HENT:      Head: Normocephalic  Right Ear: External ear normal       Left Ear: External ear normal       Nose: Nose normal       Mouth/Throat:      Pharynx: No oropharyngeal exudate  Eyes:      General:         Right eye: No discharge  Left eye: No discharge  Conjunctiva/sclera: Conjunctivae normal       Pupils: Pupils are equal, round, and reactive to light  Neck:      Thyroid: No thyromegaly  Cardiovascular:      Rate and Rhythm: Normal rate and regular rhythm  Heart sounds: Normal heart sounds  No murmur heard  Pulmonary:      Effort: Pulmonary effort is normal       Breath sounds: Normal breath sounds  No wheezing  Abdominal:      General: Bowel sounds are normal       Palpations: Abdomen is soft  There is no mass  Tenderness: There is no abdominal tenderness  Musculoskeletal:         General: No tenderness  Normal range of motion  Cervical back: Normal range of motion  Lymphadenopathy:      Cervical: No cervical adenopathy  Skin:     General: Skin is warm and dry  Findings: No rash  Neurological:      Mental Status: He is alert and oriented to person, place, and time     Psychiatric:         Behavior: Behavior normal

## 2022-09-22 ENCOUNTER — PATIENT OUTREACH (OUTPATIENT)
Dept: SURGERY | Facility: CLINIC | Age: 47
End: 2022-09-22

## 2022-09-23 ENCOUNTER — PATIENT OUTREACH (OUTPATIENT)
Dept: SURGERY | Facility: CLINIC | Age: 47
End: 2022-09-23

## 2022-09-26 ENCOUNTER — PATIENT OUTREACH (OUTPATIENT)
Dept: SURGERY | Facility: CLINIC | Age: 47
End: 2022-09-26

## 2022-09-26 NOTE — PROGRESS NOTES
HC spoke to  about the lease and it not saying the ct's name on it  HC also spoke to  about the deposits in the account and if [facility]  wanted to count it as money  Robert Junior looked over bank account with [facility]   stated that what ever transaction cannot be directly correlated with money he returns, it has be counted as  Income   also mentioned maybe holding off and waiting to see after 3 months and having a discussion with him to give him one more chance  HC then followed up with HS and suggested that ct's auth be for 3 months and HC speak to him now about the deposits  HC then sent e-mail to [facility] about updating the lease

## 2022-09-26 NOTE — PROGRESS NOTES
HC got TBRA check requisition signed  HC sent to [facility]  gave billing to Dena Mena

## 2022-10-03 ENCOUNTER — PATIENT OUTREACH (OUTPATIENT)
Dept: SURGERY | Facility: CLINIC | Age: 47
End: 2022-10-03

## 2022-10-03 NOTE — PROGRESS NOTES
[facility]  sent message to ct about the lease and how the landlord has not responded to e-mail asking him to fill in the information about the tenant  The lease was blank  HC also asked what time he was available to go over the deposits  Ct then called and said that he was not aware that land has not responded to [facility]  about the blank lease  HC then looked in his file with ct on the phone and told him that his name was not listed on the lease as a tenant and his daughter Is not listed as a occupant  Ct asked if [facility]  has reached out to him and [facility]  stated that [facility]  sent an e-mail and he has not responded  HC then looked up e-mail an to show that he has not responded  Ct agreed to call him to ask him for the lease  HC then talked with him about the deposits in his account and him being approved for 3 months to check and see if he is still getting deposits that cannot be traced to him sending the money back  Ct agreed to following up in 3 months  HC then sent another e-mail to deana asking for the updated lease

## 2022-10-04 ENCOUNTER — PATIENT OUTREACH (OUTPATIENT)
Dept: SURGERY | Facility: CLINIC | Age: 47
End: 2022-10-04

## 2022-10-04 NOTE — PROGRESS NOTES
HC reached out to client to confirm that the updated lease was received and it was put into his file

## 2022-10-05 ENCOUNTER — PATIENT OUTREACH (OUTPATIENT)
Dept: SURGERY | Facility: CLINIC | Age: 47
End: 2022-10-05

## 2022-10-05 NOTE — PROGRESS NOTES
Cm and Ct completed 6 month follow up over the phone  Ct is currently still residing in [place]  Ct's current housing is stable  Ct has assistance with [employer]   Ct reports he has not been sexually active states that when he is he does disclose and does not use protection  Ct is currently in good health  CM and Ct discussed at risk behaviors and importance of using protection and maintaining medication adherence  Ct is currently medically adherent to his medications and his appointments  Ct also reports he has diabetes and it is under control  Ct states that he is currently working with Dr Perlita Liao and Laura Toney for PCP  Ct reports he currently does not have any issues with transportation and drives himself  Ct currently does work full time as a  form [employer]   Ct does not smoke cigarettes  Ct reports he does have some MH issues and wants to get back in tx with [address]  Ct request to continue to work with Christopher Iraheta  Cm will put in referral  Tiarra Hernandez states he is going to court soon for 50/50 custody for his son  Ct reports no past issues with drugs or alcohol  Ct states there are no domestic violence issues  Ct believes that his last dental appointment was sometime in  7/2022 with [facility] again  There are no signatures due to COVID-19   Cm completed acuity scale his score is 17

## 2022-10-07 ENCOUNTER — PATIENT OUTREACH (OUTPATIENT)
Dept: SURGERY | Facility: CLINIC | Age: 47
End: 2022-10-07

## 2022-10-12 ENCOUNTER — PATIENT OUTREACH (OUTPATIENT)
Dept: SURGERY | Facility: CLINIC | Age: 47
End: 2022-10-12

## 2022-10-12 NOTE — PROGRESS NOTES
HC got TBRA check requisition signed  HC sent to [facility]  gave billing to Krista Mora

## 2022-10-13 NOTE — PROGRESS NOTES
Ct called Eleuterio to get him a copy of his medication list to assist in applying for SNAP benefits online   Cm sent ct a photo of his list

## 2022-10-14 DIAGNOSIS — S33.5XXA LUMBAR SPRAIN, INITIAL ENCOUNTER: ICD-10-CM

## 2022-10-19 ENCOUNTER — PATIENT OUTREACH (OUTPATIENT)
Dept: SURGERY | Facility: CLINIC | Age: 47
End: 2022-10-19

## 2022-10-21 ENCOUNTER — PATIENT OUTREACH (OUTPATIENT)
Dept: SURGERY | Facility: CLINIC | Age: 47
End: 2022-10-21

## 2022-10-24 ENCOUNTER — APPOINTMENT (OUTPATIENT)
Dept: LAB | Facility: HOSPITAL | Age: 47
End: 2022-10-24
Attending: INTERNAL MEDICINE
Payer: COMMERCIAL

## 2022-10-24 DIAGNOSIS — E11.65 UNCONTROLLED TYPE 2 DIABETES MELLITUS WITH HYPERGLYCEMIA (HCC): ICD-10-CM

## 2022-10-24 LAB
ALBUMIN SERPL BCP-MCNC: 4.5 G/DL (ref 3.5–5)
ALP SERPL-CCNC: 72 U/L (ref 43–122)
ALT SERPL W P-5'-P-CCNC: 17 U/L
ANION GAP SERPL CALCULATED.3IONS-SCNC: 8 MMOL/L (ref 5–14)
AST SERPL W P-5'-P-CCNC: 16 U/L (ref 17–59)
BASOPHILS # BLD AUTO: 0.07 THOUSANDS/ÂΜL (ref 0–0.1)
BASOPHILS NFR BLD AUTO: 1 % (ref 0–1)
BILIRUB SERPL-MCNC: 0.6 MG/DL (ref 0.2–1)
BUN SERPL-MCNC: 11 MG/DL (ref 5–25)
CALCIUM SERPL-MCNC: 9.3 MG/DL (ref 8.4–10.2)
CHLORIDE SERPL-SCNC: 103 MMOL/L (ref 96–108)
CO2 SERPL-SCNC: 26 MMOL/L (ref 21–32)
CREAT SERPL-MCNC: 0.61 MG/DL (ref 0.7–1.5)
EOSINOPHIL # BLD AUTO: 0.08 THOUSAND/ÂΜL (ref 0–0.61)
EOSINOPHIL NFR BLD AUTO: 1 % (ref 0–6)
ERYTHROCYTE [DISTWIDTH] IN BLOOD BY AUTOMATED COUNT: 13.2 % (ref 11.6–15.1)
EST. AVERAGE GLUCOSE BLD GHB EST-MCNC: 200 MG/DL
GFR SERPL CREATININE-BSD FRML MDRD: 118 ML/MIN/1.73SQ M
GLUCOSE P FAST SERPL-MCNC: 250 MG/DL (ref 70–99)
HBA1C MFR BLD: 8.6 %
HCT VFR BLD AUTO: 43.4 % (ref 36.5–49.3)
HGB BLD-MCNC: 13.8 G/DL (ref 12–17)
IMM GRANULOCYTES # BLD AUTO: 0.03 THOUSAND/UL (ref 0–0.2)
IMM GRANULOCYTES NFR BLD AUTO: 0 % (ref 0–2)
LYMPHOCYTES # BLD AUTO: 2.28 THOUSANDS/ÂΜL (ref 0.6–4.47)
LYMPHOCYTES NFR BLD AUTO: 25 % (ref 14–44)
MCH RBC QN AUTO: 28 PG (ref 26.8–34.3)
MCHC RBC AUTO-ENTMCNC: 31.8 G/DL (ref 31.4–37.4)
MCV RBC AUTO: 88 FL (ref 82–98)
MONOCYTES # BLD AUTO: 0.62 THOUSAND/ÂΜL (ref 0.17–1.22)
MONOCYTES NFR BLD AUTO: 7 % (ref 4–12)
NEUTROPHILS # BLD AUTO: 5.98 THOUSANDS/ÂΜL (ref 1.85–7.62)
NEUTS SEG NFR BLD AUTO: 66 % (ref 43–75)
NRBC BLD AUTO-RTO: 0 /100 WBCS
PLATELET # BLD AUTO: 311 THOUSANDS/UL (ref 149–390)
PMV BLD AUTO: 9.9 FL (ref 8.9–12.7)
POTASSIUM SERPL-SCNC: 4 MMOL/L (ref 3.5–5.3)
PROT SERPL-MCNC: 8.2 G/DL (ref 6.4–8.4)
RBC # BLD AUTO: 4.92 MILLION/UL (ref 3.88–5.62)
SODIUM SERPL-SCNC: 137 MMOL/L (ref 135–147)
WBC # BLD AUTO: 9.06 THOUSAND/UL (ref 4.31–10.16)

## 2022-10-24 PROCEDURE — 85025 COMPLETE CBC W/AUTO DIFF WBC: CPT | Performed by: INTERNAL MEDICINE

## 2022-10-24 PROCEDURE — 80053 COMPREHEN METABOLIC PANEL: CPT | Performed by: INTERNAL MEDICINE

## 2022-10-24 PROCEDURE — 36415 COLL VENOUS BLD VENIPUNCTURE: CPT | Performed by: INTERNAL MEDICINE

## 2022-10-24 PROCEDURE — 87536 HIV-1 QUANT&REVRSE TRNSCRPJ: CPT | Performed by: INTERNAL MEDICINE

## 2022-10-24 PROCEDURE — 86360 T CELL ABSOLUTE COUNT/RATIO: CPT | Performed by: INTERNAL MEDICINE

## 2022-10-24 PROCEDURE — 83036 HEMOGLOBIN GLYCOSYLATED A1C: CPT

## 2022-10-24 RX ORDER — METHOCARBAMOL 500 MG/1
TABLET, FILM COATED ORAL
Qty: 30 TABLET | Refills: 0 | Status: SHIPPED | OUTPATIENT
Start: 2022-10-24 | End: 2022-10-26 | Stop reason: HOSPADM

## 2022-10-25 ENCOUNTER — HOSPITAL ENCOUNTER (EMERGENCY)
Facility: HOSPITAL | Age: 47
Discharge: HOME | End: 2022-10-25
Attending: EMERGENCY MEDICINE
Payer: COMMERCIAL

## 2022-10-25 ENCOUNTER — APPOINTMENT (EMERGENCY)
Dept: RADIOLOGY | Facility: HOSPITAL | Age: 47
End: 2022-10-25
Payer: COMMERCIAL

## 2022-10-25 VITALS
BODY MASS INDEX: 31.21 KG/M2 | SYSTOLIC BLOOD PRESSURE: 143 MMHG | WEIGHT: 223.77 LBS | RESPIRATION RATE: 22 BRPM | OXYGEN SATURATION: 98 % | HEART RATE: 70 BPM | TEMPERATURE: 97.2 F | DIASTOLIC BLOOD PRESSURE: 85 MMHG

## 2022-10-25 DIAGNOSIS — R07.9 CHEST PAIN, UNSPECIFIED: Primary | ICD-10-CM

## 2022-10-25 LAB
ALBUMIN SERPL BCP-MCNC: 4.5 G/DL (ref 3.5–5)
ALP SERPL-CCNC: 65 U/L (ref 43–122)
ALT SERPL W P-5'-P-CCNC: 18 U/L
ANION GAP SERPL CALCULATED.3IONS-SCNC: 9 MMOL/L (ref 5–14)
AST SERPL W P-5'-P-CCNC: 16 U/L (ref 17–59)
ATRIAL RATE: 71 BPM
BASOPHILS # BLD AUTO: 0.09 THOUSANDS/ÂΜL (ref 0–0.1)
BASOPHILS # BLD AUTO: 0.1 X10E3/UL (ref 0–0.2)
BASOPHILS NFR BLD AUTO: 1 %
BASOPHILS NFR BLD AUTO: 1 % (ref 0–1)
BILIRUB SERPL-MCNC: 0.76 MG/DL (ref 0.2–1)
BUN SERPL-MCNC: 11 MG/DL (ref 5–25)
CALCIUM SERPL-MCNC: 9.3 MG/DL (ref 8.4–10.2)
CARDIAC TROPONIN I PNL SERPL HS: <2 NG/L
CD3+CD4+ CELLS # BLD: 672 /UL (ref 359–1519)
CD3+CD4+ CELLS NFR BLD: 29.2 % (ref 30.8–58.5)
CD3+CD4+ CELLS/CD3+CD8+ CLL BLD: 0.77 % (ref 0.92–3.72)
CD3+CD8+ CELLS # BLD: 869 /UL (ref 109–897)
CD3+CD8+ CELLS NFR BLD: 37.8 % (ref 12–35.5)
CHLORIDE SERPL-SCNC: 101 MMOL/L (ref 96–108)
CO2 SERPL-SCNC: 27 MMOL/L (ref 21–32)
CREAT SERPL-MCNC: 0.66 MG/DL (ref 0.7–1.5)
D DIMER PPP FEU-MCNC: 0.28 UG/ML FEU
EOSINOPHIL # BLD AUTO: 0.09 THOUSAND/ÂΜL (ref 0–0.61)
EOSINOPHIL # BLD AUTO: 0.1 X10E3/UL (ref 0–0.4)
EOSINOPHIL NFR BLD AUTO: 1 %
EOSINOPHIL NFR BLD AUTO: 1 % (ref 0–6)
ERYTHROCYTE [DISTWIDTH] IN BLOOD BY AUTOMATED COUNT: 12.6 % (ref 11.6–15.4)
ERYTHROCYTE [DISTWIDTH] IN BLOOD BY AUTOMATED COUNT: 13 % (ref 11.6–15.1)
FLUAV RNA RESP QL NAA+PROBE: NEGATIVE
FLUBV RNA RESP QL NAA+PROBE: NEGATIVE
GFR SERPL CREATININE-BSD FRML MDRD: 115 ML/MIN/1.73SQ M
GLUCOSE SERPL-MCNC: 207 MG/DL (ref 70–99)
GLUCOSE SERPL-MCNC: 212 MG/DL (ref 65–140)
HCT VFR BLD AUTO: 43 % (ref 36.5–49.3)
HCT VFR BLD AUTO: 43 % (ref 37.5–51)
HGB BLD-MCNC: 13.8 G/DL (ref 12–17)
HGB BLD-MCNC: 14.1 G/DL (ref 13–17.7)
HIV1 RNA # SERPL NAA+PROBE: <20 COPIES/ML
HIV1 RNA SERPL NAA+PROBE-LOG#: NORMAL LOG10COPY/ML
IMM GRANULOCYTES # BLD AUTO: 0.03 THOUSAND/UL (ref 0–0.2)
IMM GRANULOCYTES # BLD: 0 X10E3/UL (ref 0–0.1)
IMM GRANULOCYTES NFR BLD AUTO: 0 % (ref 0–2)
IMM GRANULOCYTES NFR BLD: 0 %
LIPASE SERPL-CCNC: 111 U/L (ref 23–300)
LYMPHOCYTES # BLD AUTO: 2.3 X10E3/UL (ref 0.7–3.1)
LYMPHOCYTES # BLD AUTO: 2.47 THOUSANDS/ÂΜL (ref 0.6–4.47)
LYMPHOCYTES NFR BLD AUTO: 24 % (ref 14–44)
LYMPHOCYTES NFR BLD AUTO: 25 %
MCH RBC QN AUTO: 28.4 PG (ref 26.8–34.3)
MCH RBC QN AUTO: 29 PG (ref 26.6–33)
MCHC RBC AUTO-ENTMCNC: 32.1 G/DL (ref 31.4–37.4)
MCHC RBC AUTO-ENTMCNC: 32.8 G/DL (ref 31.5–35.7)
MCV RBC AUTO: 88 FL (ref 79–97)
MCV RBC AUTO: 89 FL (ref 82–98)
MONOCYTES # BLD AUTO: 0.6 X10E3/UL (ref 0.1–0.9)
MONOCYTES # BLD AUTO: 0.63 THOUSAND/ÂΜL (ref 0.17–1.22)
MONOCYTES NFR BLD AUTO: 6 % (ref 4–12)
MONOCYTES NFR BLD AUTO: 7 %
NEUTROPHILS # BLD AUTO: 6 X10E3/UL (ref 1.4–7)
NEUTROPHILS # BLD AUTO: 7.14 THOUSANDS/ÂΜL (ref 1.85–7.62)
NEUTROPHILS NFR BLD AUTO: 66 %
NEUTS SEG NFR BLD AUTO: 68 % (ref 43–75)
NRBC BLD AUTO-RTO: 0 /100 WBCS
P AXIS: 14 DEGREES
PLATELET # BLD AUTO: 292 THOUSANDS/UL (ref 149–390)
PLATELET # BLD AUTO: 315 X10E3/UL (ref 150–450)
PMV BLD AUTO: 9.5 FL (ref 8.9–12.7)
POTASSIUM SERPL-SCNC: 4 MMOL/L (ref 3.5–5.3)
PR INTERVAL: 156 MS
PROT SERPL-MCNC: 8.1 G/DL (ref 6.4–8.4)
QRS AXIS: 25 DEGREES
QRSD INTERVAL: 88 MS
QT INTERVAL: 382 MS
QTC INTERVAL: 415 MS
RBC # BLD AUTO: 4.86 MILLION/UL (ref 3.88–5.62)
RBC # BLD AUTO: 4.87 X10E6/UL (ref 4.14–5.8)
RSV RNA RESP QL NAA+PROBE: NEGATIVE
SARS-COV-2 RNA RESP QL NAA+PROBE: NEGATIVE
SODIUM SERPL-SCNC: 137 MMOL/L (ref 135–147)
T WAVE AXIS: 15 DEGREES
VENTRICULAR RATE: 71 BPM
WBC # BLD AUTO: 10.45 THOUSAND/UL (ref 4.31–10.16)
WBC # BLD AUTO: 9 X10E3/UL (ref 3.4–10.8)

## 2022-10-25 PROCEDURE — 93005 ELECTROCARDIOGRAM TRACING: CPT

## 2022-10-25 PROCEDURE — 36415 COLL VENOUS BLD VENIPUNCTURE: CPT | Performed by: PHYSICIAN ASSISTANT

## 2022-10-25 PROCEDURE — 93010 ELECTROCARDIOGRAM REPORT: CPT | Performed by: INTERNAL MEDICINE

## 2022-10-25 PROCEDURE — 85379 FIBRIN DEGRADATION QUANT: CPT | Performed by: PHYSICIAN ASSISTANT

## 2022-10-25 PROCEDURE — 83690 ASSAY OF LIPASE: CPT | Performed by: PHYSICIAN ASSISTANT

## 2022-10-25 PROCEDURE — 80053 COMPREHEN METABOLIC PANEL: CPT | Performed by: PHYSICIAN ASSISTANT

## 2022-10-25 PROCEDURE — 71046 X-RAY EXAM CHEST 2 VIEWS: CPT

## 2022-10-25 PROCEDURE — 82948 REAGENT STRIP/BLOOD GLUCOSE: CPT

## 2022-10-25 PROCEDURE — 84484 ASSAY OF TROPONIN QUANT: CPT | Performed by: PHYSICIAN ASSISTANT

## 2022-10-25 PROCEDURE — 85025 COMPLETE CBC W/AUTO DIFF WBC: CPT | Performed by: PHYSICIAN ASSISTANT

## 2022-10-25 PROCEDURE — 99285 EMERGENCY DEPT VISIT HI MDM: CPT | Performed by: PHYSICIAN ASSISTANT

## 2022-10-25 PROCEDURE — 0241U HB NFCT DS VIR RESP RNA 4 TRGT: CPT | Performed by: PHYSICIAN ASSISTANT

## 2022-10-25 RX ORDER — KETOROLAC TROMETHAMINE 30 MG/ML
15 INJECTION, SOLUTION INTRAMUSCULAR; INTRAVENOUS ONCE
Status: COMPLETED | OUTPATIENT
Start: 2022-10-25 | End: 2022-10-25

## 2022-10-25 RX ADMIN — SODIUM CHLORIDE 1000 ML: 0.9 INJECTION, SOLUTION INTRAVENOUS at 09:33

## 2022-10-25 RX ADMIN — KETOROLAC TROMETHAMINE 15 MG: 30 INJECTION, SOLUTION INTRAMUSCULAR; INTRAVENOUS at 10:14

## 2022-10-25 NOTE — ED PROVIDER NOTES
History  Chief Complaint   Patient presents with   • Chest Pain     Right sided chest pain that radiates to his back  Describes pain as sharp and stabbing  Denies SOB     51 y/o  male reports history of htn, hld, DM, HIV+ on Biktarvy, presenting for evaluation of chest pain which he states is right-sided began yesterday while he was relaxing and radiates into his back  Patient reports a sharp pain which is present more in the back than in the anterior chest   Patient reports no recent long distance travel, recent surgeries, lower leg swelling, hormone therapies, smoking  Patient reports he does not drink alcohol or use tobacco   Patient denies any prior cardiac conditions or catheterizations reports no abdominal pain, nausea, vomiting, diarrhea, fevers, chills, coughing or congestion, palpitations or shortness of breath associated with chest pain  History provided by:  Patient  Chest Pain  Pain location:  R chest  Pain quality: sharp    Pain radiates to:  Mid back and upper back  Pain radiates to the back: yes    Pain severity:  Moderate  Onset quality:  Gradual  Duration:  2 days  Timing:  Constant  Progression:  Unchanged  Chronicity:  New  Relieved by:  None tried  Worsened by:  Nothing tried  Ineffective treatments:  None tried  Associated symptoms: no abdominal pain, no dizziness, no fatigue, no fever, no nausea, no numbness, no palpitations, no shortness of breath and not vomiting    Risk factors: hypertension and male sex        Prior to Admission Medications   Prescriptions Last Dose Informant Patient Reported? Taking?    Aspirin Low Dose 81 MG EC tablet   No No   Sig: Take 1 tablet (81 mg total) by mouth daily   BD Pen Needle Bela 2nd Gen 32G X 4 MM MISC   No No   Sig: Inject under the skin 2 (two) times a day   Biktarvy -25 MG tablet   No No   Sig: Take 1 tablet by mouth daily   Dulaglutide (Trulicity) 3 MG/2 7KD SOPN   No No   Sig: Inject 0 5 mL (3 mg total) under the skin once a week   Lantus SoloStar 100 units/mL SOPN   No No   Sig: Inject 0 3 mL (30 Units total) under the skin daily   atorvastatin (LIPITOR) 20 mg tablet   No No   Sig: Take 1 tablet (20 mg total) by mouth daily   ibuprofen (MOTRIN) 600 mg tablet   No No   Sig: Take 1 tablet (600 mg total) by mouth every 6 (six) hours as needed for moderate pain   methocarbamol (ROBAXIN) 500 mg tablet   No No   Sig: TAKE 1 TABLET BY MOUTH TWICE A DAY   sildenafil (VIAGRA) 100 mg tablet   No No   Sig: Take 1 tablet (100 mg total) by mouth daily as needed for erectile dysfunction   valACYclovir (VALTREX) 1,000 mg tablet   No No   Sig: Take 1 tablet (1,000 mg total) by mouth daily      Facility-Administered Medications: None       Past Medical History:   Diagnosis Date   • Chlamydia    • Diabetes mellitus ([facility] )     Type II   • Gonorrhea    • Herpes    • HIV disease ([place] [address] ) 07/2020    mode of transmission: heterosexual   • Hyperlipidemia    • Hypertension    • Neoplasm of kidney    • Obesity     BMI 37 2   • Syphilis        Past Surgical History:   Procedure Laterality Date   • NO PAST SURGERIES         Family History   Problem Relation Age of Onset   • Diabetes Mother    • Diabetes Father    • Diabetes Family      I have reviewed and agree with the history as documented  E-Cigarette/Vaping   • E-Cigarette Use Never User      E-Cigarette/Vaping Substances     Social History     Tobacco Use   • Smoking status: Never Smoker   • Smokeless tobacco: Never Used   Vaping Use   • Vaping Use: Never used   Substance Use Topics   • Alcohol use: No   • Drug use: No       Review of Systems   Constitutional: Negative for chills, fatigue and fever  HENT: Negative for congestion, ear pain, rhinorrhea and sore throat  Eyes: Negative for redness  Respiratory: Negative for chest tightness and shortness of breath  Cardiovascular: Positive for chest pain  Negative for palpitations  Gastrointestinal: Negative for abdominal pain, nausea and vomiting     Genitourinary: Negative for dysuria and hematuria  Musculoskeletal: Negative  Skin: Negative for rash  Neurological: Negative for dizziness, syncope, light-headedness and numbness  Physical Exam  Physical Exam  Vitals and nursing note reviewed  Constitutional:       Appearance: Normal appearance  He is well-developed  HENT:      Head: Normocephalic and atraumatic  Eyes:      General: No scleral icterus  Pupils: Pupils are equal, round, and reactive to light  Cardiovascular:      Rate and Rhythm: Normal rate and regular rhythm  Pulses: Normal pulses  Pulmonary:      Effort: Pulmonary effort is normal  No respiratory distress  Breath sounds: No stridor  No decreased breath sounds or wheezing  Comments: CTAB  Chest:       Abdominal:      General: There is no distension  Palpations: There is no mass  Musculoskeletal:      Cervical back: Normal range of motion  Skin:     General: Skin is warm and dry  Capillary Refill: Capillary refill takes less than 2 seconds  Coloration: Skin is not jaundiced  Neurological:      Mental Status: He is alert and oriented to person, place, and time        Gait: Gait normal    Psychiatric:         Mood and Affect: Mood normal          Vital Signs  ED Triage Vitals   Temperature Pulse Respirations Blood Pressure SpO2   10/25/22 0909 10/25/22 0909 10/25/22 0909 10/25/22 0909 10/25/22 0909   (!) 97 2 °F (36 2 °C) 76 20 153/90 99 %      Temp Source Heart Rate Source Patient Position - Orthostatic VS BP Location FiO2 (%)   10/25/22 0909 10/25/22 0909 10/25/22 0909 10/25/22 0909 --   Tympanic Monitor Lying Left arm       Pain Score       10/25/22 0906       8           Vitals:    10/25/22 0909   BP: 153/90   Pulse: 76   Patient Position - Orthostatic VS: Lying         Visual Acuity      ED Medications  Medications   sodium chloride 0 9 % bolus 1,000 mL (1,000 mL Intravenous New Bag 10/25/22 0933)   ketorolac (TORADOL) injection 15 mg (has no administration in time range)       Diagnostic Studies  Results Reviewed     Procedure Component Value Units Date/Time    HS Troponin 0hr (reflex protocol) [[id number]]  (Normal) Collected: 10/25/22 0930    Lab Status: Final result Specimen: Blood from Arm, Right Updated: 10/25/22 1002     hs TnI 0hr <2 ng/L     HS Troponin I 2hr [[id number]]     Lab Status: No result Specimen: Blood     D-Dimer [[id number]]  (Normal) Collected: 10/25/22 0930    Lab Status: Final result Specimen: Blood from Arm, Right Updated: 10/25/22 0954     D-Dimer, Quant 0 28 ug/ml FEU     Comprehensive metabolic panel [[id number]]  (Abnormal) Collected: 10/25/22 0930    Lab Status: Final result Specimen: Blood from Arm, Right Updated: 10/25/22 0952     Sodium 137 mmol/L      Potassium 4 0 mmol/L      Chloride 101 mmol/L      CO2 27 mmol/L      ANION GAP 9 mmol/L      BUN 11 mg/dL      Creatinine 0 66 mg/dL      Glucose 207 mg/dL      Calcium 9 3 mg/dL      AST 16 U/L      ALT 18 U/L      Alkaline Phosphatase 65 U/L      Total Protein 8 1 g/dL      Albumin 4 5 g/dL      Total Bilirubin 0 76 mg/dL      eGFR 115 ml/min/1 73sq m     Narrative:      [place] guidelines for Chronic Kidney Disease (CKD):   •  Stage 1 with normal or high GFR (GFR > 90 mL/min/1 73 square meters)  •  Stage 2 Mild CKD (GFR = 60-89 mL/min/1 73 square meters)  •  Stage 3A Moderate CKD (GFR = 45-59 mL/min/1 73 square meters)  •  Stage 3B Moderate CKD (GFR = 30-44 mL/min/1 73 square meters)  •  Stage 4 Severe CKD (GFR = 15-29 mL/min/1 73 square meters)  •  Stage 5 End Stage CKD (GFR <15 mL/min/1 73 square meters)  Note: GFR calculation is accurate only with a steady state creatinine    Lipase [[id number]]  (Normal) Collected: 10/25/22 0930    Lab Status: Final result Specimen: Blood from Arm, Right Updated: 10/25/22 0951     Lipase 111 u/L     CBC and differential [[id number]]  (Abnormal) Collected: 10/25/22 0930    Lab Status: Final result Specimen: Blood from Arm, Right Updated: 10/25/22 0942     WBC 10 45 Thousand/uL      RBC 4 86 Million/uL      Hemoglobin 13 8 g/dL      Hematocrit 43 0 %      MCV 89 fL      MCH 28 4 pg      MCHC 32 1 g/dL      RDW 13 0 %      MPV 9 5 fL      Platelets 063 Thousands/uL      nRBC 0 /100 WBCs      Neutrophils Relative 68 %      Immat GRANS % 0 %      Lymphocytes Relative 24 %      Monocytes Relative 6 %      Eosinophils Relative 1 %      Basophils Relative 1 %      Neutrophils Absolute 7 14 Thousands/µL      Immature Grans Absolute 0 03 Thousand/uL      Lymphocytes Absolute 2 47 Thousands/µL      Monocytes Absolute 0 63 Thousand/µL      Eosinophils Absolute 0 09 Thousand/µL      Basophils Absolute 0 09 Thousands/µL     FLU/RSV/COVID - if FLU/RSV clinically relevant [[id number]] Collected: 10/25/22 0930    Lab Status: In process Specimen: Nares from Nose Updated: 10/25/22 0937    Fingerstick Glucose (POCT) [[id number]]  (Abnormal) Collected: 10/25/22 0914    Lab Status: Final result Updated: 10/25/22 0917     POC Glucose 212 mg/dl                  XR chest 2 views   ED Interpretation by Wale Guy PA-C (10/25 1014)   No acute cardiopulmonary abnormalities visualized                   Procedures  ECG 12 Lead Documentation Only    Date/Time: 10/25/2022 9:48 AM  Performed by: Wale Guy PA-C  Authorized by: Wale Guy PA-C     Indications / Diagnosis:  Chest pain  ECG reviewed by me, the ED Provider: yes    Patient location:  ED  Previous ECG:     Comparison to cardiac monitor: Yes    Interpretation:     Interpretation: normal    Rate:     ECG rate:  71    ECG rate assessment: normal    Rhythm:     Rhythm: sinus rhythm    Ectopy:     Ectopy: none    QRS:     QRS axis:  Normal  Conduction:     Conduction: normal    ST segments:     ST segments:  Normal  T waves:     T waves: normal    Comments:        QRS 88                 ED Course  ED Course as of 10/25/22 1014   Tue Oct 25, 2022 1009 Patient was reexamined at this time and was found to be stable for discharge  Return to emergency department criteria was reviewed with the patient who verbalized understanding and was agreeable to discharge and the treatment plan at this time  HEART Risk Score    Flowsheet Row Most Recent Value   Heart Score Risk Calculator    History 0 Filed at: 10/25/2022 1013   ECG 0 Filed at: 10/25/2022 1013   Age 1 Filed at: 10/25/2022 1013   Risk Factors 1 Filed at: 10/25/2022 1013   Troponin 0 Filed at: 10/25/2022 1013   HEART Score 2 Filed at: 10/25/2022 1013                        SBIRT 22yo+    Flowsheet Row Most Recent Value   SBIRT (25 yo +)    In order to provide better care to our patients, we are screening all of our patients for alcohol and drug use  Would it be okay to ask you these screening questions? No Filed at: 10/25/2022 0072                    MDM  Number of Diagnoses or Management Options  Diagnosis management comments: All imaging and/or lab testing discussed with patient, strict return to ED precautions discussed  Patient and/or family members verbalizes understanding and agrees with plan  Patient is stable for discharge     Portions of the record may have been created with voice recognition software  Occasional wrong word or "sound a like" substitutions may have occurred due to the inherent limitations of voice recognition software  Read the chart carefully and recognize, using context, where substitutions have occurred          Disposition  Final diagnoses:   Chest pain, unspecified     Time reflects when diagnosis was documented in both MDM as applicable and the Disposition within this note     Time User Action Codes Description Comment    10/25/2022 10:14 AM Cesar Oconnell Add [R07 9] Chest pain, unspecified       ED Disposition     ED Disposition   Discharge    Condition   Stable    Date/Time   Tue Oct 25, 2022 10:14 AM    Comment   Dave Chowdhury discharge to home/self care                Follow-up Information     Follow up With Specialties Details Why Contact Info    [facility], Nurse Practitioner Schedule an appointment as soon as possible for a visit  As needed [address] [facility]  [phone]            Patient's Medications   Discharge Prescriptions    No medications on file       No discharge procedures on file      PDMP Review     None          ED Provider  Electronically Signed by           Sandip Nugent PA-C  10/25/22 7624

## 2022-10-26 ENCOUNTER — TELEPHONE (OUTPATIENT)
Dept: SURGERY | Facility: CLINIC | Age: 47
End: 2022-10-26

## 2022-10-26 DIAGNOSIS — S29.011D MUSCLE STRAIN OF CHEST WALL, SUBSEQUENT ENCOUNTER: Primary | ICD-10-CM

## 2022-10-26 RX ORDER — CYCLOBENZAPRINE HCL 10 MG
10 TABLET ORAL 3 TIMES DAILY PRN
Qty: 60 TABLET | Refills: 0 | Status: SHIPPED | OUTPATIENT
Start: 2022-10-26

## 2022-10-26 NOTE — TELEPHONE ENCOUNTER
Called pt back and sent medication to pharmacy  Also wrote note excusing him from work until Friday  He will contact office if symptoms do not improve

## 2022-10-26 NOTE — TELEPHONE ENCOUNTER
Pt called complaining of chest pain(right side) He was in the ED yesterday, where they ran numerous tests, and told him to follow up with his pcp  Pain is so severe that pt could not sleep, and did not go to work today

## 2022-11-04 DIAGNOSIS — E78.2 MIXED HYPERLIPIDEMIA: ICD-10-CM

## 2022-11-07 ENCOUNTER — PATIENT OUTREACH (OUTPATIENT)
Dept: SURGERY | Facility: CLINIC | Age: 47
End: 2022-11-07

## 2022-11-07 RX ORDER — ATORVASTATIN CALCIUM 20 MG/1
TABLET, FILM COATED ORAL
Qty: 30 TABLET | Refills: 1 | Status: SHIPPED | OUTPATIENT
Start: 2022-11-07

## 2022-11-07 NOTE — PROGRESS NOTES
HC spoke to ct about the property management company not getting the rent  HC asked if he ever sent the w-9 in  HC then agreed to call deana and check  HC then called [facility] property management on two different numbers and left  A voicemail  [facility]  sent message to Ct and told him that [facility]  called and left a voicemail

## 2022-11-08 ENCOUNTER — PATIENT OUTREACH (OUTPATIENT)
Dept: SURGERY | Facility: CLINIC | Age: 47
End: 2022-11-08

## 2022-11-08 ENCOUNTER — OFFICE VISIT (OUTPATIENT)
Dept: SURGERY | Facility: CLINIC | Age: 47
End: 2022-11-08

## 2022-11-08 VITALS
TEMPERATURE: 99 F | DIASTOLIC BLOOD PRESSURE: 90 MMHG | HEART RATE: 87 BPM | WEIGHT: 219 LBS | HEIGHT: 71 IN | OXYGEN SATURATION: 95 % | BODY MASS INDEX: 30.66 KG/M2 | SYSTOLIC BLOOD PRESSURE: 151 MMHG

## 2022-11-08 DIAGNOSIS — R07.9 CHEST PAIN, UNSPECIFIED TYPE: ICD-10-CM

## 2022-11-08 DIAGNOSIS — B20 HUMAN IMMUNODEFICIENCY VIRUS (HIV) DISEASE (HCC): Primary | ICD-10-CM

## 2022-11-08 DIAGNOSIS — E11.65 UNCONTROLLED TYPE 2 DIABETES MELLITUS WITH HYPERGLYCEMIA (HCC): ICD-10-CM

## 2022-11-08 DIAGNOSIS — Z13.220 ENCOUNTER FOR LIPID SCREENING FOR CARDIOVASCULAR DISEASE: ICD-10-CM

## 2022-11-08 DIAGNOSIS — Z23 NEED FOR INFLUENZA VACCINATION: ICD-10-CM

## 2022-11-08 DIAGNOSIS — Z11.3 ENCOUNTER FOR SCREENING FOR BACTERIAL SEXUALLY TRANSMITTED DISEASE: ICD-10-CM

## 2022-11-08 DIAGNOSIS — A52.8 SYPHILIS, LATE LATENT: ICD-10-CM

## 2022-11-08 DIAGNOSIS — Z13.6 ENCOUNTER FOR LIPID SCREENING FOR CARDIOVASCULAR DISEASE: ICD-10-CM

## 2022-11-08 DIAGNOSIS — Z20.2 CONTACT WITH AND (SUSPECTED) EXPOSURE TO INFECTIONS WITH A PREDOMINANTLY SEXUAL MODE OF TRANSMISSION: ICD-10-CM

## 2022-11-08 DIAGNOSIS — D72.89 OTHER SPECIFIED DISORDERS OF WHITE BLOOD CELLS: ICD-10-CM

## 2022-11-08 DIAGNOSIS — I10 PRIMARY HYPERTENSION: ICD-10-CM

## 2022-11-08 DIAGNOSIS — Z72.89 OTHER PROBLEMS RELATED TO LIFESTYLE: ICD-10-CM

## 2022-11-08 DIAGNOSIS — Z11.1 SCREENING-PULMONARY TB: ICD-10-CM

## 2022-11-08 DIAGNOSIS — E66.9 OBESITY (BMI 30.0-34.9): ICD-10-CM

## 2022-11-08 NOTE — PROGRESS NOTES
[facility]  sent message to ct telling him that [facility]  called deana on two different numbers and there has been no response  HC also told ct that if the owner is cashing the checks, there is nothing HC can do about that  That issue has to be worked out by the owner and property management company  HC then said that if he wishes to change the address, [facility]  needs an updated w-9 and that there is nothing in his file

## 2022-11-08 NOTE — PROGRESS NOTES
Progress Note - Infectious Disease   Dante Latham 52 y o  male MRN: [id number]  Unit/Bed#:  Encounter: [id number]      Impression/Plan:  Impression/Plan:  1  HIV-doing well on Biktarvy with a near undetectable viral load and a CD4 count in the 600s   Continue ART, recheck labs in 2 months, follow-up in 3 months   Stressed adherence     2  Diabetes mellitus-improved controlled with hemoglobin A1c down to 8 6  Refused endocrinology previously  Stressed the importance of tight control        3  Morbid obesity-continues to stressed the importance of diet and exercise     4  Syphilis-patient status post treatment with good response    Continue to monitor RPR annually       5  Hypertension-suboptimal control  Discussed with the primary who will address    6  Right-sided chest pain-seen in the emergency department and diagnosed with a musculoskeletal problem  Not really responding to pain medications or muscle relaxants  Discussed with the primary who will see the patient in the clinic if he will show up  He is missed several appointments  May need CT the chest       Patient was provided medication, adherence and prevention education    Subjective:  Routine follow-up for HIV  Patient claims 100% adherence with Biktarvy    Patient denies any notable side effects  Overall the feeling well  The patient denies any fever chills or sweats, denies any nausea vomiting or diarrhea, denies any cough or shortness of breath  Patient was seen in the emergency department 2 weeks ago with right-sided chest pain which came back negative as far as workup  He was diagnosed with musculoskeletal pain  He continues to have some right-sided chest pain    ROS:  A complete review of systems is negative other than that noted above in the subjective    Followup portions patient history reviewed and updated as:   Allergies, current medications, past medical history, past social history, past surgical history, and the problem list    Objective:  Vitals:  Vitals:    11/08/22 1620   BP: 151/90   Pulse: 87   Temp: 99 °F (37 2 °C)   SpO2: 95%   Weight: 99 3 kg (219 lb)   Height: 5' 11" (1 803 m)       Physical Exam:   General Appearance:  Alert, interactive, appearing well,  nontoxic, no acute distress  Neck:   Supple without lymphadenopathy, no thyromegaly or masses   Throat: Oropharynx moist without lesions  Lungs:   Clear to auscultation bilaterally; no wheezes, rhonchi or rales; respirations unlabored   Heart:  RRR; no murmur, rub or gallop   Abdomen:   Soft, non-tender, non-distended, positive bowel sounds  Extremities: No clubbing, cyanosis or edema   Skin: No new rashes or lesions  No draining wounds noted         Labs, Imaging, & Other studies:   All pertinent labs and imaging studies were personally reviewed    Lab Results   Component Value Date     (L) 04/04/2018    K 4 0 10/25/2022     10/25/2022    CO2 27 10/25/2022    ANIONGAP 10 04/04/2018    BUN 11 10/25/2022    CREATININE 0 66 (L) 10/25/2022    GLUCOSE 269 (H) 09/10/2018    GLUF 250 (H) 10/24/2022    CALCIUM 9 3 10/25/2022    AST 16 (L) 10/25/2022    ALT 18 10/25/2022    ALKPHOS 65 10/25/2022    PROT 6 9 04/04/2018    BILITOT 0 4 04/04/2018    EGFR 115 10/25/2022     Lab Results   Component Value Date    WBC 10 45 (H) 10/25/2022    HGB 13 8 10/25/2022    HCT 43 0 10/25/2022    MCV 89 10/25/2022     10/25/2022     Lab Results   Component Value Date    HEPCAB Non-reactive 12/11/2021     Lab Results   Component Value Date    HAV Reactive (A) 12/11/2021    HEPCAB Non-reactive 12/11/2021     Lab Results   Component Value Date    RPR Reactive (A) 12/11/2021    RPR Reactive 2 dils (A) 12/11/2021     CD4 ABS   Date/Time Value Ref Range Status   10/24/2022 09:14  359 - 1519 /uL Final     HIV-1 RNA by PCR, Qn   Date/Time Value Ref Range Status   10/24/2022 09:14 AM <20 copies/mL Final     Comment:     HIV-1 RNA detected  The reportable range for this assay is 20 to 10,000,000  copies HIV-1 RNA/mL             Current Outpatient Medications:   •  Aspirin Low Dose 81 MG EC tablet, Take 1 tablet (81 mg total) by mouth daily, Disp: 30 tablet, Rfl: 2  •  atorvastatin (LIPITOR) 20 mg tablet, TAKE 1 TABLET BY MOUTH DAILY, Disp: 30 tablet, Rfl: 1  •  BD Pen Needle Bela 2nd Gen 32G X 4 MM MISC, Inject under the skin 2 (two) times a day, Disp: 100 each, Rfl: 2  •  Biktarvy -25 MG tablet, Take 1 tablet by mouth daily, Disp: 30 tablet, Rfl: 2  •  cyclobenzaprine (FLEXERIL) 10 mg tablet, Take 1 tablet (10 mg total) by mouth 3 (three) times a day as needed for muscle spasms, Disp: 60 tablet, Rfl: 0  •  Dulaglutide (Trulicity) 3 JJ/5 6AI SOPN, Inject 0 5 mL (3 mg total) under the skin once a week, Disp: 15 mL, Rfl: 2  •  ibuprofen (MOTRIN) 600 mg tablet, Take 1 tablet (600 mg total) by mouth every 6 (six) hours as needed for moderate pain, Disp: 30 tablet, Rfl: 2  •  Lantus SoloStar 100 units/mL SOPN, Inject 0 3 mL (30 Units total) under the skin daily, Disp: 15 mL, Rfl: 2  •  sildenafil (VIAGRA) 100 mg tablet, Take 1 tablet (100 mg total) by mouth daily as needed for erectile dysfunction, Disp: 30 tablet, Rfl: 2  •  valACYclovir (VALTREX) 1,000 mg tablet, Take 1 tablet (1,000 mg total) by mouth daily, Disp: 30 tablet, Rfl: 2

## 2022-11-09 ENCOUNTER — DOCUMENTATION (OUTPATIENT)
Dept: SURGERY | Facility: CLINIC | Age: 47
End: 2022-11-09

## 2022-11-09 NOTE — PROGRESS NOTES
Nutrition consult:  RD visited pt in conjunction with ID visit  A1C levels are trending down  Lab Results   Component Value Date    HGBA1C 8 6 (H) 10/24/2022   Prior A1C 10 1(8/02)  Pt stated he has stopped drinking soda  He plans on cutting back on junk food to help with weight loss & blood sugar levels  RD provided contact information if pt would like to follow up  Will continue to provide nutrition education & support as needed     Leslie Mediate RDN,LDN

## 2022-11-16 ENCOUNTER — PATIENT OUTREACH (OUTPATIENT)
Dept: SURGERY | Facility: CLINIC | Age: 47
End: 2022-11-16

## 2022-11-16 NOTE — PROGRESS NOTES
HC got [employer]RA check requisition signed  HC sent to [facility]  gave billing to Lele Montalvo

## 2022-11-17 ENCOUNTER — PATIENT OUTREACH (OUTPATIENT)
Dept: SURGERY | Facility: CLINIC | Age: 47
End: 2022-11-17

## 2022-11-17 NOTE — PROGRESS NOTES
HC spoke with ct about the expiration date of his housing application in December and requesting his October and November bank statements  HC did say that when he receives the November statements, he can sent it to [facility]  HC will not be doing a whole application with him  Ct then sent August, September and partial October and [facility]  received call from him and he asked if it was received  HC then spoke with him about giving the whole month of October and November when it becomes available  Ct agreed

## 2022-11-28 DIAGNOSIS — M54.50 CHRONIC LOW BACK PAIN, UNSPECIFIED BACK PAIN LATERALITY, UNSPECIFIED WHETHER SCIATICA PRESENT: ICD-10-CM

## 2022-11-28 DIAGNOSIS — N52.9 ERECTILE DYSFUNCTION, UNSPECIFIED ERECTILE DYSFUNCTION TYPE: ICD-10-CM

## 2022-11-28 DIAGNOSIS — G89.29 CHRONIC LOW BACK PAIN, UNSPECIFIED BACK PAIN LATERALITY, UNSPECIFIED WHETHER SCIATICA PRESENT: ICD-10-CM

## 2022-11-28 DIAGNOSIS — E11.65 UNCONTROLLED TYPE 2 DIABETES MELLITUS WITH HYPERGLYCEMIA (HCC): ICD-10-CM

## 2022-11-28 RX ORDER — SILDENAFIL 100 MG/1
100 TABLET, FILM COATED ORAL DAILY PRN
Qty: 30 TABLET | Refills: 2 | Status: CANCELLED | OUTPATIENT
Start: 2022-11-28

## 2022-11-29 ENCOUNTER — TELEPHONE (OUTPATIENT)
Dept: SURGERY | Facility: CLINIC | Age: 47
End: 2022-11-29

## 2022-11-29 ENCOUNTER — PATIENT OUTREACH (OUTPATIENT)
Dept: SURGERY | Facility: CLINIC | Age: 47
End: 2022-11-29

## 2022-11-29 DIAGNOSIS — N52.9 ERECTILE DYSFUNCTION, UNSPECIFIED ERECTILE DYSFUNCTION TYPE: ICD-10-CM

## 2022-11-29 RX ORDER — IBUPROFEN 600 MG/1
TABLET ORAL
Qty: 30 TABLET | Refills: 2 | Status: SHIPPED | OUTPATIENT
Start: 2022-11-29

## 2022-11-29 RX ORDER — SILDENAFIL 100 MG/1
100 TABLET, FILM COATED ORAL DAILY PRN
Qty: 30 TABLET | Refills: 2 | Status: SHIPPED | OUTPATIENT
Start: 2022-11-29

## 2022-11-29 RX ORDER — PEN NEEDLE, DIABETIC 32GX 5/32"
NEEDLE, DISPOSABLE MISCELLANEOUS
Qty: 100 EACH | Refills: 2 | Status: SHIPPED | OUTPATIENT
Start: 2022-11-29

## 2022-11-29 RX ORDER — SILDENAFIL 100 MG/1
100 TABLET, FILM COATED ORAL DAILY PRN
Qty: 30 TABLET | Refills: 2 | Status: SHIPPED | OUTPATIENT
Start: 2022-11-29 | End: 2022-11-29

## 2022-11-29 RX ORDER — ASPIRIN 81 MG/1
TABLET, COATED ORAL
Qty: 30 TABLET | Refills: 2 | Status: SHIPPED | OUTPATIENT
Start: 2022-11-29

## 2022-11-29 RX ORDER — SILDENAFIL 100 MG/1
100 TABLET, FILM COATED ORAL DAILY PRN
Qty: 30 TABLET | Refills: 2 | Status: SHIPPED | OUTPATIENT
Start: 2022-11-29 | End: 2022-11-29 | Stop reason: SDUPTHER

## 2022-11-29 NOTE — PROGRESS NOTES
HC went over the bank statements that ct sent over and saw that the last bank statement was until November 5th  HC did see there was one deposit in his November bank statements  HC did look over the messages sent between [facility]  and ct to see if the December bank statements was requested by [facility]  The rest was not requested

## 2022-11-29 NOTE — TELEPHONE ENCOUNTER
Pt called and asked that his viagra prescription be called in to the [facility]

## 2022-12-05 ENCOUNTER — PATIENT OUTREACH (OUTPATIENT)
Dept: SURGERY | Facility: CLINIC | Age: 47
End: 2022-12-05

## 2022-12-05 NOTE — PROGRESS NOTES
HC spoke to ct about him going to court for a reduction of child support and that he forgot to mention to the court that he has rental assistance  Ct was concerned with how that was going to affect him  HC responded that ct should be ok

## 2022-12-07 ENCOUNTER — PATIENT OUTREACH (OUTPATIENT)
Dept: SURGERY | Facility: CLINIC | Age: 47
End: 2022-12-07

## 2022-12-07 NOTE — PROGRESS NOTES
HC received documents from [facility] OF [employer], [facility]  printed it out and put it in ct's file  HC will have to review and send to HS

## 2022-12-13 ENCOUNTER — PATIENT OUTREACH (OUTPATIENT)
Dept: SURGERY | Facility: CLINIC | Age: 47
End: 2022-12-13

## 2022-12-13 NOTE — PROGRESS NOTES
HC looked over his bank statements and noticed ct was not receiving direct deposits from his job until middle of October, but ct had opened up his account on August 15th  HC then noticed that there was not documentation in his file showing that his [place] bank statements was closed  HC then noted that there were different amount in deposits days apart in his bank accounts from September- October  HC also noted that ct was Zelled money from a woman in the amount of 160 00  [facility]    HC saw that [facility]  only had September 6 to October 5th bank statement and October 6th to November 5th  HC then sent a message to ct to ask to speak  HC then brought up all those points and Ct said that he was not getting direct deposits for a while because he was getting a paper check  It took a while for him to get it changed over to direct deposit because it was a new account  HC asked about the cash deposits and ct said that he would only put the amount he needed for a bill at that time and kept the cash on him  [facility]  asked for proof the [employer] account was closed and ct agreed to get it back to [facility]  HC asked about the 160 deposits from [facility] and he said that is his Aunt and she sent him money for his birthday  Ct then also mentioned that he did  get 500 from his cousin for his kids [de-identified]  [facility]  requested bank statement November 6th to December 5th  Ct then sent it on the phone  Ct made it clear that he is not getting money from anyone on a regular basis and he is struggling  HC did agree that he may have it hard ,but HC has to follow up to see if he is getting any additional money because it would have to be counted as income

## 2022-12-15 ENCOUNTER — PATIENT OUTREACH (OUTPATIENT)
Dept: SURGERY | Facility: CLINIC | Age: 47
End: 2022-12-15

## 2022-12-15 NOTE — PROGRESS NOTES
HC spoke with ct about his [place] bank statements and submitting those to [facility] OF [place]  to be able to pay his rent

## 2022-12-16 ENCOUNTER — PATIENT OUTREACH (OUTPATIENT)
Dept: SURGERY | Facility: CLINIC | Age: 47
End: 2022-12-16

## 2022-12-16 NOTE — PROGRESS NOTES
CT called  back and said that he called [employer] the same day [facility]  and Ct spoke and he asked for the bank statement showing the account was closed and they told him that they will not give any documents to him because he owes them over 200 dollars and they want the money back first    Ct began to say that the cannot afford to pay them and he is tempted to go to the bank  And get a statement  HC told ct that [facility]  will discuss with  HS the issue and send his current bank statements  HC scanned over documents and sent over to HS and wrote a detailed email about the transactions that [facility]  went over with ct

## 2022-12-20 ENCOUNTER — OFFICE VISIT (OUTPATIENT)
Dept: SURGERY | Facility: CLINIC | Age: 47
End: 2022-12-20

## 2022-12-20 VITALS
BODY MASS INDEX: 30.57 KG/M2 | WEIGHT: 218.4 LBS | HEIGHT: 71 IN | HEART RATE: 88 BPM | DIASTOLIC BLOOD PRESSURE: 82 MMHG | TEMPERATURE: 97.7 F | OXYGEN SATURATION: 96 % | SYSTOLIC BLOOD PRESSURE: 139 MMHG

## 2022-12-20 DIAGNOSIS — A60.9 HSV (HERPES SIMPLEX VIRUS) ANOGENITAL INFECTION: ICD-10-CM

## 2022-12-20 DIAGNOSIS — N52.9 ERECTILE DYSFUNCTION, UNSPECIFIED ERECTILE DYSFUNCTION TYPE: ICD-10-CM

## 2022-12-20 DIAGNOSIS — E78.2 MIXED HYPERLIPIDEMIA: ICD-10-CM

## 2022-12-20 DIAGNOSIS — B20 HIV INFECTION, UNSPECIFIED SYMPTOM STATUS (HCC): ICD-10-CM

## 2022-12-20 DIAGNOSIS — E11.65 UNCONTROLLED TYPE 2 DIABETES MELLITUS WITH HYPERGLYCEMIA (HCC): ICD-10-CM

## 2022-12-20 DIAGNOSIS — I10 PRIMARY HYPERTENSION: ICD-10-CM

## 2022-12-20 DIAGNOSIS — B20 HUMAN IMMUNODEFICIENCY VIRUS (HIV) DISEASE (HCC): Primary | ICD-10-CM

## 2022-12-20 DIAGNOSIS — E66.9 OBESITY (BMI 30.0-34.9): ICD-10-CM

## 2022-12-20 RX ORDER — INSULIN GLARGINE 100 [IU]/ML
30 INJECTION, SOLUTION SUBCUTANEOUS DAILY
Qty: 15 ML | Refills: 2 | Status: SHIPPED | OUTPATIENT
Start: 2022-12-20

## 2022-12-20 RX ORDER — ATORVASTATIN CALCIUM 20 MG/1
20 TABLET, FILM COATED ORAL DAILY
Qty: 30 TABLET | Refills: 1 | Status: SHIPPED | OUTPATIENT
Start: 2022-12-20

## 2022-12-20 RX ORDER — DULAGLUTIDE 3 MG/.5ML
3 INJECTION, SOLUTION SUBCUTANEOUS WEEKLY
Qty: 15 ML | Refills: 2 | Status: SHIPPED | OUTPATIENT
Start: 2022-12-20

## 2022-12-20 RX ORDER — VALACYCLOVIR HYDROCHLORIDE 1 G/1
1000 TABLET, FILM COATED ORAL DAILY
Qty: 30 TABLET | Refills: 2 | Status: SHIPPED | OUTPATIENT
Start: 2022-12-20 | End: 2023-01-19

## 2022-12-20 RX ORDER — SILDENAFIL 100 MG/1
100 TABLET, FILM COATED ORAL DAILY PRN
Qty: 30 TABLET | Refills: 2 | Status: SHIPPED | OUTPATIENT
Start: 2022-12-20

## 2022-12-20 RX ORDER — PEN NEEDLE, DIABETIC 32GX 5/32"
NEEDLE, DISPOSABLE MISCELLANEOUS WEEKLY
Qty: 100 EACH | Refills: 2 | Status: SHIPPED | OUTPATIENT
Start: 2022-12-20

## 2022-12-20 RX ORDER — BICTEGRAVIR SODIUM, EMTRICITABINE, AND TENOFOVIR ALAFENAMIDE FUMARATE 50; 200; 25 MG/1; MG/1; MG/1
1 TABLET ORAL DAILY
Qty: 30 TABLET | Refills: 2 | Status: SHIPPED | OUTPATIENT
Start: 2022-12-20

## 2022-12-20 RX ORDER — ASPIRIN 81 MG/1
81 TABLET, COATED ORAL DAILY
Qty: 30 TABLET | Refills: 2 | Status: SHIPPED | OUTPATIENT
Start: 2022-12-20

## 2022-12-20 NOTE — PROGRESS NOTES
Assessment/Plan:    Primary hypertension  Blood pressure:   BP Readings from Last 3 Encounters:   12/20/22 139/82   11/08/22 151/90   10/25/22 143/85       BP above goal  Discussed measuring at home and starting antihypertensive if BP remains elevated  Educated on risk factors of HTN, including CVA  Educated on the following lifestyle modifications to lower BP and decrease cardiovascular risk factors  limit alcohol intake, reduce salt in diet, maintain a healthy weight, engage in 30 minutes of cardiovascular exercise daily, and not smoke  Uncontrolled type 2 diabetes mellitus with hyperglycemia (HCC)    Lab Results   Component Value Date    HGBA1C 8 6 (H) 10/24/2022     Lab Results   Component Value Date/Time    HGBA1C 8 6 (H) 10/24/2022 09:14 AM    HGBA1C 13 7 (H) 09/15/2021 03:42 PM        Continue current medication  HgbA1C has improved from 13 to 8 in one year  Educated to follow diabetic diet, maintain a healthy weight, and exercise regularly  Referred to dietician for additional education  Human immunodeficiency virus (HIV) disease ([facility] )  No results found for: [id number]  CD4 ABS   Date/Time Value Ref Range Status   10/24/2022 09:14  359 - 1519 /uL Final     HIV-1 RNA by PCR, Qn   Date/Time Value Ref Range Status   10/24/2022 09:14 AM <20 copies/mL Final     Comment:     HIV-1 RNA detected  The reportable range for this assay is 20 to 10,000,000  copies HIV-1 RNA/mL  HIV-1 RNA Viral Load Log   Date/Time Value Ref Range Status   10/24/2022 09:14 AM COMMENT [id number]/mL Final     Comment:     Unable to calculate result since non-numeric result obtained for  component test          ART: Christina Alex        Denies side effects  Stressed the importance of adherence  Continue follow up with ID clinic  Reviewed most recent labs, including Cd4 and viral load   Discussed the risks and benefits of treatment options, instructions for management, importance of treatment adherence, and reduction of risk factor  Educated on possible  medication side effects  Counseled on routes of HIV transmission, including the risk of  infection  Emphasized that viral suppression is the best method to prevent HIV transmission  At this time pt denies the need for HIV testing of anyone in their life  Total encounter time was 45 minutes  Greater then 20 minutes were spent on counseling and patient education  Pt voices understanding and agreement with treatment plan  Diagnoses and all orders for this visit:    Human immunodeficiency virus (HIV) disease ([facility] )    Uncontrolled type 2 diabetes mellitus with hyperglycemia (HCC)  -     Aspirin Low Dose 81 MG EC tablet; Take 1 tablet (81 mg total) by mouth daily  -     BD Pen Needle Bela U/F 32G X 4 MM MISC; Inject under the skin once a week  -     Dulaglutide (Trulicity) 3 MV/7 5XY SOPN; Inject 0 5 mL (3 mg total) under the skin once a week  -     Lantus SoloStar 100 units/mL SOPN; Inject 0 3 mL (30 Units total) under the skin daily    Mixed hyperlipidemia  -     atorvastatin (LIPITOR) 20 mg tablet; Take 1 tablet (20 mg total) by mouth daily    HIV infection, unspecified symptom status (HCC)  -     Biktarvy -25 MG tablet; Take 1 tablet by mouth daily    HSV (herpes simplex virus) anogenital infection  -     valACYclovir (VALTREX) 1,000 mg tablet; Take 1 tablet (1,000 mg total) by mouth daily    Erectile dysfunction, unspecified erectile dysfunction type  -     sildenafil (VIAGRA) 100 mg tablet; Take 1 tablet (100 mg total) by mouth daily as needed for erectile dysfunction    Obesity (BMI 30 0-34  9)    Primary hypertension          Subjective:      Patient ID: Rita Patrick is a 52 y o  male  Mr Rita Patrick is a 75-year-old male who presents to the clinic today for 3 month f/u PCP visit  Past medical history significant for HIV, uncontrolled type 2 diabetes, hyperlipidemia, and obesity      Mr Jodee Garrett is doing well and offers no acute complaints  The following portions of the patient's history were reviewed and updated as appropriate: allergies, current medications, past family history, past medical history, past social history, past surgical history and problem list     Review of Systems   Constitutional: Negative for activity change, appetite change, chills, diaphoresis, fatigue, fever and unexpected weight change  HENT: Negative for congestion, dental problem, ear pain, hearing loss, mouth sores, rhinorrhea and sore throat  Eyes: Negative for pain, redness and visual disturbance  Respiratory: Negative for shortness of breath and wheezing  Cardiovascular: Negative for chest pain and leg swelling  Gastrointestinal: Negative for abdominal pain, constipation, diarrhea, nausea and vomiting  Endocrine: Negative for polydipsia, polyphagia and polyuria  Genitourinary: Negative for difficulty urinating and dysuria  Musculoskeletal: Negative for back pain, joint swelling and myalgias  Skin: Negative for rash  Neurological: Negative for syncope and headaches  Psychiatric/Behavioral: Negative for behavioral problems and suicidal ideas  Objective:      /82   Pulse 88   Temp 97 7 °F (36 5 °C)   Ht 5' 11" (1 803 m)   Wt 99 1 kg (218 lb 6 4 oz)   SpO2 96%   BMI 30 46 kg/m²          Physical Exam  Constitutional:       General: He is not in acute distress  Appearance: He is well-developed  HENT:      Head: Normocephalic  Right Ear: External ear normal       Left Ear: External ear normal       Nose: Nose normal       Mouth/Throat:      Pharynx: No oropharyngeal exudate  Eyes:      General:         Right eye: No discharge  Left eye: No discharge  Conjunctiva/sclera: Conjunctivae normal       Pupils: Pupils are equal, round, and reactive to light  Neck:      Thyroid: No thyromegaly  Cardiovascular:      Rate and Rhythm: Normal rate and regular rhythm        Heart sounds: Normal heart sounds  No murmur heard  Pulmonary:      Effort: Pulmonary effort is normal       Breath sounds: Normal breath sounds  No wheezing  Abdominal:      General: Bowel sounds are normal       Palpations: Abdomen is soft  There is no mass  Tenderness: There is no abdominal tenderness  Musculoskeletal:         General: No tenderness  Normal range of motion  Cervical back: Normal range of motion  Lymphadenopathy:      Cervical: No cervical adenopathy  Skin:     General: Skin is warm and dry  Findings: No rash  Neurological:      Mental Status: He is alert and oriented to person, place, and time  Psychiatric:         Behavior: Behavior normal          BMI Counseling: Body mass index is 30 46 kg/m²  The BMI is above normal  Nutrition recommendations include 3-5 servings of fruits/vegetables daily  Exercise recommendations include exercising 3-5 times per week

## 2022-12-20 NOTE — ASSESSMENT & PLAN NOTE
Lab Results   Component Value Date    HGBA1C 8 6 (H) 10/24/2022     Lab Results   Component Value Date/Time    HGBA1C 8 6 (H) 10/24/2022 09:14 AM    HGBA1C 13 7 (H) 09/15/2021 03:42 PM        Continue current medication  HgbA1C has improved from 13 to 8 in one year  Educated to follow diabetic diet, maintain a healthy weight, and exercise regularly  Referred to dietician for additional education

## 2022-12-20 NOTE — ASSESSMENT & PLAN NOTE
No results found for: HU4MGXS  CD4 ABS   Date/Time Value Ref Range Status   10/24/2022 09:14  359 - 1519 /uL Final     HIV-1 RNA by PCR, Qn   Date/Time Value Ref Range Status   10/24/2022 09:14 AM <20 copies/mL Final     Comment:     HIV-1 RNA detected  The reportable range for this assay is 20 to 10,000,000  copies HIV-1 RNA/mL  HIV-1 RNA Viral Load Log   Date/Time Value Ref Range Status   10/24/2022 09:14 AM COMMENT [id number]/mL Final     Comment:     Unable to calculate result since non-numeric result obtained for  component test          ART: Refugio Chung        Denies side effects  Stressed the importance of adherence  Continue follow up with ID clinic  Reviewed most recent labs, including Cd4 and viral load  Discussed the risks and benefits of treatment options, instructions for management, importance of treatment adherence, and reduction of risk factor  Educated on possible  medication side effects  Counseled on routes of HIV transmission, including the risk of  infection  Emphasized that viral suppression is the best method to prevent HIV transmission  At this time pt denies the need for HIV testing of anyone in their life  Total encounter time was 45 minutes  Greater then 20 minutes were spent on counseling and patient education  Pt voices understanding and agreement with treatment plan

## 2022-12-20 NOTE — ASSESSMENT & PLAN NOTE
Blood pressure:   BP Readings from Last 3 Encounters:   12/20/22 139/82   11/08/22 151/90   10/25/22 143/85       BP above goal  Discussed measuring at home and starting antihypertensive if BP remains elevated  Educated on risk factors of HTN, including CVA  Educated on the following lifestyle modifications to lower BP and decrease cardiovascular risk factors  limit alcohol intake, reduce salt in diet, maintain a healthy weight, engage in 30 minutes of cardiovascular exercise daily, and not smoke

## 2022-12-20 NOTE — PATIENT INSTRUCTIONS

## 2022-12-21 ENCOUNTER — PATIENT OUTREACH (OUTPATIENT)
Dept: SURGERY | Facility: CLINIC | Age: 47
End: 2022-12-21

## 2022-12-22 ENCOUNTER — PATIENT OUTREACH (OUTPATIENT)
Dept: SURGERY | Facility: CLINIC | Age: 47
End: 2022-12-22

## 2022-12-22 NOTE — PROGRESS NOTES
[facility]  sent an email to HS about the status of his application  HC did make HS aware that rent has not been requested to date

## 2022-12-27 ENCOUNTER — PATIENT OUTREACH (OUTPATIENT)
Dept: SURGERY | Facility: CLINIC | Age: 47
End: 2022-12-27

## 2022-12-27 NOTE — PROGRESS NOTES
HC spoke with HS about his application and still looking it over  HS spoke with [facility] OF [facility]  about paying his old auth rent amount today  HC got [employer] check requisition signed  HC sent to accounts payable  [facility]  gave billing to Ho Cronin

## 2023-01-03 ENCOUNTER — TELEPHONE (OUTPATIENT)
Dept: SURGERY | Facility: CLINIC | Age: 48
End: 2023-01-03

## 2023-01-03 NOTE — TELEPHONE ENCOUNTER
Nenita Powers please find out what he needs [facility] for and let him know it will take up to 72 business hours to complete

## 2023-01-05 ENCOUNTER — PATIENT OUTREACH (OUTPATIENT)
Dept: SURGERY | Facility: CLINIC | Age: 48
End: 2023-01-05

## 2023-01-09 ENCOUNTER — PATIENT OUTREACH (OUTPATIENT)
Dept: SURGERY | Facility: CLINIC | Age: 48
End: 2023-01-09

## 2023-01-11 ENCOUNTER — TELEPHONE (OUTPATIENT)
Dept: SURGERY | Facility: CLINIC | Age: 48
End: 2023-01-11

## 2023-01-11 NOTE — TELEPHONE ENCOUNTER
Pt called this morning to check on the status of his [place] paperwork  Franchesca Else he said he spoke to you about it, but he never got confirmation if the paperwork was submitted

## 2023-01-16 ENCOUNTER — PATIENT OUTREACH (OUTPATIENT)
Dept: SURGERY | Facility: CLINIC | Age: 48
End: 2023-01-16

## 2023-01-16 NOTE — PROGRESS NOTES
HC got TBRA check requisition signed  HC sent to [facility]  gave billing to Kulwinder Hoover  The Delivery OB Provider certifies that vaginal examination and/or abdominal examination after the delivery was done and no foreign body was found.

## 2023-01-26 ENCOUNTER — VBI (OUTPATIENT)
Dept: ADMINISTRATIVE | Facility: OTHER | Age: 48
End: 2023-01-26

## 2023-02-01 ENCOUNTER — APPOINTMENT (OUTPATIENT)
Dept: LAB | Facility: HOSPITAL | Age: 48
End: 2023-02-01

## 2023-02-01 LAB
ALBUMIN SERPL BCP-MCNC: 4.2 G/DL (ref 3.5–5)
ALP SERPL-CCNC: 69 U/L (ref 43–122)
ALT SERPL W P-5'-P-CCNC: 13 U/L
ANION GAP SERPL CALCULATED.3IONS-SCNC: 7 MMOL/L (ref 5–14)
AST SERPL W P-5'-P-CCNC: 18 U/L (ref 17–59)
BACTERIA UR QL AUTO: NORMAL /HPF
BASOPHILS # BLD AUTO: 0.08 THOUSANDS/ÂΜL (ref 0–0.1)
BASOPHILS NFR BLD AUTO: 1 % (ref 0–1)
BILIRUB SERPL-MCNC: 0.94 MG/DL (ref 0.2–1)
BILIRUB UR QL STRIP: NEGATIVE
BUN SERPL-MCNC: 7 MG/DL (ref 5–25)
CALCIUM SERPL-MCNC: 9.3 MG/DL (ref 8.4–10.2)
CHLORIDE SERPL-SCNC: 103 MMOL/L (ref 96–108)
CHOLEST SERPL-MCNC: 160 MG/DL
CLARITY UR: ABNORMAL
CO2 SERPL-SCNC: 29 MMOL/L (ref 21–32)
COLOR UR: ABNORMAL
CREAT SERPL-MCNC: 0.61 MG/DL (ref 0.7–1.5)
EOSINOPHIL # BLD AUTO: 0.16 THOUSAND/ÂΜL (ref 0–0.61)
EOSINOPHIL NFR BLD AUTO: 2 % (ref 0–6)
ERYTHROCYTE [DISTWIDTH] IN BLOOD BY AUTOMATED COUNT: 13.1 % (ref 11.6–15.1)
GFR SERPL CREATININE-BSD FRML MDRD: 118 ML/MIN/1.73SQ M
GLUCOSE P FAST SERPL-MCNC: 202 MG/DL (ref 70–99)
GLUCOSE UR STRIP-MCNC: ABNORMAL MG/DL
HCT VFR BLD AUTO: 40.9 % (ref 36.5–49.3)
HCV AB SER QL: NORMAL
HDLC SERPL-MCNC: 43 MG/DL
HGB BLD-MCNC: 13 G/DL (ref 12–17)
HGB UR QL STRIP.AUTO: 25
IMM GRANULOCYTES # BLD AUTO: 0.02 THOUSAND/UL (ref 0–0.2)
IMM GRANULOCYTES NFR BLD AUTO: 0 % (ref 0–2)
KETONES UR STRIP-MCNC: ABNORMAL MG/DL
LDLC SERPL CALC-MCNC: 104 MG/DL
LEUKOCYTE ESTERASE UR QL STRIP: NEGATIVE
LYMPHOCYTES # BLD AUTO: 2.27 THOUSANDS/ÂΜL (ref 0.6–4.47)
LYMPHOCYTES NFR BLD AUTO: 29 % (ref 14–44)
MCH RBC QN AUTO: 28.3 PG (ref 26.8–34.3)
MCHC RBC AUTO-ENTMCNC: 31.8 G/DL (ref 31.4–37.4)
MCV RBC AUTO: 89 FL (ref 82–98)
MONOCYTES # BLD AUTO: 0.48 THOUSAND/ÂΜL (ref 0.17–1.22)
MONOCYTES NFR BLD AUTO: 6 % (ref 4–12)
NEUTROPHILS # BLD AUTO: 4.94 THOUSANDS/ÂΜL (ref 1.85–7.62)
NEUTS SEG NFR BLD AUTO: 62 % (ref 43–75)
NITRITE UR QL STRIP: NEGATIVE
NON-SQ EPI CELLS URNS QL MICRO: NORMAL /HPF
NRBC BLD AUTO-RTO: 0 /100 WBCS
PH UR STRIP.AUTO: 5 [PH]
PLATELET # BLD AUTO: 287 THOUSANDS/UL (ref 149–390)
PMV BLD AUTO: 10.1 FL (ref 8.9–12.7)
POTASSIUM SERPL-SCNC: 4.3 MMOL/L (ref 3.5–5.3)
PROT SERPL-MCNC: 8 G/DL (ref 6.4–8.4)
PROT UR STRIP-MCNC: ABNORMAL MG/DL
RBC # BLD AUTO: 4.6 MILLION/UL (ref 3.88–5.62)
RBC #/AREA URNS AUTO: NORMAL /HPF
SODIUM SERPL-SCNC: 139 MMOL/L (ref 135–147)
SP GR UR STRIP.AUTO: 1.02 (ref 1–1.04)
TRIGL SERPL-MCNC: 65 MG/DL
UROBILINOGEN UA: 1 MG/DL
WBC # BLD AUTO: 7.95 THOUSAND/UL (ref 4.31–10.16)
WBC #/AREA URNS AUTO: NORMAL /HPF

## 2023-02-02 LAB
BASOPHILS # BLD AUTO: 0.1 X10E3/UL (ref 0–0.2)
BASOPHILS NFR BLD AUTO: 1 %
C TRACH DNA SPEC QL NAA+PROBE: NEGATIVE
CD3+CD4+ CELLS # BLD: 772 /UL (ref 359–1519)
CD3+CD4+ CELLS NFR BLD: 35.1 % (ref 30.8–58.5)
CD3+CD4+ CELLS/CD3+CD8+ CLL BLD: 0.99 % (ref 0.92–3.72)
CD3+CD8+ CELLS # BLD: 783 /UL (ref 109–897)
CD3+CD8+ CELLS NFR BLD: 35.6 % (ref 12–35.5)
EOSINOPHIL # BLD AUTO: 0.2 X10E3/UL (ref 0–0.4)
EOSINOPHIL NFR BLD AUTO: 2 %
ERYTHROCYTE [DISTWIDTH] IN BLOOD BY AUTOMATED COUNT: 12.4 % (ref 11.6–15.4)
HCT VFR BLD AUTO: 39.1 % (ref 37.5–51)
HGB BLD-MCNC: 13.2 G/DL (ref 13–17.7)
IMM GRANULOCYTES # BLD: 0 X10E3/UL (ref 0–0.1)
IMM GRANULOCYTES NFR BLD: 0 %
LYMPHOCYTES # BLD AUTO: 2.2 X10E3/UL (ref 0.7–3.1)
LYMPHOCYTES NFR BLD AUTO: 28 %
MCH RBC QN AUTO: 29.5 PG (ref 26.6–33)
MCHC RBC AUTO-ENTMCNC: 33.8 G/DL (ref 31.5–35.7)
MCV RBC AUTO: 88 FL (ref 79–97)
MONOCYTES # BLD AUTO: 0.5 X10E3/UL (ref 0.1–0.9)
MONOCYTES NFR BLD AUTO: 6 %
N GONORRHOEA DNA SPEC QL NAA+PROBE: NEGATIVE
NEUTROPHILS # BLD AUTO: 4.9 X10E3/UL (ref 1.4–7)
NEUTROPHILS NFR BLD AUTO: 63 %
PLATELET # BLD AUTO: 290 X10E3/UL (ref 150–450)
RBC # BLD AUTO: 4.47 X10E6/UL (ref 4.14–5.8)
RPR SER QL: REACTIVE
RPR SER-TITR: ABNORMAL {TITER}
WBC # BLD AUTO: 7.9 X10E3/UL (ref 3.4–10.8)

## 2023-02-03 LAB
GAMMA INTERFERON BACKGROUND BLD IA-ACNC: 0.06 IU/ML
HIV1 RNA # SERPL NAA+PROBE: <20 COPIES/ML
HIV1 RNA SERPL NAA+PROBE-LOG#: NORMAL LOG10COPY/ML
M TB IFN-G BLD-IMP: NEGATIVE
M TB IFN-G CD4+ BCKGRND COR BLD-ACNC: 0.04 IU/ML
M TB IFN-G CD4+ BCKGRND COR BLD-ACNC: 0.05 IU/ML
MITOGEN IGNF BCKGRD COR BLD-ACNC: 2.61 IU/ML

## 2023-02-04 LAB — T PALLIDUM AB SER QL IF: REACTIVE

## 2023-02-06 ENCOUNTER — PATIENT OUTREACH (OUTPATIENT)
Dept: SURGERY | Facility: CLINIC | Age: 48
End: 2023-02-06

## 2023-02-06 NOTE — PROGRESS NOTES
HC received call from ct about going to court  HC then returned ct's phone call back and left a voicemail

## 2023-02-14 ENCOUNTER — OFFICE VISIT (OUTPATIENT)
Dept: SURGERY | Facility: CLINIC | Age: 48
End: 2023-02-14

## 2023-02-14 ENCOUNTER — PATIENT OUTREACH (OUTPATIENT)
Dept: SURGERY | Facility: CLINIC | Age: 48
End: 2023-02-14

## 2023-02-14 VITALS
HEIGHT: 71 IN | BODY MASS INDEX: 29.88 KG/M2 | TEMPERATURE: 99 F | SYSTOLIC BLOOD PRESSURE: 138 MMHG | OXYGEN SATURATION: 95 % | WEIGHT: 213.4 LBS | DIASTOLIC BLOOD PRESSURE: 74 MMHG | HEART RATE: 80 BPM

## 2023-02-14 DIAGNOSIS — Z13.1 SCREENING FOR DIABETES MELLITUS: ICD-10-CM

## 2023-02-14 DIAGNOSIS — E11.65 UNCONTROLLED TYPE 2 DIABETES MELLITUS WITH HYPERGLYCEMIA (HCC): ICD-10-CM

## 2023-02-14 DIAGNOSIS — Z79.899 OTHER LONG TERM (CURRENT) DRUG THERAPY: ICD-10-CM

## 2023-02-14 DIAGNOSIS — B20 HUMAN IMMUNODEFICIENCY VIRUS (HIV) DISEASE (HCC): Primary | ICD-10-CM

## 2023-02-14 DIAGNOSIS — E66.9 OBESITY (BMI 30.0-34.9): ICD-10-CM

## 2023-02-14 DIAGNOSIS — A52.8 SYPHILIS, LATE LATENT: ICD-10-CM

## 2023-02-14 DIAGNOSIS — I10 PRIMARY HYPERTENSION: ICD-10-CM

## 2023-02-14 NOTE — PROGRESS NOTES
HC got TB[employer] check requisition signed  HC sent to [facility] OF [employer] [facility]  gave billing to Marcia Hess

## 2023-02-14 NOTE — PROGRESS NOTES
Progress Note - Infectious Disease   Richy Verduzco 50 y o  male MRN: [id number]  Unit/Bed#:  Encounter: [id number]      Impression/Plan:  1  HIV-doing well on Biktarvy with a near undetectable viral load and a CD4 count in the 700s   Continue ART, recheck labs in 2 months, follow-up in 3 months   Stressed adherence     2  Diabetes mellitus-improved controlled with hemoglobin A1c down to 8 6  Refused endocrinology previously  Stressed the importance of tight control        3  Morbid obesity-continues to stressed the importance of diet and exercise  Has lost a few a few lbs  Continue  Discussed with the primary     4  Syphilis-patient status post treatment with good response but now with increased RPR to 1:32    Benzathine PCN X 3 for presumptive late latent syphilis     5  Hypertension-better control  Discussed with the primary who will address     6  Right-sided chest pain-resolved  Nonadherent with CT chest      Patient was provided medication, adherence and prevention education    Subjective:  Routine follow-up for HIV  Patient claims 100% adherence with Biktarvy    Patient denies any notable side effects  Overall the feeling well  The patient denies any fever chills or sweats, denies any nausea vomiting or diarrhea, denies any cough or shortness of breath  ROS:  A complete review of systems is negative other than that noted above in the subjective    Followup portions patient history reviewed and updated as: Allergies, current medications, past medical history, past social history, past surgical history, and the problem list    Objective:  Vitals:  Vitals:    02/14/23 1618   BP: 138/74   Pulse: 80   Temp: 99 °F (37 2 °C)   SpO2: 95%   Weight: 96 8 kg (213 lb 6 4 oz)   Height: 5' 11" (1 803 m)       Physical Exam:   General Appearance:  Alert, interactive, appearing well,  nontoxic, no acute distress     Neck:   Supple without lymphadenopathy, no thyromegaly or masses   Throat: Oropharynx moist without lesions  Lungs:   Clear to auscultation bilaterally; no wheezes, rhonchi or rales; respirations unlabored   Heart:  RRR; no murmur, rub or gallop   Abdomen:   Soft, non-tender, non-distended, positive bowel sounds  Extremities: No clubbing, cyanosis or edema   Skin: No new rashes or lesions  No draining wounds noted  Labs, Imaging, & Other studies:   All pertinent labs and imaging studies were personally reviewed    Lab Results   Component Value Date     (L) 04/04/2018    K 4 3 02/01/2023     02/01/2023    CO2 29 02/01/2023    ANIONGAP 10 04/04/2018    BUN 7 02/01/2023    CREATININE 0 61 (L) 02/01/2023    GLUCOSE 269 (H) 09/10/2018    GLUF 202 (H) 02/01/2023    CALCIUM 9 3 02/01/2023    AST 18 02/01/2023    ALT 13 02/01/2023    ALKPHOS 69 02/01/2023    PROT 6 9 04/04/2018    BILITOT 0 4 04/04/2018    EGFR 118 02/01/2023     Lab Results   Component Value Date    WBC 7 95 02/01/2023    WBC 7 9 02/01/2023    HGB 13 0 02/01/2023    HGB 13 2 02/01/2023    HCT 40 9 02/01/2023    HCT 39 1 02/01/2023    MCV 89 02/01/2023    MCV 88 02/01/2023     02/01/2023     02/01/2023     Lab Results   Component Value Date    HEPCAB Non-reactive 02/01/2023     Lab Results   Component Value Date    HAV Reactive (A) 12/11/2021    HEPCAB Non-reactive 02/01/2023     Lab Results   Component Value Date    RPR Reactive (A) 02/01/2023    RPR Reactive 32 dils (A) 02/01/2023     CD4 ABS   Date/Time Value Ref Range Status   02/01/2023 12:36  359 - 1519 /uL Final     HIV-1 RNA by PCR, Qn   Date/Time Value Ref Range Status   02/01/2023 12:36 PM <20 copies/mL Final     Comment:     HIV-1 RNA not detected  The reportable range for this assay is 20 to 10,000,000  copies HIV-1 RNA/mL             Current Outpatient Medications:   •  Aspirin Low Dose 81 MG EC tablet, Take 1 tablet (81 mg total) by mouth daily, Disp: 30 tablet, Rfl: 2  •  atorvastatin (LIPITOR) 20 mg tablet, Take 1 tablet (20 mg total) by mouth daily, Disp: 30 tablet, Rfl: 1  •  BD Pen Needle Bela U/F 32G X 4 MM MISC, Inject under the skin once a week, Disp: 100 each, Rfl: 2  •  Biktarvy -25 MG tablet, Take 1 tablet by mouth daily, Disp: 30 tablet, Rfl: 2  •  cyclobenzaprine (FLEXERIL) 10 mg tablet, Take 1 tablet (10 mg total) by mouth 3 (three) times a day as needed for muscle spasms, Disp: 60 tablet, Rfl: 0  •  Dulaglutide (Trulicity) 3 UJ/0 1ZM SOPN, Inject 0 5 mL (3 mg total) under the skin once a week, Disp: 15 mL, Rfl: 2  •  ibuprofen (MOTRIN) 600 mg tablet, TAKE 1 TABLET BY MOUTH EVERY SIX HOURS AS NEEDED FOR MODERATE PAIN, Disp: 30 tablet, Rfl: 2  •  Lantus SoloStar 100 units/mL SOPN, Inject 0 3 mL (30 Units total) under the skin daily, Disp: 15 mL, Rfl: 2  •  sildenafil (VIAGRA) 100 mg tablet, Take 1 tablet (100 mg total) by mouth daily as needed for erectile dysfunction, Disp: 30 tablet, Rfl: 2  •  valACYclovir (VALTREX) 1,000 mg tablet, Take 1 tablet (1,000 mg total) by mouth daily, Disp: 30 tablet, Rfl: 2  No current facility-administered medications for this visit

## 2023-02-15 ENCOUNTER — DOCUMENTATION (OUTPATIENT)
Dept: SURGERY | Facility: CLINIC | Age: 48
End: 2023-02-15

## 2023-02-15 NOTE — PROGRESS NOTES
Assessment    Jc Gonzales seen by RD in conjunction with ID f/u  pt is established patient (last annual was 12/21/2021)    PMHx:  DMT2, HTN, HIV      Clinical Data/Client History    CD4 count:  783  Viral load:  <20  ART:   Biktarvy      , Patient Navigator: Amina     Food assistance: SNAP    Living situation: House or apartment     Psychosocial factors: Depression life stress     Mobility:  self ambulates    Physical activity: walks and goes jogging once a week       Oral problems: denied difficulties chewing and swallowing , went to dentist 6 months ago       Typical food/beverage intake:    · Breakfast cereal at work   · [employer] skips   · [employer], rice, potatoes   · Snacks tries to avoid     Appetite: Good    Vitamin, mineral, herbal supplements: N/A    Oral/enteral nutrition supplements:  N/A    GI problems: denied n/v/d/c    Food allergies/intolerances:  No     Weight history: Wt Readings from Last 3 Encounters:   02/14/23 96 8 kg (213 lb 6 4 oz)   12/20/22 99 1 kg (218 lb 6 4 oz)   11/08/22 99 3 kg (219 lb)       Current body weight:  213(2/14/2023)  Height:  71"  BMI:  29  7(overweight for age group)  IBW:  171  %IBW  124%     Weight change: no significant weight change in the last year  's  Small desirable weight loss noted         Nutrition-related labs:    Lab Results   Component Value Date    HGBA1C 8 6 (H) 10/24/2022     Lab Results   Component Value Date    SODIUM 139 02/01/2023    K 4 3 02/01/2023     02/01/2023    CO2 29 02/01/2023    BUN 7 02/01/2023    CREATININE 0 61 (L) 02/01/2023    GLUC 207 (H) 10/25/2022    CALCIUM 9 3 02/01/2023       Nutrition-related medications: lipitor, trulicity(started 58/48), lantus    Physical findings/skin integrity:  Appears to be well nourished       Nutrition Diagnosis    Problem: altered nutrition-related lab values (A1C 8)    Related to: energy intake > energy output over time     As Evidenced By: diet recall       Estimated Nutritional Needs    Olman Roblero REE: CBW 97kg    · ~2250 kcal (based on: 1 2 stress factor)  · ~62g protein (based on:  08g/pro)  · ~2400 fluid (based on: 25ml/kg)      Current intake estimation: exceeds needs       Nutrition Intervention/Recommendations    Nutrition education intervention: provided    Nutrition recommendations: Myplate meals, avoid skipping meals & increase vegetable intake     Teaching Method: verbal  printed material (Myplate)    Person Educated: person     Comprehension: excellent    Receptivity: excellent    Expected compliance: excellent    Collaboration/referral of nutrition care: [employer] voucher       Goals:  Lower A1C  Avoid skipping meals  Continue to avoid unhealthy snacking & regular soda  Monitoring/Evaluation:  Will continue to monitor weight trend, labs, appetite and need for nutrition education  Visit Summary  Pt's A1C is still elevated but trending down  Pt struggles with eating healthy while parenting  [employer] instructions & Myplate handout provided  RD gave positive reinforcement regarding healthy habits already incorporated into pts lifestyle  Pt demonstrated understanding  Will continue to provide nutrition education as needed     Dima Glez RDN,LDN

## 2023-02-15 NOTE — PROGRESS NOTES
Assessment/Plan:   [facility] met with pt during ID clinic to complete PHQ-9, pt scored 12, minor depression noted  Pt stated he is struggling with legal issues related to his child support  Pt stated his child support payments have increased and are causing financial stress  Pt stated he is tired most of the time but contributes this to his work schedule and caring for his children  Pt stated if he did not have the responsibility of his children he would likely go to work and then go straight home to rest   Pt feels he does not have any time for himself  Pt relies heavily on his margaret and his [employer]ian[employer] community for support  Pt is a never smoker  Pt was provided with information for monthly chat and snack and stated he would try to attend  [facility]     Today patient present with   Chief Complaint   Patient presents with   • Follow-up     Pt offers no c/o at this time  Patient would likely benefit from annual PHQ-9   Consider/focus/continue [facility] monitoring of Emotional, cognitive and behavioral stability   Stage of change: Pre-contemplation  Plan/ Behavioral Recommendations: ongoing [address] interventions as per pts coordinated care and/or pts request      Diagnoses and all orders for this visit:    Human immunodeficiency virus (HIV) disease ([facility] [address] )  -     Hemoglobin A1C  -     CBC and differential  -     T-helper cells CD4/CD8 %  -     Comprehensive metabolic panel  -     HIV-1 RNA, quantitative, PCR    Other long term (current) drug therapy  -     Hemoglobin A1C    Screening for diabetes mellitus  -     Hemoglobin A1C    Uncontrolled type 2 diabetes mellitus with hyperglycemia (HCC)  -     Hemoglobin A1C    Syphilis, late latent  -     penicillin G benzathine (BICILLIN L-A) intramuscular injection 2 4 Million Units    Obesity (BMI 30 0-34  9)    Primary hypertension          Subjective:     Patient ID: Gonzalo Dennis is a 50 y o  male      HPI    History of Present Illness:      Patient is being seen for annual behavioral health assessment  Patient reports their behavioral health concerns are stable  [unfilled]       Review of Systems      Objective:     Physical Exam      [facility]    Orientation     Person: yes    Place: yes    Time: yes    Appearance    Well Developed: yes healthy    Uncomfortable: no    Normal Body Odor: yes    Smells of Feces: no    Smells of Urine: no    Disheveled: no    Well Nourished: yes weight WNL of ideal    Grooming Unkempt: no    Poor Eye Contact: no    Hirsute: no    Looks Tired: yes    Acutely Exhausted: noappearance reflects stated age    Mood and Affect:     Appropriate: yes    Euthymicyes    Irritable: no    Angry: no    Anxious: yes    Depressed:no    Blunted:no    Labile: no    Restricted: no    Harm to Self or Others: pt denies SI/HI     Substance Abuse: Pt has significant history of substance abuse but has been clean and sober for many years

## 2023-02-16 ENCOUNTER — PATIENT OUTREACH (OUTPATIENT)
Dept: SURGERY | Facility: CLINIC | Age: 48
End: 2023-02-16

## 2023-02-16 NOTE — PROGRESS NOTES
Ct called Cm and states he has questions about court for child support and his housing assistance  Cm discussed with [facility]  Supervisor

## 2023-02-21 ENCOUNTER — CLINICAL SUPPORT (OUTPATIENT)
Dept: SURGERY | Facility: CLINIC | Age: 48
End: 2023-02-21

## 2023-02-21 DIAGNOSIS — A52.8 SYPHILIS, LATE LATENT: Primary | ICD-10-CM

## 2023-02-21 RX ORDER — PENICILLIN G BENZATHINE 2400000 [IU]/4ML
2.4 INJECTION, SUSPENSION INTRAMUSCULAR WEEKLY
Qty: 12 ML | Refills: 0 | Status: SHIPPED | OUTPATIENT
Start: 2023-02-21 | End: 2023-03-08

## 2023-02-27 DIAGNOSIS — M54.50 CHRONIC LOW BACK PAIN, UNSPECIFIED BACK PAIN LATERALITY, UNSPECIFIED WHETHER SCIATICA PRESENT: ICD-10-CM

## 2023-02-27 DIAGNOSIS — E78.2 MIXED HYPERLIPIDEMIA: ICD-10-CM

## 2023-02-27 DIAGNOSIS — G89.29 CHRONIC LOW BACK PAIN, UNSPECIFIED BACK PAIN LATERALITY, UNSPECIFIED WHETHER SCIATICA PRESENT: ICD-10-CM

## 2023-02-28 ENCOUNTER — CLINICAL SUPPORT (OUTPATIENT)
Dept: SURGERY | Facility: CLINIC | Age: 48
End: 2023-02-28

## 2023-02-28 DIAGNOSIS — A52.8 SYPHILIS, LATE LATENT: Primary | ICD-10-CM

## 2023-02-28 RX ORDER — ATORVASTATIN CALCIUM 20 MG/1
TABLET, FILM COATED ORAL
Qty: 30 TABLET | Refills: 1 | Status: SHIPPED | OUTPATIENT
Start: 2023-02-28

## 2023-02-28 RX ORDER — IBUPROFEN 600 MG/1
TABLET ORAL
Qty: 30 TABLET | Refills: 2 | Status: SHIPPED | OUTPATIENT
Start: 2023-02-28

## 2023-03-07 ENCOUNTER — TELEPHONE (OUTPATIENT)
Dept: SURGERY | Facility: CLINIC | Age: 48
End: 2023-03-07

## 2023-03-07 DIAGNOSIS — N52.9 ERECTILE DYSFUNCTION, UNSPECIFIED ERECTILE DYSFUNCTION TYPE: ICD-10-CM

## 2023-03-07 RX ORDER — SILDENAFIL 100 MG/1
100 TABLET, FILM COATED ORAL DAILY PRN
Qty: 30 TABLET | Refills: 2 | Status: SHIPPED | OUTPATIENT
Start: 2023-03-07 | End: 2023-03-07 | Stop reason: SDUPTHER

## 2023-03-07 RX ORDER — SILDENAFIL 100 MG/1
100 TABLET, FILM COATED ORAL DAILY PRN
Qty: 30 TABLET | Refills: 2 | Status: SHIPPED | OUTPATIENT
Start: 2023-03-07

## 2023-03-07 NOTE — TELEPHONE ENCOUNTER
Pt called and asked that a refill be put in for Viagra at the [facility] on [email or web address]

## 2023-03-08 ENCOUNTER — PATIENT OUTREACH (OUTPATIENT)
Dept: SURGERY | Facility: CLINIC | Age: 48
End: 2023-03-08

## 2023-03-10 ENCOUNTER — PATIENT OUTREACH (OUTPATIENT)
Dept: SURGERY | Facility: CLINIC | Age: 48
End: 2023-03-10

## 2023-03-14 ENCOUNTER — PATIENT OUTREACH (OUTPATIENT)
Dept: SURGERY | Facility: CLINIC | Age: 48
End: 2023-03-14

## 2023-03-14 NOTE — PROGRESS NOTES
HC got TBRA check requisition signed  HC sent to [facility]  gave billing to Zoe Mni

## 2023-03-16 ENCOUNTER — PATIENT OUTREACH (OUTPATIENT)
Dept: SURGERY | Facility: CLINIC | Age: 48
End: 2023-03-16

## 2023-03-21 ENCOUNTER — PATIENT OUTREACH (OUTPATIENT)
Dept: SURGERY | Facility: CLINIC | Age: 48
End: 2023-03-21

## 2023-03-27 DIAGNOSIS — B20 HIV INFECTION, UNSPECIFIED SYMPTOM STATUS (HCC): ICD-10-CM

## 2023-03-27 DIAGNOSIS — A60.9 HSV (HERPES SIMPLEX VIRUS) ANOGENITAL INFECTION: ICD-10-CM

## 2023-03-27 DIAGNOSIS — E11.65 UNCONTROLLED TYPE 2 DIABETES MELLITUS WITH HYPERGLYCEMIA (HCC): ICD-10-CM

## 2023-03-28 ENCOUNTER — PATIENT OUTREACH (OUTPATIENT)
Dept: SURGERY | Facility: CLINIC | Age: 48
End: 2023-03-28

## 2023-03-28 RX ORDER — ASPIRIN 81 MG/1
TABLET, COATED ORAL
Qty: 30 TABLET | Refills: 2 | Status: SHIPPED | OUTPATIENT
Start: 2023-03-28

## 2023-03-28 RX ORDER — INSULIN GLARGINE 100 [IU]/ML
INJECTION, SOLUTION SUBCUTANEOUS
Qty: 15 ML | Refills: 2 | Status: SHIPPED | OUTPATIENT
Start: 2023-03-28

## 2023-03-28 RX ORDER — VALACYCLOVIR HYDROCHLORIDE 1 G/1
TABLET, FILM COATED ORAL
Qty: 30 TABLET | Refills: 2 | Status: SHIPPED | OUTPATIENT
Start: 2023-03-28 | End: 2023-06-26

## 2023-03-28 RX ORDER — DULAGLUTIDE 3 MG/.5ML
INJECTION, SOLUTION SUBCUTANEOUS
Qty: 2 ML | Refills: 3 | Status: SHIPPED | OUTPATIENT
Start: 2023-03-28

## 2023-03-28 RX ORDER — PEN NEEDLE, DIABETIC 32GX 5/32"
NEEDLE, DISPOSABLE MISCELLANEOUS
Qty: 100 EACH | Refills: 2 | Status: SHIPPED | OUTPATIENT
Start: 2023-03-28

## 2023-03-28 RX ORDER — BICTEGRAVIR SODIUM, EMTRICITABINE, AND TENOFOVIR ALAFENAMIDE FUMARATE 50; 200; 25 MG/1; MG/1; MG/1
1 TABLET ORAL DAILY
Qty: 30 TABLET | Refills: 2 | Status: SHIPPED | OUTPATIENT
Start: 2023-03-28

## 2023-03-29 ENCOUNTER — PATIENT OUTREACH (OUTPATIENT)
Dept: SURGERY | Facility: CLINIC | Age: 48
End: 2023-03-29

## 2023-04-03 ENCOUNTER — PATIENT OUTREACH (OUTPATIENT)
Dept: SURGERY | Facility: CLINIC | Age: 48
End: 2023-04-03

## 2023-04-03 ENCOUNTER — TELEPHONE (OUTPATIENT)
Dept: SURGERY | Facility: CLINIC | Age: 48
End: 2023-04-03

## 2023-04-03 DIAGNOSIS — N52.9 ERECTILE DYSFUNCTION, UNSPECIFIED ERECTILE DYSFUNCTION TYPE: Primary | ICD-10-CM

## 2023-04-03 RX ORDER — TADALAFIL 20 MG/1
20 TABLET ORAL DAILY PRN
Qty: 30 TABLET | Refills: 0 | Status: SHIPPED | OUTPATIENT
Start: 2023-04-03

## 2023-04-03 RX ORDER — TADALAFIL 20 MG/1
20 TABLET ORAL DAILY PRN
Qty: 30 TABLET | Refills: 0 | Status: SHIPPED | OUTPATIENT
Start: 2023-04-03 | End: 2023-04-03 | Stop reason: SDUPTHER

## 2023-04-03 NOTE — PROGRESS NOTES
Ct presented to [facility] over the phone no signatures due to COVID-19  Dex Koroma is currently still residing in [place] remains to have custody of his daughter  Ct's current housing is stable  Ct has assistance with [employer]  Ct currently has medicaid for insurance   Ct reports he is sexually active states thats disclosed and does not use protection  Ct is currently in good health  CM and Ct discussed at risk behaviors and importance of using protection and maintaining medication adherence  Ct is currently medically adherent to his medications and his appointments  Ct also reports he has diabetes and it is under control  Ct states that he is currently working with Dr Mariaa Mcmillan and Yareli Richards for PCP  Ct reports he currently does not have any issues with transportation and drives himself  Ct currently does work full time as a  form [employer]   Ct does not smoke cigarettes  Ct reports he does have some MH issues and wanted to follow up with Cain Bueno for [address]  Ct states there are no legal issues  Ct reports no past issues with drugs or alcohol  Ct states there are no domestic violence issues  Ct believes that his last dental appointment was sometime in [address] and doesn't need assistance rescheduling  Cm completed acuity scale his score is 17

## 2023-04-13 ENCOUNTER — PATIENT OUTREACH (OUTPATIENT)
Dept: SURGERY | Facility: CLINIC | Age: 48
End: 2023-04-13

## 2023-04-19 ENCOUNTER — PATIENT OUTREACH (OUTPATIENT)
Dept: SURGERY | Facility: CLINIC | Age: 48
End: 2023-04-19

## 2023-04-20 ENCOUNTER — PATIENT OUTREACH (OUTPATIENT)
Dept: SURGERY | Facility: CLINIC | Age: 48
End: 2023-04-20

## 2023-04-21 ENCOUNTER — PATIENT OUTREACH (OUTPATIENT)
Dept: SURGERY | Facility: CLINIC | Age: 48
End: 2023-04-21

## 2023-04-24 DIAGNOSIS — E78.2 MIXED HYPERLIPIDEMIA: ICD-10-CM

## 2023-04-25 ENCOUNTER — APPOINTMENT (OUTPATIENT)
Dept: LAB | Facility: HOSPITAL | Age: 48
End: 2023-04-25

## 2023-04-25 LAB
ALBUMIN SERPL BCP-MCNC: 4.1 G/DL (ref 3.5–5)
ALP SERPL-CCNC: 43 U/L (ref 34–104)
ALT SERPL W P-5'-P-CCNC: 9 U/L (ref 7–52)
ANION GAP SERPL CALCULATED.3IONS-SCNC: 8 MMOL/L (ref 4–13)
AST SERPL W P-5'-P-CCNC: 10 U/L (ref 13–39)
BASOPHILS # BLD AUTO: 0.11 THOUSANDS/ΜL (ref 0–0.1)
BASOPHILS NFR BLD AUTO: 1 % (ref 0–1)
BILIRUB SERPL-MCNC: 0.64 MG/DL (ref 0.2–1)
BUN SERPL-MCNC: 10 MG/DL (ref 5–25)
CALCIUM SERPL-MCNC: 9.1 MG/DL (ref 8.4–10.2)
CHLORIDE SERPL-SCNC: 103 MMOL/L (ref 96–108)
CO2 SERPL-SCNC: 29 MMOL/L (ref 21–32)
CREAT SERPL-MCNC: 0.7 MG/DL (ref 0.6–1.3)
EOSINOPHIL # BLD AUTO: 0.44 THOUSAND/ΜL (ref 0–0.61)
EOSINOPHIL NFR BLD AUTO: 6 % (ref 0–6)
ERYTHROCYTE [DISTWIDTH] IN BLOOD BY AUTOMATED COUNT: 13 % (ref 11.6–15.1)
GFR SERPL CREATININE-BSD FRML MDRD: 111 ML/MIN/1.73SQ M
GLUCOSE P FAST SERPL-MCNC: 162 MG/DL (ref 65–99)
HCT VFR BLD AUTO: 40.4 % (ref 36.5–49.3)
HGB BLD-MCNC: 13.1 G/DL (ref 12–17)
IMM GRANULOCYTES # BLD AUTO: 0.01 THOUSAND/UL (ref 0–0.2)
IMM GRANULOCYTES NFR BLD AUTO: 0 % (ref 0–2)
LYMPHOCYTES # BLD AUTO: 2.6 THOUSANDS/ΜL (ref 0.6–4.47)
LYMPHOCYTES NFR BLD AUTO: 34 % (ref 14–44)
MCH RBC QN AUTO: 28.8 PG (ref 26.8–34.3)
MCHC RBC AUTO-ENTMCNC: 32.4 G/DL (ref 31.4–37.4)
MCV RBC AUTO: 89 FL (ref 82–98)
MONOCYTES # BLD AUTO: 0.46 THOUSAND/ΜL (ref 0.17–1.22)
MONOCYTES NFR BLD AUTO: 6 % (ref 4–12)
NEUTROPHILS # BLD AUTO: 3.99 THOUSANDS/ΜL (ref 1.85–7.62)
NEUTS SEG NFR BLD AUTO: 53 % (ref 43–75)
NRBC BLD AUTO-RTO: 0 /100 WBCS
PLATELET # BLD AUTO: 269 THOUSANDS/UL (ref 149–390)
PMV BLD AUTO: 10.3 FL (ref 8.9–12.7)
POTASSIUM SERPL-SCNC: 4 MMOL/L (ref 3.5–5.3)
PROT SERPL-MCNC: 6.7 G/DL (ref 6.4–8.4)
RBC # BLD AUTO: 4.55 MILLION/UL (ref 3.88–5.62)
SODIUM SERPL-SCNC: 140 MMOL/L (ref 135–147)
WBC # BLD AUTO: 7.61 THOUSAND/UL (ref 4.31–10.16)

## 2023-04-25 RX ORDER — ATORVASTATIN CALCIUM 20 MG/1
TABLET, FILM COATED ORAL
Qty: 30 TABLET | Refills: 1 | Status: SHIPPED | OUTPATIENT
Start: 2023-04-25

## 2023-04-26 ENCOUNTER — PATIENT OUTREACH (OUTPATIENT)
Dept: SURGERY | Facility: CLINIC | Age: 48
End: 2023-04-26

## 2023-04-26 LAB
BASOPHILS # BLD AUTO: 0.1 X10E3/UL (ref 0–0.2)
BASOPHILS NFR BLD AUTO: 1 %
CD3+CD4+ CELLS # BLD: 829 /UL (ref 359–1519)
CD3+CD4+ CELLS NFR BLD: 30.7 % (ref 30.8–58.5)
CD3+CD4+ CELLS/CD3+CD8+ CLL BLD: 0.88 % (ref 0.92–3.72)
CD3+CD8+ CELLS # BLD: 940 /UL (ref 109–897)
CD3+CD8+ CELLS NFR BLD: 34.8 % (ref 12–35.5)
EOSINOPHIL # BLD AUTO: 0.5 X10E3/UL (ref 0–0.4)
EOSINOPHIL NFR BLD AUTO: 6 %
ERYTHROCYTE [DISTWIDTH] IN BLOOD BY AUTOMATED COUNT: 12.9 % (ref 11.6–15.4)
EST. AVERAGE GLUCOSE BLD GHB EST-MCNC: 189 MG/DL
HBA1C MFR BLD: 8.2 %
HCT VFR BLD AUTO: 40.3 % (ref 37.5–51)
HGB BLD-MCNC: 13.1 G/DL (ref 13–17.7)
IMM GRANULOCYTES # BLD: 0 X10E3/UL (ref 0–0.1)
IMM GRANULOCYTES NFR BLD: 0 %
LYMPHOCYTES # BLD AUTO: 2.7 X10E3/UL (ref 0.7–3.1)
LYMPHOCYTES NFR BLD AUTO: 34 %
MCH RBC QN AUTO: 28.1 PG (ref 26.6–33)
MCHC RBC AUTO-ENTMCNC: 32.5 G/DL (ref 31.5–35.7)
MCV RBC AUTO: 87 FL (ref 79–97)
MONOCYTES # BLD AUTO: 0.5 X10E3/UL (ref 0.1–0.9)
MONOCYTES NFR BLD AUTO: 6 %
NEUTROPHILS # BLD AUTO: 4.1 X10E3/UL (ref 1.4–7)
NEUTROPHILS NFR BLD AUTO: 53 %
PLATELET # BLD AUTO: 255 X10E3/UL (ref 150–450)
RBC # BLD AUTO: 4.66 X10E6/UL (ref 4.14–5.8)
WBC # BLD AUTO: 7.9 X10E3/UL (ref 3.4–10.8)

## 2023-04-27 LAB
HIV1 RNA # SERPL NAA+PROBE: <20 COPIES/ML
HIV1 RNA SERPL NAA+PROBE-LOG#: NORMAL LOG10COPY/ML

## 2023-04-27 NOTE — PROGRESS NOTES
HC text Ct to follow up on TBRA follow up  Ct states he can come drop off documentation due to work schedule and complete over the phone  HC states that is fine

## 2023-04-28 ENCOUNTER — PATIENT OUTREACH (OUTPATIENT)
Dept: SURGERY | Facility: CLINIC | Age: 48
End: 2023-04-28

## 2023-04-29 NOTE — PROGRESS NOTES
Ct text Cm to meet  HC called Ct and states we can reschedule for week of 5-8-2023 Ct states he should have all his documentation by then

## 2023-05-09 ENCOUNTER — PATIENT OUTREACH (OUTPATIENT)
Dept: SURGERY | Facility: CLINIC | Age: 48
End: 2023-05-09

## 2023-05-09 ENCOUNTER — OFFICE VISIT (OUTPATIENT)
Dept: SURGERY | Facility: CLINIC | Age: 48
End: 2023-05-09

## 2023-05-09 VITALS
WEIGHT: 213 LBS | TEMPERATURE: 98.8 F | SYSTOLIC BLOOD PRESSURE: 138 MMHG | BODY MASS INDEX: 29.82 KG/M2 | DIASTOLIC BLOOD PRESSURE: 76 MMHG | OXYGEN SATURATION: 96 % | HEART RATE: 77 BPM | HEIGHT: 71 IN

## 2023-05-09 DIAGNOSIS — E66.9 OBESITY (BMI 30.0-34.9): ICD-10-CM

## 2023-05-09 DIAGNOSIS — A52.8 SYPHILIS, LATE LATENT: ICD-10-CM

## 2023-05-09 DIAGNOSIS — I10 PRIMARY HYPERTENSION: ICD-10-CM

## 2023-05-09 DIAGNOSIS — B20 HUMAN IMMUNODEFICIENCY VIRUS (HIV) DISEASE (HCC): Primary | ICD-10-CM

## 2023-05-09 DIAGNOSIS — E11.65 UNCONTROLLED TYPE 2 DIABETES MELLITUS WITH HYPERGLYCEMIA (HCC): ICD-10-CM

## 2023-05-09 NOTE — PROGRESS NOTES
"Progress Note - Infectious Disease   Jorge Castaneda 50 y o  male MRN: [id number]  Unit/Bed#:  Encounter: [id number]      Impression/Plan:  1  HIV-doing well on Biktarvy with a near undetectable viral load and a CD4 count in the 800s   Continue ART, recheck labs in 2 months, follow-up in 3 months   Stressed adherence     2  Diabetes mellitus-improved controlled with hemoglobin A1c down to 8 2  Refused endocrinology previously  Stressed the importance of tight control        3  Obesity-continues to stressed the importance of diet and exercise  Weight stable  Discussed with the primary     4  Syphilis-patient status post treatment with good response but now with increased RPR to 1:32    Given benzathine PCN X 3 for presumptive late latent syphilis  Recheck RPR     5  Hypertension-better control          Patient was provided medication, adherence and prevention education    Subjective:  Routine follow-up for HIV  Patient claims 100% adherence with Biktarvy  Patient denies any notable side effects  Overall the feeling well  The patient denies any fever chills or sweats, denies any nausea vomiting or diarrhea, denies any cough or shortness of breath  ROS:  A complete review of systems is negative other than that noted above in the subjective    Followup portions patient history reviewed and updated as: Allergies, current medications, past medical history, past social history, past surgical history, and the problem list    Objective:  Vitals:  Vitals:    05/09/23 1729   BP: 138/76   Pulse: 77   Temp: 98 8 °F (37 1 °C)   SpO2: 96%   Weight: 96 6 kg (213 lb)   Height: 5' 11\" (1 803 m)       Physical Exam:   General Appearance:  Alert, interactive, appearing well,  nontoxic, no acute distress  Neck:   Supple without lymphadenopathy, no thyromegaly or masses   Throat: Oropharynx moist without lesions      Lungs:   Clear to auscultation bilaterally; no wheezes, rhonchi or rales; respirations unlabored   Heart:  " RRR; no murmur, rub or gallop   Abdomen:   Soft, non-tender, non-distended, positive bowel sounds  Extremities: No clubbing, cyanosis or edema   Skin: No new rashes or lesions  No draining wounds noted  Labs, Imaging, & Other studies:   All pertinent labs and imaging studies were personally reviewed    Lab Results   Component Value Date     (L) 04/04/2018    K 4 0 04/25/2023     04/25/2023    CO2 29 04/25/2023    ANIONGAP 10 04/04/2018    BUN 10 04/25/2023    CREATININE 0 70 04/25/2023    GLUCOSE 269 (H) 09/10/2018    GLUF 162 (H) 04/25/2023    CALCIUM 9 1 04/25/2023    AST 10 (L) 04/25/2023    ALT 9 04/25/2023    ALKPHOS 43 04/25/2023    PROT 6 9 04/04/2018    BILITOT 0 4 04/04/2018    EGFR 111 04/25/2023     Lab Results   Component Value Date    WBC 7 61 04/25/2023    WBC 7 9 04/25/2023    HGB 13 1 04/25/2023    HGB 13 1 04/25/2023    HCT 40 4 04/25/2023    HCT 40 3 04/25/2023    MCV 89 04/25/2023    MCV 87 04/25/2023     04/25/2023     04/25/2023     Lab Results   Component Value Date    HEPCAB Non-reactive 02/01/2023     Lab Results   Component Value Date    HAV Reactive (A) 12/11/2021    HEPCAB Non-reactive 02/01/2023     Lab Results   Component Value Date    RPR Reactive (A) 02/01/2023    RPR Reactive 32 dils (A) 02/01/2023     CD4 ABS   Date/Time Value Ref Range Status   04/25/2023 08:04  359 - 1519 /uL Final     HIV-1 RNA by PCR, Qn   Date/Time Value Ref Range Status   04/25/2023 08:04 AM <20 copies/mL Final     Comment:     HIV-1 RNA not detected  The reportable range for this assay is 20 to 10,000,000  copies HIV-1 RNA/mL             Current Outpatient Medications:   •  Aspirin Low Dose 81 MG EC tablet, TAKE 1 TABLET BY MOUTH DAILY, Disp: 30 tablet, Rfl: 2  •  atorvastatin (LIPITOR) 20 mg tablet, TAKE 1 TABLET BY MOUTH DAILY, Disp: 30 tablet, Rfl: 1  •  BD Pen Needle Bela U/F 32G X 4 MM MISC, INJECT UNDER SKIN ONCE A WEEK, Disp: 100 each, Rfl: 2  •  Biktarvy -25 MG tablet, TAKE 1 TABLET BY MOUTH DAILY, Disp: 30 tablet, Rfl: 2  •  cyclobenzaprine (FLEXERIL) 10 mg tablet, Take 1 tablet (10 mg total) by mouth 3 (three) times a day as needed for muscle spasms, Disp: 60 tablet, Rfl: 0  •  ibuprofen (MOTRIN) 600 mg tablet, TAKE 1 TABLET BY MOUTH EVERY SIX HOURS AS NEEDED FOR MODERATE PAIN, Disp: 30 tablet, Rfl: 2  •  Lantus SoloStar 100 units/mL SOPN, INJECT 0 3ML (30 UNITS SUBCUTANEOUSLY DAILY, Disp: 15 mL, Rfl: 2  •  tadalafil (CIALIS) 20 MG tablet, Take 1 tablet (20 mg total) by mouth daily as needed for erectile dysfunction, Disp: 30 tablet, Rfl: 0  •  Trulicity 3 IW/9 8RU injection, INJECT 0 5ML UNDER THE SKIN ONCE A WEEK, Disp: 2 mL, Rfl: 3  •  valACYclovir (VALTREX) 1,000 mg tablet, TAKE 1 TABLET BY MOUTH DAILY, Disp: 30 tablet, Rfl: 2

## 2023-05-11 ENCOUNTER — TELEPHONE (OUTPATIENT)
Dept: ADMINISTRATIVE | Facility: OTHER | Age: 48
End: 2023-05-11

## 2023-05-11 NOTE — TELEPHONE ENCOUNTER
Upon review of the In Basket request we were able to locate, review, and update the patient chart as requested for CRC: Colonoscopy  Any additional questions or concerns should be emailed to the Practice Liaisons via the appropriate education email address, please do not reply via In Basket      Thank you  Erik Carnes

## 2023-05-11 NOTE — TELEPHONE ENCOUNTER
----- Message from Dede Agee RN sent at 5/10/2023  4:41 PM EDT -----  Regarding:  CRC screening  05/10/23 4:41 PM    Hello, our patient Audie Aschoff has had CRC: Colonoscopy completed/performed  Please assist in updating the patient chart by pulling the document from the Media Tab  The date of service is 4/18/23       Thank you,  Jennifer Wu RN  [facility]  ;

## 2023-05-16 ENCOUNTER — TELEPHONE (OUTPATIENT)
Dept: SURGERY | Facility: CLINIC | Age: 48
End: 2023-05-16

## 2023-05-16 NOTE — TELEPHONE ENCOUNTER
[facility] Nutrition Encounter       Freddie Flaherty was called by RD regarding A1C level  Appetite: Excellent    Weight history: Wt Readings from Last 3 Encounters:   05/09/23 96 6 kg (213 lb)   02/14/23 96 8 kg (213 lb 6 4 oz)   12/20/22 99 1 kg (218 lb 6 4 oz)       Nutrition-related labs:    Lab Results   Component Value Date    HGBA1C 8 2 (H) 04/25/2023     Lab Results   Component Value Date    SODIUM 140 04/25/2023    K 4 0 04/25/2023     04/25/2023    CO2 29 04/25/2023    BUN 10 04/25/2023    CREATININE 0 70 04/25/2023    GLUC 207 (H) 10/25/2022    CALCIUM 9 1 04/25/2023     A1C has been trending down but is still high  Nutrition Diagnosis    Problem: altered nutrition-related lab values (A1C)    Related to: lack of food planning, purchasing, and preparation skills    As Evidenced By: patient interview       Nutrition Intervention/Recommendations    Nutrition education intervention: provided    Nutrition recommendations: Pack lunch daily for the next 2 weeks  Avoid cookies & soda at work  Teaching Method: verbal     Person Educated: Person     Comprehension: very good    Receptivity: very good    Expected compliance: very good    Collaboration/referral of nutrition care:   N/A    Goals:  Pack lunch daily for the next 2 weeks  Avoid cookies & soda at work  A1C <6 5  Monitoring/Evaluation:  RD will continue to monitor nutrition related labs, appetite, weight trend and need for nutrition education  Visit Summary  Rd discussed current A1C lab values with pt  Myplate & other materials provided in the past  Pt is aware of how he should eat for his DMT2  but 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

## 2023-05-18 ENCOUNTER — PATIENT OUTREACH (OUTPATIENT)
Dept: SURGERY | Facility: CLINIC | Age: 48
End: 2023-05-18

## 2023-05-18 NOTE — PROGRESS NOTES
HC called Ct to schedule TBRA renewal  Ct states he will be in for his doctor appointment today so will drop it off with staff and [facility]  can call Ct  HC agreed  [facility]  updated clinic staff in team meeting

## 2023-05-24 ENCOUNTER — TELEPHONE (OUTPATIENT)
Dept: SURGERY | Facility: CLINIC | Age: 48
End: 2023-05-24

## 2023-05-24 ENCOUNTER — PATIENT OUTREACH (OUTPATIENT)
Dept: SURGERY | Facility: CLINIC | Age: 48
End: 2023-05-24

## 2023-05-24 DIAGNOSIS — N52.9 ERECTILE DYSFUNCTION, UNSPECIFIED ERECTILE DYSFUNCTION TYPE: ICD-10-CM

## 2023-05-24 RX ORDER — TADALAFIL 20 MG/1
20 TABLET ORAL DAILY PRN
Qty: 30 TABLET | Refills: 0 | Status: SHIPPED | OUTPATIENT
Start: 2023-05-24 | End: 2023-05-31 | Stop reason: SDUPTHER

## 2023-05-24 NOTE — TELEPHONE ENCOUNTER
Pt called and requested a refill be put in for his Cialis at the [facility] on [address]

## 2023-05-25 ENCOUNTER — TELEPHONE (OUTPATIENT)
Dept: SURGERY | Facility: CLINIC | Age: 48
End: 2023-05-25

## 2023-05-25 NOTE — TELEPHONE ENCOUNTER
Pt called yesterday asking for a refill of Cialis to be filled at [facility], then he called back this morning to change it to [facility] so he can use [facility]  I called [facility] to see if I could request the change but was told the request had to come from the new pharmacy  I then called [facility] and explained the situation and they said they would make the call  I called pt and explained the process  I will check in later to see if the script transfer went through

## 2023-05-31 ENCOUNTER — DOCUMENTATION (OUTPATIENT)
Dept: SURGERY | Facility: CLINIC | Age: 48
End: 2023-05-31

## 2023-05-31 ENCOUNTER — OFFICE VISIT (OUTPATIENT)
Dept: SURGERY | Facility: CLINIC | Age: 48
End: 2023-05-31

## 2023-05-31 VITALS
SYSTOLIC BLOOD PRESSURE: 125 MMHG | OXYGEN SATURATION: 95 % | BODY MASS INDEX: 30.66 KG/M2 | TEMPERATURE: 98.8 F | HEIGHT: 71 IN | HEART RATE: 81 BPM | DIASTOLIC BLOOD PRESSURE: 78 MMHG | WEIGHT: 219 LBS

## 2023-05-31 DIAGNOSIS — B20 HIV INFECTION, UNSPECIFIED SYMPTOM STATUS (HCC): ICD-10-CM

## 2023-05-31 DIAGNOSIS — E78.2 MIXED HYPERLIPIDEMIA: ICD-10-CM

## 2023-05-31 DIAGNOSIS — S29.011D MUSCLE STRAIN OF CHEST WALL, SUBSEQUENT ENCOUNTER: ICD-10-CM

## 2023-05-31 DIAGNOSIS — E11.65 UNCONTROLLED TYPE 2 DIABETES MELLITUS WITH HYPERGLYCEMIA (HCC): ICD-10-CM

## 2023-05-31 DIAGNOSIS — N52.9 ERECTILE DYSFUNCTION, UNSPECIFIED ERECTILE DYSFUNCTION TYPE: ICD-10-CM

## 2023-05-31 DIAGNOSIS — B20 HUMAN IMMUNODEFICIENCY VIRUS (HIV) DISEASE (HCC): ICD-10-CM

## 2023-05-31 DIAGNOSIS — Z00.00 ANNUAL PHYSICAL EXAM: Primary | ICD-10-CM

## 2023-05-31 DIAGNOSIS — E66.9 OBESITY (BMI 30.0-34.9): ICD-10-CM

## 2023-05-31 RX ORDER — BICTEGRAVIR SODIUM, EMTRICITABINE, AND TENOFOVIR ALAFENAMIDE FUMARATE 50; 200; 25 MG/1; MG/1; MG/1
1 TABLET ORAL DAILY
Qty: 30 TABLET | Refills: 2 | Status: SHIPPED | OUTPATIENT
Start: 2023-05-31

## 2023-05-31 RX ORDER — ATORVASTATIN CALCIUM 20 MG/1
20 TABLET, FILM COATED ORAL DAILY
Qty: 30 TABLET | Refills: 1 | Status: SHIPPED | OUTPATIENT
Start: 2023-05-31

## 2023-05-31 RX ORDER — INSULIN GLARGINE 100 [IU]/ML
40 INJECTION, SOLUTION SUBCUTANEOUS
Qty: 15 ML | Refills: 2 | Status: SHIPPED | OUTPATIENT
Start: 2023-05-31

## 2023-05-31 RX ORDER — TADALAFIL 20 MG/1
20 TABLET ORAL DAILY PRN
Qty: 30 TABLET | Refills: 0 | Status: SHIPPED | OUTPATIENT
Start: 2023-05-31

## 2023-05-31 RX ORDER — PEN NEEDLE, DIABETIC 32GX 5/32"
NEEDLE, DISPOSABLE MISCELLANEOUS
Qty: 100 EACH | Refills: 2 | Status: SHIPPED | OUTPATIENT
Start: 2023-05-31

## 2023-05-31 RX ORDER — ASPIRIN 81 MG/1
81 TABLET, COATED ORAL DAILY
Qty: 30 TABLET | Refills: 2 | Status: SHIPPED | OUTPATIENT
Start: 2023-05-31

## 2023-05-31 RX ORDER — CYCLOBENZAPRINE HCL 10 MG
10 TABLET ORAL 3 TIMES DAILY PRN
Qty: 60 TABLET | Refills: 0 | Status: SHIPPED | OUTPATIENT
Start: 2023-05-31

## 2023-05-31 NOTE — ASSESSMENT & PLAN NOTE
"Body mass index is 30 54 kg/m²  Wt Readings from Last 3 Encounters:   05/31/23 99 3 kg (219 lb)   05/09/23 96 6 kg (213 lb)   02/14/23 96 8 kg (213 lb 6 4 oz)     Height: 5' 11\" (180 3 cm)           Lab Results   Component Value Date    HGBA1C 8 2 (H) 04/25/2023     Lab Results   Component Value Date    GLUCOSE 269 (H) 09/10/2018   ;    Educated on the health risks of obesity  Advised to lose weight  Encouraged eating a healthy diet and daily cardiac exercise  Recommended increasing fruits and vegetables and limiting processed foods  Refer to dietitian for additional education          "

## 2023-05-31 NOTE — ASSESSMENT & PLAN NOTE
Lab Results   Component Value Date    HGBA1C 8 2 (H) 04/25/2023     Lab Results   Component Value Date/Time    HGBA1C 8 2 (H) 04/25/2023 08:04 AM    HGBA1C 13 7 (H) 09/15/2021 03:42 PM      Switch patient from [facility] to [facility]  Hemoglobin A1c remains elevated at 8 2  He has also started to gain weight  Increased from 2 13lbs to -219lbs  has tried metformin in the past without good control of insulin levels  Educated to follow diabetic diet, maintain a healthy weight, and exercise regularly  Referred to dietician for additional education

## 2023-05-31 NOTE — ASSESSMENT & PLAN NOTE
"No results found for: \"GE4FLAF\"  CD4 ABS   Date/Time Value Ref Range Status   2023 08:04  359 - 1519 /uL Final     HIV-1 RNA by PCR, Qn   Date/Time Value Ref Range Status   2023 08:04 AM <20 copies/mL Final     Comment:     HIV-1 RNA not detected  The reportable range for this assay is 20 to 10,000,000  copies HIV-1 RNA/mL  HIV-1 RNA Viral Load Log   Date/Time Value Ref Range Status   2023 08:04 AM COMMENT [id number]/mL Final     Comment:     Unable to calculate result since non-numeric result obtained for  component test          ART: Ananias Hashimoto        Denies side effects  Stressed the importance of adherence  Continue follow up with ID clinic  Reviewed most recent labs, including Cd4 and viral load  Discussed the risks and benefits of treatment options, instructions for management, importance of treatment adherence, and reduction of risk factor  Educated on possible  medication side effects  Counseled on routes of HIV transmission, including the risk of  infection  Emphasized that viral suppression is the best method to prevent HIV transmission  At this time pt denies the need for HIV testing of anyone in their life  Total encounter time was 45 minutes  Greater then 20 minutes were spent on counseling and patient education  Pt voices understanding and agreement with treatment plan        "

## 2023-05-31 NOTE — PROGRESS NOTES
[facility] Nutrition Encounter       Donna Alpers seen by RD in conjunction with PCP f/u  regarding elevated A1C level  Visit Summary  RD followed up on prior goals with pt: Packing lunches, avoiding snacks at work and avoiding soda  Pt stated he cut down on eating junk at work but is still not packing his lunch  RD continues to offer encouragement & nutritional support as needed     Junaid Waters RDN,LDN

## 2023-05-31 NOTE — PROGRESS NOTES
"  [address]    NAME: Erwin Reynoso  AGE: 50 y o  SEX: male  : 1975     DATE: 2023     Assessment and Plan:   Coughing with marijuana use: Educated that this is a common occurrence when using marijuana  Advised that if he would like to stop coughing he should discontinue use of marijuana  Problem List Items Addressed This Visit        Endocrine    Uncontrolled type 2 diabetes mellitus with hyperglycemia ([place] [address] )       Lab Results   Component Value Date    HGBA1C 8 2 (H) 2023     Lab Results   Component Value Date/Time    HGBA1C 8 2 (H) 2023 08:04 AM    HGBA1C 13 7 (H) 09/15/2021 03:42 PM      Switch patient from [facility] to [facility]  Hemoglobin A1c remains elevated at 8 2  He has also started to gain weight  Increased from 2 13lbs to -219lbs  has tried metformin in the past without good control of insulin levels  Educated to follow diabetic diet, maintain a healthy weight, and exercise regularly  Referred to dietician for additional education  Relevant Medications    semaglutide, 1 mg/dose, (Ozempic, 1 MG/DOSE,) 4 mg/3 mL injection pen    Aspirin Low Dose 81 MG EC tablet    BD Pen Needle Bela U/F 32G X 4 MM MISC    Lantus SoloStar 100 units/mL SOPN       Other    Human immunodeficiency virus (HIV) disease ([place] [address] )     No results found for: \"NH8HAVX\"  CD4 ABS   Date/Time Value Ref Range Status   2023 08:04  359 - 1519 /uL Final     HIV-1 RNA by PCR, Qn   Date/Time Value Ref Range Status   2023 08:04 AM <20 copies/mL Final     Comment:     HIV-1 RNA not detected  The reportable range for this assay is 20 to 10,000,000  copies HIV-1 RNA/mL       HIV-1 RNA Viral Load Log   Date/Time Value Ref Range Status   2023 08:04 AM COMMENT [id number]/mL Final     Comment:     Unable to calculate result since non-numeric result obtained for  component test          ART: " "Biktarvy        Denies side effects  Stressed the importance of adherence  Continue follow up with ID clinic  Reviewed most recent labs, including Cd4 and viral load  Discussed the risks and benefits of treatment options, instructions for management, importance of treatment adherence, and reduction of risk factor  Educated on possible  medication side effects  Counseled on routes of HIV transmission, including the risk of  infection  Emphasized that viral suppression is the best method to prevent HIV transmission  At this time pt denies the need for HIV testing of anyone in their life  Total encounter time was 45 minutes  Greater then 20 minutes were spent on counseling and patient education  Pt voices understanding and agreement with treatment plan  Relevant Medications    Biktarvy -25 MG tablet    Obesity (BMI 30 0-34  9)     Body mass index is 30 54 kg/m²  Wt Readings from Last 3 Encounters:   23 99 3 kg (219 lb)   23 96 6 kg (213 lb)   23 96 8 kg (213 lb 6 4 oz)     Height: 5' 11\" (180 3 cm)           Lab Results   Component Value Date    HGBA1C 8 2 (H) 2023     Lab Results   Component Value Date    GLUCOSE 269 (H) 09/10/2018   ;    Educated on the health risks of obesity  Advised to lose weight  Encouraged eating a healthy diet and daily cardiac exercise  Recommended increasing fruits and vegetables and limiting processed foods  Refer to dietitian for additional education                Other Visit Diagnoses     Annual physical exam    -  Primary    Mixed hyperlipidemia        Relevant Medications    atorvastatin (LIPITOR) 20 mg tablet    HIV infection, unspecified symptom status (HCC)        Relevant Medications    Biktarvy -25 MG tablet    Muscle strain of chest wall, subsequent encounter        Relevant Medications    cyclobenzaprine (FLEXERIL) 10 mg tablet          Immunizations and preventive care screenings were discussed with " patient today  Appropriate education was printed on patient's after visit summary  Discussed risks and benefits of prostate cancer screening  We discussed the controversial history of PSA screening for prostate cancer in the [place] as well as the risk of over detection and over treatment of prostate cancer by way of PSA screening  The patient understands that PSA blood testing is an imperfect way to screen for prostate cancer and that elevated PSA levels in the blood may also be caused by infection, inflammation, prostatic trauma or manipulation, urological procedures, or by benign prostatic enlargement  The role of the digital rectal examination in prostate cancer screening was also discussed and I discussed with him that there is large interobserver variability in the findings of digital rectal examination  Counseling:  Alcohol/drug use: discussed moderation in alcohol intake, the recommendations for healthy alcohol use, and avoidance of illicit drug use  · Exercise: the importance of regular exercise/physical activity was discussed  Recommend exercise 3-5 times per week for at least 30 minutes  Return in 3 months (on 8/31/2023)  Chief Complaint:     Chief Complaint   Patient presents with   • Follow-up     Pt offers no c/o at this time  History of Present Illness:     Adult Annual Physical   Patient here for a comprehensive physical exam  The patient reports no problems  Diet and Physical Activity  · Diet/Nutrition: well balanced diet and frequent junk food  · Exercise: no formal exercise  Depression Screening  PHQ-2/9 Depression Screening         General Health  · Sleep: sleeps well  · Hearing: normal - none   · Vision: no vision problems  · Dental: regular dental visits and brushes teeth twice daily   Health  · Symptoms include: none     Review of Systems:     Review of Systems   Constitutional: Negative for chills and fever     HENT: Negative for ear pain and sore throat  Eyes: Negative for pain and visual disturbance  Respiratory: Negative for cough and shortness of breath  Cardiovascular: Negative for chest pain and palpitations  Gastrointestinal: Negative for abdominal pain and vomiting  Genitourinary: Negative for dysuria and hematuria  Musculoskeletal: Negative for arthralgias and back pain  Skin: Negative for color change and rash  Neurological: Negative for seizures and syncope  All other systems reviewed and are negative       Past Medical History:     Past Medical History:   Diagnosis Date   • Chlamydia    • Diabetes mellitus ([place] [address] )     Type II   • Gonorrhea    • Herpes    • HIV disease ([place] [address] ) 07/2020    mode of transmission: heterosexual   • Hyperlipidemia    • Hypertension    • Neoplasm of kidney    • Obesity     BMI 37 2   • Syphilis       Past Surgical History:     Past Surgical History:   Procedure Laterality Date   • NO PAST SURGERIES        Family History:     Family History   Problem Relation Age of Onset   • Diabetes Mother    • Diabetes Father    • Diabetes Family       Social History:     Social History     Socioeconomic History   • Marital status: Single     Spouse name: None   • Number of children: None   • Years of education: None   • Highest education level: None   Occupational History   • None   Tobacco Use   • Smoking status: Never   • Smokeless tobacco: Never   Vaping Use   • Vaping Use: Never used   Substance and Sexual Activity   • Alcohol use: No   • Drug use: No   • Sexual activity: Yes     Partners: Female     Birth control/protection: None   Other Topics Concern   • None   Social History Narrative    Activity level- sedentary     Wears Safety belt     Social Determinants of Health     Financial Resource Strain: Not on file   Food Insecurity: Food Insecurity Present (7/19/2022)    Hunger Vital Sign    • Worried About Running Out of Food in the Last Year: Often true    • Ran Out of Food in the Last Year: Often true "  Transportation Needs: Not on file   Physical Activity: Not on file   Stress: Not on file   Social Connections: Not on file   Intimate Partner Violence: Not on file   Housing Stability: Not on file      Current Medications:     Current Outpatient Medications   Medication Sig Dispense Refill   • Aspirin Low Dose 81 MG EC tablet Take 1 tablet (81 mg total) by mouth daily 30 tablet 2   • atorvastatin (LIPITOR) 20 mg tablet Take 1 tablet (20 mg total) by mouth daily 30 tablet 1   • BD Pen Needle Bela U/F 32G X 4 MM MISC Inject under the skin daily at bedtime 100 each 2   • Biktarvy -25 MG tablet Take 1 tablet by mouth daily 30 tablet 2   • cyclobenzaprine (FLEXERIL) 10 mg tablet Take 1 tablet (10 mg total) by mouth 3 (three) times a day as needed for muscle spasms 60 tablet 0   • ibuprofen (MOTRIN) 600 mg tablet TAKE 1 TABLET BY MOUTH EVERY SIX HOURS AS NEEDED FOR MODERATE PAIN 30 tablet 2   • Lantus SoloStar 100 units/mL SOPN Inject 0 4 mL (40 Units total) under the skin daily at bedtime 15 mL 2   • semaglutide, 1 mg/dose, (Ozempic, 1 MG/DOSE,) 4 mg/3 mL injection pen Inject 0 75 mL (1 mg total) under the skin every 7 days 3 mL 2   • tadalafil (CIALIS) 20 MG tablet Take 1 tablet (20 mg total) by mouth daily as needed for erectile dysfunction 30 tablet 0   • valACYclovir (VALTREX) 1,000 mg tablet TAKE 1 TABLET BY MOUTH DAILY 30 tablet 2     No current facility-administered medications for this visit  Allergies: Allergies   Allergen Reactions   • Metformin Diarrhea      Physical Exam:     /78   Pulse 81   Temp 98 8 °F (37 1 °C)   Ht 5' 11\" (1 803 m)   Wt 99 3 kg (219 lb)   SpO2 95%   BMI 30 54 kg/m²     Physical Exam  Vitals and nursing note reviewed  Constitutional:       General: He is not in acute distress  Appearance: He is well-developed  HENT:      Head: Normocephalic and atraumatic     Eyes:      Conjunctiva/sclera: Conjunctivae normal    Cardiovascular:      Rate and Rhythm: " Normal rate and regular rhythm  Heart sounds: No murmur heard  Pulmonary:      Effort: Pulmonary effort is normal  No respiratory distress  Breath sounds: Normal breath sounds  Abdominal:      Palpations: Abdomen is soft  Tenderness: There is no abdominal tenderness  Musculoskeletal:         General: No swelling  Cervical back: Neck supple  Skin:     General: Skin is warm and dry  Capillary Refill: Capillary refill takes less than 2 seconds  Neurological:      Mental Status: He is alert     Psychiatric:         Mood and Affect: Mood normal           REDD Arthur  [facility] AT [facility]

## 2023-06-08 ENCOUNTER — PATIENT OUTREACH (OUTPATIENT)
Dept: SURGERY | Facility: CLINIC | Age: 48
End: 2023-06-08

## 2023-06-14 ENCOUNTER — PATIENT OUTREACH (OUTPATIENT)
Dept: SURGERY | Facility: CLINIC | Age: 48
End: 2023-06-14

## 2023-06-15 ENCOUNTER — PATIENT OUTREACH (OUTPATIENT)
Dept: SURGERY | Facility: CLINIC | Age: 48
End: 2023-06-15

## 2023-06-19 DIAGNOSIS — S29.011D MUSCLE STRAIN OF CHEST WALL, SUBSEQUENT ENCOUNTER: ICD-10-CM

## 2023-06-19 DIAGNOSIS — A60.9 HSV (HERPES SIMPLEX VIRUS) ANOGENITAL INFECTION: ICD-10-CM

## 2023-06-20 ENCOUNTER — PATIENT OUTREACH (OUTPATIENT)
Dept: SURGERY | Facility: CLINIC | Age: 48
End: 2023-06-20

## 2023-06-20 RX ORDER — VALACYCLOVIR HYDROCHLORIDE 1 G/1
TABLET, FILM COATED ORAL
Qty: 30 TABLET | Refills: 2 | Status: SHIPPED | OUTPATIENT
Start: 2023-06-20 | End: 2023-09-18

## 2023-06-20 RX ORDER — CYCLOBENZAPRINE HCL 10 MG
TABLET ORAL
Qty: 60 TABLET | Refills: 0 | Status: SHIPPED | OUTPATIENT
Start: 2023-06-20

## 2023-06-22 DIAGNOSIS — N52.9 ERECTILE DYSFUNCTION, UNSPECIFIED ERECTILE DYSFUNCTION TYPE: ICD-10-CM

## 2023-06-22 RX ORDER — TADALAFIL 20 MG/1
20 TABLET ORAL DAILY PRN
Qty: 30 TABLET | Refills: 0 | Status: SHIPPED | OUTPATIENT
Start: 2023-06-22

## 2023-06-26 ENCOUNTER — PATIENT OUTREACH (OUTPATIENT)
Dept: SURGERY | Facility: CLINIC | Age: 48
End: 2023-06-26

## 2023-06-30 NOTE — PROGRESS NOTES
HC discussed Ct's case with [facility] OF [facility]  Darrel

## 2023-07-05 ENCOUNTER — PATIENT OUTREACH (OUTPATIENT)
Dept: SURGERY | Facility: CLINIC | Age: 48
End: 2023-07-05

## 2023-07-10 ENCOUNTER — PATIENT OUTREACH (OUTPATIENT)
Dept: SURGERY | Facility: CLINIC | Age: 48
End: 2023-07-10

## 2023-07-11 ENCOUNTER — PATIENT OUTREACH (OUTPATIENT)
Dept: SURGERY | Facility: CLINIC | Age: 48
End: 2023-07-11

## 2023-07-12 ENCOUNTER — PATIENT OUTREACH (OUTPATIENT)
Dept: SURGERY | Facility: CLINIC | Age: 48
End: 2023-07-12

## 2023-07-12 ENCOUNTER — TELEPHONE (OUTPATIENT)
Dept: SURGERY | Facility: CLINIC | Age: 48
End: 2023-07-12

## 2023-07-12 DIAGNOSIS — N52.9 ERECTILE DYSFUNCTION, UNSPECIFIED ERECTILE DYSFUNCTION TYPE: ICD-10-CM

## 2023-07-12 RX ORDER — TADALAFIL 20 MG/1
20 TABLET ORAL DAILY PRN
Qty: 30 TABLET | Refills: 0 | Status: SHIPPED | OUTPATIENT
Start: 2023-07-12 | End: 2023-07-18 | Stop reason: SDUPTHER

## 2023-07-12 NOTE — TELEPHONE ENCOUNTER
Pt called and requested a script for Cialis be sent to the [facility] on [address].

## 2023-07-17 ENCOUNTER — PATIENT OUTREACH (OUTPATIENT)
Dept: SURGERY | Facility: CLINIC | Age: 48
End: 2023-07-17

## 2023-07-17 NOTE — PROGRESS NOTES
Ct called and states that his job will be closing in beginning of September. Ct states he is concerned about rent and not being able to keep up. [place] [facility] OF [place], [facility]. requested a severance letter for Ct to be reviewed for his TBRA keaton.

## 2023-07-17 NOTE — PROGRESS NOTES
HC discussed Ct's case with HS who states we will need to follow up on Ct's inspection and check annual date.

## 2023-07-18 ENCOUNTER — TELEPHONE (OUTPATIENT)
Dept: SURGERY | Facility: CLINIC | Age: 48
End: 2023-07-18

## 2023-07-18 DIAGNOSIS — N52.9 ERECTILE DYSFUNCTION, UNSPECIFIED ERECTILE DYSFUNCTION TYPE: ICD-10-CM

## 2023-07-18 RX ORDER — TADALAFIL 20 MG/1
20 TABLET ORAL DAILY PRN
Qty: 30 TABLET | Refills: 0 | Status: SHIPPED | OUTPATIENT
Start: 2023-07-18

## 2023-07-18 NOTE — TELEPHONE ENCOUNTER
Pt called and asked for a script for Cialis to be sent to the [facility] on [address]. I checked, and the script that was sent last week did not transmit.

## 2023-07-19 ENCOUNTER — LAB (OUTPATIENT)
Dept: LAB | Facility: HOSPITAL | Age: 48
End: 2023-07-19
Payer: COMMERCIAL

## 2023-07-19 DIAGNOSIS — A52.8 SYPHILIS, LATE LATENT: ICD-10-CM

## 2023-07-19 LAB
ALBUMIN SERPL BCP-MCNC: 4.4 G/DL (ref 3.5–5)
ALP SERPL-CCNC: 46 U/L (ref 34–104)
ALT SERPL W P-5'-P-CCNC: 11 U/L (ref 7–52)
ANION GAP SERPL CALCULATED.3IONS-SCNC: 9 MMOL/L
AST SERPL W P-5'-P-CCNC: 10 U/L (ref 13–39)
BASOPHILS # BLD AUTO: 0.11 THOUSANDS/ÂΜL (ref 0–0.1)
BASOPHILS NFR BLD AUTO: 2 % (ref 0–1)
BILIRUB SERPL-MCNC: 0.54 MG/DL (ref 0.2–1)
BUN SERPL-MCNC: 11 MG/DL (ref 5–25)
CALCIUM SERPL-MCNC: 9.3 MG/DL (ref 8.4–10.2)
CHLORIDE SERPL-SCNC: 103 MMOL/L (ref 96–108)
CO2 SERPL-SCNC: 26 MMOL/L (ref 21–32)
CREAT SERPL-MCNC: 0.69 MG/DL (ref 0.6–1.3)
EOSINOPHIL # BLD AUTO: 0.43 THOUSAND/ÂΜL (ref 0–0.61)
EOSINOPHIL NFR BLD AUTO: 6 % (ref 0–6)
ERYTHROCYTE [DISTWIDTH] IN BLOOD BY AUTOMATED COUNT: 12.8 % (ref 11.6–15.1)
GFR SERPL CREATININE-BSD FRML MDRD: 112 ML/MIN/1.73SQ M
GLUCOSE SERPL-MCNC: 225 MG/DL (ref 65–140)
HCT VFR BLD AUTO: 40.8 % (ref 36.5–49.3)
HGB BLD-MCNC: 13.1 G/DL (ref 12–17)
IMM GRANULOCYTES # BLD AUTO: 0.02 THOUSAND/UL (ref 0–0.2)
IMM GRANULOCYTES NFR BLD AUTO: 0 % (ref 0–2)
LYMPHOCYTES # BLD AUTO: 2.04 THOUSANDS/ÂΜL (ref 0.6–4.47)
LYMPHOCYTES NFR BLD AUTO: 28 % (ref 14–44)
MCH RBC QN AUTO: 28.5 PG (ref 26.8–34.3)
MCHC RBC AUTO-ENTMCNC: 32.1 G/DL (ref 31.4–37.4)
MCV RBC AUTO: 89 FL (ref 82–98)
MONOCYTES # BLD AUTO: 0.45 THOUSAND/ÂΜL (ref 0.17–1.22)
MONOCYTES NFR BLD AUTO: 6 % (ref 4–12)
NEUTROPHILS # BLD AUTO: 4.33 THOUSANDS/ÂΜL (ref 1.85–7.62)
NEUTS SEG NFR BLD AUTO: 58 % (ref 43–75)
NRBC BLD AUTO-RTO: 0 /100 WBCS
PLATELET # BLD AUTO: 278 THOUSANDS/UL (ref 149–390)
PMV BLD AUTO: 10.1 FL (ref 8.9–12.7)
POTASSIUM SERPL-SCNC: 3.7 MMOL/L (ref 3.5–5.3)
PROT SERPL-MCNC: 7.1 G/DL (ref 6.4–8.4)
RBC # BLD AUTO: 4.59 MILLION/UL (ref 3.88–5.62)
SODIUM SERPL-SCNC: 138 MMOL/L (ref 135–147)
TREPONEMA PALLIDUM IGG+IGM AB [PRESENCE] IN SERUM OR PLASMA BY IMMUNOASSAY: REACTIVE
WBC # BLD AUTO: 7.38 THOUSAND/UL (ref 4.31–10.16)

## 2023-07-19 PROCEDURE — 86592 SYPHILIS TEST NON-TREP QUAL: CPT

## 2023-07-19 PROCEDURE — 36415 COLL VENOUS BLD VENIPUNCTURE: CPT

## 2023-07-19 PROCEDURE — 86593 SYPHILIS TEST NON-TREP QUANT: CPT

## 2023-07-19 PROCEDURE — 86780 TREPONEMA PALLIDUM: CPT

## 2023-07-20 ENCOUNTER — PATIENT OUTREACH (OUTPATIENT)
Dept: SURGERY | Facility: CLINIC | Age: 48
End: 2023-07-20

## 2023-07-20 LAB
BASOPHILS # BLD AUTO: 0.1 X10E3/UL (ref 0–0.2)
BASOPHILS NFR BLD AUTO: 2 %
CD3+CD4+ CELLS # BLD: 741 /UL (ref 359–1519)
CD3+CD4+ CELLS NFR BLD: 35.3 % (ref 30.8–58.5)
CD3+CD4+ CELLS/CD3+CD8+ CLL BLD: 1.03 % (ref 0.92–3.72)
CD3+CD8+ CELLS # BLD: 718 /UL (ref 109–897)
CD3+CD8+ CELLS NFR BLD: 34.2 % (ref 12–35.5)
EOSINOPHIL # BLD AUTO: 0.4 X10E3/UL (ref 0–0.4)
EOSINOPHIL NFR BLD AUTO: 6 %
ERYTHROCYTE [DISTWIDTH] IN BLOOD BY AUTOMATED COUNT: 12.6 % (ref 11.6–15.4)
HCT VFR BLD AUTO: 42.1 % (ref 37.5–51)
HGB BLD-MCNC: 13.5 G/DL (ref 13–17.7)
IMM GRANULOCYTES # BLD: 0 X10E3/UL (ref 0–0.1)
IMM GRANULOCYTES NFR BLD: 0 %
LYMPHOCYTES # BLD AUTO: 2.1 X10E3/UL (ref 0.7–3.1)
LYMPHOCYTES NFR BLD AUTO: 27 %
MCH RBC QN AUTO: 29.3 PG (ref 26.6–33)
MCHC RBC AUTO-ENTMCNC: 32.1 G/DL (ref 31.5–35.7)
MCV RBC AUTO: 92 FL (ref 79–97)
MONOCYTES # BLD AUTO: 0.5 X10E3/UL (ref 0.1–0.9)
MONOCYTES NFR BLD AUTO: 6 %
NEUTROPHILS # BLD AUTO: 4.5 X10E3/UL (ref 1.4–7)
NEUTROPHILS NFR BLD AUTO: 59 %
PLATELET # BLD AUTO: 289 X10E3/UL (ref 150–450)
RBC # BLD AUTO: 4.6 X10E6/UL (ref 4.14–5.8)
RPR SER QL: REACTIVE
RPR SER-TITR: ABNORMAL {TITER}
WBC # BLD AUTO: 7.6 X10E3/UL (ref 3.4–10.8)

## 2023-07-21 LAB
HIV1 RNA # PLAS NAA DL=20: NOT DETECTED {COPIES}/ML
T PALLIDUM AB SER QL IF: REACTIVE

## 2023-07-21 NOTE — PROGRESS NOTES
HC discussed Ct's case with [facility] OF [facility]. who states he will need to see severance letter. HC will follow up on  auth and inspection and follow up with HS. Once done can complete TBRA application.

## 2023-07-24 DIAGNOSIS — E78.2 MIXED HYPERLIPIDEMIA: ICD-10-CM

## 2023-07-24 DIAGNOSIS — S29.011D MUSCLE STRAIN OF CHEST WALL, SUBSEQUENT ENCOUNTER: ICD-10-CM

## 2023-07-25 NOTE — PROGRESS NOTES
HC discussed Ct's case with HS. HC reviewed his file and Ct will need to be seen next month. HC text Ct when would be a good day to see HC.

## 2023-07-27 ENCOUNTER — PATIENT OUTREACH (OUTPATIENT)
Dept: SURGERY | Facility: CLINIC | Age: 48
End: 2023-07-27

## 2023-07-28 NOTE — PROGRESS NOTES
[facility] OF [place], [facility]. text ct we need to schedule TBRA in person and meet. Ct states he can only meet after 3:30 or weekends. HC discussed with HS.

## 2023-07-31 ENCOUNTER — PATIENT OUTREACH (OUTPATIENT)
Dept: SURGERY | Facility: CLINIC | Age: 48
End: 2023-07-31

## 2023-08-01 RX ORDER — CYCLOBENZAPRINE HCL 10 MG
TABLET ORAL
Qty: 60 TABLET | Refills: 0 | Status: SHIPPED | OUTPATIENT
Start: 2023-08-01

## 2023-08-01 RX ORDER — ATORVASTATIN CALCIUM 20 MG/1
20 TABLET, FILM COATED ORAL DAILY
Qty: 30 TABLET | Refills: 1 | Status: SHIPPED | OUTPATIENT
Start: 2023-08-01

## 2023-08-03 ENCOUNTER — PATIENT OUTREACH (OUTPATIENT)
Dept: SURGERY | Facility: CLINIC | Age: 48
End: 2023-08-03

## 2023-08-04 ENCOUNTER — PATIENT OUTREACH (OUTPATIENT)
Dept: SURGERY | Facility: CLINIC | Age: 48
End: 2023-08-04

## 2023-08-08 ENCOUNTER — OFFICE VISIT (OUTPATIENT)
Dept: SURGERY | Facility: CLINIC | Age: 48
End: 2023-08-08
Payer: COMMERCIAL

## 2023-08-08 ENCOUNTER — PATIENT OUTREACH (OUTPATIENT)
Dept: SURGERY | Facility: CLINIC | Age: 48
End: 2023-08-08

## 2023-08-08 VITALS
HEART RATE: 80 BPM | OXYGEN SATURATION: 97 % | HEIGHT: 71 IN | WEIGHT: 216.4 LBS | SYSTOLIC BLOOD PRESSURE: 126 MMHG | BODY MASS INDEX: 30.3 KG/M2 | DIASTOLIC BLOOD PRESSURE: 82 MMHG | TEMPERATURE: 97.7 F

## 2023-08-08 DIAGNOSIS — E11.65 UNCONTROLLED TYPE 2 DIABETES MELLITUS WITH HYPERGLYCEMIA (HCC): ICD-10-CM

## 2023-08-08 DIAGNOSIS — I10 PRIMARY HYPERTENSION: ICD-10-CM

## 2023-08-08 DIAGNOSIS — B20 HUMAN IMMUNODEFICIENCY VIRUS (HIV) DISEASE (HCC): Primary | ICD-10-CM

## 2023-08-08 DIAGNOSIS — A52.8 SYPHILIS, LATE LATENT: ICD-10-CM

## 2023-08-08 DIAGNOSIS — E66.9 OBESITY (BMI 30.0-34.9): ICD-10-CM

## 2023-08-08 PROCEDURE — 99214 OFFICE O/P EST MOD 30 MIN: CPT | Performed by: INTERNAL MEDICINE

## 2023-08-08 NOTE — PROGRESS NOTES
Progress Note - Infectious Disease   Maddi Arellano 50 y.o. male MRN: [id number]  Unit/Bed#:  Encounter: [id number]      Impression/Plan:  1. HIV-doing well on Biktarvy with a near undetectable viral load and a CD4 count in the 700s.  Continue ART, recheck labs in 5 months, follow-up in 6 months.  Stressed adherence     2. Diabetes mellitus-improved controlled with hemoglobin A1c down to 8.2. Refused endocrinology previously. Stressed the importance of tight control.       3. Obesity-continues to stressed the importance of diet and exercise. Weight stable  Discussed with the primary     4. Syphilis-patient status post treatment with good response but now with increased RPR to 1:32.   Given benzathine PCN X 3 for presumptive late latent syphilis. Titer is now dropped to 1: 16.  Recheck in 3 months     5. Hypertension-better control.     Patient was provided medication, adherence and prevention education    Subjective:  Routine follow-up for HIV. Patient claims 100% adherence with Biktarvy. Patient denies any notable side effects. Overall the feeling well. The patient denies any fever chills or sweats, denies any nausea vomiting or diarrhea, denies any cough or shortness of breath. ROS:  A complete review of systems is negative other than that noted above in the subjective    Followup portions patient history reviewed and updated as: Allergies, current medications, past medical history, past social history, past surgical history, and the problem list    Objective:  Vitals:  Vitals:    08/08/23 1713   BP: 126/82   Pulse: 80   Temp: 97.7 °F (36.5 °C)   SpO2: 97%   Weight: 98.2 kg (216 lb 6.4 oz)   Height: 5' 11" (1.803 m)       Physical Exam:   General Appearance:  Alert, interactive, appearing well,  nontoxic, no acute distress. Neck:   Supple without lymphadenopathy, no thyromegaly or masses   Throat: Oropharynx moist without lesions.     Lungs:   Clear to auscultation bilaterally; no wheezes, rhonchi or rales; respirations unlabored   Heart:  RRR; no murmur, rub or gallop   Abdomen:   Soft, non-tender, non-distended, positive bowel sounds. Extremities: No clubbing, cyanosis or edema   Skin: No new rashes or lesions. No draining wounds noted. Labs, Imaging, & Other studies:   All pertinent labs and imaging studies were personally reviewed    Lab Results   Component Value Date     (L) 04/04/2018    K 3.7 07/19/2023     07/19/2023    CO2 26 07/19/2023    ANIONGAP 10 04/04/2018    BUN 11 07/19/2023    CREATININE 0.69 07/19/2023    GLUCOSE 269 (H) 09/10/2018    GLUF 162 (H) 04/25/2023    CALCIUM 9.3 07/19/2023    AST 10 (L) 07/19/2023    ALT 11 07/19/2023    ALKPHOS 46 07/19/2023    PROT 6.9 04/04/2018    BILITOT 0.4 04/04/2018    EGFR 112 07/19/2023     Lab Results   Component Value Date    WBC 7.38 07/19/2023    WBC 7.6 07/19/2023    HGB 13.1 07/19/2023    HGB 13.5 07/19/2023    HCT 40.8 07/19/2023    HCT 42.1 07/19/2023    MCV 89 07/19/2023    MCV 92 07/19/2023     07/19/2023     07/19/2023     Lab Results   Component Value Date    HEPCAB Non-reactive 02/01/2023     Lab Results   Component Value Date    HAV Reactive (A) 12/11/2021    HEPCAB Non-reactive 02/01/2023     Lab Results   Component Value Date    RPR Reactive (A) 07/19/2023    RPR Reactive 16 dils (A) 07/19/2023     CD4 ABS   Date/Time Value Ref Range Status   07/19/2023 09:33  359 - 1519 /uL Final     HIV-1 RNA by PCR, Qn   Date/Time Value Ref Range Status   04/25/2023 08:04 AM <20 copies/mL Final     Comment:     HIV-1 RNA not detected  The reportable range for this assay is 20 to 10,000,000  copies HIV-1 RNA/mL.      HIV-1 TARGET   Date/Time Value Ref Range Status   07/19/2023 09:33 AM Not Detected Not Detected Final           Current Outpatient Medications:   •  Aspirin Low Dose 81 MG EC tablet, Take 1 tablet (81 mg total) by mouth daily, Disp: 30 tablet, Rfl: 2  •  atorvastatin (LIPITOR) 20 mg tablet, TAKE 1 TABLET BY MOUTH DAILY, Disp: 30 tablet, Rfl: 1  •  BD Pen Needle Bela U/F 32G X 4 MM MISC, Inject under the skin daily at bedtime, Disp: 100 each, Rfl: 2  •  Biktarvy -25 MG tablet, Take 1 tablet by mouth daily, Disp: 30 tablet, Rfl: 2  •  cyclobenzaprine (FLEXERIL) 10 mg tablet, TAKE 1 TABLET BY MOUTH THREE TIMES A DAY AS NEEDED FOR MUSCLE SPASM, Disp: 60 tablet, Rfl: 0  •  ibuprofen (MOTRIN) 600 mg tablet, TAKE 1 TABLET BY MOUTH EVERY SIX HOURS AS NEEDED FOR MODERATE PAIN, Disp: 30 tablet, Rfl: 2  •  Lantus SoloStar 100 units/mL SOPN, Inject 0.4 mL (40 Units total) under the skin daily at bedtime, Disp: 15 mL, Rfl: 2  •  tadalafil (CIALIS) 20 MG tablet, Take 1 tablet (20 mg total) by mouth daily as needed for erectile dysfunction, Disp: 30 tablet, Rfl: 0  •  valACYclovir (VALTREX) 1,000 mg tablet, TAKE 1 TABLET BY MOUTH DAILY, Disp: 30 tablet, Rfl: 2  •  semaglutide, 1 mg/dose, (Ozempic, 1 MG/DOSE,) 4 mg/3 mL injection pen, Inject 0.75 mL (1 mg total) under the skin every 7 days (Patient not taking: Reported on 8/8/2023), Disp: 3 mL, Rfl: 2

## 2023-08-10 ENCOUNTER — PATIENT OUTREACH (OUTPATIENT)
Dept: SURGERY | Facility: CLINIC | Age: 48
End: 2023-08-10

## 2023-08-11 ENCOUNTER — PATIENT OUTREACH (OUTPATIENT)
Dept: SURGERY | Facility: CLINIC | Age: 48
End: 2023-08-11

## 2023-08-15 ENCOUNTER — PATIENT OUTREACH (OUTPATIENT)
Dept: SURGERY | Facility: CLINIC | Age: 48
End: 2023-08-15

## 2023-08-15 DIAGNOSIS — N52.9 ERECTILE DYSFUNCTION, UNSPECIFIED ERECTILE DYSFUNCTION TYPE: ICD-10-CM

## 2023-08-16 DIAGNOSIS — N52.9 ERECTILE DYSFUNCTION, UNSPECIFIED ERECTILE DYSFUNCTION TYPE: Primary | ICD-10-CM

## 2023-08-16 RX ORDER — TADALAFIL 20 MG/1
20 TABLET ORAL DAILY PRN
Qty: 30 TABLET | Refills: 0 | OUTPATIENT
Start: 2023-08-16

## 2023-08-17 ENCOUNTER — TELEPHONE (OUTPATIENT)
Dept: UROLOGY | Facility: AMBULATORY SURGERY CENTER | Age: 48
End: 2023-08-17

## 2023-08-17 ENCOUNTER — TELEPHONE (OUTPATIENT)
Dept: SURGERY | Facility: CLINIC | Age: 48
End: 2023-08-17

## 2023-08-17 NOTE — TELEPHONE ENCOUNTER
Pt called to tell us he has an apt scheduled with urology on 9/28. He asked that a script for Cialis be sent to the [address] [place] [address] on [facility], to hold him over.

## 2023-08-17 NOTE — TELEPHONE ENCOUNTER
Spoke to patient directly and scheduled a new patient appointment with Dr. Leonela Santiago for erectile dysfunction. Date, time, location and patient demographics were confirmed and reviewed with patient. He was thankful the appointment.

## 2023-08-18 ENCOUNTER — PATIENT OUTREACH (OUTPATIENT)
Dept: SURGERY | Facility: CLINIC | Age: 48
End: 2023-08-18

## 2023-08-21 DIAGNOSIS — E11.65 UNCONTROLLED TYPE 2 DIABETES MELLITUS WITH HYPERGLYCEMIA (HCC): ICD-10-CM

## 2023-08-21 DIAGNOSIS — B20 HIV INFECTION, UNSPECIFIED SYMPTOM STATUS (HCC): ICD-10-CM

## 2023-08-21 DIAGNOSIS — S29.011D MUSCLE STRAIN OF CHEST WALL, SUBSEQUENT ENCOUNTER: ICD-10-CM

## 2023-08-22 RX ORDER — CYCLOBENZAPRINE HCL 10 MG
TABLET ORAL
Qty: 60 TABLET | Refills: 0 | Status: SHIPPED | OUTPATIENT
Start: 2023-08-22

## 2023-08-22 RX ORDER — PEN NEEDLE, DIABETIC 32GX 5/32"
NEEDLE, DISPOSABLE MISCELLANEOUS
Qty: 100 EACH | Refills: 1 | Status: SHIPPED | OUTPATIENT
Start: 2023-08-22

## 2023-08-22 RX ORDER — ASPIRIN 81 MG/1
81 TABLET, COATED ORAL DAILY
Qty: 30 TABLET | Refills: 2 | Status: SHIPPED | OUTPATIENT
Start: 2023-08-22

## 2023-08-22 RX ORDER — BICTEGRAVIR SODIUM, EMTRICITABINE, AND TENOFOVIR ALAFENAMIDE FUMARATE 50; 200; 25 MG/1; MG/1; MG/1
1 TABLET ORAL DAILY
Qty: 30 TABLET | Refills: 2 | Status: SHIPPED | OUTPATIENT
Start: 2023-08-22

## 2023-08-22 RX ORDER — INSULIN GLARGINE 100 [IU]/ML
INJECTION, SOLUTION SUBCUTANEOUS
Qty: 15 ML | Refills: 2 | Status: SHIPPED | OUTPATIENT
Start: 2023-08-22

## 2023-08-31 ENCOUNTER — PATIENT OUTREACH (OUTPATIENT)
Dept: SURGERY | Facility: CLINIC | Age: 48
End: 2023-08-31

## 2023-08-31 NOTE — PROGRESS NOTES
HC and Ct met to complete TBRA renewal.  Ct came with all his documents. HC went over his income and expenses and it all was consistent. Ct remains to need to bring his severance letter. Ct states that he will send that to [facility] OF [facility].. Ct also states that he was awardered child support from his daughters mom but has yet to receive it.

## 2023-09-01 NOTE — PROGRESS NOTES
[facility] OF [facility]. text Ct regarding needing the closure letter of his job. Ct states he did get it but he's going to bring me a copy of it on Thursday.

## 2023-09-12 NOTE — PROGRESS NOTES
HC completed inspection. There were no concerns. Ct states he has letter from the court regarding child support and gave it to [facility] OF [facility]. to copy and mail back.

## 2023-09-15 ENCOUNTER — PATIENT OUTREACH (OUTPATIENT)
Dept: SURGERY | Facility: CLINIC | Age: 48
End: 2023-09-15

## 2023-09-17 NOTE — PROGRESS NOTES
HC discussed case with WOLF Moe, HC contacted accounts payable via email to receive updates on check for [employer] and to issue stop payment/reissue. HC let AP know that [employer] is missing additional payments juanis were not received, HC wanted to verify that checks were not cashed.

## 2023-09-18 ENCOUNTER — PATIENT OUTREACH (OUTPATIENT)
Dept: SURGERY | Facility: CLINIC | Age: 48
End: 2023-09-18

## 2023-09-18 NOTE — PROGRESS NOTES
HC received e-mail regarding who would be interested in participating in the coats for kids program this year. [facility]. text Ct who states is interested and provided needed information. [facility]. emailed DARRELL De Guzman with information. [facility]. text Ct with Housing Survey. Ct states it has been completed it.

## 2023-09-19 DIAGNOSIS — Z79.899 OTHER LONG TERM (CURRENT) DRUG THERAPY: ICD-10-CM

## 2023-09-19 DIAGNOSIS — Z13.1 SCREENING FOR DIABETES MELLITUS: ICD-10-CM

## 2023-09-19 DIAGNOSIS — E66.9 OBESITY (BMI 30.0-34.9): ICD-10-CM

## 2023-09-19 DIAGNOSIS — E11.65 UNCONTROLLED TYPE 2 DIABETES MELLITUS WITH HYPERGLYCEMIA (HCC): Primary | ICD-10-CM

## 2023-09-20 ENCOUNTER — PATIENT OUTREACH (OUTPATIENT)
Dept: SURGERY | Facility: CLINIC | Age: 48
End: 2023-09-20

## 2023-09-20 NOTE — PROGRESS NOTES
Ct notified HC that rent was going to be increased and provided updated lease. HC notified HS who stopped payment and HC adjusted. [facility] OF [place], [facility]. completed October check req and submitted to hs.

## 2023-09-27 NOTE — PROGRESS NOTES
=  Referring Physician: REDD Gonzalez  A copy of this note was sent to the referring physician. Diagnoses and all orders for this visit:    Erectile dysfunction, unspecified erectile dysfunction type  -     Ambulatory referral to Urology            Assessment and plan:       1. Refractory erectile dysfunction  -  -    2. Diabetes with hemoglobin A1c of 8.2  -  -     3. Medical comorbidities: History of latent syphilis, obesity with a BMI of 30.1, HIV with undetectable viral load, hypertension not on beta-blocker        Today, we had a long and candid discussion about the multifactorial etiology of erectile dysfunction and the stepwise approach to treatment. I discussed the advantages and disadvantages of the standard treatments for erectile dysfunction including phosphodiesterase inhibitors, intracavernosal injections, urethral suppositories, vacuum erection devices, and penile implants. The patient is interested in trying oral pharmacotherapy to start. I discussed the potential side affects of these medications including, but not limited to flushing, headaches, visual changes, nasal congestion, interactions with other medications, prolonged erections, and failure to achieve an erection. He was warned never to take nitroglycerin in conjunction with medications for erectile dysfunction. I also recommended that he stagger when he takes alpha-blockers for BPH and phosphodiesterase inhibitors. If the patient fails multiple trials of the maximum strength dose, we will further discuss alternative treatment options. All of his questions were answered today and he understands the logic of this approach. Janina Ramírez has had a poor response to 2 different PDE 5 inhibitors at appropriate dosing and utilizing appropriate administration instructions. Today reviewed the option of penile self injection therapy. Dosing adverse effects including the risks of hematoma and pain and fibrosis were reviewed.   He is eager to start treatment    I provided prescription electronically to a compounding pharmacy, personalized instruction handout. He will return in the near future for a penile injection teaching visit with our advanced practitioner. Patient has a history of HIV with an undetectable viral load. Standard universal precautions will be utilized at his injection visit    Paulino Hernández MD      Chief Complaint     Consult for refractory ED      History of Present Illness     Jeni Tineo is a 50 y.o. referred in consultation for refractory ED    Detailed Urologic History     - please refer to HPI    Review of Systems     Review of Systems   Constitutional: Negative for activity change and fatigue. HENT: Negative for congestion. Eyes: Negative for visual disturbance. Respiratory: Negative for shortness of breath and wheezing. Cardiovascular: Negative for chest pain and leg swelling. Gastrointestinal: Negative for abdominal pain. Endocrine: Negative for polyuria. Genitourinary: Negative for dysuria, flank pain, hematuria and urgency. Musculoskeletal: Negative for back pain. Allergic/Immunologic: Negative for immunocompromised state. Neurological: Negative for dizziness and numbness. Psychiatric/Behavioral: Negative for dysphoric mood. All other systems reviewed and are negative. Allergies     Allergies   Allergen Reactions   • Metformin Diarrhea       Physical Exam       Physical Exam  Constitutional:       General: He is not in acute distress. Appearance: He is well-developed. HENT:      Head: Normocephalic and atraumatic. Cardiovascular:      Comments: Negative lower extremity edema  Pulmonary:      Effort: Pulmonary effort is normal.      Breath sounds: Normal breath sounds. Abdominal:      Palpations: Abdomen is soft. Musculoskeletal:         General: Normal range of motion. Cervical back: Normal range of motion. Skin:     General: Skin is warm. Neurological:      Mental Status: He is alert and oriented to person, place, and time. Psychiatric:         Behavior: Behavior normal.           Vital Signs  There were no vitals filed for this visit. Current Medications       Current Outpatient Medications:   •  Aspirin Low Dose 81 MG EC tablet, TAKE 1 TABLET BY MOUTH DAILY, Disp: 30 tablet, Rfl: 2  •  atorvastatin (LIPITOR) 20 mg tablet, TAKE 1 TABLET BY MOUTH DAILY, Disp: 30 tablet, Rfl: 1  •  BD Pen Needle Bela U/F 32G X 4 MM MISC, INJECT AS DIRECTED SUBCUTANEOUSLY DAILY AT BEDTIME, Disp: 100 each, Rfl: 1  •  Biktarvy -25 MG tablet, TAKE 1 TABLET BY MOUTH DAILY, Disp: 30 tablet, Rfl: 2  •  cyclobenzaprine (FLEXERIL) 10 mg tablet, TAKE 1 TABLET BY MOUTH THREE TIMES A DAY AS NEEDED FOR MUSCLE SPASM, Disp: 60 tablet, Rfl: 0  •  ibuprofen (MOTRIN) 600 mg tablet, TAKE 1 TABLET BY MOUTH EVERY SIX HOURS AS NEEDED FOR MODERATE PAIN, Disp: 30 tablet, Rfl: 2  •  Lantus SoloStar 100 units/mL SOPN, INJECT 0.4ML (40 UNITS TOTAL) SUBCUTANEOUSLY DAILY AT BEDTIME, Disp: 15 mL, Rfl: 2  •  semaglutide, 1 mg/dose, (Ozempic, 1 MG/DOSE,) 4 mg/3 mL injection pen, Inject 0.75 mL (1 mg total) under the skin every 7 days (Patient not taking: Reported on 8/8/2023), Disp: 3 mL, Rfl: 2  •  tadalafil (CIALIS) 20 MG tablet, Take 1 tablet (20 mg total) by mouth daily as needed for erectile dysfunction, Disp: 30 tablet, Rfl: 0  •  valACYclovir (VALTREX) 1,000 mg tablet, TAKE 1 TABLET BY MOUTH DAILY, Disp: 30 tablet, Rfl: 2      Active Problems     Patient Active Problem List   Diagnosis   • Human immunodeficiency virus (HIV) disease ([address])   • Obesity (BMI 30.0-34. 9)   • Syphilis, late latent   • Uncontrolled type 2 diabetes mellitus with hyperglycemia (HCC)   • Vasculogenic erectile dysfunction   • HSV (herpes simplex virus) anogenital infection   • Primary hypertension         Past Medical History     Past Medical History:   Diagnosis Date   • Chlamydia    • Diabetes mellitus ([address] [facility])     Type II   • Gonorrhea    • Herpes    • HIV disease ([address] [facility]) 07/2020    mode of transmission: heterosexual   • Hyperlipidemia    • Hypertension    • Neoplasm of kidney    • Obesity     BMI 37.2   • Syphilis          Surgical History     Past Surgical History:   Procedure Laterality Date   • NO PAST SURGERIES           Family History     Family History   Problem Relation Age of Onset   • Diabetes Mother    • Diabetes Father    • Diabetes Family          Social History     Social History     Social History     Tobacco Use   Smoking Status Never   Smokeless Tobacco Never         Pertinent Lab Values     Lab Results   Component Value Date    CREATININE 0.69 07/19/2023       No results found for: "PSA"    @RESULTRCNT(1H])@      Pertinent Imaging       No results found. Portions of the record may have been created with voice recognition software. Occasional wrong word or "sound a like" substitutions may have occurred due to the inherent limitations of voice recognition software. In addition some of the content generated from this outpatient encounter includes information designed for patient education and/or communication back to the referring provider. Read the chart carefully and recognize, using context, where substitutions have occurred.

## 2023-09-28 ENCOUNTER — OFFICE VISIT (OUTPATIENT)
Dept: UROLOGY | Facility: CLINIC | Age: 48
End: 2023-09-28
Payer: COMMERCIAL

## 2023-09-28 VITALS
OXYGEN SATURATION: 96 % | SYSTOLIC BLOOD PRESSURE: 142 MMHG | HEART RATE: 86 BPM | DIASTOLIC BLOOD PRESSURE: 78 MMHG | BODY MASS INDEX: 30.13 KG/M2 | WEIGHT: 216 LBS

## 2023-09-28 DIAGNOSIS — N52.9 ERECTILE DYSFUNCTION, UNSPECIFIED ERECTILE DYSFUNCTION TYPE: ICD-10-CM

## 2023-09-28 PROCEDURE — 99204 OFFICE O/P NEW MOD 45 MIN: CPT | Performed by: UROLOGY

## 2023-10-02 ENCOUNTER — TELEPHONE (OUTPATIENT)
Age: 48
End: 2023-10-02

## 2023-10-02 ENCOUNTER — APPOINTMENT (OUTPATIENT)
Dept: LAB | Facility: HOSPITAL | Age: 48
End: 2023-10-02
Payer: COMMERCIAL

## 2023-10-02 DIAGNOSIS — A52.8 SYPHILIS, LATE LATENT: ICD-10-CM

## 2023-10-02 LAB
CREAT UR-MCNC: 126.7 MG/DL
EST. AVERAGE GLUCOSE BLD GHB EST-MCNC: 266 MG/DL
GLUCOSE P FAST SERPL-MCNC: 309 MG/DL (ref 65–99)
HBA1C MFR BLD: 10.9 %
MICROALBUMIN UR-MCNC: 74.1 MG/L
MICROALBUMIN/CREAT 24H UR: 58 MG/G CREATININE (ref 0–30)

## 2023-10-02 PROCEDURE — 86780 TREPONEMA PALLIDUM: CPT

## 2023-10-02 PROCEDURE — 86592 SYPHILIS TEST NON-TREP QUAL: CPT

## 2023-10-02 PROCEDURE — 36415 COLL VENOUS BLD VENIPUNCTURE: CPT

## 2023-10-02 PROCEDURE — 86593 SYPHILIS TEST NON-TREP QUANT: CPT

## 2023-10-02 NOTE — TELEPHONE ENCOUNTER
Patient of Dr Eldon Bravo at [facility]    Patient called stating he picked the medication from [address] [place] [address] for Trimix. He stated he wants to know if he can have a sooner appointment. He does not to wait until 10/18/23 to try it out. Please review and see how to proceed.     Patient can be reached at [phone]

## 2023-10-02 NOTE — TELEPHONE ENCOUNTER
Lvm to advised pt there are no openings before his apt on 10/18 for trimix teaching, provided number if he has any further questions

## 2023-10-03 ENCOUNTER — PATIENT OUTREACH (OUTPATIENT)
Dept: SURGERY | Facility: CLINIC | Age: 48
End: 2023-10-03

## 2023-10-03 LAB
RPR SER QL: REACTIVE
RPR SER-TITR: ABNORMAL {TITER}
TREPONEMA PALLIDUM IGG+IGM AB [PRESENCE] IN SERUM OR PLASMA BY IMMUNOASSAY: REACTIVE

## 2023-10-04 ENCOUNTER — PATIENT OUTREACH (OUTPATIENT)
Dept: SURGERY | Facility: CLINIC | Age: 48
End: 2023-10-04

## 2023-10-04 DIAGNOSIS — A60.9 HSV (HERPES SIMPLEX VIRUS) ANOGENITAL INFECTION: ICD-10-CM

## 2023-10-04 DIAGNOSIS — E78.2 MIXED HYPERLIPIDEMIA: ICD-10-CM

## 2023-10-04 LAB — T PALLIDUM AB SER QL IF: REACTIVE

## 2023-10-04 RX ORDER — VALACYCLOVIR HYDROCHLORIDE 1 G/1
TABLET, FILM COATED ORAL
Qty: 30 TABLET | Refills: 2 | Status: SHIPPED | OUTPATIENT
Start: 2023-10-04 | End: 2024-01-02

## 2023-10-04 RX ORDER — ATORVASTATIN CALCIUM 20 MG/1
20 TABLET, FILM COATED ORAL DAILY
Qty: 30 TABLET | Refills: 1 | Status: SHIPPED | OUTPATIENT
Start: 2023-10-04

## 2023-10-06 ENCOUNTER — PATIENT OUTREACH (OUTPATIENT)
Dept: SURGERY | Facility: CLINIC | Age: 48
End: 2023-10-06

## 2023-10-09 ENCOUNTER — PATIENT OUTREACH (OUTPATIENT)
Dept: SURGERY | Facility: CLINIC | Age: 48
End: 2023-10-09

## 2023-10-10 ENCOUNTER — OFFICE VISIT (OUTPATIENT)
Dept: SURGERY | Facility: CLINIC | Age: 48
End: 2023-10-10
Payer: COMMERCIAL

## 2023-10-10 ENCOUNTER — PATIENT OUTREACH (OUTPATIENT)
Dept: SURGERY | Facility: CLINIC | Age: 48
End: 2023-10-10

## 2023-10-10 VITALS
DIASTOLIC BLOOD PRESSURE: 88 MMHG | TEMPERATURE: 97.3 F | RESPIRATION RATE: 14 BRPM | HEART RATE: 60 BPM | OXYGEN SATURATION: 99 % | HEIGHT: 71 IN | BODY MASS INDEX: 31 KG/M2 | WEIGHT: 221.4 LBS | SYSTOLIC BLOOD PRESSURE: 138 MMHG

## 2023-10-10 DIAGNOSIS — E11.65 UNCONTROLLED TYPE 2 DIABETES MELLITUS WITH HYPERGLYCEMIA (HCC): ICD-10-CM

## 2023-10-10 DIAGNOSIS — E66.9 OBESITY (BMI 30.0-34.9): ICD-10-CM

## 2023-10-10 DIAGNOSIS — N52.9 ERECTILE DYSFUNCTION, UNSPECIFIED ERECTILE DYSFUNCTION TYPE: ICD-10-CM

## 2023-10-10 DIAGNOSIS — I10 PRIMARY HYPERTENSION: ICD-10-CM

## 2023-10-10 DIAGNOSIS — Z23 NEED FOR INFLUENZA VACCINATION: ICD-10-CM

## 2023-10-10 DIAGNOSIS — B20 HUMAN IMMUNODEFICIENCY VIRUS (HIV) DISEASE (HCC): Primary | ICD-10-CM

## 2023-10-10 PROCEDURE — 99214 OFFICE O/P EST MOD 30 MIN: CPT | Performed by: NURSE PRACTITIONER

## 2023-10-10 PROCEDURE — 90686 IIV4 VACC NO PRSV 0.5 ML IM: CPT

## 2023-10-10 PROCEDURE — 90471 IMMUNIZATION ADMIN: CPT

## 2023-10-10 RX ORDER — TADALAFIL 20 MG/1
20 TABLET ORAL DAILY PRN
Qty: 30 TABLET | Refills: 0 | Status: SHIPPED | OUTPATIENT
Start: 2023-10-10

## 2023-10-10 RX ORDER — PEN NEEDLE, DIABETIC 32 GX 1/6"
NEEDLE, DISPOSABLE MISCELLANEOUS DAILY
Qty: 100 EACH | Refills: 1 | Status: SHIPPED | OUTPATIENT
Start: 2023-10-10

## 2023-10-10 NOTE — ASSESSMENT & PLAN NOTE
Blood pressure: Blood pressure is above goal of 120/80. BP Readings from Last 3 Encounters:   10/10/23 138/88   09/28/23 142/78   08/08/23 126/82       Attempted lifestyle changes to improve blood pressure. Discussed the importance of good blood pressure control. Educated to keep a home journal of blood pressures. May need to add an ACE inhibitor at next visit. Educated on the following lifestyle modifications to lower BP and decrease cardiovascular risk factors. limit alcohol intake, reduce salt in diet, maintain a healthy weight, engage in 30 minutes of cardiovascular exercise daily, and not smoke.

## 2023-10-10 NOTE — PROGRESS NOTES
Assessment/Plan:    Human immunodeficiency virus (HIV) disease ([address])  No results found for: "SG3FADP"  CD4 ABS   Date/Time Value Ref Range Status   2023 09:33  359 - 1519 /uL Final     HIV-1 RNA by PCR, Qn   Date/Time Value Ref Range Status   2023 08:04 AM <20 copies/mL Final     Comment:     HIV-1 RNA not detected  The reportable range for this assay is 20 to 10,000,000  copies HIV-1 RNA/mL. HIV-1 RNA Viral Load Log   Date/Time Value Ref Range Status   2023 08:04 AM COMMENT [id number]/mL Final     Comment:     Unable to calculate result since non-numeric result obtained for  component test.         ART: Trenda Keldron        Denies side effects. Stressed the importance of adherence. Continue follow up with ID clinic. Reviewed most recent labs, including Cd4 and viral load. Discussed the risks and benefits of treatment options, instructions for management, importance of treatment adherence, and reduction of risk factor. Educated on possible  medication side effects. Counseled on routes of HIV transmission, including the risk of  infection. Emphasized that viral suppression is the best method to prevent HIV transmission. At this time pt.denies the need for HIV testing of anyone in their life. Total encounter time was 45 minutes. Greater then 20 minutes were spent on counseling and patient education. Pt voices understanding and agreement with treatment plan. Obesity (BMI 30.0-34. 9)  Body mass index is 30.88 kg/m². Wt Readings from Last 3 Encounters:   10/10/23 100 kg (221 lb 6.4 oz)   23 98 kg (216 lb)   23 98.2 kg (216 lb 6.4 oz)     Height: 5' 11" (180.3 cm)     Gained 5 pounds. Did not start Ozempic due to insurance coverage. Will transition to Victoza. Lab Results   Component Value Date    HGBA1C 10.9 (H) 10/02/2023     Lab Results   Component Value Date    GLUCOSE 269 (H) 09/10/2018   ;    Educated on the health risks of obesity.   Advised to lose weight. Encouraged eating a healthy diet and daily cardiac exercise. Recommended increasing fruits and vegetables and limiting processed foods. Refer to dietitian for additional education. Uncontrolled type 2 diabetes mellitus with hyperglycemia (HCC)    Lab Results   Component Value Date    HGBA1C 10.9 (H) 10/02/2023     Lab Results   Component Value Date/Time    HGBA1C 10.9 (H) 10/02/2023 09:11 AM    HGBA1C 13.7 (H) 09/15/2021 03:42 PM      Hemoglobin A1c has increased from 8.2-10.9. Continue current medication. Add Victoza. Insurance denied coverage of Monunjaro and Ozempic. Check hemoglobin A1c in 3 months. If unable to tolerate Victoza or significant results are not achieved will attempt to get authorization for [facility] again. Educated to follow diabetic diet, maintain a healthy weight, and exercise regularly. Referred to dietician for additional education. Due for annual foot exam.  Patient declines at this visit. We will complete at next visit. Primary hypertension  Blood pressure: Blood pressure is above goal of 120/80. BP Readings from Last 3 Encounters:   10/10/23 138/88   09/28/23 142/78   08/08/23 126/82       Attempted lifestyle changes to improve blood pressure. Discussed the importance of good blood pressure control. Educated to keep a home journal of blood pressures. May need to add an ACE inhibitor at next visit. Educated on the following lifestyle modifications to lower BP and decrease cardiovascular risk factors. limit alcohol intake, reduce salt in diet, maintain a healthy weight, engage in 30 minutes of cardiovascular exercise daily, and not smoke.           Diagnoses and all orders for this visit:    Human immunodeficiency virus (HIV) disease ([address])    Need for influenza vaccination  -     influenza vaccine, quadrivalent, 0.5 mL, preservative-free, for adult and pediatric patients 6 mos+ (EARL, [facility], TAIVAL, FLUZONE)    Erectile dysfunction, unspecified erectile dysfunction type  -     tadalafil (CIALIS) 20 MG tablet; Take 1 tablet (20 mg total) by mouth daily as needed for erectile dysfunction    Uncontrolled type 2 diabetes mellitus with hyperglycemia (HCC)  -     liraglutide (VICTOZA) injection; Inject 0.2 mL (1.2 mg total) under the skin daily  -     Insulin Pen Needle (NovoFine Plus Pen Needle) 32G X 4 MM MISC; Use in the morning    Obesity (BMI 30.0-34. 9)    Primary hypertension          Subjective:      Patient ID: Kam Chan is a 50 y.o. male. Mr. Kam Chan is a 42-year-old male who presents to the clinic today for 3 month f/u PCP visit. Past medical history significant for HIV, uncontrolled type 2 diabetes, hyperlipidemia, and obesity. Mr. Barber Goncalves is doing well and offers no acute complaints. He is requesting a review of erectile dysfunction medication secondary to cost.     Received influenza vaccine today. The following portions of the patient's history were reviewed and updated as appropriate: allergies, current medications, past family history, past medical history, past social history, past surgical history and problem list.    Review of Systems   Constitutional: Negative for chills and fever. HENT: Negative for ear pain and sore throat. Eyes: Negative for pain and visual disturbance. Respiratory: Negative for cough and shortness of breath. Cardiovascular: Negative for chest pain and palpitations. Gastrointestinal: Negative for abdominal pain and vomiting. Genitourinary: Negative for dysuria and hematuria. Musculoskeletal: Negative for arthralgias and back pain. Skin: Negative for color change and rash. Neurological: Negative for seizures and syncope. All other systems reviewed and are negative.         Objective:      /88 (BP Location: Right arm, Patient Position: Sitting, Cuff Size: Large)   Pulse 60   Temp (!) 97.3 °F (36.3 °C) (Temporal) Resp 14   Ht 5' 11" (1.803 m)   Wt 100 kg (221 lb 6.4 oz)   SpO2 99%   BMI 30.88 kg/m²       Lab Results   Component Value Date     (L) 04/04/2018    K 3.7 07/19/2023     07/19/2023    CO2 26 07/19/2023    ANIONGAP 10 04/04/2018    BUN 11 07/19/2023    CREATININE 0.69 07/19/2023    GLUCOSE 269 (H) 09/10/2018    GLUF 309 (H) 10/02/2023    CALCIUM 9.3 07/19/2023    AST 10 (L) 07/19/2023    ALT 11 07/19/2023    ALKPHOS 46 07/19/2023    PROT 6.9 04/04/2018    BILITOT 0.4 04/04/2018    EGFR 112 07/19/2023     Lab Results   Component Value Date    WBC 7.38 07/19/2023    WBC 7.6 07/19/2023    HGB 13.1 07/19/2023    HGB 13.5 07/19/2023    HCT 40.8 07/19/2023    HCT 42.1 07/19/2023    MCV 89 07/19/2023    MCV 92 07/19/2023     07/19/2023     07/19/2023     Lab Results   Component Value Date    HEPCAB Non-reactive 02/01/2023     Lab Results   Component Value Date    HAV Reactive (A) 12/11/2021    HEPCAB Non-reactive 02/01/2023     Lab Results   Component Value Date    RPR Reactive (A) 10/02/2023    RPR Reactive 8 dils (A) 10/02/2023            Physical Exam  Constitutional:       General: He is not in acute distress. Appearance: He is well-developed. HENT:      Head: Normocephalic. Right Ear: External ear normal.      Left Ear: External ear normal.      Nose: Nose normal.      Mouth/Throat:      Pharynx: No oropharyngeal exudate. Eyes:      General:         Right eye: No discharge. Left eye: No discharge. Conjunctiva/sclera: Conjunctivae normal.      Pupils: Pupils are equal, round, and reactive to light. Neck:      Thyroid: No thyromegaly. Cardiovascular:      Rate and Rhythm: Normal rate and regular rhythm. Heart sounds: Normal heart sounds. No murmur heard. Pulmonary:      Effort: Pulmonary effort is normal.      Breath sounds: Normal breath sounds. No wheezing. Abdominal:      General: Bowel sounds are normal.      Palpations: Abdomen is soft.  There is no mass.      Tenderness: There is no abdominal tenderness. Musculoskeletal:         General: No tenderness. Normal range of motion. Cervical back: Normal range of motion. Lymphadenopathy:      Cervical: No cervical adenopathy. Skin:     General: Skin is warm and dry. Findings: No rash. Neurological:      Mental Status: He is alert and oriented to person, place, and time.    Psychiatric:         Behavior: Behavior normal.

## 2023-10-10 NOTE — ASSESSMENT & PLAN NOTE
Lab Results   Component Value Date    HGBA1C 10.9 (H) 10/02/2023     Lab Results   Component Value Date/Time    HGBA1C 10.9 (H) 10/02/2023 09:11 AM    HGBA1C 13.7 (H) 09/15/2021 03:42 PM      Hemoglobin A1c has increased from 8.2-10.9. Continue current medication. Add Victoza. Insurance denied coverage of Monunjaro and Ozempic. Check hemoglobin A1c in 3 months. If unable to tolerate Victoza or significant results are not achieved will attempt to get authorization for [facility] again. Educated to follow diabetic diet, maintain a healthy weight, and exercise regularly. Referred to dietician for additional education. Due for annual foot exam.  Patient declines at this visit. We will complete at next visit.

## 2023-10-10 NOTE — ASSESSMENT & PLAN NOTE
Body mass index is 30.88 kg/m². Wt Readings from Last 3 Encounters:   10/10/23 100 kg (221 lb 6.4 oz)   09/28/23 98 kg (216 lb)   08/08/23 98.2 kg (216 lb 6.4 oz)     Height: 5' 11" (180.3 cm)     Gained 5 pounds. Did not start Ozempic due to insurance coverage. Will transition to Victoza. Lab Results   Component Value Date    HGBA1C 10.9 (H) 10/02/2023     Lab Results   Component Value Date    GLUCOSE 269 (H) 09/10/2018   ;    Educated on the health risks of obesity. Advised to lose weight. Encouraged eating a healthy diet and daily cardiac exercise. Recommended increasing fruits and vegetables and limiting processed foods. Refer to dietitian for additional education.

## 2023-10-10 NOTE — ASSESSMENT & PLAN NOTE
No results found for: "WZ1ARPU"  CD4 ABS   Date/Time Value Ref Range Status   2023 09:33  359 - 1519 /uL Final     HIV-1 RNA by PCR, Qn   Date/Time Value Ref Range Status   2023 08:04 AM <20 copies/mL Final     Comment:     HIV-1 RNA not detected  The reportable range for this assay is 20 to 10,000,000  copies HIV-1 RNA/mL. HIV-1 RNA Viral Load Log   Date/Time Value Ref Range Status   2023 08:04 AM COMMENT [id number]/mL Final     Comment:     Unable to calculate result since non-numeric result obtained for  component test.         ART: Lennice Fails        Denies side effects. Stressed the importance of adherence. Continue follow up with ID clinic. Reviewed most recent labs, including Cd4 and viral load. Discussed the risks and benefits of treatment options, instructions for management, importance of treatment adherence, and reduction of risk factor. Educated on possible  medication side effects. Counseled on routes of HIV transmission, including the risk of  infection. Emphasized that viral suppression is the best method to prevent HIV transmission. At this time pt.denies the need for HIV testing of anyone in their life. Total encounter time was 45 minutes. Greater then 20 minutes were spent on counseling and patient education. Pt voices understanding and agreement with treatment plan.

## 2023-10-11 ENCOUNTER — PATIENT OUTREACH (OUTPATIENT)
Dept: SURGERY | Facility: CLINIC | Age: 48
End: 2023-10-11

## 2023-10-11 DIAGNOSIS — S29.011D MUSCLE STRAIN OF CHEST WALL, SUBSEQUENT ENCOUNTER: ICD-10-CM

## 2023-10-12 ENCOUNTER — PATIENT OUTREACH (OUTPATIENT)
Dept: SURGERY | Facility: CLINIC | Age: 48
End: 2023-10-12

## 2023-10-16 RX ORDER — CYCLOBENZAPRINE HCL 10 MG
TABLET ORAL
Qty: 60 TABLET | Refills: 0 | Status: SHIPPED | OUTPATIENT
Start: 2023-10-16

## 2023-10-18 ENCOUNTER — PROCEDURE VISIT (OUTPATIENT)
Dept: UROLOGY | Facility: CLINIC | Age: 48
End: 2023-10-18
Payer: COMMERCIAL

## 2023-10-18 VITALS
HEART RATE: 88 BPM | DIASTOLIC BLOOD PRESSURE: 76 MMHG | BODY MASS INDEX: 30.94 KG/M2 | HEIGHT: 71 IN | WEIGHT: 221 LBS | SYSTOLIC BLOOD PRESSURE: 140 MMHG

## 2023-10-18 DIAGNOSIS — N52.8 OTHER MALE ERECTILE DYSFUNCTION: Primary | ICD-10-CM

## 2023-10-18 PROCEDURE — 99213 OFFICE O/P EST LOW 20 MIN: CPT | Performed by: PHYSICIAN ASSISTANT

## 2023-10-18 NOTE — PROGRESS NOTES
UROLOGY PROGRESS NOTE   Patient Identifiers: Tomas Cuello (MRN [id number])  Date of Service: 10/18/2023    Subjective:   44-year-old male with history of erectile dysfunction. He is here for Trimix trial.  He reports having already used the medication because he could not wait for this appointment. He has not had good success so far but no complications.     Reason for visit: ED follow-up    Objective:     VITALS:    Vitals:    10/18/23 1359   BP: 140/76   Pulse: 88           LABS:  Lab Results   Component Value Date    HGB 13.1 07/19/2023    HGB 13.5 07/19/2023    HCT 40.8 07/19/2023    HCT 42.1 07/19/2023    WBC 7.38 07/19/2023    WBC 7.6 07/19/2023     07/19/2023     07/19/2023   ]    Lab Results   Component Value Date     (L) 04/04/2018    K 3.7 07/19/2023     07/19/2023    CO2 26 07/19/2023    BUN 11 07/19/2023    CREATININE 0.69 07/19/2023    CALCIUM 9.3 07/19/2023    GLUCOSE 269 (H) 09/10/2018   ]        INPATIENT MEDS:    Current Outpatient Medications:     Aspirin Low Dose 81 MG EC tablet, TAKE 1 TABLET BY MOUTH DAILY, Disp: 30 tablet, Rfl: 2    atorvastatin (LIPITOR) 20 mg tablet, TAKE 1 TABLET BY MOUTH DAILY, Disp: 30 tablet, Rfl: 1    BD Pen Needle Bela U/F 32G X 4 MM MISC, INJECT AS DIRECTED SUBCUTANEOUSLY DAILY AT BEDTIME, Disp: 100 each, Rfl: 1    Biktarvy -25 MG tablet, TAKE 1 TABLET BY MOUTH DAILY, Disp: 30 tablet, Rfl: 2    cyclobenzaprine (FLEXERIL) 10 mg tablet, TAKE 1 TABLET BY MOUTH THREE TIMES A DAY AS NEEDED FOR MUSCLE SPASM, Disp: 60 tablet, Rfl: 0    ibuprofen (MOTRIN) 600 mg tablet, TAKE 1 TABLET BY MOUTH EVERY SIX HOURS AS NEEDED FOR MODERATE PAIN, Disp: 30 tablet, Rfl: 2    Insulin Pen Needle (NovoFine Plus Pen Needle) 32G X 4 MM MISC, Use in the morning, Disp: 100 each, Rfl: 1    Lantus SoloStar 100 units/mL SOPN, INJECT 0.4ML (40 UNITS TOTAL) SUBCUTANEOUSLY DAILY AT BEDTIME, Disp: 15 mL, Rfl: 2    liraglutide (VICTOZA) injection, Inject 0.2 mL (1.2 mg total) under the skin daily, Disp: 9 mL, Rfl: 2    tadalafil (CIALIS) 20 MG tablet, Take 1 tablet (20 mg total) by mouth daily as needed for erectile dysfunction, Disp: 30 tablet, Rfl: 0    valACYclovir (VALTREX) 1,000 mg tablet, TAKE 1 TABLET BY MOUTH DAILY, Disp: 30 tablet, Rfl: 2      Physical Exam:   /76 (BP Location: Left arm, Patient Position: Sitting, Cuff Size: Adult)   Pulse 88   Ht 5' 11" (1.803 m)   Wt 100 kg (221 lb)   BMI 30.82 kg/m²   GEN: no acute distress    RESP: breathing comfortably with no accessory muscle use    ABD: soft, non-tender, non-distended   INCISION:    EXT: no significant peripheral edema       RADIOLOGY:   none     Assessment:   #1.   Erectile dysfunction    Plan:   -The patient used the Trimix prior to his instructional visit  -We discussed injection protocol  -I will increase his concentration  -We discussed a penile ring in conjunction with Cialis  -He may follow-up as needed and call for refill

## 2023-11-02 ENCOUNTER — PATIENT OUTREACH (OUTPATIENT)
Dept: SURGERY | Facility: CLINIC | Age: 48
End: 2023-11-02

## 2023-11-02 NOTE — PROGRESS NOTES
[facility] that CFK voucher is ready for . Ct came in to  [facility] vouchers. Ct states he has spoken to child support for his son and they have adjusted his new income from unemployment and Ct will have about 50% still taken away. Ct also states he was going to go to domestics to discuss his child support he was supposed to be receiving from his daughters mother. HC states will need proof of this before next checks are cut for December rent. Ct states he will get that information and proof and [facility]. before 11-,      updated HS regarding Decembers rent.

## 2023-11-03 ENCOUNTER — PATIENT OUTREACH (OUTPATIENT)
Dept: SURGERY | Facility: CLINIC | Age: 48
End: 2023-11-03

## 2023-11-03 DIAGNOSIS — E11.65 UNCONTROLLED TYPE 2 DIABETES MELLITUS WITH HYPERGLYCEMIA (HCC): ICD-10-CM

## 2023-11-03 NOTE — PROGRESS NOTES
Ct called and states he talked to domestics with no solution. Ct reports he will be receiving a $357 gross with taxes about $317. Ct then reports that child support is scheduled to deduct $174 weekly. Ct extremely concerned and frustrated. Ct states once received the documentation we can re calculate the income. HC called HS and discussed.

## 2023-11-07 ENCOUNTER — PATIENT OUTREACH (OUTPATIENT)
Dept: SURGERY | Facility: CLINIC | Age: 48
End: 2023-11-07

## 2023-11-07 RX ORDER — PEN NEEDLE, DIABETIC 32GX 5/32"
NEEDLE, DISPOSABLE MISCELLANEOUS DAILY
Qty: 100 EACH | Refills: 2 | Status: SHIPPED | OUTPATIENT
Start: 2023-11-07 | End: 2023-11-14 | Stop reason: SDUPTHER

## 2023-11-08 ENCOUNTER — TELEPHONE (OUTPATIENT)
Dept: SURGERY | Facility: CLINIC | Age: 48
End: 2023-11-08

## 2023-11-08 ENCOUNTER — PATIENT OUTREACH (OUTPATIENT)
Dept: SURGERY | Facility: CLINIC | Age: 48
End: 2023-11-08

## 2023-11-08 DIAGNOSIS — N52.9 ERECTILE DYSFUNCTION, UNSPECIFIED ERECTILE DYSFUNCTION TYPE: ICD-10-CM

## 2023-11-08 RX ORDER — TADALAFIL 20 MG/1
20 TABLET ORAL DAILY PRN
Qty: 30 TABLET | Refills: 0 | Status: SHIPPED | OUTPATIENT
Start: 2023-11-08 | End: 2023-11-14 | Stop reason: SDUPTHER

## 2023-11-09 ENCOUNTER — PATIENT OUTREACH (OUTPATIENT)
Dept: SURGERY | Facility: CLINIC | Age: 48
End: 2023-11-09

## 2023-11-13 DIAGNOSIS — Z11.1 SCREENING-PULMONARY TB: Primary | ICD-10-CM

## 2023-11-14 ENCOUNTER — LAB (OUTPATIENT)
Dept: LAB | Facility: HOSPITAL | Age: 48
End: 2023-11-14
Payer: COMMERCIAL

## 2023-11-14 ENCOUNTER — DOCUMENTATION (OUTPATIENT)
Dept: SURGERY | Facility: CLINIC | Age: 48
End: 2023-11-14

## 2023-11-14 ENCOUNTER — OFFICE VISIT (OUTPATIENT)
Dept: SURGERY | Facility: CLINIC | Age: 48
End: 2023-11-14
Payer: COMMERCIAL

## 2023-11-14 VITALS
TEMPERATURE: 97.9 F | HEIGHT: 71 IN | BODY MASS INDEX: 30.44 KG/M2 | WEIGHT: 217.4 LBS | DIASTOLIC BLOOD PRESSURE: 72 MMHG | HEART RATE: 76 BPM | SYSTOLIC BLOOD PRESSURE: 132 MMHG | OXYGEN SATURATION: 98 %

## 2023-11-14 DIAGNOSIS — E78.2 MIXED HYPERLIPIDEMIA: ICD-10-CM

## 2023-11-14 DIAGNOSIS — B20 HIV INFECTION, UNSPECIFIED SYMPTOM STATUS (HCC): ICD-10-CM

## 2023-11-14 DIAGNOSIS — B20 HUMAN IMMUNODEFICIENCY VIRUS (HIV) DISEASE (HCC): Primary | ICD-10-CM

## 2023-11-14 DIAGNOSIS — E66.9 OBESITY (BMI 30.0-34.9): ICD-10-CM

## 2023-11-14 DIAGNOSIS — A52.8 SYPHILIS, LATE LATENT: Primary | ICD-10-CM

## 2023-11-14 DIAGNOSIS — I10 PRIMARY HYPERTENSION: ICD-10-CM

## 2023-11-14 DIAGNOSIS — N52.9 ERECTILE DYSFUNCTION, UNSPECIFIED ERECTILE DYSFUNCTION TYPE: ICD-10-CM

## 2023-11-14 DIAGNOSIS — A60.9 HSV (HERPES SIMPLEX VIRUS) ANOGENITAL INFECTION: ICD-10-CM

## 2023-11-14 DIAGNOSIS — E11.65 UNCONTROLLED TYPE 2 DIABETES MELLITUS WITH HYPERGLYCEMIA (HCC): ICD-10-CM

## 2023-11-14 LAB
ALBUMIN SERPL BCP-MCNC: 4.5 G/DL (ref 3.5–5)
ALP SERPL-CCNC: 55 U/L (ref 34–104)
ALT SERPL W P-5'-P-CCNC: 10 U/L (ref 7–52)
ANION GAP SERPL CALCULATED.3IONS-SCNC: 11 MMOL/L
AST SERPL W P-5'-P-CCNC: 10 U/L (ref 13–39)
BASOPHILS # BLD AUTO: 0.08 THOUSANDS/ÂΜL (ref 0–0.1)
BASOPHILS NFR BLD AUTO: 1 % (ref 0–1)
BILIRUB SERPL-MCNC: 0.57 MG/DL (ref 0.2–1)
BUN SERPL-MCNC: 11 MG/DL (ref 5–25)
CALCIUM SERPL-MCNC: 9.4 MG/DL (ref 8.4–10.2)
CHLORIDE SERPL-SCNC: 100 MMOL/L (ref 96–108)
CO2 SERPL-SCNC: 24 MMOL/L (ref 21–32)
CREAT SERPL-MCNC: 0.67 MG/DL (ref 0.6–1.3)
EOSINOPHIL # BLD AUTO: 0.17 THOUSAND/ÂΜL (ref 0–0.61)
EOSINOPHIL NFR BLD AUTO: 3 % (ref 0–6)
ERYTHROCYTE [DISTWIDTH] IN BLOOD BY AUTOMATED COUNT: 12.7 % (ref 11.6–15.1)
GFR SERPL CREATININE-BSD FRML MDRD: 113 ML/MIN/1.73SQ M
GLUCOSE SERPL-MCNC: 301 MG/DL (ref 65–140)
HCT VFR BLD AUTO: 43.7 % (ref 36.5–49.3)
HGB BLD-MCNC: 14.5 G/DL (ref 12–17)
IMM GRANULOCYTES # BLD AUTO: 0.01 THOUSAND/UL (ref 0–0.2)
IMM GRANULOCYTES NFR BLD AUTO: 0 % (ref 0–2)
LYMPHOCYTES # BLD AUTO: 2.07 THOUSANDS/ÂΜL (ref 0.6–4.47)
LYMPHOCYTES NFR BLD AUTO: 33 % (ref 14–44)
MCH RBC QN AUTO: 28.5 PG (ref 26.8–34.3)
MCHC RBC AUTO-ENTMCNC: 33.2 G/DL (ref 31.4–37.4)
MCV RBC AUTO: 86 FL (ref 82–98)
MONOCYTES # BLD AUTO: 0.4 THOUSAND/ÂΜL (ref 0.17–1.22)
MONOCYTES NFR BLD AUTO: 6 % (ref 4–12)
NEUTROPHILS # BLD AUTO: 3.49 THOUSANDS/ÂΜL (ref 1.85–7.62)
NEUTS SEG NFR BLD AUTO: 57 % (ref 43–75)
NRBC BLD AUTO-RTO: 0 /100 WBCS
PLATELET # BLD AUTO: 279 THOUSANDS/UL (ref 149–390)
PMV BLD AUTO: 10.4 FL (ref 8.9–12.7)
POTASSIUM SERPL-SCNC: 3.6 MMOL/L (ref 3.5–5.3)
PROT SERPL-MCNC: 7.1 G/DL (ref 6.4–8.4)
RBC # BLD AUTO: 5.08 MILLION/UL (ref 3.88–5.62)
SODIUM SERPL-SCNC: 135 MMOL/L (ref 135–147)
WBC # BLD AUTO: 6.22 THOUSAND/UL (ref 4.31–10.16)

## 2023-11-14 PROCEDURE — 99214 OFFICE O/P EST MOD 30 MIN: CPT | Performed by: NURSE PRACTITIONER

## 2023-11-14 PROCEDURE — 36415 COLL VENOUS BLD VENIPUNCTURE: CPT

## 2023-11-14 PROCEDURE — 86480 TB TEST CELL IMMUN MEASURE: CPT

## 2023-11-14 PROCEDURE — 86780 TREPONEMA PALLIDUM: CPT

## 2023-11-14 PROCEDURE — 86592 SYPHILIS TEST NON-TREP QUAL: CPT

## 2023-11-14 PROCEDURE — 86593 SYPHILIS TEST NON-TREP QUANT: CPT

## 2023-11-14 RX ORDER — VALACYCLOVIR HYDROCHLORIDE 1 G/1
1000 TABLET, FILM COATED ORAL DAILY
Qty: 30 TABLET | Refills: 2 | Status: SHIPPED | OUTPATIENT
Start: 2023-11-14 | End: 2024-02-12

## 2023-11-14 RX ORDER — BICTEGRAVIR SODIUM, EMTRICITABINE, AND TENOFOVIR ALAFENAMIDE FUMARATE 50; 200; 25 MG/1; MG/1; MG/1
1 TABLET ORAL DAILY
Qty: 30 TABLET | Refills: 2 | Status: SHIPPED | OUTPATIENT
Start: 2023-11-14

## 2023-11-14 RX ORDER — TADALAFIL 20 MG/1
20 TABLET ORAL DAILY PRN
Qty: 30 TABLET | Refills: 2 | Status: SHIPPED | OUTPATIENT
Start: 2023-11-14

## 2023-11-14 RX ORDER — ATORVASTATIN CALCIUM 20 MG/1
20 TABLET, FILM COATED ORAL DAILY
Qty: 30 TABLET | Refills: 1 | Status: SHIPPED | OUTPATIENT
Start: 2023-11-14

## 2023-11-14 RX ORDER — ASPIRIN 81 MG/1
81 TABLET, COATED ORAL DAILY
Qty: 30 TABLET | Refills: 2 | Status: SHIPPED | OUTPATIENT
Start: 2023-11-14

## 2023-11-14 RX ORDER — INSULIN GLARGINE 100 [IU]/ML
40 INJECTION, SOLUTION SUBCUTANEOUS
Qty: 15 ML | Refills: 2 | Status: SHIPPED | OUTPATIENT
Start: 2023-11-14

## 2023-11-14 RX ORDER — PEN NEEDLE, DIABETIC 32GX 5/32"
NEEDLE, DISPOSABLE MISCELLANEOUS DAILY
Qty: 100 EACH | Refills: 2 | Status: SHIPPED | OUTPATIENT
Start: 2023-11-14 | End: 2024-05-12

## 2023-11-14 NOTE — ASSESSMENT & PLAN NOTE
There is no height or weight on file to calculate BMI. Wt Readings from Last 3 Encounters:   10/18/23 100 kg (221 lb)   10/10/23 100 kg (221 lb 6.4 oz)   09/28/23 98 kg (216 lb)     Today weight is 217. Lab Results   Component Value Date    HGBA1C 10.9 (H) 10/02/2023     Lab Results   Component Value Date    GLUCOSE 269 (H) 09/10/2018   ;    Educated on the health risks of obesity. Advised to lose weight. Encouraged eating a healthy diet and daily cardiac exercise. Recommended increasing fruits and vegetables and limiting processed foods. Refer to dietitian for additional education.

## 2023-11-14 NOTE — ASSESSMENT & PLAN NOTE
No results found for: "OQ4BYER"  CD4 ABS   Date/Time Value Ref Range Status   2023 09:33  359 - 1519 /uL Final     HIV-1 RNA by PCR, Qn   Date/Time Value Ref Range Status   2023 08:04 AM <20 copies/mL Final     Comment:     HIV-1 RNA not detected  The reportable range for this assay is 20 to 10,000,000  copies HIV-1 RNA/mL. HIV-1 RNA Viral Load Log   Date/Time Value Ref Range Status   2023 08:04 AM COMMENT [id number]/mL Final     Comment:     Unable to calculate result since non-numeric result obtained for  component test.         ART: Lizzy Hwang        Denies side effects. Stressed the importance of adherence. Continue follow up with ID clinic. Reviewed most recent labs, including Cd4 and viral load. Discussed the risks and benefits of treatment options, instructions for management, importance of treatment adherence, and reduction of risk factor. Educated on possible  medication side effects. Counseled on routes of HIV transmission, including the risk of  infection. Emphasized that viral suppression is the best method to prevent HIV transmission. At this time pt.denies the need for HIV testing of anyone in their life. Total encounter time was 45 minutes. Greater then 20 minutes were spent on counseling and patient education. Pt voices understanding and agreement with treatment plan.

## 2023-11-14 NOTE — ASSESSMENT & PLAN NOTE
Blood pressure:   BP Readings from Last 3 Encounters:   11/14/23 132/72   10/18/23 140/76   10/10/23 138/88       Continue current antihypertensive. Educated on the following lifestyle modifications to lower BP and decrease cardiovascular risk factors. limit alcohol intake, reduce salt in diet, maintain a healthy weight, engage in 30 minutes of cardiovascular exercise daily, and not smoke.

## 2023-11-14 NOTE — PATIENT INSTRUCTIONS
Diabetes and Exercise   AMBULATORY CARE:   How exercise will help you manage diabetes:  Physical activity, such as exercise, can help keep your blood sugar level steady or improve insulin resistance. Activity can help decrease your risk for heart disease, and help you lose weight, if needed. Exercise can also help lower your A1c or keep it at goal. Your diabetes care team provider will help you create an exercise plan. The plan will be based on the type of diabetes you have and your starting fitness level. Call your local emergency number (911 in the [address]) if:   You have chest pain or shortness of breath. Seek care immediately if:   You have a low blood sugar level and it does not improve with treatment. Symptoms are trouble thinking, a pounding heartbeat, and sweating. Your blood sugar level is above 240 mg/dL and does not come down within 15 minutes of treatment. You have blurred or double vision. Your breath has a fruity, sweet smell, or your breathing is shallow. Call your doctor or diabetes care team if:   You have ketones in your blood or urine. You have a fever. Your blood sugar levels are higher than your target goals. You often have low blood sugar levels. Your skin is red, dry, warm, or swollen. You have a wound that does not heal.    You have trouble coping with diabetes, or you feel anxious or depressed. You have questions or concerns about your condition or care. Tips to help you create and meet your exercisegoals:   Set a goal for at least 150 minutes (2.5 hours) of moderate to vigorous aerobic activity each week. Aerobic activity helps your heart stay strong. Aerobic activity includes walking, bicycling, dancing, swimming, and raking leaves. Spread aerobic activity over 3 to 5 days. Do not take more than 2 days off in a row. It is best to do at least 10 minutes at a time and 30 minutes each day. You can work up to these goals.  Remember that any activity is better than no activity. Over time, you can make exercise more intense or last longer. You can also add more days of exercise as your fitness level improves. Your diabetes care team can help you make a step-by-step plan to achieve your goals. Set a strength training goal of 2 to 3 times a week. Take at least 1 day off in between strength training sessions. Strength training helps you keep the muscles you have and build new muscles. Strength training includes lifting weights, climbing stairs, yoga, and genet chi.         Older adults should include balance training 2 to 3 times each week. These include walking backwards, standing on one foot, and walking heel to toe in a straight line. Other healthy activity tips:   Stretch before and after you exercise to prevent injury. Drink water or liquids that do not contain sugar before, during, and after exercise. Ask your dietitian or healthcare provider which liquids you should drink when you exercise. Do not  sit for longer than 30 minutes at a time during your day. If you cannot walk around, at least stand up. This will help you stay active and keep your blood circulating. Try to be active throughout your day. Exercise and blood sugar levels:  Check your blood sugar level before and after exercise, if you use insulin. Healthcare providers may tell you to change the amount of insulin you take or food you eat. If your blood sugar level is high, check your blood or urine for ketones before you exercise. Do not exercise if your blood sugar level is high and you have ketones. If your blood sugar level is less than 100 mg/dL, have a carbohydrate snack before you exercise. Examples are 4 to 6 crackers, ½ banana, 8 ounces (1 cup) of milk, or 4 ounces (½ cup) of juice. Follow up with your doctor or diabetes care team provider as directed: Your doctor or provider may recommend counseling to help you meet your exercise goals.  Write down your questions so you remember to ask them during your visits. © Copyright Johanny Albmona 2023 Information is for End User's use only and may not be sold, redistributed or otherwise used for commercial purposes. The above information is an  only. It is not intended as medical advice for individual conditions or treatments. Talk to your doctor, nurse or pharmacist before following any medical regimen to see if it is safe and effective for you.

## 2023-11-14 NOTE — PROGRESS NOTES
[facility] Annual Nutrition Assessment(Annual)     Indigo Ponce seen by RD in conjunction with PCP f/u     pt is established patient (last annual was 2/15/2023)    PMHx:  HIV, uncontrolled DMT2, obesity, HSV, HTN      Clinical Data/Client History    CD4 count:  741  Viral load:  <20  ART:   Biktarvy      , Patient Navigator: Payal     Food assistance: SNAP    Living situation: House or apartment     Psychosocial factors:  N/A    Mobility:  self ambulates    Physical activity: active with his kids     Oral health concerns: denied oral health concerns       Typical food/beverage intake:    Breakfast Skips  Lunch Skips   Dinner Large meal-potatoes, steak and vegetables   Snacks N/A  Beverages water     Appetite: Unchanged from normal    OTC vitamin, mineral, herbal supplements: MVI    Oral/enteral nutrition supplements:  N/A    GI problems: denied n/v/d/c    Food allergies/intolerances:  N/A    Weight history:   Wt Readings from Last 3 Encounters:   11/14/23 98.6 kg (217 lb 6.4 oz)   10/18/23 100 kg (221 lb)   10/10/23 100 kg (221 lb 6.4 oz)       Current body weight:  217  Height:  71"  BMI:  30  IBW:  172  %IBW  126%    Weight change: N/A    Nutrition-related labs:    Lab Results   Component Value Date    HGBA1C 10.9 (H) 10/02/2023     Lab Results   Component Value Date    SODIUM 135 11/14/2023    K 3.6 11/14/2023     11/14/2023    CO2 24 11/14/2023    BUN 11 11/14/2023    CREATININE 0.67 11/14/2023    GLUC 301 (H) 11/14/2023    CALCIUM 9.4 11/14/2023     Lab Results   Component Value Date    CHOLESTEROL 160 02/01/2023    CHOLESTEROL 210 (H) 12/11/2021     Lab Results   Component Value Date    HDL 43 02/01/2023    HDL 50 12/11/2021     Lab Results   Component Value Date    TRIG 65 02/01/2023    TRIG 87 12/11/2021     No results found for: "[address]"    Current medications:   Flexeril, lipitor, victiza, cialis     Physical findings/skin integrity:  Skin intact     Nutrition Diagnosis    Problem: inconsistent CHO intake     Related to: lack of food planning, purchasing, and preparation skills    As Evidenced By: abnormal labs (A1C 10.9) patient interview       Estimated Nutritional Needs    Olman Roblero REE: CBW     ~[id number] (based on: 1.2 stress factor)  ~79 protein (based on: .08)  ~2470 fluid (based on: 25ml/kg)      Current intake estimation: exceeds needs       Nutrition Intervention/Recommendations    Nutrition education intervention: provided    Nutrition recommendations: meal planning(3 meals per day,test blood sugars before meals). Teaching Method: verbal     Person Educated: patient    Comprehension: good    Receptivity: good    Expected compliance: good    Collaboration/referral of nutrition care:    Pt has mobile market coupon       Goals:  Stable nutrition related labs  Gradual weight loss to healthy BMI <30  Consume 3 Myplate meals & test blood sugar daily    Monitoring/Evaluation:  Will continue to monitor goals, weight trend, labs and need for nutrition education. Visit Summary  A1C continues to trend up. Pt reports only eating one large meal per day. RD provided education on meal planning, blood sugar testing & s/s of continued hyperglycemia. Pt demonstrated understanding.  He plans on trying to start meal planning & checking his blood sugar before meals.   -J Carlos Barrera RDN,AMANDAN

## 2023-11-14 NOTE — PROGRESS NOTES
Diabetic Foot Exam    Patient's shoes and socks removed. Right Foot/Ankle   Right Foot Inspection  Skin Exam: skin normal and skin intact. No dry skin, no warmth, no callus, no erythema, no maceration, no abnormal color, no pre-ulcer, no ulcer and no callus. Toe Exam: ROM and strength within normal limits. Sensory   Vibration: intact  Proprioception: intact  Monofilament testing: intact    Vascular  Capillary refills: < 3 seconds  The right DP pulse is 1+. The right PT pulse is 1+. Left Foot/Ankle  Left Foot Inspection  Skin Exam: skin normal and skin intact. No dry skin, no warmth, no erythema, no maceration, normal color, no pre-ulcer, no ulcer and no callus. Toe Exam: ROM and strength within normal limits. Sensory   Vibration: intact  Proprioception: intact  Monofilament testing: intact    Vascular  Capillary refills: < 3 seconds  The left DP pulse is 1+. The left PT pulse is 1+. Assign Risk Category  No deformity present  No loss of protective sensation  No weak pulses  Risk: 0  Assessment/Plan:    Human immunodeficiency virus (HIV) disease ([address])  No results found for: "CV2REID"  CD4 ABS   Date/Time Value Ref Range Status   07/19/2023 09:33  359 - 1519 /uL Final     HIV-1 RNA by PCR, Qn   Date/Time Value Ref Range Status   04/25/2023 08:04 AM <20 copies/mL Final     Comment:     HIV-1 RNA not detected  The reportable range for this assay is 20 to 10,000,000  copies HIV-1 RNA/mL. HIV-1 RNA Viral Load Log   Date/Time Value Ref Range Status   04/25/2023 08:04 AM COMMENT [id number]/mL Final     Comment:     Unable to calculate result since non-numeric result obtained for  component test.         ART: Snow Pavon        Denies side effects. Stressed the importance of adherence. Continue follow up with ID clinic. Reviewed most recent labs, including Cd4 and viral load.  Discussed the risks and benefits of treatment options, instructions for management, importance of treatment adherence, and reduction of risk factor. Educated on possible  medication side effects. Counseled on routes of HIV transmission, including the risk of  infection. Emphasized that viral suppression is the best method to prevent HIV transmission. At this time pt.denies the need for HIV testing of anyone in their life. Total encounter time was 45 minutes. Greater then 20 minutes were spent on counseling and patient education. Pt voices understanding and agreement with treatment plan. Obesity (BMI 30.0-34. 9)  There is no height or weight on file to calculate BMI. Wt Readings from Last 3 Encounters:   10/18/23 100 kg (221 lb)   10/10/23 100 kg (221 lb 6.4 oz)   23 98 kg (216 lb)     Today weight is 217. Lab Results   Component Value Date    HGBA1C 10.9 (H) 10/02/2023     Lab Results   Component Value Date    GLUCOSE 269 (H) 09/10/2018   ;    Educated on the health risks of obesity. Advised to lose weight. Encouraged eating a healthy diet and daily cardiac exercise. Recommended increasing fruits and vegetables and limiting processed foods. Refer to dietitian for additional education. Primary hypertension  Blood pressure:   BP Readings from Last 3 Encounters:   23 132/72   10/18/23 140/76   10/10/23 138/88       Continue current antihypertensive. Educated on the following lifestyle modifications to lower BP and decrease cardiovascular risk factors. limit alcohol intake, reduce salt in diet, maintain a healthy weight, engage in 30 minutes of cardiovascular exercise daily, and not smoke. Diagnoses and all orders for this visit:    Human immunodeficiency virus (HIV) disease ([address])    Obesity (BMI 30.0-34. 9)    Primary hypertension    Uncontrolled type 2 diabetes mellitus with hyperglycemia (HCC)  -     liraglutide (VICTOZA) injection; Inject 0.2 mL (1.2 mg total) under the skin daily  -     Aspirin Low Dose 81 MG EC tablet;  Take 1 tablet (81 mg total) by mouth daily  -     BD Pen Needle Bela U/F 32G X 4 MM MISC; Inject under the skin daily  -     Lantus SoloStar 100 units/mL SOPN; Inject 0.4 mL (40 Units total) under the skin daily at bedtime    Mixed hyperlipidemia  -     atorvastatin (LIPITOR) 20 mg tablet; Take 1 tablet (20 mg total) by mouth daily    HIV infection, unspecified symptom status (HCC)  -     Biktarvy -25 MG tablet; Take 1 tablet by mouth daily    HSV (herpes simplex virus) anogenital infection  -     valACYclovir (VALTREX) 1,000 mg tablet; Take 1 tablet (1,000 mg total) by mouth daily    Erectile dysfunction, unspecified erectile dysfunction type  -     tadalafil (CIALIS) 20 MG tablet; Take 1 tablet (20 mg total) by mouth daily as needed for erectile dysfunction          Subjective:      Patient ID: Ana Maria Fall is a 50 y.o. male. Mr. Ana Maria Fall is a 59-year-old male who presents to the clinic today for 1 month f/u PCP visit. Past medical history significant for HIV, uncontrolled type 2 diabetes, hyperlipidemia, and obesity. Mr. Cipriano Vidal was started on Victozia. He reported discomfort after eating a large meal and stopped medication. Educated pt that this is an expected side effect and he needs to eat smaller portions. He will restart medication. .             The following portions of the patient's history were reviewed and updated as appropriate: allergies, current medications, past family history, past medical history, past social history, past surgical history, and problem list.    Review of Systems   Constitutional:  Negative for chills and fever. HENT:  Negative for ear pain and sore throat. Eyes:  Negative for pain and visual disturbance. Respiratory:  Negative for cough and shortness of breath. Cardiovascular:  Negative for chest pain and palpitations. Gastrointestinal:  Negative for abdominal pain and vomiting. Genitourinary:  Negative for dysuria and hematuria.    Musculoskeletal:  Negative for arthralgias and back pain. Skin:  Negative for color change and rash. Neurological:  Negative for seizures and syncope. All other systems reviewed and are negative. Objective:      /72   Pulse 76   Temp 97.9 °F (36.6 °C)   Ht 5' 11" (1.803 m)   Wt 98.6 kg (217 lb 6.4 oz)   SpO2 98%   BMI 30.32 kg/m²          Physical Exam  Vitals and nursing note reviewed. Constitutional:       Appearance: Normal appearance. HENT:      Head: Normocephalic. Nose: Nose normal. No congestion or rhinorrhea. Mouth/Throat:      Mouth: Mucous membranes are moist.      Pharynx: Oropharynx is clear. Eyes:      Pupils: Pupils are equal, round, and reactive to light. Cardiovascular:      Rate and Rhythm: Normal rate and regular rhythm. Pulses: no weak pulses          Dorsalis pedis pulses are 1+ on the right side and 1+ on the left side. Posterior tibial pulses are 1+ on the right side and 1+ on the left side. Heart sounds: Normal heart sounds. Pulmonary:      Effort: Pulmonary effort is normal.      Breath sounds: Normal breath sounds. Abdominal:      General: Abdomen is flat. Bowel sounds are normal.      Palpations: Abdomen is soft. Musculoskeletal:         General: No tenderness or signs of injury. Normal range of motion. Feet:      Right foot:      Skin integrity: No ulcer, skin breakdown, erythema, warmth, callus or dry skin. Left foot:      Skin integrity: No ulcer, skin breakdown, erythema, warmth, callus or dry skin. Skin:     General: Skin is warm and dry. Neurological:      Mental Status: He is alert and oriented to person, place, and time.

## 2023-11-15 LAB
BASOPHILS # BLD AUTO: 0.1 X10E3/UL (ref 0–0.2)
BASOPHILS NFR BLD AUTO: 1 %
CD3+CD4+ CELLS # BLD: 851 /UL (ref 359–1519)
CD3+CD4+ CELLS NFR BLD: 38.7 % (ref 30.8–58.5)
CD3+CD4+ CELLS/CD3+CD8+ CLL BLD: 1.12 % (ref 0.92–3.72)
CD3+CD8+ CELLS # BLD: 761 /UL (ref 109–897)
CD3+CD8+ CELLS NFR BLD: 34.6 % (ref 12–35.5)
EOSINOPHIL # BLD AUTO: 0.2 X10E3/UL (ref 0–0.4)
EOSINOPHIL NFR BLD AUTO: 3 %
ERYTHROCYTE [DISTWIDTH] IN BLOOD BY AUTOMATED COUNT: 12.3 % (ref 11.6–15.4)
GAMMA INTERFERON BACKGROUND BLD IA-ACNC: <0 IU/ML
HCT VFR BLD AUTO: 45 % (ref 37.5–51)
HGB BLD-MCNC: 14.7 G/DL (ref 13–17.7)
HIV1 RNA # PLAS NAA DL=20: NOT DETECTED {COPIES}/ML
IMM GRANULOCYTES # BLD: 0 X10E3/UL (ref 0–0.1)
IMM GRANULOCYTES NFR BLD: 0 %
LYMPHOCYTES # BLD AUTO: 2.2 X10E3/UL (ref 0.7–3.1)
LYMPHOCYTES NFR BLD AUTO: 34 %
M TB IFN-G BLD-IMP: NEGATIVE
M TB IFN-G CD4+ BCKGRND COR BLD-ACNC: 0 IU/ML
M TB IFN-G CD4+ BCKGRND COR BLD-ACNC: 0.01 IU/ML
MCH RBC QN AUTO: 28.8 PG (ref 26.6–33)
MCHC RBC AUTO-ENTMCNC: 32.7 G/DL (ref 31.5–35.7)
MCV RBC AUTO: 88 FL (ref 79–97)
MITOGEN IGNF BCKGRD COR BLD-ACNC: 3.57 IU/ML
MONOCYTES # BLD AUTO: 0.4 X10E3/UL (ref 0.1–0.9)
MONOCYTES NFR BLD AUTO: 6 %
NEUTROPHILS # BLD AUTO: 3.5 X10E3/UL (ref 1.4–7)
NEUTROPHILS NFR BLD AUTO: 56 %
PLATELET # BLD AUTO: 283 X10E3/UL (ref 150–450)
RBC # BLD AUTO: 5.1 X10E6/UL (ref 4.14–5.8)
RPR SER QL: REACTIVE
RPR SER-TITR: ABNORMAL {TITER}
TREPONEMA PALLIDUM IGG+IGM AB [PRESENCE] IN SERUM OR PLASMA BY IMMUNOASSAY: REACTIVE
WBC # BLD AUTO: 6.4 X10E3/UL (ref 3.4–10.8)

## 2023-11-16 LAB — T PALLIDUM AB SER QL IF: REACTIVE

## 2023-11-17 ENCOUNTER — PATIENT OUTREACH (OUTPATIENT)
Dept: SURGERY | Facility: CLINIC | Age: 48
End: 2023-11-17

## 2023-11-17 DIAGNOSIS — A52.8 SYPHILIS, LATE LATENT: Primary | ICD-10-CM

## 2023-11-17 RX ORDER — DOXYCYCLINE 100 MG/1
100 TABLET ORAL 2 TIMES DAILY
Qty: 56 TABLET | Refills: 0 | Status: SHIPPED | OUTPATIENT
Start: 2023-11-17 | End: 2023-12-15

## 2023-11-17 NOTE — PROGRESS NOTES
Ct called and states that unemployment will continue to take his child support out with his previous pay although he's no longer working. HC discussed with HS who states Ct should bring in documentation for calculations.

## 2023-11-17 NOTE — PROGRESS NOTES
HC completed recalculations with HS. Ct's rental portion has decreased. Ct has been extended to 1/31/2023. HC text Ct to update. There was a miscommunication with Ct's rent and there was a increase. HC and HS fixed the issue and gave new rent amount to Ct. Ct states he remains to look for a job.

## 2023-11-17 NOTE — PROGRESS NOTES
Ct text HC can he come bring in the documentation. HC states he can. Ct came in and dropped off child support documentation. Ct states he is still in search of a new job as he can't afford to live at the moment. HC discussed Ct's case with HS.

## 2023-11-20 ENCOUNTER — VBI (OUTPATIENT)
Dept: ADMINISTRATIVE | Facility: OTHER | Age: 48
End: 2023-11-20

## 2023-11-22 ENCOUNTER — PATIENT OUTREACH (OUTPATIENT)
Dept: SURGERY | Facility: CLINIC | Age: 48
End: 2023-11-22

## 2023-11-30 ENCOUNTER — HOSPITAL ENCOUNTER (EMERGENCY)
Facility: HOSPITAL | Age: 48
Discharge: HOME | End: 2023-11-30
Attending: EMERGENCY MEDICINE
Payer: COMMERCIAL

## 2023-11-30 VITALS
SYSTOLIC BLOOD PRESSURE: 163 MMHG | TEMPERATURE: 98.4 F | RESPIRATION RATE: 18 BRPM | OXYGEN SATURATION: 98 % | DIASTOLIC BLOOD PRESSURE: 94 MMHG | HEART RATE: 80 BPM

## 2023-11-30 DIAGNOSIS — N48.30 PRIAPISM: Primary | ICD-10-CM

## 2023-11-30 PROCEDURE — 99285 EMERGENCY DEPT VISIT HI MDM: CPT | Performed by: EMERGENCY MEDICINE

## 2023-11-30 PROCEDURE — 99283 EMERGENCY DEPT VISIT LOW MDM: CPT

## 2023-11-30 PROCEDURE — 54220 IRRG CRPRA CAVRNOSA PRIAPISM: CPT | Performed by: EMERGENCY MEDICINE

## 2023-11-30 PROCEDURE — 96374 THER/PROPH/DIAG INJ IV PUSH: CPT

## 2023-11-30 PROCEDURE — 54235 NJX CORPORA CAVERNOSA RX AGT: CPT | Performed by: EMERGENCY MEDICINE

## 2023-11-30 RX ORDER — PHENYLEPHRINE HCL IN 0.9% NACL 1 MG/10 ML
100 SYRINGE (ML) INTRAVENOUS
Status: DISCONTINUED | OUTPATIENT
Start: 2023-11-30 | End: 2023-11-30 | Stop reason: HOSPADM

## 2023-11-30 RX ORDER — LIDOCAINE HYDROCHLORIDE 10 MG/ML
10 INJECTION, SOLUTION EPIDURAL; INFILTRATION; INTRACAUDAL; PERINEURAL ONCE
Status: COMPLETED | OUTPATIENT
Start: 2023-11-30 | End: 2023-11-30

## 2023-11-30 RX ORDER — HYDROMORPHONE HCL/PF 1 MG/ML
1 SYRINGE (ML) INJECTION ONCE
Status: COMPLETED | OUTPATIENT
Start: 2023-11-30 | End: 2023-11-30

## 2023-11-30 RX ADMIN — HYDROMORPHONE HYDROCHLORIDE 1 MG: 1 INJECTION, SOLUTION INTRAMUSCULAR; INTRAVENOUS; SUBCUTANEOUS at 18:39

## 2023-11-30 RX ADMIN — LIDOCAINE HYDROCHLORIDE 10 ML: 10 INJECTION, SOLUTION EPIDURAL; INFILTRATION; INTRACAUDAL at 18:40

## 2023-11-30 NOTE — ED PROVIDER NOTES
History  Chief Complaint   Patient presents with    Penis / Scrotum Problem     Pt reports he took an injected medication from urology @10:00am this morning. Reports erection since then. Normally takes 20ml, took 60ml's today. C/o severe pain, painful urination     75-year-old male with a history of type 2 diabetes, hypertension, hyperlipidemia, HIV, and erectile dysfunction who presents complaining of an erection for the past 8 hours. The patient states that he treats his erectile dysfunction with Trimix injections prescribed by his urologist.  The patient states that he typically injects 20 mL. Today at 10 AM the patient injected 60 mL, and has had an erection since that time. The patient states that he has had increasing pain in his penis since that time. The patient has taken ibuprofen for his pain. The patient states that he is able to urinate. The patient denies any other complaints. Prior to Admission Medications   Prescriptions Last Dose Informant Patient Reported? Taking?    Aspirin Low Dose 81 MG EC tablet   No No   Sig: Take 1 tablet (81 mg total) by mouth daily   BD Pen Needle Bela U/F 32G X 4 MM MISC   No No   Sig: Inject under the skin daily   Biktarvy -25 MG tablet   No No   Sig: Take 1 tablet by mouth daily   Insulin Pen Needle (NovoFine Plus Pen Needle) 32G X 4 MM MISC  Self No No   Sig: Use in the morning   Lantus SoloStar 100 units/mL SOPN   No No   Sig: Inject 0.4 mL (40 Units total) under the skin daily at bedtime   atorvastatin (LIPITOR) 20 mg tablet   No No   Sig: Take 1 tablet (20 mg total) by mouth daily   cyclobenzaprine (FLEXERIL) 10 mg tablet  Self No No   Sig: TAKE 1 TABLET BY MOUTH THREE TIMES A DAY AS NEEDED FOR MUSCLE SPASM   doxycycline (ADOXA) 100 MG tablet   No No   Sig: Take 1 tablet (100 mg total) by mouth 2 (two) times a day for 28 days   ibuprofen (MOTRIN) 600 mg tablet  Self No No   Sig: TAKE 1 TABLET BY MOUTH EVERY SIX HOURS AS NEEDED FOR MODERATE PAIN liraglutide (VICTOZA) injection   No No   Sig: Inject 0.2 mL (1.2 mg total) under the skin daily   tadalafil (CIALIS) 20 MG tablet   No No   Sig: Take 1 tablet (20 mg total) by mouth daily as needed for erectile dysfunction   valACYclovir (VALTREX) 1,000 mg tablet   No No   Sig: Take 1 tablet (1,000 mg total) by mouth daily      Facility-Administered Medications: None       Past Medical History:   Diagnosis Date    Chlamydia     Diabetes mellitus ([facility])     Type II    Gonorrhea     Herpes     HIV disease ([facility]) 07/2020    mode of transmission: heterosexual    Hyperlipidemia     Hypertension     Neoplasm of kidney     Obesity     BMI 37.2    Syphilis        Past Surgical History:   Procedure Laterality Date    NO PAST SURGERIES         Family History   Problem Relation Age of Onset    Diabetes Mother     Diabetes Father     Diabetes Family      I have reviewed and agree with the history as documented. E-Cigarette/Vaping    E-Cigarette Use Never User      E-Cigarette/Vaping Substances     Social History     Tobacco Use    Smoking status: Never    Smokeless tobacco: Never   Vaping Use    Vaping Use: Never used   Substance Use Topics    Alcohol use: No    Drug use: No        Review of Systems   Constitutional:  Negative for chills and fever. HENT:  Negative for congestion and sore throat. Eyes:  Negative for pain and redness. Respiratory:  Negative for cough and shortness of breath. Cardiovascular:  Negative for chest pain and palpitations. Gastrointestinal:  Negative for abdominal pain, diarrhea, nausea and vomiting. Genitourinary:  Positive for penile pain and penile swelling. Negative for dysuria and hematuria. Musculoskeletal:  Negative for arthralgias and myalgias. Skin:  Negative for color change, pallor and rash. Neurological:  Negative for syncope, weakness, light-headedness, numbness and headaches. All other systems reviewed and are negative.       Physical Exam  ED Triage Vitals Temperature Pulse Respirations Blood Pressure SpO2   11/30/23 1818 11/30/23 1818 11/30/23 1818 11/30/23 1818 11/30/23 1818   98.4 °F (36.9 °C) 80 18 163/94 98 %      Temp Source Heart Rate Source Patient Position - Orthostatic VS BP Location FiO2 (%)   11/30/23 1818 11/30/23 1818 11/30/23 1818 11/30/23 1818 --   Oral Monitor Sitting Left arm       Pain Score       11/30/23 1839       10 - Worst Possible Pain             Orthostatic Vital Signs  Vitals:    11/30/23 1818   BP: 163/94   Pulse: 80   Patient Position - Orthostatic VS: Sitting       Physical Exam  Constitutional:       General: He is not in acute distress. Appearance: Normal appearance. He is not ill-appearing, toxic-appearing or diaphoretic. HENT:      Head: Normocephalic and atraumatic. Nose: Nose normal.      Mouth/Throat:      Mouth: Mucous membranes are moist.      Pharynx: Oropharynx is clear. Eyes:      Conjunctiva/sclera: Conjunctivae normal.      Pupils: Pupils are equal, round, and reactive to light. Cardiovascular:      Rate and Rhythm: Normal rate and regular rhythm. Pulses: Normal pulses. Heart sounds: Normal heart sounds. No murmur heard. No friction rub. No gallop. Pulmonary:      Effort: Pulmonary effort is normal.      Breath sounds: Normal breath sounds. No wheezing, rhonchi or rales. Abdominal:      General: Abdomen is flat. Palpations: Abdomen is soft. Tenderness: There is no abdominal tenderness. There is no guarding or rebound. Genitourinary:     Comments: Erect penis  Musculoskeletal:         General: No swelling or tenderness. Normal range of motion. Cervical back: Normal range of motion and neck supple. No rigidity or tenderness. Right lower leg: No edema. Left lower leg: No edema. Lymphadenopathy:      Cervical: No cervical adenopathy. Skin:     General: Skin is warm and dry. Capillary Refill: Capillary refill takes less than 2 seconds.       Coloration: Skin is not jaundiced or pale. Findings: No bruising, erythema, lesion or rash. Neurological:      General: No focal deficit present. Mental Status: He is alert and oriented to person, place, and time. ED Medications  Medications   lidocaine (PF) (XYLOCAINE-MPF) 1 % injection 10 mL (10 mL Infiltration Given by Other 11/30/23 1840)   HYDROmorphone (DILAUDID) injection 1 mg (1 mg Intravenous Given 11/30/23 1839)       Diagnostic Studies  Results Reviewed       None                   No orders to display         Procedures  Procedures      ED Course                                       Medical Decision Making  55-year-old male with a history of type 2 diabetes, hypertension, hyperlipidemia, HIV, and erectile dysfunction who presents complaining of an erection for the past 8 hours after injecting Trimix and around 10 AM this morning. Vitals are within the normal limits. On exam the patient is alert and oriented, no acute distress, heart is regular rate and rhythm, lungs are clear to auscultation bilaterally, abdomen is soft and nontender, erect penis, no testicular tenderness or swelling. The patient's symptoms and exam are consistent with low flow priapism. Will treat the patient's pain with Dilaudid. Will order lidocaine to perform penile block and phenylephrine for injection. Approximately 300 mL of blood is aspirated from the corpora cavernosa. For cc of phenylephrine was injected and detumescence is achieved. The patient reports significant improvement in his pain. The patient is observed in the emergency department without further corrections. The patient is instructed to call his urologist tomorrow. Return precautions are given and the patient is discharged. Risk  Prescription drug management.           Disposition  Final diagnoses:   Priapism     Time reflects when diagnosis was documented in both MDM as applicable and the Disposition within this note       Time User Action Codes Description Comment    11/30/2023  8:30 PM Lawanda Mcmillan Add [N48.30] Priapism           ED Disposition       ED Disposition   Discharge    Condition   Stable    Date/Time   Thu Nov 30, 2023  8:30 PM    Comment   Amina Whiting discharge to home/self care. Follow-up Information       Follow up With Specialties Details Why Contact Info Additional [facility] Emergency Department Emergency Medicine Go to  If symptoms worsen [address] [id number]  [facility] Emergency Department, [address], [place], [facility]) Urology Call in 1 day  [address] [id number] [address] [facility], [address] [place] [address], [address]            Discharge Medication List as of 11/30/2023  8:32 PM        CONTINUE these medications which have NOT CHANGED    Details   Aspirin Low Dose 81 MG EC tablet Take 1 tablet (81 mg total) by mouth daily, Starting Tue 11/14/2023, Normal      atorvastatin (LIPITOR) 20 mg tablet Take 1 tablet (20 mg total) by mouth daily, Starting Tue 11/14/2023, Normal      !! BD Pen Needle Bela U/F 32G X 4 MM MISC Inject under the skin daily, Starting Tue 11/14/2023, Until Sun 5/12/2024, Normal      Biktarvy -25 MG tablet Take 1 tablet by mouth daily, Starting Tue 11/14/2023, Normal      cyclobenzaprine (FLEXERIL) 10 mg tablet TAKE 1 TABLET BY MOUTH THREE TIMES A DAY AS NEEDED FOR MUSCLE SPASM, Normal      doxycycline (ADOXA) 100 MG tablet Take 1 tablet (100 mg total) by mouth 2 (two) times a day for 28 days, Starting Fri 11/17/2023, Until Fri 12/15/2023, Normal      ibuprofen (MOTRIN) 600 mg tablet TAKE 1 TABLET BY MOUTH EVERY SIX HOURS AS NEEDED FOR MODERATE PAIN, Normal      !!  Insulin Pen Needle (NovoFine Plus Pen Needle) 32G X 4 MM MISC Use in the morning, Starting Tue 10/10/2023, Normal      Lantus SoloStar 100 units/mL SOPN Inject 0.4 mL (40 Units total) under the skin daily at bedtime, Starting Tue 11/14/2023, Normal      liraglutide (VICTOZA) injection Inject 0.2 mL (1.2 mg total) under the skin daily, Starting Tue 11/14/2023, Normal      tadalafil (CIALIS) 20 MG tablet Take 1 tablet (20 mg total) by mouth daily as needed for erectile dysfunction, Starting Tue 11/14/2023, Normal      valACYclovir (VALTREX) 1,000 mg tablet Take 1 tablet (1,000 mg total) by mouth daily, Starting Tue 11/14/2023, Until Mon 2/12/2024, Normal       !! - Potential duplicate medications found. Please discuss with provider. No discharge procedures on file. PDMP Review       None             ED Provider  Attending physically available and evaluated Stefanie Blackburn. I managed the patient along with the ED Attending.     Electronically Signed by           Amaury Chapman DO  12/01/23 0136

## 2023-12-01 NOTE — DISCHARGE INSTRUCTIONS
You were seen in the emergency department for an erection lasting 8 hours. The blood was drained from your penis and medication was injected to help your erection go down. Call your urologist tomorrow to inform them of this happening. If you have another erection that lasts greater than 4 hours come to the emergency department immediately.

## 2023-12-03 NOTE — ED ATTENDING ATTESTATION
11/30/2023  IDella MD, saw and evaluated the patient. I have discussed the patient with the resident/non-physician practitioner and agree with the resident's/non-physician practitioner's findings, Plan of Care, and MDM as documented in the resident's/non-physician practitioner's note, except where noted. All available labs and Radiology studies were reviewed. I was present for key portions of any procedure(s) performed by the resident/non-physician practitioner and I was immediately available to provide assistance. At this point I agree with the current assessment done in the Emergency Department. I have conducted an independent evaluation of this patient a history and physical is as follows:    ED Course       Patient is a 80-year-old male with a history of diabetes mellitus, erectile dysfunction hyperlipidemia hypertension presents with persistent erection for past 8 hours. Patient routinely treats himself with Trimix injections prescribed by his urologist.  Patient admits that he inadvertently took 3 times his usual dose of Trimix today. Injection occurred approximately 10 AM when he injected 60 cc at a persistent erection ever since. Patient states he is unable to urinate secondary to pain. Denies any other complaints    Vitals reviewed. Abdomen soft nontender nondistended   exam: Priapism present. Impression: Priapism  Differential diagnosis: Same      Discussed plan of care with patient at bedside including corporal aspiration and irrigation with phenylephrine as well as saline risks and benefits discussed with patient at bedside and he is amenable to proceeding with procedure as discussed. Procedure Note: Corporal aspiration of priapism. Patient underwent a dorsal penile block using 1% lidocaine without epinephrine approximately 6 cc. Patient tolerated procedure well without any difficulty.   Left corpus cavernosa was accessed at the 10 o'clock position using a 21-gauge butterfly needle confirmed with aspiration of dark venous blood. Approximate 250 cc of dark blood was aspirated with moderate detumescence. Corpus cavernosa was then serially irrigated and aspirated using saline and phenylepherine with complete detumescence. Patient tolerated procedure well had no immediate complications. Patient was observed in the emergency department for total of 2 hours without any recurrence of his priapism. Patient was able to void spontaneously in ED  without difficulty    Patient was counseled to avoid using any more erectile dysfunction medications till he follows up with his urologist.  To contact his urologist first thing in the morning. Patient verbalized understanding of all instructions and answered all of his questions.   Critical Care Time  Procedures

## 2023-12-04 ENCOUNTER — TELEPHONE (OUTPATIENT)
Dept: SURGERY | Facility: CLINIC | Age: 48
End: 2023-12-04

## 2023-12-04 NOTE — TELEPHONE ENCOUNTER
Spoke to patient and told them that the provider will not be prescribing Cialis and he will need to follow up with urology for it. Patient is feeling much better after his er visit.

## 2023-12-15 ENCOUNTER — PATIENT OUTREACH (OUTPATIENT)
Dept: SURGERY | Facility: CLINIC | Age: 48
End: 2023-12-15

## 2024-01-01 NOTE — ED NOTES
Ekg was done and given to provider   Time- 9:09     Valentine Cabrera  10/25/22 2647 I have personally seen and examined the patient. I have collaborated with and supervised the

## 2024-01-04 ENCOUNTER — PATIENT OUTREACH (OUTPATIENT)
Dept: SURGERY | Facility: CLINIC | Age: 49
End: 2024-01-04

## 2024-01-09 ENCOUNTER — TELEPHONE (OUTPATIENT)
Age: 49
End: 2024-01-09

## 2024-01-09 DIAGNOSIS — N52.8 OTHER MALE ERECTILE DYSFUNCTION: Primary | ICD-10-CM

## 2024-01-09 RX ORDER — TADALAFIL 20 MG/1
20 TABLET ORAL DAILY PRN
Qty: 10 TABLET | Refills: 0 | Status: SHIPPED | OUTPATIENT
Start: 2024-01-09 | End: 2024-01-09

## 2024-01-09 RX ORDER — TADALAFIL 20 MG/1
20 TABLET ORAL DAILY PRN
Qty: 10 TABLET | Refills: 0 | Status: SHIPPED | OUTPATIENT
Start: 2024-01-09 | End: 2024-01-10 | Stop reason: SDUPTHER

## 2024-01-09 NOTE — TELEPHONE ENCOUNTER
Cialis sent to pharmacy. Looks like he had priapism with Trimix. Would advise against using Trimix. Thanks

## 2024-01-09 NOTE — TELEPHONE ENCOUNTER
Patient called to say that Trimix is not helping he would prefer to go back on Cialis. Could we call it in [facility]in [place]?    CB: [phone]

## 2024-01-10 ENCOUNTER — PATIENT OUTREACH (OUTPATIENT)
Dept: SURGERY | Facility: CLINIC | Age: 49
End: 2024-01-10

## 2024-01-10 DIAGNOSIS — N52.8 OTHER MALE ERECTILE DYSFUNCTION: ICD-10-CM

## 2024-01-10 RX ORDER — TADALAFIL 20 MG/1
20 TABLET ORAL DAILY PRN
Qty: 30 TABLET | Refills: 3 | Status: SHIPPED | OUTPATIENT
Start: 2024-01-10

## 2024-01-10 RX ORDER — TADALAFIL 20 MG/1
20 TABLET ORAL DAILY PRN
Qty: 30 TABLET | Refills: 3 | Status: SHIPPED | OUTPATIENT
Start: 2024-01-10 | End: 2024-01-10 | Stop reason: SDUPTHER

## 2024-01-10 NOTE — TELEPHONE ENCOUNTER
Pt called yesterday to have refill of   tadalafil (CIALIS) 20 MG tablet     Pt went to  the rx and it was only for 10 tablets. Pt said he usually gets 30 tablets and does not want to pay twice for the prescription.    Pt req  [phone]

## 2024-01-14 NOTE — PROGRESS NOTES
HC text Ct when he can come in for renewal. HC requested towards the end of the month due to needing a months worth of income with his new position.

## 2024-01-15 NOTE — ED NOTES
Requested medication from pharmacy.       Mayra Barakat RN  11/30/23 7656
Additional Notes: Labs reviewed. Will increase dose to 40mg bid\\n\\n\\n\\nGoal 10,909\\nMet 1,800
Render Risk Assessment In Note?: no
Detail Level: Detailed

## 2024-01-17 ENCOUNTER — PATIENT OUTREACH (OUTPATIENT)
Dept: SURGERY | Facility: CLINIC | Age: 49
End: 2024-01-17

## 2024-01-26 ENCOUNTER — PATIENT OUTREACH (OUTPATIENT)
Dept: SURGERY | Facility: CLINIC | Age: 49
End: 2024-01-26

## 2024-01-29 DIAGNOSIS — N52.8 OTHER MALE ERECTILE DYSFUNCTION: ICD-10-CM

## 2024-01-30 DIAGNOSIS — A60.9 HSV (HERPES SIMPLEX VIRUS) ANOGENITAL INFECTION: ICD-10-CM

## 2024-01-30 DIAGNOSIS — B20 HUMAN IMMUNODEFICIENCY VIRUS (HIV) DISEASE (HCC): Primary | ICD-10-CM

## 2024-01-30 DIAGNOSIS — E78.2 MIXED HYPERLIPIDEMIA: ICD-10-CM

## 2024-01-30 DIAGNOSIS — B20 HIV INFECTION, UNSPECIFIED SYMPTOM STATUS (HCC): ICD-10-CM

## 2024-01-30 RX ORDER — TADALAFIL 20 MG/1
20 TABLET ORAL DAILY PRN
Qty: 90 TABLET | Refills: 1 | Status: SHIPPED | OUTPATIENT
Start: 2024-01-30

## 2024-01-31 RX ORDER — BICTEGRAVIR SODIUM, EMTRICITABINE, AND TENOFOVIR ALAFENAMIDE FUMARATE 50; 200; 25 MG/1; MG/1; MG/1
1 TABLET ORAL DAILY
Qty: 30 TABLET | Refills: 2 | Status: SHIPPED | OUTPATIENT
Start: 2024-01-31

## 2024-01-31 RX ORDER — ATORVASTATIN CALCIUM 20 MG/1
20 TABLET, FILM COATED ORAL DAILY
Qty: 30 TABLET | Refills: 1 | Status: SHIPPED | OUTPATIENT
Start: 2024-01-31

## 2024-01-31 RX ORDER — VALACYCLOVIR HYDROCHLORIDE 1 G/1
1000 TABLET, FILM COATED ORAL DAILY
Qty: 30 TABLET | Refills: 2 | Status: SHIPPED | OUTPATIENT
Start: 2024-01-31 | End: 2024-04-30

## 2024-02-04 ENCOUNTER — PATIENT OUTREACH (OUTPATIENT)
Dept: SURGERY | Facility: CLINIC | Age: 49
End: 2024-02-04

## 2024-02-06 ENCOUNTER — PATIENT OUTREACH (OUTPATIENT)
Dept: SURGERY | Facility: CLINIC | Age: 49
End: 2024-02-06

## 2024-02-08 ENCOUNTER — PATIENT OUTREACH (OUTPATIENT)
Dept: SURGERY | Facility: CLINIC | Age: 49
End: 2024-02-08

## 2024-02-08 NOTE — PROGRESS NOTES
HC met with Ct and completed TBRA application in office. HC went over housing authorization with updated ct information. HC went over several housing policies including noncompliance and grievance. Ct understood, acknowledged and signed supporting housing releases and confirmation for [employer] and [employer] communication. HC went over housing goals with ct. Ct did provide all supporting documentation. HC assisted Ct in signing into [employer] accounts to get proof. This includes compass: username [email or web address] password [id number]!!! And security answers Fany(nickname) Josse Mothers román hooper and Jaja is placed birthed. HC printed out paystubs from both jobs and income from child support card on [employer]oprogram.com. Ct's username is [email or web address] and password is [email or web address]!!!. HC completed TBRA Auth and sent to .     Ct presented to [facility] over the phone no signatures due to COVID-19.. Ct is currently still residing in [place] remains to have custody of his daughter. Ct's current housing is stable. Ct has assistance with [employer]. Ct currently has medicaid for insurance.  Ct reports he is sexually active states thats disclosed and does not use protection. Ct is currently in good health. CM and Ct discussed at risk behaviors and importance of using protection and maintaining medication adherence. Ct is currently medically adherent to his medications and his appointments. Ct also reports he has diabetes and it is under control. Ct states that he is currently working with Dr. Valdes and Andree for PCP. Ct reports he currently does not have any issues with transportation and drives himself. 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  Ct does not smoke cigarettes. Ct reports he does have some MH issues and not sure he wanted to pursue assistance with . Ct states there are no legal issues. Ct reports no past issues with drugs or alcohol. Ct states there are no domestic violence issues. Ct believes that his last  dental appointment was sometime in 2022 with  [facility] and doesn't need assistance rescheduling. Cm completed acuity scale his score is 17.

## 2024-02-09 ENCOUNTER — TELEPHONE (OUTPATIENT)
Age: 49
End: 2024-02-09

## 2024-02-09 ENCOUNTER — PATIENT OUTREACH (OUTPATIENT)
Dept: SURGERY | Facility: CLINIC | Age: 49
End: 2024-02-09

## 2024-02-09 NOTE — TELEPHONE ENCOUNTER
Reason for call:   [x] Refill   [] Prior Auth  [x] Other: Medication could not be queue up as it is not on med list. I see the faxed prescription in his visit notes from 09/28/23. He states it costs him like $65 for on vial and is more cost efficent to get 3 vials for $110    Office:   [] PCP/Provider -   [x] Specialty/Provider - Grant     Medication: Trimix     Dose/Frequency: as directed     Quantity: Pt is requesting 3 vials as it saves him money     Pharmacy: [facility]    Does the patient have enough for 3 days?   [] Yes   [x] No - Send as HP to POD     BP done and blood sugar,  All WDL.  holding na hand and squeezes but will not open eyes.  PMD in room now, orders CT scan of head.

## 2024-02-12 ENCOUNTER — PATIENT OUTREACH (OUTPATIENT)
Dept: SURGERY | Facility: CLINIC | Age: 49
End: 2024-02-12

## 2024-02-16 DIAGNOSIS — E11.65 UNCONTROLLED TYPE 2 DIABETES MELLITUS WITH HYPERGLYCEMIA (HCC): ICD-10-CM

## 2024-02-20 RX ORDER — ASPIRIN 81 MG/1
81 TABLET, COATED ORAL DAILY
Qty: 30 TABLET | Refills: 2 | Status: SHIPPED | OUTPATIENT
Start: 2024-02-20

## 2024-02-21 ENCOUNTER — OFFICE VISIT (OUTPATIENT)
Dept: SURGERY | Facility: CLINIC | Age: 49
End: 2024-02-21

## 2024-02-21 ENCOUNTER — DOCUMENTATION (OUTPATIENT)
Dept: SURGERY | Facility: CLINIC | Age: 49
End: 2024-02-21

## 2024-02-21 VITALS
TEMPERATURE: 97.5 F | BODY MASS INDEX: 29.51 KG/M2 | HEART RATE: 69 BPM | OXYGEN SATURATION: 98 % | DIASTOLIC BLOOD PRESSURE: 74 MMHG | WEIGHT: 210.8 LBS | HEIGHT: 71 IN | SYSTOLIC BLOOD PRESSURE: 143 MMHG

## 2024-02-21 DIAGNOSIS — B20 HUMAN IMMUNODEFICIENCY VIRUS (HIV) DISEASE (HCC): ICD-10-CM

## 2024-02-21 DIAGNOSIS — I10 PRIMARY HYPERTENSION: ICD-10-CM

## 2024-02-21 DIAGNOSIS — N52.9 ERECTILE DYSFUNCTION, UNSPECIFIED ERECTILE DYSFUNCTION TYPE: Primary | ICD-10-CM

## 2024-02-21 DIAGNOSIS — E11.65 UNCONTROLLED TYPE 2 DIABETES MELLITUS WITH HYPERGLYCEMIA (HCC): ICD-10-CM

## 2024-02-21 DIAGNOSIS — E66.9 OBESITY (BMI 30.0-34.9): ICD-10-CM

## 2024-02-21 DIAGNOSIS — A52.8 SYPHILIS, LATE LATENT: ICD-10-CM

## 2024-02-21 NOTE — PROGRESS NOTES
"Name: Charlie Hernandez      : 1975      MRN: [id number]  Encounter Provider: REDD Arthur  Encounter Date: 2024   Encounter department: [facility] AT [facility]    Assessment & Plan     1. Erectile dysfunction, unspecified erectile dysfunction type    2. Uncontrolled type 2 diabetes mellitus with hyperglycemia (HCC)  Assessment & Plan:    Lab Results   Component Value Date    HGBA1C 10.9 (H) 10/02/2023     Lab Results   Component Value Date/Time    HGBA1C 10.9 (H) 10/02/2023 09:11 AM    HGBA1C 13.7 (H) 09/15/2021 03:42 PM      Charlie diabetes is difficult to control. He has diffculty tolerating daily injections and fears medication side effects. He has been on Metformin, Trulicity, and victozia with limited success. Change to mounjaro to increase adherence, lower BGS, and promote weight loss. Due to secondary diagnosis of HTN and HIV, pt is high risk for cardiac disease. Educated pt that he needs to get BGS levels under control or he is at risk for serious commodities.     Educated to follow diabetic diet, maintain a healthy weight, and exercise regularly. Referred to dietician for additional education.    Refer to podiatry for regular foot care.    Refer to opthalmology for yearly retinal exam.                 Orders:  -     tirzepatide (Mounjaro) 2.5 MG/0.5ML; Inject 0.5 mL (2.5 mg total) under the skin every 7 days  -     Hemoglobin A1C    3. Syphilis, late latent  -     RPR Treatment Monitoring; Future    4. Human immunodeficiency virus (HIV) disease ([facility])  Assessment & Plan:  No results found for: \"PA8GTJN\"  CD4 ABS   Date/Time Value Ref Range Status   2023 11:24  359 - 1519 /uL Final     HIV-1 RNA by PCR, Qn   Date/Time Value Ref Range Status   2023 08:04 AM <20 copies/mL Final     Comment:     HIV-1 RNA not detected  The reportable range for this assay is 20 to 10,000,000  copies HIV-1 RNA/mL.     HIV-1 RNA Viral Load Log   Date/Time Value Ref Range Status " "  2023 08:04 AM COMMENT [id number]/mL Final     Comment:     Unable to calculate result since non-numeric result obtained for  component test.         ART: Biktarvy        Denies side effects. Stressed the importance of adherence.  Continue follow up with ID clinic.       Reviewed most recent labs, including Cd4 and viral load. Discussed the risks and benefits of treatment options, instructions for management, importance of treatment adherence, and reduction of risk factor.Educated on possible  medication side effects.     Counseled on routes of HIV transmission, including the risk of  infection. Emphasized that viral suppression is the best method to prevent HIV transmission.  At this time pt.denies the need for HIV testing of anyone in their life.     Total encounter time was 45 minutes. Greater then 20 minutes were spent on counseling and patient education. Pt voices understanding and agreement with treatment plan.          5. Obesity (BMI 30.0-34.9)  Assessment & Plan:  Body mass index is 29.4 kg/m².  Wt Readings from Last 3 Encounters:   24 95.6 kg (210 lb 12.8 oz)   23 98.6 kg (217 lb 6.4 oz)   10/18/23 100 kg (221 lb)     Height: 5' 11\" (180.3 cm)       Has lost weight. Encouraged continued progress towards weight loss goals.     Lab Results   Component Value Date    HGBA1C 10.9 (H) 10/02/2023     Lab Results   Component Value Date    GLUCOSE 269 (H) 09/10/2018   ;    Educated on the health risks of obesity.  Advised to lose weight.  Encouraged eating a healthy diet and daily cardiac exercise.  Recommended increasing fruits and vegetables and limiting processed foods.  Refer to dietitian for additional education.            6. Primary hypertension  Assessment & Plan:  Blood pressure: BP still above gaol. Pt reports scattered adherence with BP meds. Stressed the importance of taking medication as prescribed.   BP Readings from Last 3 Encounters:   24 143/74   23 163/94 "   11/14/23 132/72       Continue current antihypertensive.    Educated on the following lifestyle modifications to lower BP and decrease cardiovascular risk factors.  limit alcohol intake, reduce salt in diet, maintain a healthy weight, engage in 30 minutes of cardiovascular exercise daily, and not smoke.                Subjective     Mr. Charlie Hernandez is a 46-year-old male who presents to the clinic today for 3 month f/u PCP visit.  Past medical history significant for HIV, uncontrolled type 2 diabetes, hyperlipidemia, and obesity.     was started on Victozia. He has had scattered adherence with medication. He is also having problems following up with medical appointments and taking his antihypertensives as directed due to work hours..           Review of Systems   Constitutional:  Negative for chills and fever.   HENT:  Negative for ear pain and sore throat.    Eyes:  Negative for pain and visual disturbance.   Respiratory:  Negative for cough and shortness of breath.    Cardiovascular:  Negative for chest pain and palpitations.   Gastrointestinal:  Negative for abdominal pain and vomiting.   Genitourinary:  Negative for dysuria and hematuria.   Musculoskeletal:  Negative for arthralgias and back pain.   Skin:  Negative for color change and rash.   Neurological:  Negative for seizures and syncope.   All other systems reviewed and are negative.      Past Medical History:   Diagnosis Date    Chlamydia     Diabetes mellitus (HCC)     Type II    Gonorrhea     Herpes     HIV disease (HCC) 07/2020    mode of transmission: heterosexual    Hyperlipidemia     Hypertension     Neoplasm of kidney     Obesity     BMI 37.2    Syphilis      Past Surgical History:   Procedure Laterality Date    NO PAST SURGERIES       Family History   Problem Relation Age of Onset    Diabetes Mother     Diabetes Father     Diabetes Family      Social History     Socioeconomic History    Marital status: Single     Spouse name:  None    Number of children: None    Years of education: None    Highest education level: None   Occupational History    None   Tobacco Use    Smoking status: Never    Smokeless tobacco: Never   Vaping Use    Vaping status: Never Used   Substance and Sexual Activity    Alcohol use: No    Drug use: Yes     Types: Marijuana    Sexual activity: Yes     Partners: Female     Birth control/protection: None   Other Topics Concern    None   Social History Narrative    Activity level- sedentary     Wears Safety belt     Social Determinants of Health     Financial Resource Strain: Not on file   Food Insecurity: Food Insecurity Present (7/19/2022)    Hunger Vital Sign     Worried About Running Out of Food in the Last Year: Often true     Ran Out of Food in the Last Year: Often true   Transportation Needs: Not on file   Physical Activity: Not on file   Stress: Not on file   Social Connections: Not on file   Intimate Partner Violence: Not on file   Housing Stability: Not on file     Current Outpatient Medications on File Prior to Visit   Medication Sig    Aspirin Low Dose 81 MG EC tablet TAKE 1 TABLET BY MOUTH DAILY    atorvastatin (LIPITOR) 20 mg tablet TAKE 1 TABLET BY MOUTH DAILY    BD Pen Needle Bela U/F 32G X 4 MM MISC Inject under the skin daily    Biktarvy -25 MG tablet TAKE 1 TABLET BY MOUTH DAILY    cyclobenzaprine (FLEXERIL) 10 mg tablet TAKE 1 TABLET BY MOUTH THREE TIMES A DAY AS NEEDED FOR MUSCLE SPASM    ibuprofen (MOTRIN) 600 mg tablet TAKE 1 TABLET BY MOUTH EVERY SIX HOURS AS NEEDED FOR MODERATE PAIN    Insulin Pen Needle (NovoFine Plus Pen Needle) 32G X 4 MM MISC Use in the morning    Lantus SoloStar 100 units/mL SOPN Inject 0.4 mL (40 Units total) under the skin daily at bedtime    valACYclovir (VALTREX) 1,000 mg tablet Take 1 tablet (1,000 mg total) by mouth daily    [DISCONTINUED] liraglutide (VICTOZA) injection Inject 0.2 mL (1.2 mg total) under the skin daily    tadalafil (CIALIS) 20 MG tablet Take 1  "tablet (20 mg total) by mouth daily as needed for erectile dysfunction (Patient not taking: Reported on 2/21/2024)     Allergies   Allergen Reactions    Metformin Diarrhea     Immunization History   Administered Date(s) Administered    COVID-19 PFIZER VACCINE 0.3 ML IM 04/12/2021, 05/06/2021    COVID-19 Pfizer vac (Delfino-sucrose, gray cap) 12 yr+ IM 03/26/2022    Hep A, adult 01/09/2020    INFLUENZA 11/20/2020    Influenza, injectable, quadrivalent, preservative free 0.5 mL 10/10/2023    Influenza, recombinant, quadrivalent,injectable, preservative free 12/21/2021, 11/08/2022    Influenza, seasonal, injectable 12/06/2010, 12/16/2011, 11/30/2012    Meningococcal MCV4P 02/23/2021, 06/22/2021    Pneumococcal Conjugate 13-Valent 11/20/2020    Pneumococcal Polysaccharide PPV23 12/21/2021    Tdap 06/20/2017, 11/20/2020       Objective     /74   Pulse 69   Temp 97.5 °F (36.4 °C)   Ht 5' 11\" (1.803 m)   Wt 95.6 kg (210 lb 12.8 oz)   SpO2 98%   BMI 29.40 kg/m²     Physical Exam  Vitals and nursing note reviewed.   Constitutional:       Appearance: Normal appearance.   HENT:      Head: Normocephalic.      Nose: Nose normal. No congestion or rhinorrhea.      Mouth/Throat:      Mouth: Mucous membranes are moist.      Pharynx: Oropharynx is clear.   Eyes:      Pupils: Pupils are equal, round, and reactive to light.   Cardiovascular:      Rate and Rhythm: Normal rate and regular rhythm.      Pulses: Normal pulses.      Heart sounds: Normal heart sounds.   Pulmonary:      Effort: Pulmonary effort is normal.      Breath sounds: Normal breath sounds.   Abdominal:      General: Abdomen is flat. Bowel sounds are normal.      Palpations: Abdomen is soft.   Musculoskeletal:         General: No tenderness or signs of injury. Normal range of motion.   Skin:     General: Skin is warm and dry.   Neurological:      Mental Status: He is alert and oriented to person, place, and time.       REDD Arthur    "

## 2024-02-21 NOTE — PATIENT INSTRUCTIONS
Heart Healthy Diet   AMBULATORY CARE:   A heart healthy diet  is an eating plan low in unhealthy fats and sodium (salt). The plan is high in healthy fats and fiber. A heart healthy diet helps improve your cholesterol levels and lowers your risk for heart disease and stroke. A dietitian will teach you how to read and understand food labels.  Heart healthy diet guidelines to follow:   Choose foods that contain healthy fats:      Unsaturated fats  include monounsaturated and polyunsaturated fats. Unsaturated fat is found in foods such as soybean, canola, olive, corn, and safflower oils. It is also found in soft tub margarine that is made with liquid vegetable oil.    Omega-3 fat  is found in certain fish, such as salmon, tuna, and trout, and in walnuts and flaxseed. Eat fish high in omega-3 fats at least 2 times a week.       Limit or do not have unhealthy fats:      Cholesterol  is found in animal foods, such as eggs and lobster, and in dairy products made from whole milk. Limit cholesterol to less than 200 mg each day.    Saturated fat  is found in meats, such as sutton and hamburger. It is also found in chicken or turkey skin, whole milk, and butter. Limit saturated fat to less than 7% of your total daily calories.    Trans fat  is found in packaged foods, such as potato chips and cookies. It is also in hard margarine, some fried foods, and shortening. Do not eat foods that contain trans fats.    Get 20 to 30 grams of fiber each day.  Fruits, vegetables, whole-grain foods, and legumes (cooked beans) are good sources of fiber.         Limit sodium as directed.  You may be told to limit sodium, such as to 2,000 mg or less each day. Choose low-sodium or no-salt-added foods. Add little or no salt to food you prepare. Use herbs and spices in place of salt.       Include the following in your heart healthy plan:  Ask your dietitian or healthcare provider how many servings to have each day from the following food  groups:  Grains:      Whole-wheat breads, cereals, and pastas, and brown rice    Low-fat, low-sodium crackers and chips    Vegetables:      Broccoli, green beans, green peas, and spinach    Collards, kale, and lima beans    Carrots, sweet potatoes, tomatoes, and peppers    Canned vegetables with no salt added    Fruits:      Bananas, peaches, pears, and pineapple    Grapes, raisins, and dates    Oranges, tangerines, grapefruit, orange juice, and grapefruit juice    Apricots, mangoes, melons, and papaya    Raspberries and strawberries    Canned fruit with no added sugar    Low-fat dairy:      Nonfat (skim) milk, 1% milk, and low-fat almond, cashew, or soy milks fortified with calcium    Low-fat cheese, regular or frozen yogurt, and cottage cheese    Meats and proteins:      Lean cuts of beef and pork (loin, leg, round), skinless chicken and turkey    Legumes, soy products, egg whites, or nuts    Limit or do not include the following in your heart healthy plan:   Foods and liquids that contain unhealthy fats and oils:      Whole or 2% milk, cream cheese, sour cream, or cheese    High-fat cuts of beef (T-bone steaks, ribs), chicken or turkey with skin, and organ meats such as liver    Butter, stick margarine, shortening, and cooking oils such as coconut or palm oil    Foods and liquids high in sodium:      Packaged foods, such as frozen dinners, cookies, macaroni and cheese, and cereals with more than 300 mg of sodium per serving    Vegetables with added sodium, such as instant potatoes, vegetables with added sauces, or regular canned vegetables    Cured or smoked meats, such as hot dogs, sutton, and sausage    High-sodium ketchup, barbecue sauce, salad dressing, pickles, olives, soy sauce, or miso    Foods and liquids high in sugar:      Candy, cake, cookies, pies, or doughnuts    Soft drinks (soda), sports drinks, or sweetened tea    Canned or dry mixes for cakes, soups, sauces, or gravies    Other healthy heart  guidelines:   Do not smoke.  Nicotine and other chemicals in cigarettes and cigars can cause lung and heart damage. Ask your healthcare provider for information if you currently smoke and need help to quit. E-cigarettes or smokeless tobacco still contain nicotine. Talk to your provider before you use these products.    Limit or do not drink alcohol as directed.  Alcohol can damage your heart and raise your blood pressure. Your healthcare provider may give you specific daily and weekly limits. The general recommended limit is 1 drink a day for women 21 or older and for men 65 or older. Do not have more than 3 drinks within 24 hours or 7 within a week. The recommended limit is 2 drinks a day for men 21 to 64 years of age. Do not have more than 4 drinks within 24 hours or 14 within a week. A drink of alcohol is 12 ounces of beer, 5 ounces of wine, or 1½ ounces of liquor.    Maintain a healthy weight.  Extra body weight makes your heart work harder. Ask your provider what a healthy weight is for you. He or she can help you create a safe weight loss plan, if needed.    Exercise regularly.  Exercise can help you maintain a healthy weight and improve your blood pressure and cholesterol levels. Regular exercise can also decrease your risk for heart problems. Ask your provider about the best exercise plan for you. Do not start an exercise program without asking your provider.          Follow up with your doctor or cardiologist as directed:  Write down your questions so you remember to ask them during your visits.  © Copyright [employer] 2023 Information is for End User's use only and may not be sold, redistributed or otherwise used for commercial purposes.  The above information is an  only. It is not intended as medical advice for individual conditions or treatments. Talk to your doctor, nurse or pharmacist before following any medical regimen to see if it is safe and effective for you.

## 2024-02-21 NOTE — PROGRESS NOTES
Assessment/Plan:   [facility] met with pt during PCP appt. To complete PHQ-9 and follow up.  Pt scored 2 no signs nor symptoms of MH noted.   Pt stated he is doing well and life has calmed down since our last meeting.  Pt stated he is with his youngest gareth mother and the relationship is doing well.   [facility]     Today patient present with   Chief Complaint   Patient presents with    Follow-up     Pt offers no c/o at this time.     Patient would likely benefit from annual PHQ-9  Consider/focus/continue monitoring of pts emotional, cognitive and behavioral stabilty  Stage of change:   Plan/ Behavioral Recommendations: ongoing  interventions as per pt's coordinated care and/or pts request of services.       Diagnoses and all orders for this visit:    Erectile dysfunction, unspecified erectile dysfunction type    Uncontrolled type 2 diabetes mellitus with hyperglycemia (HCC)  -     tirzepatide (Mounjaro) 2.5 MG/0.5ML; Inject 0.5 mL (2.5 mg total) under the skin every 7 days  -     Hemoglobin A1C    Syphilis, late latent  -     Cancel: RPR-Syphilis Screening (Total Syphilis IGG/IGM); Future  -     RPR Treatment Monitoring; Future          Subjective:     Patient ID: Charlie Hernandez is a 49 y.o. male.    HPI    History of Present Illness:      Patient is being seen for annual behavioral health assessment.   Patient denies current behavioral health concerns.    [unfilled]       Review of Systems      Objective:     Physical Exam      Community Health    Orientation     Person: yes    Place: yes    Time: yes    Appearance    Well Developed: yes healthy    Uncomfortable: no    Normal Body Odor: yes    Smells of Feces: no    Smells of Urine: no    Disheveled: no    Well Nourished: yes weight WNL of ideal    Grooming Unkempt: no    Poor Eye Contact: no    Hirsute: no    Looks Tired: no    Acutely Exhausted: noappearance reflects stated age    Mood and Affect:     Appropriate: yes    Euthymicyes    Irritable:  no    Angry: no    Anxious: no    Depressed:no    Blunted:no    Labile: no    Restricted: no    Harm to Self or Others: pt denies SI/HI no signs nor symptoms noted.    Substance Abuse: past history

## 2024-02-21 NOTE — ASSESSMENT & PLAN NOTE
Lab Results   Component Value Date    HGBA1C 10.9 (H) 10/02/2023     Lab Results   Component Value Date/Time    HGBA1C 10.9 (H) 10/02/2023 09:11 AM    HGBA1C 13.7 (H) 09/15/2021 03:42 PM      Charlie diabetes is difficult to control. He has diffculty tolerating daily injections and fears medication side effects. He has been on Metformin, Trulicity, and victozia with limited success. Change to mounjaro to increase adherence, lower BGS, and promote weight loss. Due to secondary diagnosis of HTN and HIV, pt is high risk for cardiac disease. Educated pt that he needs to get BGS levels under control or he is at risk for serious commodities.     Educated to follow diabetic diet, maintain a healthy weight, and exercise regularly. Referred to dietician for additional education.    Refer to podiatry for regular foot care.    Refer to opthalmology for yearly retinal exam.

## 2024-02-21 NOTE — ASSESSMENT & PLAN NOTE
"Body mass index is 29.4 kg/m².  Wt Readings from Last 3 Encounters:   02/21/24 95.6 kg (210 lb 12.8 oz)   11/14/23 98.6 kg (217 lb 6.4 oz)   10/18/23 100 kg (221 lb)     Height: 5' 11\" (180.3 cm)       Has lost weight. Encouraged continued progress towards weight loss goals.     Lab Results   Component Value Date    HGBA1C 10.9 (H) 10/02/2023     Lab Results   Component Value Date    GLUCOSE 269 (H) 09/10/2018   ;    Educated on the health risks of obesity.  Advised to lose weight.  Encouraged eating a healthy diet and daily cardiac exercise.  Recommended increasing fruits and vegetables and limiting processed foods.  Refer to dietitian for additional education.        "

## 2024-02-21 NOTE — ASSESSMENT & PLAN NOTE
"No results found for: \"CL2SGIC\"  CD4 ABS   Date/Time Value Ref Range Status   2023 11:24  359 - 1519 /uL Final     HIV-1 RNA by PCR, Qn   Date/Time Value Ref Range Status   2023 08:04 AM <20 copies/mL Final     Comment:     HIV-1 RNA not detected  The reportable range for this assay is 20 to 10,000,000  copies HIV-1 RNA/mL.     HIV-1 RNA Viral Load Log   Date/Time Value Ref Range Status   2023 08:04 AM COMMENT [id number]/mL Final     Comment:     Unable to calculate result since non-numeric result obtained for  component test.         ART: Biktarvy        Denies side effects. Stressed the importance of adherence.  Continue follow up with ID clinic.       Reviewed most recent labs, including Cd4 and viral load. Discussed the risks and benefits of treatment options, instructions for management, importance of treatment adherence, and reduction of risk factor.Educated on possible  medication side effects.     Counseled on routes of HIV transmission, including the risk of  infection. Emphasized that viral suppression is the best method to prevent HIV transmission.  At this time pt.denies the need for HIV testing of anyone in their life.     Total encounter time was 45 minutes. Greater then 20 minutes were spent on counseling and patient education. Pt voices understanding and agreement with treatment plan.      "

## 2024-02-21 NOTE — PROGRESS NOTES
"[place] [facility] Encounter     Charlie seen by RD in conjunction with PCP f/u       Typical food/beverage intake:  Pt is still skipping breakfast and lunch. He consumes a large dinner.     Appetite: Fair     Physical activity: very active at work     Weight history:   Wt Readings from Last 3 Encounters:   11/14/23 98.6 kg (217 lb 6.4 oz)   10/18/23 100 kg (221 lb)   10/10/23 100 kg (221 lb 6.4 oz)   05/09/2023   213(96.6kg)     Nutrition-related labs:    Lab Results   Component Value Date    HGBA1C 10.9 (H) 10/02/2023     Lab Results   Component Value Date    CHOLESTEROL 160 02/01/2023    CHOLESTEROL 210 (H) 12/11/2021     Lab Results   Component Value Date    HDL 43 02/01/2023    HDL 50 12/11/2021     Lab Results   Component Value Date    TRIG 65 02/01/2023    TRIG 87 12/11/2021     No results found for: \"NONHDLC\"        Nutrition Diagnosis    Problem: altered nutrition-related lab values (A1C)    Related to: lack of food planning, purchasing, and preparation skills    As Evidenced By: patient interview       Nutrition Intervention/Recommendations    Nutrition education intervention: provided and reinforced previous education     Nutrition recommendations: Pack small protein/vegetable snacks to consume while at work    Supplement recommendations: No supplement recommendations at this time      Teaching Method: verbal     Person Educated: patient    Comprehension: good    Receptivity: good    Expected compliance: fair    Collaboration/referral of nutrition care:   N/A      Goals:  Lower A1C  Pack high protein/vegetable snacks for at work       Monitoring/Evaluation:  Will continue to encourage positive eating habits.     Visit Summary  RD visited pt to discuss progress from previous goals(packing lunch & healthy snacking). Pt still skipping daily meals & having a large meal at night. He reports lacking energy during the day. He often focuses on his children and forgets about himself. RD discussed the importance of " healthy snack/meals for energy. Will follow up at next appointment.   -Evelyn Deleon RDN,LDN

## 2024-02-21 NOTE — ASSESSMENT & PLAN NOTE
Blood pressure: BP still above gaol. Pt reports scattered adherence with BP meds. Stressed the importance of taking medication as prescribed.   BP Readings from Last 3 Encounters:   02/21/24 143/74   11/30/23 163/94   11/14/23 132/72       Continue current antihypertensive.    Educated on the following lifestyle modifications to lower BP and decrease cardiovascular risk factors.  limit alcohol intake, reduce salt in diet, maintain a healthy weight, engage in 30 minutes of cardiovascular exercise daily, and not smoke.

## 2024-02-28 DIAGNOSIS — E11.65 UNCONTROLLED TYPE 2 DIABETES MELLITUS WITH HYPERGLYCEMIA (HCC): ICD-10-CM

## 2024-03-05 RX ORDER — INSULIN GLARGINE 100 [IU]/ML
INJECTION, SOLUTION SUBCUTANEOUS
Qty: 15 ML | Refills: 2 | Status: SHIPPED | OUTPATIENT
Start: 2024-03-05

## 2024-03-05 RX ORDER — LIRAGLUTIDE 6 MG/ML
1.2 INJECTION SUBCUTANEOUS DAILY
Qty: 6 ML | Refills: 2 | Status: SHIPPED | OUTPATIENT
Start: 2024-03-05 | End: 2024-03-11

## 2024-03-08 ENCOUNTER — TELEPHONE (OUTPATIENT)
Dept: SURGERY | Facility: CLINIC | Age: 49
End: 2024-03-08

## 2024-03-08 NOTE — TELEPHONE ENCOUNTER
Ccn called for clarification on medication. Pt is now on mounjarno and wanted to make sure he was not taking both that and victoza.

## 2024-03-18 ENCOUNTER — APPOINTMENT (OUTPATIENT)
Dept: LAB | Facility: HOSPITAL | Age: 49
End: 2024-03-18
Payer: COMMERCIAL

## 2024-03-18 ENCOUNTER — TELEPHONE (OUTPATIENT)
Dept: SURGERY | Facility: CLINIC | Age: 49
End: 2024-03-18

## 2024-03-18 DIAGNOSIS — A52.8 SYPHILIS, LATE LATENT: ICD-10-CM

## 2024-03-18 LAB
ALBUMIN SERPL BCP-MCNC: 4.3 G/DL (ref 3.5–5)
ALP SERPL-CCNC: 41 U/L (ref 34–104)
ALT SERPL W P-5'-P-CCNC: 9 U/L (ref 7–52)
ANION GAP SERPL CALCULATED.3IONS-SCNC: 6 MMOL/L (ref 4–13)
AST SERPL W P-5'-P-CCNC: 9 U/L (ref 13–39)
BASOPHILS # BLD AUTO: 0.14 THOUSANDS/ÂΜL (ref 0–0.1)
BASOPHILS NFR BLD AUTO: 2 % (ref 0–1)
BILIRUB SERPL-MCNC: 0.54 MG/DL (ref 0.2–1)
BUN SERPL-MCNC: 8 MG/DL (ref 5–25)
CALCIUM SERPL-MCNC: 8.8 MG/DL (ref 8.4–10.2)
CHLORIDE SERPL-SCNC: 104 MMOL/L (ref 96–108)
CO2 SERPL-SCNC: 29 MMOL/L (ref 21–32)
CREAT SERPL-MCNC: 0.71 MG/DL (ref 0.6–1.3)
EOSINOPHIL # BLD AUTO: 0.82 THOUSAND/ÂΜL (ref 0–0.61)
EOSINOPHIL NFR BLD AUTO: 10 % (ref 0–6)
ERYTHROCYTE [DISTWIDTH] IN BLOOD BY AUTOMATED COUNT: 13.2 % (ref 11.6–15.1)
EST. AVERAGE GLUCOSE BLD GHB EST-MCNC: 243 MG/DL
GFR SERPL CREATININE-BSD FRML MDRD: 110 ML/MIN/1.73SQ M
GLUCOSE P FAST SERPL-MCNC: 179 MG/DL (ref 65–99)
HBA1C MFR BLD: 10.1 %
HCT VFR BLD AUTO: 43.2 % (ref 36.5–49.3)
HGB BLD-MCNC: 13.6 G/DL (ref 12–17)
IMM GRANULOCYTES # BLD AUTO: 0.01 THOUSAND/UL (ref 0–0.2)
IMM GRANULOCYTES NFR BLD AUTO: 0 % (ref 0–2)
LYMPHOCYTES # BLD AUTO: 2.86 THOUSANDS/ÂΜL (ref 0.6–4.47)
LYMPHOCYTES NFR BLD AUTO: 35 % (ref 14–44)
MCH RBC QN AUTO: 28.4 PG (ref 26.8–34.3)
MCHC RBC AUTO-ENTMCNC: 31.5 G/DL (ref 31.4–37.4)
MCV RBC AUTO: 90 FL (ref 82–98)
MONOCYTES # BLD AUTO: 0.52 THOUSAND/ÂΜL (ref 0.17–1.22)
MONOCYTES NFR BLD AUTO: 6 % (ref 4–12)
NEUTROPHILS # BLD AUTO: 3.74 THOUSANDS/ÂΜL (ref 1.85–7.62)
NEUTS SEG NFR BLD AUTO: 47 % (ref 43–75)
NRBC BLD AUTO-RTO: 0 /100 WBCS
PLATELET # BLD AUTO: 280 THOUSANDS/UL (ref 149–390)
PMV BLD AUTO: 9.6 FL (ref 8.9–12.7)
POTASSIUM SERPL-SCNC: 4.2 MMOL/L (ref 3.5–5.3)
PROT SERPL-MCNC: 6.9 G/DL (ref 6.4–8.4)
RBC # BLD AUTO: 4.79 MILLION/UL (ref 3.88–5.62)
SODIUM SERPL-SCNC: 139 MMOL/L (ref 135–147)
WBC # BLD AUTO: 8.09 THOUSAND/UL (ref 4.31–10.16)

## 2024-03-18 PROCEDURE — 86593 SYPHILIS TEST NON-TREP QUANT: CPT

## 2024-03-18 PROCEDURE — 36415 COLL VENOUS BLD VENIPUNCTURE: CPT

## 2024-03-18 PROCEDURE — 86592 SYPHILIS TEST NON-TREP QUAL: CPT

## 2024-03-18 NOTE — TELEPHONE ENCOUNTER
Pt called requesting tb results he had done in November. Pt needs results faxed to his employer [employer] [phone]. I faxed results and we received confirmation.

## 2024-03-19 ENCOUNTER — PATIENT OUTREACH (OUTPATIENT)
Dept: SURGERY | Facility: CLINIC | Age: 49
End: 2024-03-19

## 2024-03-19 LAB
BASOPHILS # BLD AUTO: 0.2 X10E3/UL (ref 0–0.2)
BASOPHILS NFR BLD AUTO: 2 %
CD3+CD4+ CELLS # BLD: 918 /UL (ref 359–1519)
CD3+CD4+ CELLS NFR BLD: 34 % (ref 30.8–58.5)
CD3+CD4+ CELLS/CD3+CD8+ CLL BLD: 0.94 % (ref 0.92–3.72)
CD3+CD8+ CELLS # BLD: 975 /UL (ref 109–897)
CD3+CD8+ CELLS NFR BLD: 36.1 % (ref 12–35.5)
EOSINOPHIL # BLD AUTO: 0.8 X10E3/UL (ref 0–0.4)
EOSINOPHIL NFR BLD AUTO: 10 %
ERYTHROCYTE [DISTWIDTH] IN BLOOD BY AUTOMATED COUNT: 12.9 % (ref 11.6–15.4)
HCT VFR BLD AUTO: 35.6 % (ref 37.5–51)
HGB BLD-MCNC: 11 G/DL (ref 13–17.7)
IMM GRANULOCYTES # BLD: 0 X10E3/UL (ref 0–0.1)
IMM GRANULOCYTES NFR BLD: 0 %
LYMPHOCYTES # BLD AUTO: 2.7 X10E3/UL (ref 0.7–3.1)
LYMPHOCYTES NFR BLD AUTO: 33 %
MCH RBC QN AUTO: 28.6 PG (ref 26.6–33)
MCHC RBC AUTO-ENTMCNC: 30.9 G/DL (ref 31.5–35.7)
MCV RBC AUTO: 93 FL (ref 79–97)
MONOCYTES # BLD AUTO: 0.6 X10E3/UL (ref 0.1–0.9)
MONOCYTES NFR BLD AUTO: 7 %
NEUTROPHILS # BLD AUTO: 3.9 X10E3/UL (ref 1.4–7)
NEUTROPHILS NFR BLD AUTO: 48 %
PLATELET # BLD AUTO: 334 X10E3/UL (ref 150–450)
RBC # BLD AUTO: 3.85 X10E6/UL (ref 4.14–5.8)
RPR SER QL: REACTIVE
RPR SER-TITR: ABNORMAL {TITER}
WBC # BLD AUTO: 8.1 X10E3/UL (ref 3.4–10.8)

## 2024-03-19 NOTE — PROGRESS NOTES
Ct reports that he has received a shut off notice for his electricity due to his non payment when he lost his job in the fall. Ct reports he has been in the rears with child support and is going to discuss with his child's mother  regarding a solution for back payment. Ct asked about [employer] application and HC will assist 3-.    HC updated Clinic team in meeting.

## 2024-03-20 LAB — HIV1 RNA # PLAS NAA DL=20: NOT DETECTED {COPIES}/ML

## 2024-03-22 DIAGNOSIS — N52.8 OTHER MALE ERECTILE DYSFUNCTION: ICD-10-CM

## 2024-03-22 DIAGNOSIS — E78.2 MIXED HYPERLIPIDEMIA: ICD-10-CM

## 2024-03-22 RX ORDER — TADALAFIL 20 MG/1
20 TABLET ORAL DAILY PRN
Qty: 90 TABLET | Refills: 0 | Status: SHIPPED | OUTPATIENT
Start: 2024-03-22

## 2024-03-22 NOTE — TELEPHONE ENCOUNTER
Reason for call:   [x] Refill   [] Prior Auth  [] Other:     Office:   [] PCP/Provider -   [x] Specialty/Provider - Urology     Medication: Tadalafil     Dose/Frequency: 20 mg tablet taken by mouth once daily     Quantity: 90    Pharmacy: [address] [place], [place] - [address] [facility] [phone]     Does the patient have enough for 3 days?   [] Yes   [x] No - Send as HP to POD

## 2024-03-25 RX ORDER — ATORVASTATIN CALCIUM 20 MG/1
20 TABLET, FILM COATED ORAL DAILY
Qty: 30 TABLET | Refills: 1 | Status: SHIPPED | OUTPATIENT
Start: 2024-03-25

## 2024-04-01 ENCOUNTER — PATIENT OUTREACH (OUTPATIENT)
Dept: SURGERY | Facility: CLINIC | Age: 49
End: 2024-04-01

## 2024-04-01 DIAGNOSIS — Z13.220 ENCOUNTER FOR LIPID SCREENING FOR CARDIOVASCULAR DISEASE: ICD-10-CM

## 2024-04-01 DIAGNOSIS — Z79.899 OTHER LONG TERM (CURRENT) DRUG THERAPY: ICD-10-CM

## 2024-04-01 DIAGNOSIS — Z11.3 ENCOUNTER FOR SCREENING FOR BACTERIAL SEXUALLY TRANSMITTED DISEASE: ICD-10-CM

## 2024-04-01 DIAGNOSIS — E11.65 UNCONTROLLED TYPE 2 DIABETES MELLITUS WITH HYPERGLYCEMIA (HCC): ICD-10-CM

## 2024-04-01 DIAGNOSIS — Z13.6 ENCOUNTER FOR LIPID SCREENING FOR CARDIOVASCULAR DISEASE: ICD-10-CM

## 2024-04-01 DIAGNOSIS — Z72.89 OTHER PROBLEMS RELATED TO LIFESTYLE: ICD-10-CM

## 2024-04-01 DIAGNOSIS — Z20.2 CONTACT WITH AND (SUSPECTED) EXPOSURE TO INFECTIONS WITH A PREDOMINANTLY SEXUAL MODE OF TRANSMISSION: ICD-10-CM

## 2024-04-01 DIAGNOSIS — B20 HUMAN IMMUNODEFICIENCY VIRUS (HIV) DISEASE (HCC): Primary | ICD-10-CM

## 2024-04-01 DIAGNOSIS — D72.89 OTHER SPECIFIED DISORDERS OF WHITE BLOOD CELLS: ICD-10-CM

## 2024-04-02 ENCOUNTER — PATIENT OUTREACH (OUTPATIENT)
Dept: SURGERY | Facility: CLINIC | Age: 49
End: 2024-04-02

## 2024-04-02 ENCOUNTER — OFFICE VISIT (OUTPATIENT)
Dept: SURGERY | Facility: CLINIC | Age: 49
End: 2024-04-02
Payer: COMMERCIAL

## 2024-04-02 VITALS
WEIGHT: 218.2 LBS | HEIGHT: 71 IN | BODY MASS INDEX: 30.55 KG/M2 | DIASTOLIC BLOOD PRESSURE: 73 MMHG | HEART RATE: 89 BPM | SYSTOLIC BLOOD PRESSURE: 120 MMHG | OXYGEN SATURATION: 96 % | TEMPERATURE: 98.9 F

## 2024-04-02 DIAGNOSIS — I10 PRIMARY HYPERTENSION: ICD-10-CM

## 2024-04-02 DIAGNOSIS — E11.65 UNCONTROLLED TYPE 2 DIABETES MELLITUS WITH HYPERGLYCEMIA (HCC): ICD-10-CM

## 2024-04-02 DIAGNOSIS — B20 HUMAN IMMUNODEFICIENCY VIRUS (HIV) DISEASE (HCC): Primary | ICD-10-CM

## 2024-04-02 DIAGNOSIS — A52.8 SYPHILIS, LATE LATENT: ICD-10-CM

## 2024-04-02 PROCEDURE — 99214 OFFICE O/P EST MOD 30 MIN: CPT | Performed by: INTERNAL MEDICINE

## 2024-04-02 RX ORDER — PEN NEEDLE, DIABETIC 32GX 5/32"
NEEDLE, DISPOSABLE MISCELLANEOUS
Qty: 100 EACH | Refills: 1 | Status: SHIPPED | OUTPATIENT
Start: 2024-04-02

## 2024-04-02 NOTE — PROGRESS NOTES
"Progress Note - Infectious Disease   Charlie Hernandez 49 y.o. male MRN: [id number]  Unit/Bed#:  Encounter: [id number]      Impression/Plan:  1. HIV-doing well on Biktarvy with a near undetectable viral load and a CD4 count in the 900s.  Continue ART, recheck labs in 5 months, follow-up in 6 months.  Stressed adherence     2. Diabetes mellitus-hemoglobin A1c increased to 10.1.  On Mounjaro now.  Stressed the importance of tight control.  Likely needs referral to endocrinology.  Discussed with the primary who is addressing     3.  Obesity-continues to stressed the importance of diet and exercise.  Weight stable  Discussed with the primary     4. Syphilis-patient status post treatment with good response but now with increased RPR to 1:32 consistent with recurrent infection.   Given benzathine PCN X 3 for presumptive late latent syphilis.   Titer dropped to 1: 16 once again.  Recheck in 3 months     5. Hypertension-better control.     Patient was provided medication, adherence and prevention education    Subjective:  Routine follow-up for HIV.  Patient claims 100% adherence with Biktarvy. Patient denies any notable side effects.  Overall the feeling well.  The patient denies any fever chills or sweats, denies any nausea vomiting or diarrhea, denies any cough or shortness of breath.    ROS:  A complete review of systems is negative other than that noted above in the subjective    Followup portions patient history reviewed and updated as:  Allergies, current medications, past medical history, past social history, past surgical history, and the problem list    Objective:  Vitals:  Vitals:    04/02/24 1729   BP: 120/73   Pulse: 89   Temp: 98.9 °F (37.2 °C)   SpO2: 96%   Weight: 99 kg (218 lb 3.2 oz)   Height: 5' 11\" (1.803 m)       Physical Exam:   General Appearance:  Alert, interactive, appearing well,  nontoxic, no acute distress.   Neck:   Supple without lymphadenopathy, no thyromegaly or masses   Throat: Oropharynx " moist without lesions.    Lungs:   Clear to auscultation bilaterally; no wheezes, rhonchi or rales; respirations unlabored   Heart:  RRR; no murmur, rub or gallop   Abdomen:   Soft, non-tender, non-distended, positive bowel sounds.     Extremities: No clubbing, cyanosis or edema   Skin: No new rashes or lesions. No draining wounds noted.       Labs, Imaging, & Other studies:   All pertinent labs and imaging studies were personally reviewed    Lab Results   Component Value Date     (L) 04/04/2018    K 4.2 03/18/2024     03/18/2024    CO2 29 03/18/2024    ANIONGAP 10 04/04/2018    BUN 8 03/18/2024    CREATININE 0.71 03/18/2024    GLUCOSE 269 (H) 09/10/2018    GLUF 179 (H) 03/18/2024    CALCIUM 8.8 03/18/2024    AST 9 (L) 03/18/2024    ALT 9 03/18/2024    ALKPHOS 41 03/18/2024    PROT 6.9 04/04/2018    BILITOT 0.4 04/04/2018    EGFR 110 03/18/2024     Lab Results   Component Value Date    WBC 8.09 03/18/2024    WBC 8.1 03/18/2024    HGB 13.6 03/18/2024    HGB 11.0 (L) 03/18/2024    HCT 43.2 03/18/2024    HCT 35.6 (L) 03/18/2024    MCV 90 03/18/2024    MCV 93 03/18/2024     03/18/2024     03/18/2024     Lab Results   Component Value Date    HEPCAB Non-reactive 02/01/2023     Lab Results   Component Value Date    HAV Reactive (A) 12/11/2021    HEPCAB Non-reactive 02/01/2023     Lab Results   Component Value Date    RPR Reactive (A) 03/18/2024    RPR Reactive 16 dils (A) 03/18/2024     CD4 ABS   Date/Time Value Ref Range Status   03/18/2024 09:47  359 - 1519 /uL Final     HIV-1 RNA by PCR, Qn   Date/Time Value Ref Range Status   04/25/2023 08:04 AM <20 copies/mL Final     Comment:     HIV-1 RNA not detected  The reportable range for this assay is 20 to 10,000,000  copies HIV-1 RNA/mL.     HIV-1 TARGET   Date/Time Value Ref Range Status   03/18/2024 09:47 AM Not Detected Not Detected Final           Current Outpatient Medications:     Aspirin Low Dose 81 MG EC tablet, TAKE 1 TABLET BY MOUTH  DAILY, Disp: 30 tablet, Rfl: 2    atorvastatin (LIPITOR) 20 mg tablet, TAKE 1 TABLET BY MOUTH DAILY, Disp: 30 tablet, Rfl: 1    BD Pen Needle Bela U/F 32G X 4 MM MISC, Inject under the skin daily, Disp: 100 each, Rfl: 2    Biktarvy -25 MG tablet, TAKE 1 TABLET BY MOUTH DAILY, Disp: 30 tablet, Rfl: 2    cyclobenzaprine (FLEXERIL) 10 mg tablet, TAKE 1 TABLET BY MOUTH THREE TIMES A DAY AS NEEDED FOR MUSCLE SPASM, Disp: 60 tablet, Rfl: 0    ibuprofen (MOTRIN) 600 mg tablet, TAKE 1 TABLET BY MOUTH EVERY SIX HOURS AS NEEDED FOR MODERATE PAIN, Disp: 30 tablet, Rfl: 2    Insulin Pen Needle (NovoFine Plus Pen Needle) 32G X 4 MM MISC, Use in the morning, Disp: 100 each, Rfl: 1    Lantus SoloStar 100 units/mL SOPN, INJECT 0.4mL (40 UNITS TOTAL) SUBCUTANEOUSLY DAILY AT BEDTIME, Disp: 15 mL, Rfl: 2    tadalafil (CIALIS) 20 MG tablet, Take 1 tablet (20 mg total) by mouth daily as needed for erectile dysfunction, Disp: 90 tablet, Rfl: 0    tirzepatide (Mounjaro) 2.5 MG/0.5ML, Inject 0.5 mL (2.5 mg total) under the skin every 7 days, Disp: 2 mL, Rfl: 2    valACYclovir (VALTREX) 1,000 mg tablet, Take 1 tablet (1,000 mg total) by mouth daily, Disp: 30 tablet, Rfl: 2

## 2024-04-03 ENCOUNTER — PATIENT OUTREACH (OUTPATIENT)
Dept: SURGERY | Facility: CLINIC | Age: 49
End: 2024-04-03

## 2024-04-04 NOTE — PROGRESS NOTES
HC text Ct to follow up if he has heard from [employer] yet regarding his application. HC notified Ct that application was mailed due to fax being down and recommended going to [facility] at the [facility] office.

## 2024-04-04 NOTE — PROGRESS NOTES
HC text Ct to follow up.Ct states that he hasn't heard from them but called and was told he may qualify for [employer] program. Ct was told to call and apply. Ct states he will get back to HC with information.

## 2024-04-04 NOTE — PROGRESS NOTES
HC and Ct text regarding [employer]. Ct states he has been approved by [employer] and they are coming to his unit. HC states that the program will not pay back the bill. HC suggested Ct going to [facility] office and following up with [employer]. Ct states he will due so.

## 2024-04-15 ENCOUNTER — PATIENT OUTREACH (OUTPATIENT)
Dept: SURGERY | Facility: CLINIC | Age: 49
End: 2024-04-15

## 2024-04-16 ENCOUNTER — PATIENT OUTREACH (OUTPATIENT)
Dept: SURGERY | Facility: CLINIC | Age: 49
End: 2024-04-16

## 2024-04-16 NOTE — PROGRESS NOTES
Client e-mailed documents to José Luis Mcclellan for assistance to get them signed by PCP and return to be sent.     HC emailed [employer] to check in about assistance before completing a proposal form.     HC updated Client and checked in.

## 2024-04-16 NOTE — PROGRESS NOTES
Ct called  and reports that he is going to move forward with his electric getting turned off and will try to get another job to make more money to pay it off. Ct also reports that he received mail that his MA has been taken away due to high income.  states to come in to complete a [employer] application and [employer].      Ct came in and completed both applications.  emailed [place] office for physician form to be completed.      completed [employer] physician form and emailed to be signed as well.

## 2024-04-18 ENCOUNTER — HOSPITAL ENCOUNTER (EMERGENCY)
Facility: HOSPITAL | Age: 49
Discharge: HOME | End: 2024-04-19
Attending: EMERGENCY MEDICINE
Payer: COMMERCIAL

## 2024-04-18 ENCOUNTER — PATIENT OUTREACH (OUTPATIENT)
Dept: SURGERY | Facility: CLINIC | Age: 49
End: 2024-04-18

## 2024-04-18 DIAGNOSIS — R00.2 PALPITATIONS: Primary | ICD-10-CM

## 2024-04-18 DIAGNOSIS — F15.10 METHAMPHETAMINE USE (HCC): ICD-10-CM

## 2024-04-18 PROCEDURE — 99285 EMERGENCY DEPT VISIT HI MDM: CPT

## 2024-04-18 PROCEDURE — 93005 ELECTROCARDIOGRAM TRACING: CPT

## 2024-04-18 NOTE — PROGRESS NOTES
HC received e-mailed from clinic team for follow up. HC updated clinic team was sent to [place] for assistance but then fwd to Clinic team to be signed by PCP.     HC received e-mailed from [employer] for approval and will need form to be completed.    HC completed Contingency form and called the Client for more details. HC e-mailed back to riri seymour. If approved [employer] will mail a check of $250 to [employer] on clients behalf.

## 2024-04-19 ENCOUNTER — TELEPHONE (OUTPATIENT)
Dept: SURGERY | Facility: CLINIC | Age: 49
End: 2024-04-19

## 2024-04-19 ENCOUNTER — APPOINTMENT (EMERGENCY)
Dept: RADIOLOGY | Facility: HOSPITAL | Age: 49
End: 2024-04-19
Payer: COMMERCIAL

## 2024-04-19 VITALS
OXYGEN SATURATION: 98 % | SYSTOLIC BLOOD PRESSURE: 154 MMHG | TEMPERATURE: 97.5 F | HEART RATE: 68 BPM | RESPIRATION RATE: 18 BRPM | DIASTOLIC BLOOD PRESSURE: 94 MMHG

## 2024-04-19 DIAGNOSIS — B20 HIV INFECTION, UNSPECIFIED SYMPTOM STATUS (HCC): ICD-10-CM

## 2024-04-19 DIAGNOSIS — A60.9 HSV (HERPES SIMPLEX VIRUS) ANOGENITAL INFECTION: ICD-10-CM

## 2024-04-19 LAB
ALBUMIN SERPL BCP-MCNC: 4.5 G/DL (ref 3.5–5)
ALP SERPL-CCNC: 49 U/L (ref 34–104)
ALT SERPL W P-5'-P-CCNC: 14 U/L (ref 7–52)
ANION GAP SERPL CALCULATED.3IONS-SCNC: 10 MMOL/L (ref 4–13)
AST SERPL W P-5'-P-CCNC: 32 U/L (ref 13–39)
ATRIAL RATE: 69 BPM
BASOPHILS # BLD AUTO: 0.1 THOUSANDS/ÂΜL (ref 0–0.1)
BASOPHILS NFR BLD AUTO: 1 % (ref 0–1)
BILIRUB SERPL-MCNC: 0.69 MG/DL (ref 0.2–1)
BUN SERPL-MCNC: 11 MG/DL (ref 5–25)
CALCIUM SERPL-MCNC: 9.1 MG/DL (ref 8.4–10.2)
CARDIAC TROPONIN I PNL SERPL HS: 4 NG/L
CHLORIDE SERPL-SCNC: 99 MMOL/L (ref 96–108)
CO2 SERPL-SCNC: 27 MMOL/L (ref 21–32)
CREAT SERPL-MCNC: 0.85 MG/DL (ref 0.6–1.3)
EOSINOPHIL # BLD AUTO: 0.1 THOUSAND/ÂΜL (ref 0–0.61)
EOSINOPHIL NFR BLD AUTO: 1 % (ref 0–6)
ERYTHROCYTE [DISTWIDTH] IN BLOOD BY AUTOMATED COUNT: 12.1 % (ref 11.6–15.1)
GFR SERPL CREATININE-BSD FRML MDRD: 102 ML/MIN/1.73SQ M
GLUCOSE SERPL-MCNC: 220 MG/DL (ref 65–140)
HCT VFR BLD AUTO: 42.8 % (ref 36.5–49.3)
HGB BLD-MCNC: 14.1 G/DL (ref 12–17)
IMM GRANULOCYTES # BLD AUTO: 0.02 THOUSAND/UL (ref 0–0.2)
IMM GRANULOCYTES NFR BLD AUTO: 0 % (ref 0–2)
LYMPHOCYTES # BLD AUTO: 3.25 THOUSANDS/ÂΜL (ref 0.6–4.47)
LYMPHOCYTES NFR BLD AUTO: 35 % (ref 14–44)
MCH RBC QN AUTO: 28.8 PG (ref 26.8–34.3)
MCHC RBC AUTO-ENTMCNC: 32.9 G/DL (ref 31.4–37.4)
MCV RBC AUTO: 87 FL (ref 82–98)
MONOCYTES # BLD AUTO: 0.62 THOUSAND/ÂΜL (ref 0.17–1.22)
MONOCYTES NFR BLD AUTO: 7 % (ref 4–12)
NEUTROPHILS # BLD AUTO: 5.3 THOUSANDS/ÂΜL (ref 1.85–7.62)
NEUTS SEG NFR BLD AUTO: 56 % (ref 43–75)
NRBC BLD AUTO-RTO: 0 /100 WBCS
P AXIS: 58 DEGREES
PLATELET # BLD AUTO: 249 THOUSANDS/UL (ref 149–390)
PMV BLD AUTO: 10.5 FL (ref 8.9–12.7)
POTASSIUM SERPL-SCNC: 4.6 MMOL/L (ref 3.5–5.3)
PR INTERVAL: 152 MS
PROT SERPL-MCNC: 8 G/DL (ref 6.4–8.4)
QRS AXIS: 25 DEGREES
QRSD INTERVAL: 86 MS
QT INTERVAL: 410 MS
QTC INTERVAL: 439 MS
RBC # BLD AUTO: 4.9 MILLION/UL (ref 3.88–5.62)
SODIUM SERPL-SCNC: 136 MMOL/L (ref 135–147)
T WAVE AXIS: 22 DEGREES
VENTRICULAR RATE: 69 BPM
WBC # BLD AUTO: 9.39 THOUSAND/UL (ref 4.31–10.16)

## 2024-04-19 PROCEDURE — 93010 ELECTROCARDIOGRAM REPORT: CPT | Performed by: INTERNAL MEDICINE

## 2024-04-19 PROCEDURE — 99285 EMERGENCY DEPT VISIT HI MDM: CPT | Performed by: EMERGENCY MEDICINE

## 2024-04-19 PROCEDURE — 85025 COMPLETE CBC W/AUTO DIFF WBC: CPT | Performed by: EMERGENCY MEDICINE

## 2024-04-19 PROCEDURE — 36415 COLL VENOUS BLD VENIPUNCTURE: CPT | Performed by: EMERGENCY MEDICINE

## 2024-04-19 PROCEDURE — 71045 X-RAY EXAM CHEST 1 VIEW: CPT

## 2024-04-19 PROCEDURE — 84484 ASSAY OF TROPONIN QUANT: CPT | Performed by: EMERGENCY MEDICINE

## 2024-04-19 PROCEDURE — 80053 COMPREHEN METABOLIC PANEL: CPT | Performed by: EMERGENCY MEDICINE

## 2024-04-19 NOTE — ED PROVIDER NOTES
"History  Chief Complaint   Patient presents with    Irregular Heart Beat     Smoked marijuana, took 3 viagra, and smoked meth for the first time     HPI    50 yo M hx HIV on biktarvy (states he is compliant) presents to ed for eval after having palpitations. Patient states a friend came over and offered patient crystal meth. Patient has prescriptions for medical thc and for viagra. States he was trying to have intercourse and took three viagra in addition to THC and crystal meth. Took \"2 pulls\" / inhalations of crystal meth he states. Tired to sleep afterwards, but had palpitations. No chest pain. No sob. No cough. States his symptoms have resolved. He has no complaints at this time.    Prior to Admission Medications   Prescriptions Last Dose Informant Patient Reported? Taking?   Aspirin Low Dose 81 MG EC tablet   No No   Sig: TAKE 1 TABLET BY MOUTH DAILY   BD Pen Needle Bela U/F 32G X 4 MM MISC   No No   Sig: Inject under the skin daily   Biktarvy -25 MG tablet   No No   Sig: TAKE 1 TABLET BY MOUTH DAILY   Insulin Pen Needle (BD Pen Needle Bela U/F) 32G X 4 MM MISC   No No   Sig: USE IN THE MORNING   Lantus SoloStar 100 units/mL SOPN   No No   Sig: INJECT 0.4mL (40 UNITS TOTAL) SUBCUTANEOUSLY DAILY AT BEDTIME   atorvastatin (LIPITOR) 20 mg tablet   No No   Sig: TAKE 1 TABLET BY MOUTH DAILY   cyclobenzaprine (FLEXERIL) 10 mg tablet  Self No No   Sig: TAKE 1 TABLET BY MOUTH THREE TIMES A DAY AS NEEDED FOR MUSCLE SPASM   ibuprofen (MOTRIN) 600 mg tablet  Self No No   Sig: TAKE 1 TABLET BY MOUTH EVERY SIX HOURS AS NEEDED FOR MODERATE PAIN   tadalafil (CIALIS) 20 MG tablet   No No   Sig: Take 1 tablet (20 mg total) by mouth daily as needed for erectile dysfunction   tirzepatide (Mounjaro) 2.5 MG/0.5ML   No No   Sig: Inject 0.5 mL (2.5 mg total) under the skin every 7 days   valACYclovir (VALTREX) 1,000 mg tablet   No No   Sig: Take 1 tablet (1,000 mg total) by mouth daily      Facility-Administered Medications: " None       Past Medical History:   Diagnosis Date    Chlamydia     Diabetes mellitus (HCC)     Type II    Gonorrhea     Herpes     HIV disease ([facility]) 07/2020    mode of transmission: heterosexual    Hyperlipidemia     Hypertension     Neoplasm of kidney     Obesity     BMI 37.2    Syphilis        Past Surgical History:   Procedure Laterality Date    NO PAST SURGERIES         Family History   Problem Relation Age of Onset    Diabetes Mother     Diabetes Father     Diabetes Family      I have reviewed and agree with the history as documented.    E-Cigarette/Vaping    E-Cigarette Use Never User      E-Cigarette/Vaping Substances     Social History     Tobacco Use    Smoking status: Never    Smokeless tobacco: Never   Vaping Use    Vaping status: Never Used   Substance Use Topics    Alcohol use: No    Drug use: Yes     Types: Marijuana, Methamphetamines       Review of Systems   Constitutional:  Negative for chills, fatigue and fever.   HENT:  Negative for nosebleeds and sore throat.    Eyes:  Negative for redness and visual disturbance.   Respiratory:  Negative for shortness of breath and wheezing.    Cardiovascular:  Positive for palpitations. Negative for chest pain.   Gastrointestinal:  Negative for abdominal pain and diarrhea.   Endocrine: Negative for polyuria.   Genitourinary:  Negative for difficulty urinating and testicular pain.   Musculoskeletal:  Negative for back pain and neck stiffness.   Skin:  Negative for rash and wound.   Neurological:  Negative for seizures, speech difficulty and headaches.   Psychiatric/Behavioral:  Negative for dysphoric mood and hallucinations.    All other systems reviewed and are negative.      Physical Exam  Physical Exam  Vitals and nursing note reviewed.   Constitutional:       Appearance: He is well-developed.   HENT:      Head: Normocephalic and atraumatic.      Right Ear: External ear normal.      Left Ear: External ear normal.   Eyes:      Conjunctiva/sclera: Conjunctivae  normal.   Cardiovascular:      Rate and Rhythm: Normal rate and regular rhythm.      Heart sounds: Normal heart sounds.   Pulmonary:      Effort: Pulmonary effort is normal.      Breath sounds: Normal breath sounds.   Abdominal:      General: There is no distension.      Tenderness: There is no guarding.   Musculoskeletal:         General: Normal range of motion.      Cervical back: Normal range of motion.   Skin:     General: Skin is warm and dry.      Findings: No rash.   Neurological:      Mental Status: He is alert and oriented to person, place, and time.      Cranial Nerves: No cranial nerve deficit.      Sensory: No sensory deficit.      Motor: No abnormal muscle tone.      Coordination: Coordination normal.         Vital Signs  ED Triage Vitals   Temperature Pulse Respirations Blood Pressure SpO2   04/18/24 2357 04/18/24 2354 04/18/24 2354 04/18/24 2354 04/18/24 2354   97.5 °F (36.4 °C) 68 18 (!) 171/97 98 %      Temp Source Heart Rate Source Patient Position - Orthostatic VS BP Location FiO2 (%)   04/18/24 2357 04/18/24 2354 04/18/24 2354 04/18/24 2354 --   Oral Monitor Lying Left arm       Pain Score       04/18/24 2354       No Pain           Vitals:    04/18/24 2354 04/19/24 0019   BP: (!) 171/97 154/94   Pulse: 68    Patient Position - Orthostatic VS: Lying          Visual Acuity      ED Medications  Medications - No data to display    Diagnostic Studies  Results Reviewed       Procedure Component Value Units Date/Time    HS Troponin 0hr (reflex protocol) [[id number]]  (Normal) Collected: 04/19/24 0031    Lab Status: Final result Specimen: Blood from Arm, Right Updated: 04/19/24 0101     hs TnI 0hr 4 ng/L     HS Troponin I 2hr [[id number]]     Lab Status: No result Specimen: Blood     CMP [[id number]]  (Abnormal) Collected: 04/19/24 0031    Lab Status: Final result Specimen: Blood from Arm, Right Updated: 04/19/24 0055     Sodium 136 mmol/L      Potassium 4.6 mmol/L      Chloride 99 mmol/L      CO2 27  mmol/L      ANION GAP 10 mmol/L      BUN 11 mg/dL      Creatinine 0.85 mg/dL      Glucose 220 mg/dL      Calcium 9.1 mg/dL      AST 32 U/L      ALT 14 U/L      Alkaline Phosphatase 49 U/L      Total Protein 8.0 g/dL      Albumin 4.5 g/dL      Total Bilirubin 0.69 mg/dL      eGFR 102 ml/min/1.73sq m     Narrative:      National Kidney Disease Foundation guidelines for Chronic Kidney Disease (CKD):     Stage 1 with normal or high GFR (GFR > 90 mL/min/1.73 square meters)    Stage 2 Mild CKD (GFR = 60-89 mL/min/1.73 square meters)    Stage 3A Moderate CKD (GFR = 45-59 mL/min/1.73 square meters)    Stage 3B Moderate CKD (GFR = 30-44 mL/min/1.73 square meters)    Stage 4 Severe CKD (GFR = 15-29 mL/min/1.73 square meters)    Stage 5 End Stage CKD (GFR <15 mL/min/1.73 square meters)  Note: GFR calculation is accurate only with a steady state creatinine    CBC and differential [[id number]] Collected: 04/19/24 0031    Lab Status: Final result Specimen: Blood from Arm, Right Updated: 04/19/24 0039     WBC 9.39 Thousand/uL      RBC 4.90 Million/uL      Hemoglobin 14.1 g/dL      Hematocrit 42.8 %      MCV 87 fL      MCH 28.8 pg      MCHC 32.9 g/dL      RDW 12.1 %      MPV 10.5 fL      Platelets 249 Thousands/uL      nRBC 0 /100 WBCs      Segmented % 56 %      Immature Grans % 0 %      Lymphocytes % 35 %      Monocytes % 7 %      Eosinophils Relative 1 %      Basophils Relative 1 %      Absolute Neutrophils 5.30 Thousands/µL      Absolute Immature Grans 0.02 Thousand/uL      Absolute Lymphocytes 3.25 Thousands/µL      Absolute Monocytes 0.62 Thousand/µL      Eosinophils Absolute 0.10 Thousand/µL      Basophils Absolute 0.10 Thousands/µL                    XR chest 1 view portable    (Results Pending)              Procedures  ECG 12 Lead Documentation Only    Date/Time: 4/19/2024 1:53 AM    Performed by: Celestino Beltran MD  Authorized by: Celestino Beltran MD    Comments:      ECG 12 Lead Documentation  Date/Time: today/date:  4/19/2024  Performed by: Celestino Beltran  Authorized by: Celestino Beltran    ECG reviewed by me, the ED Provider: yes    Patient location:  ED   Previous ECG: If available: no  Rate:  ECG rate assessment: normal    Rhythm: sinus rhythm    Ectopy: none    QRS axis:  Normal  Intervals: normal   Q waves: None   ST segments:  No acute ST changes  T waves: normal      Impression: Normal Sinus EKG               ED Course  ED Course as of 04/19/24 0156   Fri Apr 19, 2024   0106 hs TnI 0hr: 4  No chest pain   0106 ANION GAP: 10  No gap   0106 GLUCOSE(!): 220  Hx of DM                                             Medical Decision Making  Amount and/or Complexity of Data Reviewed  Labs: ordered. Decision-making details documented in ED Course.  Radiology: ordered.      Amount and/or Complexity of Data Reviewed  Clinical lab tests: ordered and reviewed   Reviewed past medical records: yes    History Provided by patient     Differential considered: electrolyte abnormalities, arrhythmias    Consideration of tests:  Labs looking for any acute electrolyte abnormalities. Troponin for ACS.  EKG ordered looking for any arrhythmias or acute changes to suggest ischemia. Imaging for any acute pathology i.e. pneumothorax.    EKG NSR  CXR within normal limits  Labs within normal limits    PCP follow up.    The patient was instructed to follow up as documented. Strict return precautions were discussed with the patient and the patient was instructed to return to the emergency department immediately if symptoms worsen. The patient/patient family member acknowledged and were in agreement with plan.          Disposition  Final diagnoses:   Palpitations   Methamphetamine use (HCC)     Time reflects when diagnosis was documented in both MDM as applicable and the Disposition within this note       Time User Action Codes Description Comment    4/19/2024  1:08 AM Celestino Beltran Add [R00.2] Palpitations     4/19/2024  1:08 AM Celestino Beltran Add [F15.10]  Methamphetamine use (HCC)           ED Disposition       ED Disposition   Discharge    Condition   Stable    Date/Time   Fri Apr 19, 2024  1:08 AM    Comment   Charlie Hernandez discharge to home/self care.                   Follow-up Information       Follow up With Specialties Details Why Contact Info Additional Information    [facility] Medicine Schedule an appointment as soon as possible for a visit in 1 week For follow up regarding your symptoms and recheck, To find a primary care doctor [address] [place] [address], [address] [place] [address] [phone] [facility], [address] [place] [address], [place] [address], [place], [place], [address] [phone]            Discharge Medication List as of 4/19/2024  1:09 AM        CONTINUE these medications which have NOT CHANGED    Details   Aspirin Low Dose 81 MG EC tablet TAKE 1 TABLET BY MOUTH DAILY, Starting Tue 2/20/2024, Normal      atorvastatin (LIPITOR) 20 mg tablet TAKE 1 TABLET BY MOUTH DAILY, Starting Mon 3/25/2024, Normal      !! BD Pen Needle Bela U/F 32G X 4 MM MISC Inject under the skin daily, Starting Tue 11/14/2023, Until Sun 5/12/2024, Normal      Biktarvy -25 MG tablet TAKE 1 TABLET BY MOUTH DAILY, Starting Wed 1/31/2024, Normal      cyclobenzaprine (FLEXERIL) 10 mg tablet TAKE 1 TABLET BY MOUTH THREE TIMES A DAY AS NEEDED FOR MUSCLE SPASM, Normal      ibuprofen (MOTRIN) 600 mg tablet TAKE 1 TABLET BY MOUTH EVERY SIX HOURS AS NEEDED FOR MODERATE PAIN, Normal      !! Insulin Pen Needle (BD Pen Needle Bela U/F) 32G X 4 MM MISC USE IN THE MORNING, Normal      Lantus SoloStar 100 units/mL SOPN INJECT 0.4mL (40 UNITS TOTAL) SUBCUTANEOUSLY DAILY AT BEDTIME, Normal      tadalafil (CIALIS) 20 MG tablet Take 1 tablet (20 mg total) by mouth daily as needed for erectile dysfunction, Starting Fri 3/22/2024, Normal      tirzepatide (Mounjaro) 2.5 MG/0.5ML Inject 0.5 mL (2.5 mg total)  under the skin every 7 days, Starting Wed 2/21/2024, Normal      valACYclovir (VALTREX) 1,000 mg tablet Take 1 tablet (1,000 mg total) by mouth daily, Starting Wed 1/31/2024, Until Tue 4/30/2024, Normal       !! - Potential duplicate medications found. Please discuss with provider.          No discharge procedures on file.    PDMP Review       None            ED Provider  Electronically Signed by             Celestino Beltran MD  04/19/24 0156

## 2024-04-22 RX ORDER — BICTEGRAVIR SODIUM, EMTRICITABINE, AND TENOFOVIR ALAFENAMIDE FUMARATE 50; 200; 25 MG/1; MG/1; MG/1
1 TABLET ORAL DAILY
Qty: 30 TABLET | Refills: 2 | Status: SHIPPED | OUTPATIENT
Start: 2024-04-22

## 2024-04-22 RX ORDER — VALACYCLOVIR HYDROCHLORIDE 1 G/1
1000 TABLET, FILM COATED ORAL DAILY
Qty: 30 TABLET | Refills: 2 | Status: SHIPPED | OUTPATIENT
Start: 2024-04-22 | End: 2024-07-21

## 2024-04-23 ENCOUNTER — PATIENT OUTREACH (OUTPATIENT)
Dept: SURGERY | Facility: CLINIC | Age: 49
End: 2024-04-23

## 2024-04-23 NOTE — PROGRESS NOTES
HC received signed [employer] page and sent to [employer].     HC discussed case with HS.     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 He and his mom were having a disagreement and is stepping away from the position. Ct states he received some assistance from family and his electric is back on for the time being.     HC discussed Ct at team meeting and updated the team.     HC faxed [facility] MAWD application with Physical form.

## 2024-04-25 ENCOUNTER — PATIENT OUTREACH (OUTPATIENT)
Dept: SURGERY | Facility: CLINIC | Age: 49
End: 2024-04-25

## 2024-04-30 ENCOUNTER — TELEPHONE (OUTPATIENT)
Dept: SURGERY | Facility: CLINIC | Age: 49
End: 2024-04-30

## 2024-04-30 NOTE — TELEPHONE ENCOUNTER
Pt called stating that he has been getting lumps under his armpits and he is concerned because he states that his mom cancer started with bumps under her arms. Pt had an appt for may 21 st and I moved it up for him. Pt will be coming in on Tuesday, may 7th  at 1:30.

## 2024-05-03 ENCOUNTER — PATIENT OUTREACH (OUTPATIENT)
Dept: SURGERY | Facility: CLINIC | Age: 49
End: 2024-05-03

## 2024-05-07 ENCOUNTER — OFFICE VISIT (OUTPATIENT)
Dept: SURGERY | Facility: CLINIC | Age: 49
End: 2024-05-07
Payer: COMMERCIAL

## 2024-05-07 VITALS
HEIGHT: 71 IN | SYSTOLIC BLOOD PRESSURE: 136 MMHG | TEMPERATURE: 97.8 F | HEART RATE: 90 BPM | WEIGHT: 196.4 LBS | BODY MASS INDEX: 27.5 KG/M2 | DIASTOLIC BLOOD PRESSURE: 80 MMHG | OXYGEN SATURATION: 98 %

## 2024-05-07 DIAGNOSIS — E11.65 UNCONTROLLED TYPE 2 DIABETES MELLITUS WITH HYPERGLYCEMIA (HCC): ICD-10-CM

## 2024-05-07 DIAGNOSIS — I10 PRIMARY HYPERTENSION: ICD-10-CM

## 2024-05-07 DIAGNOSIS — B20 HUMAN IMMUNODEFICIENCY VIRUS (HIV) DISEASE (HCC): Primary | ICD-10-CM

## 2024-05-07 DIAGNOSIS — L72.3 SEBACEOUS CYST: ICD-10-CM

## 2024-05-07 PROCEDURE — 99214 OFFICE O/P EST MOD 30 MIN: CPT | Performed by: NURSE PRACTITIONER

## 2024-05-07 NOTE — PROGRESS NOTES
"Name: Charlie Hernandez      : 1975      MRN: [id number]  Encounter Provider: REDD Arthur  Encounter Date: 2024   Encounter department: ASC AT [facility]    Assessment & Plan     1. Human immunodeficiency virus (HIV) disease (HCC)  Assessment & Plan:  No results found for: \"[id number]\"  CD4 ABS   Date/Time Value Ref Range Status   2024 09:47  359 - 1519 /uL Final     HIV-1 RNA by PCR, Qn   Date/Time Value Ref Range Status   2023 08:04 AM <20 copies/mL Final     Comment:     HIV-1 RNA not detected  The reportable range for this assay is 20 to 10,000,000  copies HIV-1 RNA/mL.     HIV-1 RNA Viral Load Log   Date/Time Value Ref Range Status   2023 08:04 AM COMMENT [id number]/mL Final     Comment:     Unable to calculate result since non-numeric result obtained for  component test.         ART: Biktarvy        Denies side effects. Stressed the importance of adherence.  Continue follow up with ID clinic.       Reviewed most recent labs, including Cd4 and viral load. Discussed the risks and benefits of treatment options, instructions for management, importance of treatment adherence, and reduction of risk factor.Educated on possible  medication side effects.     Counseled on routes of HIV transmission, including the risk of  infection. Emphasized that viral suppression is the best method to prevent HIV transmission.  At this time pt.denies the need for HIV testing of anyone in their life.     Total encounter time was 45 minutes. Greater then 20 minutes were spent on counseling and patient education. Pt voices understanding and agreement with treatment plan.          2. Uncontrolled type 2 diabetes mellitus with hyperglycemia (HCC)  Assessment & Plan:    Lab Results   Component Value Date    HGBA1C 10.1 (H) 2024     Lab Results   Component Value Date/Time    HGBA1C 10.1 (H) 2024 09:47 AM    HGBA1C 13.7 (H) 09/15/2021 03:42 PM        Continue current " medication. Stressed the importance of continuing GLP-1. Needs to have repeat HgbA1C completed.     Educated to follow diabetic diet, maintain a healthy weight, and exercise regularly. Referred to dietician for additional education.                      3. Primary hypertension  Assessment & Plan:  Blood pressure:   BP Readings from Last 3 Encounters:   05/07/24 136/80   04/19/24 154/94   04/02/24 120/73       Continue current antihypertensive.    Educated on the following lifestyle modifications to lower BP and decrease cardiovascular risk factors.  limit alcohol intake, reduce salt in diet, maintain a healthy weight, engage in 30 minutes of cardiovascular exercise daily, and not smoke.         4. Sebaceous cyst  Assessment & Plan:  2 cm sebaceous cyst under the right axilla.  Nodule is mobile and painless.  This has been a recurrent problem for patient.  He was concerned that it may be cancerous.  Cyst is not consistent with cancerous mass.  Lab work is not concerning for malignancy.  Continue to monitor.             Subjective     Mr. Charlie Hernandez is a 46-year-old male who presents to the clinic today for 3 month f/u PCP visit.  Past medical history significant for HIV, uncontrolled type 2 diabetes, hyperlipidemia, and obesity.     is concerned about 2 cysts located underneath his underarm.  He recently shaved and they have become more prominent.  He denies pain, fatigue, fever, or redness.      Review of Systems   Constitutional:  Negative for chills and fever.   HENT:  Negative for ear pain and sore throat.    Eyes:  Negative for pain and visual disturbance.   Respiratory:  Negative for cough and shortness of breath.    Cardiovascular:  Negative for chest pain and palpitations.   Gastrointestinal:  Negative for abdominal pain and vomiting.   Genitourinary:  Negative for dysuria and hematuria.   Musculoskeletal:  Negative for arthralgias and back pain.   Skin:  Negative for color change and  rash.   Neurological:  Negative for seizures and syncope.   All other systems reviewed and are negative.      Past Medical History:   Diagnosis Date    Chlamydia     Diabetes mellitus (HCC)     Type II    Gonorrhea     Herpes     HIV disease (HCC) 07/2020    mode of transmission: heterosexual    Hyperlipidemia     Hypertension     Neoplasm of kidney     Obesity     BMI 37.2    Syphilis      Past Surgical History:   Procedure Laterality Date    NO PAST SURGERIES       Family History   Problem Relation Age of Onset    Diabetes Mother     Diabetes Father     Diabetes Family      Social History     Socioeconomic History    Marital status: Single     Spouse name: None    Number of children: None    Years of education: None    Highest education level: None   Occupational History    None   Tobacco Use    Smoking status: Never    Smokeless tobacco: Never   Vaping Use    Vaping status: Never Used   Substance and Sexual Activity    Alcohol use: No    Drug use: Yes     Types: Marijuana, Methamphetamines    Sexual activity: Yes     Partners: Female     Birth control/protection: None   Other Topics Concern    None   Social History Narrative    Activity level- sedentary     Wears Safety belt     Social Determinants of Health     Financial Resource Strain: Not on file   Food Insecurity: Food Insecurity Present (7/19/2022)    Hunger Vital Sign     Worried About Running Out of Food in the Last Year: Often true     Ran Out of Food in the Last Year: Often true   Transportation Needs: Not on file   Physical Activity: Not on file   Stress: Not on file   Social Connections: Not on file   Intimate Partner Violence: Not on file   Housing Stability: Not on file     Current Outpatient Medications on File Prior to Visit   Medication Sig    Aspirin Low Dose 81 MG EC tablet TAKE 1 TABLET BY MOUTH DAILY    atorvastatin (LIPITOR) 20 mg tablet TAKE 1 TABLET BY MOUTH DAILY    BD Pen Needle Bela U/F 32G X 4 MM MISC Inject under the skin daily     "Biktarvy -25 MG tablet TAKE 1 TABLET BY MOUTH DAILY    cyclobenzaprine (FLEXERIL) 10 mg tablet TAKE 1 TABLET BY MOUTH THREE TIMES A DAY AS NEEDED FOR MUSCLE SPASM    ibuprofen (MOTRIN) 600 mg tablet TAKE 1 TABLET BY MOUTH EVERY SIX HOURS AS NEEDED FOR MODERATE PAIN    Insulin Pen Needle (BD Pen Needle Bela U/F) 32G X 4 MM MISC USE IN THE MORNING    Lantus SoloStar 100 units/mL SOPN INJECT 0.4mL (40 UNITS TOTAL) SUBCUTANEOUSLY DAILY AT BEDTIME    tadalafil (CIALIS) 20 MG tablet Take 1 tablet (20 mg total) by mouth daily as needed for erectile dysfunction    tirzepatide (Mounjaro) 2.5 MG/0.5ML Inject 0.5 mL (2.5 mg total) under the skin every 7 days    valACYclovir (VALTREX) 1,000 mg tablet Take 1 tablet (1,000 mg total) by mouth daily     Allergies   Allergen Reactions    Metformin Diarrhea     Immunization History   Administered Date(s) Administered    COVID-19 PFIZER VACCINE 0.3 ML IM 04/12/2021, 05/06/2021    COVID-19 Pfizer vac (Delfino-sucrose, gray cap) 12 yr+ IM 03/26/2022    Hep A, adult 01/09/2020    INFLUENZA 11/20/2020    Influenza, injectable, quadrivalent, preservative free 0.5 mL 10/10/2023    Influenza, recombinant, quadrivalent,injectable, preservative free 12/21/2021, 11/08/2022    Influenza, seasonal, injectable 12/06/2010, 12/16/2011, 11/30/2012    Meningococcal MCV4P 02/23/2021, 06/22/2021    Pneumococcal Conjugate 13-Valent 11/20/2020    Pneumococcal Polysaccharide PPV23 12/21/2021    Tdap 06/20/2017, 11/20/2020       Objective     /80   Pulse 90   Temp 97.8 °F (36.6 °C)   Ht 5' 11\" (1.803 m)   Wt 89.1 kg (196 lb 6.4 oz)   SpO2 98%   BMI 27.39 kg/m²     Physical Exam  Vitals and nursing note reviewed.   Constitutional:       Appearance: Normal appearance.   HENT:      Head: Normocephalic.      Nose: Nose normal. No congestion or rhinorrhea.      Mouth/Throat:      Mouth: Mucous membranes are moist.      Pharynx: Oropharynx is clear.   Eyes:      Pupils: Pupils are equal, round, and " reactive to light.   Cardiovascular:      Rate and Rhythm: Normal rate and regular rhythm.      Pulses: Normal pulses.      Heart sounds: Normal heart sounds.   Pulmonary:      Effort: Pulmonary effort is normal.      Breath sounds: Normal breath sounds.   Abdominal:      General: Abdomen is flat. Bowel sounds are normal.      Palpations: Abdomen is soft.   Musculoskeletal:         General: No tenderness or signs of injury. Normal range of motion.   Skin:     General: Skin is warm and dry.      Comments: Cyst present under the right underarm less than 2 cm big.  Mobile.  Painless.  No overt signs of infection including redness or warmth.  Exam is consistent with a sebaceous cyst.   Neurological:      Mental Status: He is alert and oriented to person, place, and time.       REDD Arthur

## 2024-05-07 NOTE — ASSESSMENT & PLAN NOTE
Lab Results   Component Value Date    HGBA1C 10.1 (H) 03/18/2024     Lab Results   Component Value Date/Time    HGBA1C 10.1 (H) 03/18/2024 09:47 AM    HGBA1C 13.7 (H) 09/15/2021 03:42 PM        Continue current medication. Stressed the importance of continuing GLP-1. Needs to have repeat HgbA1C completed.     Educated to follow diabetic diet, maintain a healthy weight, and exercise regularly. Referred to dietician for additional education.

## 2024-05-07 NOTE — ASSESSMENT & PLAN NOTE
"No results found for: \"GC0EHQH\"  CD4 ABS   Date/Time Value Ref Range Status   2024 09:47  359 - 1519 /uL Final     HIV-1 RNA by PCR, Qn   Date/Time Value Ref Range Status   2023 08:04 AM <20 copies/mL Final     Comment:     HIV-1 RNA not detected  The reportable range for this assay is 20 to 10,000,000  copies HIV-1 RNA/mL.     HIV-1 RNA Viral Load Log   Date/Time Value Ref Range Status   2023 08:04 AM COMMENT [id number]/mL Final     Comment:     Unable to calculate result since non-numeric result obtained for  component test.         ART: Biktarvy        Denies side effects. Stressed the importance of adherence.  Continue follow up with ID clinic.       Reviewed most recent labs, including Cd4 and viral load. Discussed the risks and benefits of treatment options, instructions for management, importance of treatment adherence, and reduction of risk factor.Educated on possible  medication side effects.     Counseled on routes of HIV transmission, including the risk of  infection. Emphasized that viral suppression is the best method to prevent HIV transmission.  At this time pt.denies the need for HIV testing of anyone in their life.     Total encounter time was 45 minutes. Greater then 20 minutes were spent on counseling and patient education. Pt voices understanding and agreement with treatment plan.      "

## 2024-05-07 NOTE — ASSESSMENT & PLAN NOTE
Blood pressure:   BP Readings from Last 3 Encounters:   05/07/24 136/80   04/19/24 154/94   04/02/24 120/73       Continue current antihypertensive.    Educated on the following lifestyle modifications to lower BP and decrease cardiovascular risk factors.  limit alcohol intake, reduce salt in diet, maintain a healthy weight, engage in 30 minutes of cardiovascular exercise daily, and not smoke.

## 2024-05-07 NOTE — ASSESSMENT & PLAN NOTE
2 cm sebaceous cyst under the right axilla.  Nodule is mobile and painless.  This has been a recurrent problem for patient.  He was concerned that it may be cancerous.  Cyst is not consistent with cancerous mass.  Lab work is not concerning for malignancy.  Continue to monitor.

## 2024-05-28 DIAGNOSIS — E78.2 MIXED HYPERLIPIDEMIA: ICD-10-CM

## 2024-05-28 DIAGNOSIS — E11.65 UNCONTROLLED TYPE 2 DIABETES MELLITUS WITH HYPERGLYCEMIA (HCC): ICD-10-CM

## 2024-06-05 RX ORDER — ATORVASTATIN CALCIUM 20 MG/1
20 TABLET, FILM COATED ORAL DAILY
Qty: 30 TABLET | Refills: 1 | Status: SHIPPED | OUTPATIENT
Start: 2024-06-05

## 2024-06-05 RX ORDER — PEN NEEDLE, DIABETIC 32GX 5/32"
NEEDLE, DISPOSABLE MISCELLANEOUS
OUTPATIENT
Start: 2024-06-05

## 2024-06-05 RX ORDER — ASPIRIN 81 MG/1
81 TABLET, COATED ORAL DAILY
Qty: 30 TABLET | Refills: 2 | Status: SHIPPED | OUTPATIENT
Start: 2024-06-05

## 2024-06-05 RX ORDER — TIRZEPATIDE 2.5 MG/.5ML
INJECTION, SOLUTION SUBCUTANEOUS
Qty: 2 ML | Refills: 2 | Status: SHIPPED | OUTPATIENT
Start: 2024-06-05

## 2024-06-07 DIAGNOSIS — E11.65 UNCONTROLLED TYPE 2 DIABETES MELLITUS WITH HYPERGLYCEMIA (HCC): ICD-10-CM

## 2024-06-12 ENCOUNTER — PATIENT OUTREACH (OUTPATIENT)
Dept: SURGERY | Facility: CLINIC | Age: 49
End: 2024-06-12

## 2024-06-12 NOTE — PROGRESS NOTES
Ct called Ct to follow up regarding insurance and if he has received any mail. Ct states he has not.  states can we call [facility] 6-.     completed RW sliding fee scale and e-mailed to financial counselor.     called to [facility] office.

## 2024-06-26 ENCOUNTER — OFFICE VISIT (OUTPATIENT)
Dept: SURGERY | Facility: CLINIC | Age: 49
End: 2024-06-26
Payer: COMMERCIAL

## 2024-06-26 ENCOUNTER — DOCUMENTATION (OUTPATIENT)
Dept: SURGERY | Facility: CLINIC | Age: 49
End: 2024-06-26

## 2024-06-26 VITALS
BODY MASS INDEX: 25.62 KG/M2 | OXYGEN SATURATION: 86 % | SYSTOLIC BLOOD PRESSURE: 116 MMHG | TEMPERATURE: 96.9 F | WEIGHT: 183 LBS | DIASTOLIC BLOOD PRESSURE: 76 MMHG | HEIGHT: 71 IN | HEART RATE: 86 BPM

## 2024-06-26 DIAGNOSIS — E11.65 UNCONTROLLED TYPE 2 DIABETES MELLITUS WITH HYPERGLYCEMIA (HCC): ICD-10-CM

## 2024-06-26 DIAGNOSIS — I10 PRIMARY HYPERTENSION: ICD-10-CM

## 2024-06-26 DIAGNOSIS — B20 HUMAN IMMUNODEFICIENCY VIRUS (HIV) DISEASE (HCC): Primary | ICD-10-CM

## 2024-06-26 PROCEDURE — 99214 OFFICE O/P EST MOD 30 MIN: CPT | Performed by: NURSE PRACTITIONER

## 2024-06-26 NOTE — ASSESSMENT & PLAN NOTE
"Body mass index is 25.52 kg/m².  Wt Readings from Last 3 Encounters:   06/26/24 83 kg (183 lb)   05/07/24 89.1 kg (196 lb 6.4 oz)   04/02/24 99 kg (218 lb 3.2 oz)     Height: 5' 11\" (180.3 cm)     Patient has successfully lost 10 pounds.  Encouraged progress towards weight loss goals.      Lab Results   Component Value Date    HGBA1C 10.1 (H) 03/18/2024     Lab Results   Component Value Date    GLUCOSE 269 (H) 09/10/2018   ;    Educated on the health risks of obesity.  Advised to lose weight.  Encouraged eating a healthy diet and daily cardiac exercise.  Recommended increasing fruits and vegetables and limiting processed foods.  Refer to dietitian for additional education.        "

## 2024-06-26 NOTE — ASSESSMENT & PLAN NOTE
"No results found for: \"[id number]\"  CD4 ABS   Date/Time Value Ref Range Status   2024 09:47  359 - 1519 /uL Final     HIV-1 RNA by PCR, Qn   Date/Time Value Ref Range Status   2023 08:04 AM <20 copies/mL Final     Comment:     HIV-1 RNA not detected  The reportable range for this assay is 20 to 10,000,000  copies HIV-1 RNA/mL.     HIV-1 RNA Viral Load Log   Date/Time Value Ref Range Status   2023 08:04 AM COMMENT [id number]/mL Final     Comment:     Unable to calculate result since non-numeric result obtained for  component test.         ART: Biktarvy        Denies side effects. Stressed the importance of adherence.  Continue follow up with ID clinic.       Reviewed most recent labs, including Cd4 and viral load. Discussed the risks and benefits of treatment options, instructions for management, importance of treatment adherence, and reduction of risk factor.Educated on possible  medication side effects.  Reviewed last ID visit.  Collaborated with ID to optimize medical treatment.    Counseled on routes of HIV transmission, including the risk of  infection. Emphasized that viral suppression is the best method to prevent HIV transmission.  At this time pt.denies the need for HIV testing of anyone in their life.     Total encounter time was 45 minutes. Greater then 20 minutes were spent on counseling and patient education. Pt voices understanding and agreement with treatment plan.      "

## 2024-06-26 NOTE — PROGRESS NOTES
"Ambulatory Visit  Name: Charlie Hernandez      : 1975      MRN: [id number]  Encounter Provider: REDD Arthur  Encounter Date: 2024   Encounter department: ASC AT [facility]    Assessment & Plan   1. Human immunodeficiency virus (HIV) disease (HCC)  Assessment & Plan:  No results found for: \"[id number]\"  CD4 ABS   Date/Time Value Ref Range Status   2024 09:47  359 - 1519 /uL Final     HIV-1 RNA by PCR, Qn   Date/Time Value Ref Range Status   2023 08:04 AM <20 copies/mL Final     Comment:     HIV-1 RNA not detected  The reportable range for this assay is 20 to 10,000,000  copies HIV-1 RNA/mL.     HIV-1 RNA Viral Load Log   Date/Time Value Ref Range Status   2023 08:04 AM COMMENT [id number]/mL Final     Comment:     Unable to calculate result since non-numeric result obtained for  component test.         ART: Biktarvy        Denies side effects. Stressed the importance of adherence.  Continue follow up with ID clinic.       Reviewed most recent labs, including Cd4 and viral load. Discussed the risks and benefits of treatment options, instructions for management, importance of treatment adherence, and reduction of risk factor.Educated on possible  medication side effects.  Reviewed last ID visit.  Collaborated with ID to optimize medical treatment.    Counseled on routes of HIV transmission, including the risk of  infection. Emphasized that viral suppression is the best method to prevent HIV transmission.  At this time pt.denies the need for HIV testing of anyone in their life.     Total encounter time was 45 minutes. Greater then 20 minutes were spent on counseling and patient education. Pt voices understanding and agreement with treatment plan.      2. Uncontrolled type 2 diabetes mellitus with hyperglycemia (HCC)  Assessment & Plan:    Lab Results   Component Value Date    HGBA1C 10.1 (H) 2024     Discussed continuous glucose monitoring with patient.  He " "does not monitor his blood glucose at home.  He will test out device and if he likes it we will order it at next visit.  He is taking Mounjaro as prescribed.  Using Lantus at night.  He has changed his diet and eliminated all beverages that contain sugar.  Recheck hemoglobin A1c in 1 month.  3. BMI 29.0-29.9,adult  Assessment & Plan:  Body mass index is 25.52 kg/m².  Wt Readings from Last 3 Encounters:   06/26/24 83 kg (183 lb)   05/07/24 89.1 kg (196 lb 6.4 oz)   04/02/24 99 kg (218 lb 3.2 oz)     Height: 5' 11\" (180.3 cm)     Patient has successfully lost 10 pounds.  Encouraged progress towards weight loss goals.      Lab Results   Component Value Date    HGBA1C 10.1 (H) 03/18/2024     Lab Results   Component Value Date    GLUCOSE 269 (H) 09/10/2018   ;    Educated on the health risks of obesity.  Advised to lose weight.  Encouraged eating a healthy diet and daily cardiac exercise.  Recommended increasing fruits and vegetables and limiting processed foods.  Refer to dietitian for additional education.        4. Primary hypertension  Assessment & Plan:  Blood pressure: Blood pressure is well-controlled  BP Readings from Last 3 Encounters:   06/26/24 116/76   05/07/24 136/80   04/19/24 154/94       Continue current lifestyle measures to control blood pressure.    Educated on the following lifestyle modifications to lower BP and decrease cardiovascular risk factors.  limit alcohol intake, reduce salt in diet, maintain a healthy weight, engage in 30 minutes of cardiovascular exercise daily, and not smoke.         History of Present Illness     Charlie Hernandez is a 49 y.o. male who presents to the clinic to follow-up on elevated hemoglobin A1c.    Review of Systems   Constitutional:  Negative for chills and fever.   HENT:  Negative for ear pain and sore throat.    Eyes:  Negative for pain and visual disturbance.   Respiratory:  Negative for cough and shortness of breath.    Cardiovascular:  Negative for chest pain " and palpitations.   Gastrointestinal:  Negative for abdominal pain and vomiting.   Genitourinary:  Negative for dysuria and hematuria.   Musculoskeletal:  Negative for arthralgias and back pain.   Skin:  Negative for color change and rash.   Neurological:  Negative for seizures and syncope.   All other systems reviewed and are negative.    Medical History Reviewed by provider this encounter:       Past Medical History   Past Medical History:   Diagnosis Date    Chlamydia     Diabetes mellitus (HCC)     Type II    Gonorrhea     Herpes     HIV disease ([facility]) 07/2020    mode of transmission: heterosexual    Hyperlipidemia     Hypertension     Neoplasm of kidney     Obesity     BMI 37.2    Syphilis      Past Surgical History:   Procedure Laterality Date    NO PAST SURGERIES       Family History   Problem Relation Age of Onset    Diabetes Mother     Diabetes Father     Diabetes Family      Current Outpatient Medications on File Prior to Visit   Medication Sig Dispense Refill    Aspirin Adult Low Dose 81 MG EC tablet TAKE 1 TABLET BY MOUTH DAILY 30 tablet 2    atorvastatin (LIPITOR) 20 mg tablet TAKE 1 TABLET BY MOUTH DAILY 30 tablet 1    Biktarvy -25 MG tablet TAKE 1 TABLET BY MOUTH DAILY 30 tablet 2    cyclobenzaprine (FLEXERIL) 10 mg tablet TAKE 1 TABLET BY MOUTH THREE TIMES A DAY AS NEEDED FOR MUSCLE SPASM 60 tablet 0    ibuprofen (MOTRIN) 600 mg tablet TAKE 1 TABLET BY MOUTH EVERY SIX HOURS AS NEEDED FOR MODERATE PAIN 30 tablet 2    Insulin Pen Needle (BD Pen Needle Bela U/F) 32G X 4 MM MISC USE IN THE MORNING 100 each 1    Lantus SoloStar 100 units/mL SOPN INJECT 0.4mL (40 UNITS TOTAL) SUBCUTANEOUSLY DAILY AT BEDTIME 15 mL 2    Mounjaro 2.5 MG/0.5ML INJECT 0.5mL UNDER THE SKIN EVERY 7 DAYS 2 mL 2    tadalafil (CIALIS) 20 MG tablet Take 1 tablet (20 mg total) by mouth daily as needed for erectile dysfunction 90 tablet 0    valACYclovir (VALTREX) 1,000 mg tablet Take 1 tablet (1,000 mg total) by mouth daily 30  "tablet 2    BD Pen Needle Bela U/F 32G X 4 MM MISC Inject under the skin daily 100 each 2     No current facility-administered medications on file prior to visit.     Allergies   Allergen Reactions    Metformin Diarrhea      Current Outpatient Medications on File Prior to Visit   Medication Sig Dispense Refill    Aspirin Adult Low Dose 81 MG EC tablet TAKE 1 TABLET BY MOUTH DAILY 30 tablet 2    atorvastatin (LIPITOR) 20 mg tablet TAKE 1 TABLET BY MOUTH DAILY 30 tablet 1    Biktarvy -25 MG tablet TAKE 1 TABLET BY MOUTH DAILY 30 tablet 2    cyclobenzaprine (FLEXERIL) 10 mg tablet TAKE 1 TABLET BY MOUTH THREE TIMES A DAY AS NEEDED FOR MUSCLE SPASM 60 tablet 0    ibuprofen (MOTRIN) 600 mg tablet TAKE 1 TABLET BY MOUTH EVERY SIX HOURS AS NEEDED FOR MODERATE PAIN 30 tablet 2    Insulin Pen Needle (BD Pen Needle Bela U/F) 32G X 4 MM MISC USE IN THE MORNING 100 each 1    Lantus SoloStar 100 units/mL SOPN INJECT 0.4mL (40 UNITS TOTAL) SUBCUTANEOUSLY DAILY AT BEDTIME 15 mL 2    Mounjaro 2.5 MG/0.5ML INJECT 0.5mL UNDER THE SKIN EVERY 7 DAYS 2 mL 2    tadalafil (CIALIS) 20 MG tablet Take 1 tablet (20 mg total) by mouth daily as needed for erectile dysfunction 90 tablet 0    valACYclovir (VALTREX) 1,000 mg tablet Take 1 tablet (1,000 mg total) by mouth daily 30 tablet 2    BD Pen Needle Bela U/F 32G X 4 MM MISC Inject under the skin daily 100 each 2     No current facility-administered medications on file prior to visit.      Social History     Tobacco Use    Smoking status: Never    Smokeless tobacco: Never   Vaping Use    Vaping status: Never Used   Substance and Sexual Activity    Alcohol use: No    Drug use: Yes     Types: Marijuana, Methamphetamines    Sexual activity: Yes     Partners: Female     Birth control/protection: None     Objective     /76   Pulse 86   Temp (!) 96.9 °F (36.1 °C)   Ht 5' 11\" (1.803 m)   Wt 83 kg (183 lb)   SpO2 (!) 86%   BMI 25.52 kg/m²     Physical Exam  Constitutional:       " General: He is not in acute distress.     Appearance: He is well-developed.   HENT:      Head: Normocephalic.      Right Ear: External ear normal.      Left Ear: External ear normal.      Nose: Nose normal.      Mouth/Throat:      Pharynx: No oropharyngeal exudate.   Eyes:      General:         Right eye: No discharge.         Left eye: No discharge.      Conjunctiva/sclera: Conjunctivae normal.      Pupils: Pupils are equal, round, and reactive to light.   Neck:      Thyroid: No thyromegaly.   Cardiovascular:      Rate and Rhythm: Normal rate and regular rhythm.      Heart sounds: Normal heart sounds. No murmur heard.  Pulmonary:      Effort: Pulmonary effort is normal.      Breath sounds: Normal breath sounds. No wheezing.   Abdominal:      General: Bowel sounds are normal.      Palpations: Abdomen is soft. There is no mass.      Tenderness: There is no abdominal tenderness.   Musculoskeletal:         General: No tenderness. Normal range of motion.      Cervical back: Normal range of motion.   Lymphadenopathy:      Cervical: No cervical adenopathy.   Skin:     General: Skin is warm and dry.      Findings: No rash.   Neurological:      Mental Status: He is alert and oriented to person, place, and time.   Psychiatric:         Behavior: Behavior normal.       Administrative Statements   I have spent a total time of 45 minutes on 06/26/24 In caring for this patient including Diagnostic results, Prognosis, Risks and benefits of tx options, Instructions for management, Risk factor reductions, Impressions, Counseling / Coordination of care, and Documenting in the medical record.

## 2024-06-26 NOTE — ASSESSMENT & PLAN NOTE
Lab Results   Component Value Date    HGBA1C 10.1 (H) 03/18/2024     Discussed continuous glucose monitoring with patient.  He does not monitor his blood glucose at home.  He will test out device and if he likes it we will order it at next visit.  He is taking Mounjaro as prescribed.  Using Lantus at night.  He has changed his diet and eliminated all beverages that contain sugar.  Recheck hemoglobin A1c in 1 month.

## 2024-06-26 NOTE — ASSESSMENT & PLAN NOTE
Blood pressure: Blood pressure is well-controlled  BP Readings from Last 3 Encounters:   06/26/24 116/76   05/07/24 136/80   04/19/24 154/94       Continue current lifestyle measures to control blood pressure.    Educated on the following lifestyle modifications to lower BP and decrease cardiovascular risk factors.  limit alcohol intake, reduce salt in diet, maintain a healthy weight, engage in 30 minutes of cardiovascular exercise daily, and not smoke.

## 2024-06-27 DIAGNOSIS — E11.65 UNCONTROLLED TYPE 2 DIABETES MELLITUS WITH HYPERGLYCEMIA (HCC): ICD-10-CM

## 2024-06-27 NOTE — PROGRESS NOTES
"[facility] Annual Nutrition Assessment    Charlie seen by RD in conjunction with PCP f/u     Pt is established patient (last annual was 11/14/2023)    PMHx:  DMT2, obesity, HSV, HTN       Clinical Data/Client History    CD4 count:  918  Viral load:  <20  ART:   Biktarvy    , Patient Navigator: Payal     Food assistance: SNAP    Living situation: House or apartment     Psychosocial factors:  Not discussed    Mobility:  self ambulates    Physical activity: walks at work, body weight exercises at home       Oral health concerns: denied oral health concerns       Typical food/beverage intake:    Breakfast oatmeal or cereal   Lunch sandwich   Dinner chicken, corn, green beans   Snacks fruit   Beverages water    Appetite: Fair    OTC vitamin, mineral, herbal supplements: N/A    Oral/enteral nutrition supplements:  N/A    GI problems: denied n/v/d/c    Food allergies/intolerances:  NKFA    Weight history:  Wt Readings from Last 3 Encounters:   06/26/24 83 kg (183 lb)   05/07/24 89.1 kg (196 lb 6.4 oz)   04/02/24 99 kg (218 lb 3.2 oz)    291 (5/31/2023)  Current body weight:  183  Height:  71\"  BMI:  25  IBW:  172  %IBW  106%    Weight change: -13#(6%) x 30 days, -35#(16%) x 3 months.     Nutrition-related labs:    Lab Results   Component Value Date    HGBA1C 10.1 (H) 03/18/2024     Lab Results   Component Value Date    SODIUM 136 04/19/2024    K 4.6 04/19/2024    CL 99 04/19/2024    CO2 27 04/19/2024    BUN 11 04/19/2024    CREATININE 0.85 04/19/2024    GLUC 220 (H) 04/19/2024    CALCIUM 9.1 04/19/2024     Lab Results   Component Value Date    CHOLESTEROL 160 02/01/2023    CHOLESTEROL 210 (H) 12/11/2021     Lab Results   Component Value Date    HDL 43 02/01/2023    HDL 50 12/11/2021     Lab Results   Component Value Date    TRIG 65 02/01/2023    TRIG 87 12/11/2021     Current medications:   Mounjaro, lipitor     Physical findings/skin integrity:  Skin intact       Nutrition Diagnosis    Problem: altered " nutrition-related lab values (A1C)    Related to: energy intake > energy output over time     As Evidenced By: abnormal labs (A1C 10)      Estimated Nutritional Needs    Olman Roblero REE: CBW     ~2050 kcal (based on: 1.2 stress factor)  ~67 protein (based on: .08)  ~2080 fluid (based on: 25ml/kg/day)      Current intake estimation: meeting needs       Nutrition Intervention/Recommendations    Nutrition education intervention: reinforced previous education     Nutrition recommendations: Follow Myplate, avoid high sugar drink & high fat foods     Supplement recommendations: No supplement recommendations at this time      Teaching Method: verbal     Person Educated: patient      Comprehension: excellent    Receptivity: excellent    Expected compliance: excellent      Collaboration/referral of nutrition care:   [employer] voucher  updated card #[id number]      Goals:  Lowered A1C <7%   Continue to avoid skipping meals  Avoid soda & high fat foods     Monitoring/Evaluation:  Will monitor goals.     Visit Summary  Pt noted with significant desirable weight loss r/t Mounjaro & healthy diet changes. Pt reports avoiding soda & is now eating 3 meals per day. RD congratulated pt on changes. Pt denies any GI issues or issues from medication. Will continue to monitor & provide education as needed.   -Evelyn Deleon RDN,LDN

## 2024-06-28 RX ORDER — PEN NEEDLE, DIABETIC 32GX 5/32"
NEEDLE, DISPOSABLE MISCELLANEOUS
Qty: 90 EACH | Refills: 1 | Status: SHIPPED | OUTPATIENT
Start: 2024-06-28

## 2024-06-28 RX ORDER — INSULIN GLARGINE 100 [IU]/ML
INJECTION, SOLUTION SUBCUTANEOUS
Qty: 15 ML | Refills: 2 | Status: SHIPPED | OUTPATIENT
Start: 2024-06-28 | End: 2024-07-02

## 2024-07-02 DIAGNOSIS — E11.65 UNCONTROLLED TYPE 2 DIABETES MELLITUS WITH HYPERGLYCEMIA (HCC): ICD-10-CM

## 2024-07-02 RX ORDER — INSULIN GLARGINE 100 [IU]/ML
INJECTION, SOLUTION SUBCUTANEOUS
Qty: 15 ML | Refills: 2 | Status: SHIPPED | OUTPATIENT
Start: 2024-07-02

## 2024-07-16 ENCOUNTER — PATIENT OUTREACH (OUTPATIENT)
Dept: SURGERY | Facility: CLINIC | Age: 49
End: 2024-07-16

## 2024-07-23 DIAGNOSIS — E78.2 MIXED HYPERLIPIDEMIA: ICD-10-CM

## 2024-07-23 DIAGNOSIS — B20 HIV INFECTION, UNSPECIFIED SYMPTOM STATUS (HCC): ICD-10-CM

## 2024-07-23 DIAGNOSIS — A60.9 HSV (HERPES SIMPLEX VIRUS) ANOGENITAL INFECTION: ICD-10-CM

## 2024-07-24 RX ORDER — ATORVASTATIN CALCIUM 20 MG/1
20 TABLET, FILM COATED ORAL DAILY
Qty: 30 TABLET | Refills: 1 | Status: SHIPPED | OUTPATIENT
Start: 2024-07-24

## 2024-07-24 RX ORDER — VALACYCLOVIR HYDROCHLORIDE 1 G/1
1000 TABLET, FILM COATED ORAL DAILY
Qty: 30 TABLET | Refills: 2 | Status: SHIPPED | OUTPATIENT
Start: 2024-07-24 | End: 2024-10-22

## 2024-07-24 RX ORDER — BICTEGRAVIR SODIUM, EMTRICITABINE, AND TENOFOVIR ALAFENAMIDE FUMARATE 50; 200; 25 MG/1; MG/1; MG/1
1 TABLET ORAL DAILY
Qty: 30 TABLET | Refills: 2 | Status: SHIPPED | OUTPATIENT
Start: 2024-07-24

## 2024-08-08 ENCOUNTER — PATIENT OUTREACH (OUTPATIENT)
Dept: SURGERY | Facility: CLINIC | Age: 49
End: 2024-08-08

## 2024-08-08 NOTE — PROGRESS NOTES
HC contacted by Ct, Ct [facility]. HC let Ct know that is no issues, for [facility] to sent [facility] the updated lease and rent amount prior to October rent payment. Ct let HC know that it will be effective October 1st.

## 2024-08-14 DIAGNOSIS — E11.65 UNCONTROLLED TYPE 2 DIABETES MELLITUS WITH HYPERGLYCEMIA (HCC): ICD-10-CM

## 2024-08-14 RX ORDER — TIRZEPATIDE 2.5 MG/.5ML
INJECTION, SOLUTION SUBCUTANEOUS
Qty: 2 ML | Refills: 2 | Status: SHIPPED | OUTPATIENT
Start: 2024-08-14

## 2024-08-26 ENCOUNTER — PATIENT OUTREACH (OUTPATIENT)
Dept: SURGERY | Facility: CLINIC | Age: 49
End: 2024-08-26

## 2024-08-27 ENCOUNTER — PATIENT OUTREACH (OUTPATIENT)
Dept: SURGERY | Facility: CLINIC | Age: 49
End: 2024-08-27

## 2024-08-29 ENCOUNTER — PATIENT OUTREACH (OUTPATIENT)
Dept: SURGERY | Facility: CLINIC | Age: 49
End: 2024-08-29

## 2024-08-30 NOTE — PROGRESS NOTES
Discussion took place with WOLF Moe regarding Ct case and TBRA calculations, Ct is currently facing financial stress and will provide updated income at renewal.

## 2024-09-03 ENCOUNTER — HOSPITAL ENCOUNTER (EMERGENCY)
Facility: HOSPITAL | Age: 49
Discharge: HOME | End: 2024-09-03
Attending: EMERGENCY MEDICINE

## 2024-09-03 ENCOUNTER — PATIENT OUTREACH (OUTPATIENT)
Dept: SURGERY | Facility: CLINIC | Age: 49
End: 2024-09-03

## 2024-09-03 VITALS
BODY MASS INDEX: 25.77 KG/M2 | TEMPERATURE: 99 F | SYSTOLIC BLOOD PRESSURE: 145 MMHG | DIASTOLIC BLOOD PRESSURE: 93 MMHG | HEART RATE: 97 BPM | OXYGEN SATURATION: 100 % | WEIGHT: 184.75 LBS | RESPIRATION RATE: 20 BRPM

## 2024-09-03 DIAGNOSIS — L02.411 ABSCESS OF RIGHT AXILLA: Primary | ICD-10-CM

## 2024-09-03 PROCEDURE — 99284 EMERGENCY DEPT VISIT MOD MDM: CPT | Performed by: EMERGENCY MEDICINE

## 2024-09-03 PROCEDURE — 99282 EMERGENCY DEPT VISIT SF MDM: CPT

## 2024-09-03 PROCEDURE — 10060 I&D ABSCESS SIMPLE/SINGLE: CPT | Performed by: EMERGENCY MEDICINE

## 2024-09-03 RX ORDER — NAPROXEN 500 MG/1
500 TABLET ORAL 2 TIMES DAILY WITH MEALS
Qty: 20 TABLET | Refills: 0 | Status: SHIPPED | OUTPATIENT
Start: 2024-09-03 | End: 2024-09-13

## 2024-09-03 RX ORDER — NAPROXEN 500 MG/1
500 TABLET ORAL 2 TIMES DAILY WITH MEALS
Qty: 20 TABLET | Refills: 0 | Status: SHIPPED | OUTPATIENT
Start: 2024-09-03 | End: 2024-09-03

## 2024-09-03 NOTE — ED PROVIDER NOTES
History  Chief Complaint   Patient presents with    Abscess     Pt reports he has an abscess in his right armpit for the past 3 days. Reports history of the same, no meds taken today.     49-year-old male presents for evaluation of right axillary abscess that has been present for the past 2 to 3 days.  He states he has occasionally gotten similar abscesses but they usually spontaneously rupture and drain without need for further intervention.  He denies history of hidradenitis suppurativa.  His mother has been scrubbing it with alcohol, desquamating the surface but also allowing it to drain small amounts of purulent material.  He presents with a 2 to 3 cm diameter bulging abscess with 2 spots of spontaneous purulent drainage but minimal associated erythema and no proximal streaking.  It does appear that he shaves his armpits.  No other complaints or systemic symptoms. No recent travel or sick contacts.    ROS: Denies f/c, HA, CP, SOB, abdominal pain, n/v/d. 12 system ROS o/w negative.            History provided by:  Patient and medical records  Abscess  Location:  Shoulder/arm  Shoulder/arm abscess location:  R axilla  Size:  2.5cm  Abscess quality: draining, fluctuance, induration, painful, redness and warmth    Red streaking: no    Duration:  3 days  Progression:  Worsening  Pain details:     Quality:  Pressure and throbbing    Severity:  Moderate    Duration:  3 days    Timing:  Constant    Progression:  Worsening  Chronicity:  Recurrent  Context: diabetes    Context: not immunosuppression    Relieved by:  None tried  Worsened by:  Draining/squeezing  Ineffective treatments:  None tried  Associated symptoms: no anorexia, no fatigue, no fever, no headaches, no nausea and no vomiting    Risk factors: prior abscess    Risk factors: no hx of MRSA        Prior to Admission Medications   Prescriptions Last Dose Informant Patient Reported? Taking?   Aspirin Adult Low Dose 81 MG EC tablet   No No   Sig: TAKE 1 TABLET BY  MOUTH DAILY   BD Pen Needle Bela U/F 32G X 4 MM MISC   No No   Sig: Inject under the skin daily   Biktarvy -25 MG tablet   No No   Sig: TAKE 1 TABLET BY MOUTH DAILY   Insulin Pen Needle (BD Pen Needle Bela U/F) 32G X 4 MM MISC   No No   Sig: USE IN THE MORNING   Lantus SoloStar 100 units/mL SOPN   No No   Sig: INJECT 0.4mL (40 UNITS TOTAL) SUBCUTANEOUSLY DAILY AT BEDTIME   Mounjaro 2.5 MG/0.5ML   No No   Sig: INJECT 0.5mL UNDER THE SKIN EVERY 7 DAYS   atorvastatin (LIPITOR) 20 mg tablet   No No   Sig: TAKE 1 TABLET BY MOUTH DAILY   cyclobenzaprine (FLEXERIL) 10 mg tablet  Self No No   Sig: TAKE 1 TABLET BY MOUTH THREE TIMES A DAY AS NEEDED FOR MUSCLE SPASM   ibuprofen (MOTRIN) 600 mg tablet  Self No No   Sig: TAKE 1 TABLET BY MOUTH EVERY SIX HOURS AS NEEDED FOR MODERATE PAIN   tadalafil (CIALIS) 20 MG tablet   No No   Sig: Take 1 tablet (20 mg total) by mouth daily as needed for erectile dysfunction   valACYclovir (VALTREX) 1,000 mg tablet   No No   Sig: Take 1 tablet (1,000 mg total) by mouth daily      Facility-Administered Medications: None       Past Medical History:   Diagnosis Date    Chlamydia     Diabetes mellitus (HCC)     Type II    Gonorrhea     Herpes     HIV disease (HCC) 07/2020    mode of transmission: heterosexual    Hyperlipidemia     Hypertension     Neoplasm of kidney     Obesity     BMI 37.2    Syphilis        Past Surgical History:   Procedure Laterality Date    NO PAST SURGERIES         Family History   Problem Relation Age of Onset    Diabetes Mother     Diabetes Father     Diabetes Family      I have reviewed and agree with the history as documented.    E-Cigarette/Vaping    E-Cigarette Use Never User      E-Cigarette/Vaping Substances     Social History     Tobacco Use    Smoking status: Never    Smokeless tobacco: Never   Vaping Use    Vaping status: Never Used   Substance Use Topics    Alcohol use: No    Drug use: Not Currently     Types: Marijuana, Methamphetamines       Review of  Systems   Constitutional:  Negative for chills, fatigue and fever.   HENT: Negative.     Eyes: Negative.    Respiratory:  Negative for shortness of breath.    Cardiovascular:  Negative for chest pain and palpitations.   Gastrointestinal:  Negative for abdominal pain, anorexia, diarrhea, nausea and vomiting.   Genitourinary: Negative.    Musculoskeletal:  Negative for back pain and neck pain.   Skin:         Right axilla abscess   Neurological: Negative.  Negative for syncope, light-headedness and headaches.   Psychiatric/Behavioral: Negative.     All other systems reviewed and are negative.      Physical Exam  Physical Exam  Vitals reviewed.   Constitutional:       General: He is not in acute distress.     Appearance: He is well-developed. He is not ill-appearing, toxic-appearing or diaphoretic.   HENT:      Head: Normocephalic and atraumatic.      Right Ear: External ear normal.      Left Ear: External ear normal.      Nose: Nose normal.      Mouth/Throat:      Mouth: Mucous membranes are moist.      Pharynx: Oropharynx is clear. No oropharyngeal exudate.   Eyes:      General: No scleral icterus.     Conjunctiva/sclera: Conjunctivae normal.      Pupils: Pupils are equal, round, and reactive to light.   Cardiovascular:      Rate and Rhythm: Normal rate and regular rhythm.      Heart sounds: No murmur heard.  Pulmonary:      Effort: Pulmonary effort is normal.      Breath sounds: Normal breath sounds.   Abdominal:      General: Bowel sounds are normal. There is no distension.      Palpations: Abdomen is soft.      Tenderness: There is no abdominal tenderness.   Musculoskeletal:         General: No tenderness. Normal range of motion.      Cervical back: Normal range of motion and neck supple.   Lymphadenopathy:      Cervical: No cervical adenopathy.   Skin:     General: Skin is warm and dry.      Coloration: Skin is not pale.      Findings: No erythema or rash.      Comments: 2.5cm abscess on right axilla with  maceration, spontaneous purulent drainage and generalized TTP   Neurological:      General: No focal deficit present.      Mental Status: He is alert and oriented to person, place, and time.      Motor: No abnormal muscle tone.      Deep Tendon Reflexes: Reflexes are normal and symmetric.   Psychiatric:         Mood and Affect: Mood normal.         Behavior: Behavior normal.         Thought Content: Thought content normal.         Vital Signs  ED Triage Vitals [09/03/24 1114]   Temperature Pulse Respirations Blood Pressure SpO2   99 °F (37.2 °C) 97 20 145/93 100 %      Temp Source Heart Rate Source Patient Position - Orthostatic VS BP Location FiO2 (%)   Oral Monitor Sitting Left arm --      Pain Score       --           Vitals:    09/03/24 1114   BP: 145/93   Pulse: 97   Patient Position - Orthostatic VS: Sitting         Visual Acuity      ED Medications  Medications - No data to display    Diagnostic Studies  Results Reviewed       None                   No orders to display              Procedures  Procedures         ED Course                                 SBIRT 22yo+      Flowsheet Row Most Recent Value   Initial Alcohol Screen: US AUDIT-C     1. How often do you have a drink containing alcohol? 0 Filed at: 09/03/2024 1115   2. How many drinks containing alcohol do you have on a typical day you are drinking?  0 Filed at: 09/03/2024 1115   3a. Male UNDER 65: How often do you have five or more drinks on one occasion? 0 Filed at: 09/03/2024 1115   3b. FEMALE Any Age, or MALE 65+: How often do you have 4 or more drinks on one occassion? 0 Filed at: 09/03/2024 1115   Audit-C Score 0 Filed at: 09/03/2024 1115   MINDY: How many times in the past year have you...    Used an illegal drug or used a prescription medication for non-medical reasons? Never Filed at: 09/03/2024 1115                      Medical Decision Making  MDM/DDx: Abscess in an area difficult to keep clean w/o surrounding cellulitis.    A/P: Will I&D,  start antibiotics, recommend close f/u w/ general surgery.    Risk  Prescription drug management.                 Disposition  Final diagnoses:   Abscess of right axilla     Time reflects when diagnosis was documented in both MDM as applicable and the Disposition within this note       Time User Action Codes Description Comment    9/3/2024 11:42 AM Marin Meredith [L02.411] Abscess of right axilla           ED Disposition       ED Disposition   Discharge    Condition   Stable    Date/Time   Tue Sep 3, 2024 1142    Comment   Charlie Hernandez discharge to home/self care.                   Follow-up Information       Follow up With Specialties Details Why Contact Info Additional Information    [facility] Schedule an appointment as soon as possible for a visit  for further evaluation and treatment [address] [address] [phone] [facility], [address] [address], [place], [place], [id number] [phone]            Discharge Medication List as of 9/3/2024 11:45 AM        START taking these medications    Details   cephalexin (KEFLEX) 250 mg capsule Take 2 capsules (500 mg total) by mouth every 8 (eight) hours for 5 days, Starting Tue 9/3/2024, Until Sun 9/8/2024, Normal      naproxen (NAPROSYN) 500 mg tablet Take 1 tablet (500 mg total) by mouth 2 (two) times a day with meals for 10 days, Starting Tue 9/3/2024, Until Fri 9/13/2024, Normal           CONTINUE these medications which have NOT CHANGED    Details   Aspirin Adult Low Dose 81 MG EC tablet TAKE 1 TABLET BY MOUTH DAILY, Starting Wed 6/5/2024, Normal      atorvastatin (LIPITOR) 20 mg tablet TAKE 1 TABLET BY MOUTH DAILY, Starting Wed 7/24/2024, Normal      BD Pen Needle Bela U/F 32G X 4 MM MISC Inject under the skin daily, Starting Tue 11/14/2023, Until Sun 5/12/2024, Normal      Biktarvy -25 MG tablet TAKE 1 TABLET BY  MOUTH DAILY, Starting Wed 7/24/2024, Normal      cyclobenzaprine (FLEXERIL) 10 mg tablet TAKE 1 TABLET BY MOUTH THREE TIMES A DAY AS NEEDED FOR MUSCLE SPASM, Normal      ibuprofen (MOTRIN) 600 mg tablet TAKE 1 TABLET BY MOUTH EVERY SIX HOURS AS NEEDED FOR MODERATE PAIN, Normal      Insulin Pen Needle (BD Pen Needle Bela U/F) 32G X 4 MM MISC USE IN THE MORNING, Normal      Lantus SoloStar 100 units/mL SOPN INJECT 0.4mL (40 UNITS TOTAL) SUBCUTANEOUSLY DAILY AT BEDTIME, Normal      Mounjaro 2.5 MG/0.5ML INJECT 0.5mL UNDER THE SKIN EVERY 7 DAYS, Normal      tadalafil (CIALIS) 20 MG tablet Take 1 tablet (20 mg total) by mouth daily as needed for erectile dysfunction, Starting Fri 3/22/2024, Normal      valACYclovir (VALTREX) 1,000 mg tablet Take 1 tablet (1,000 mg total) by mouth daily, Starting Wed 7/24/2024, Until Tue 10/22/2024, Normal             No discharge procedures on file.    PDMP Review       None            ED Provider  Electronically Signed by             Marin Meredith DO  09/03/24 8890

## 2024-09-03 NOTE — ED PROCEDURE NOTE
"PROCEDURE  Incision and drain    Date/Time: 9/3/2024 11:46 AM    Performed by: Marin Meredith DO  Authorized by: Marin Meredith DO  Universal Protocol:  procedure performed by consultantConsent: Verbal consent obtained.  Risks and benefits: risks, benefits and alternatives were discussed  Consent given by: patient  Time out: Immediately prior to procedure a \"time out\" was called to verify the correct patient, procedure, equipment, support staff and site/side marked as required.  Patient understanding: patient states understanding of the procedure being performed  Patient consent: the patient's understanding of the procedure matches consent given  Procedure consent: procedure consent matches procedure scheduled  Patient identity confirmed: verbally with patient and arm band    Patient location:  ED  Location:     Type:  Abscess    Location: Right axilla.  Pre-procedure details:     Skin preparation:  Antiseptic wash  Anesthesia (see MAR for exact dosages):     Anesthesia method:  Topical application    Topical anesthesia: Pain ease spray.  Procedure details:     Complexity:  Simple    Incision types:  Stab incision    Scalpel blade:  11    Approach:  Open    Incision depth:  Subcutaneous    Drainage:  Purulent and bloody    Drainage amount:  Moderate    Wound treatment:  Packing placed and wound left open    Packing materials:  1/4 in gauze    Amount 1/4\":  6cm  Post-procedure details:     Patient tolerance of procedure:  Tolerated well, no immediate complications       Marin Meredith DO  09/03/24 1248    "

## 2024-09-04 ENCOUNTER — TELEPHONE (OUTPATIENT)
Dept: SURGERY | Facility: CLINIC | Age: 49
End: 2024-09-04

## 2024-09-04 ENCOUNTER — PATIENT OUTREACH (OUTPATIENT)
Dept: SURGERY | Facility: CLINIC | Age: 49
End: 2024-09-04

## 2024-09-04 NOTE — TELEPHONE ENCOUNTER
Called pt to follow up on his ER visit for an abscess in his underarm. Pt stated the ER has drained it and he is doing better. Pt has an appt with Provider in September and wishes to keep that appt and doesn't need a sooner appt.

## 2024-09-05 ENCOUNTER — PATIENT OUTREACH (OUTPATIENT)
Dept: SURGERY | Facility: CLINIC | Age: 49
End: 2024-09-05

## 2024-09-05 NOTE — PROGRESS NOTES
HC received notification regarding interest in coats for kids. HC text Ct who states is interested in the program. Ct provided children's names and ages.    HC updated information in share drive and updated Manager.     HC received [employer]W Aam[employer] approval letter.

## 2024-09-10 ENCOUNTER — PATIENT OUTREACH (OUTPATIENT)
Dept: SURGERY | Facility: CLINIC | Age: 49
End: 2024-09-10

## 2024-09-10 NOTE — PROGRESS NOTES
Additional Notes: Pain is negative, 0/10 pain scale HC completed Check req and submitted to HS.   Quality 130: Documentation Of Current Medications In The Medical Record: Current Medications Documented Quality 131: Pain Assessment And Follow-Up: Pain assessment using a standardized tool is documented as negative, no follow-up plan required Detail Level: Detailed

## 2024-09-10 NOTE — PROGRESS NOTES
HC called Ct to complete 6 month follow up. Ct is currently still residing in [place] remains to have custody of his daughter. Ct's current housing is stable. Ct has assistance with [employer]. Ct reports he is sexually active states thats disclosed and does not use protection. Ct is currently in good health. CM and Ct discussed at risk behaviors and importance of using protection and maintaining medication adherence. Ct is currently medically adherent to his medications and his appointments. Ct also reports he has diabetes and it is under control. Ct states that he is currently working with Dr. Valdes and Andree for PCP. Ct reports he currently does not have any issues with transportation and drives himself.

## 2024-09-10 NOTE — PROGRESS NOTES
Ct text HC that he called [employer]. Ct has an appointment for 10- at 8:30am. Ct states he is struggling and has no income coming in.    HC called and discussed that he should get up too 5300 that will be taxed 30% which would be around $3700-$3800.

## 2024-09-10 NOTE — PROGRESS NOTES
Ct text HC that he has a appointment tomorrow at [employer] to get assistance with applying to unemployment.

## 2024-09-11 DIAGNOSIS — E78.2 MIXED HYPERLIPIDEMIA: ICD-10-CM

## 2024-09-11 RX ORDER — ATORVASTATIN CALCIUM 20 MG/1
20 TABLET, FILM COATED ORAL DAILY
Qty: 30 TABLET | Refills: 1 | Status: SHIPPED | OUTPATIENT
Start: 2024-09-11

## 2024-09-13 ENCOUNTER — PATIENT OUTREACH (OUTPATIENT)
Dept: SURGERY | Facility: CLINIC | Age: 49
End: 2024-09-13

## 2024-09-13 NOTE — PROGRESS NOTES
HC called Ct to follow up regarding paystub. Ct states he will send it over.     Ct states he will send new lease to add increase to Oct CR.    HC discussed Ct's case with HS. Pending new stub in 2 weeks to do a re-calculations.

## 2024-09-19 ENCOUNTER — PATIENT OUTREACH (OUTPATIENT)
Dept: SURGERY | Facility: CLINIC | Age: 49
End: 2024-09-19

## 2024-09-24 ENCOUNTER — OFFICE VISIT (OUTPATIENT)
Dept: SURGERY | Facility: CLINIC | Age: 49
End: 2024-09-24
Payer: COMMERCIAL

## 2024-09-24 ENCOUNTER — DOCUMENTATION (OUTPATIENT)
Dept: SURGERY | Facility: CLINIC | Age: 49
End: 2024-09-24

## 2024-09-24 VITALS
HEIGHT: 71 IN | SYSTOLIC BLOOD PRESSURE: 129 MMHG | HEART RATE: 99 BPM | DIASTOLIC BLOOD PRESSURE: 77 MMHG | OXYGEN SATURATION: 97 % | WEIGHT: 186 LBS | TEMPERATURE: 98.2 F | BODY MASS INDEX: 26.04 KG/M2

## 2024-09-24 DIAGNOSIS — Z23 NEED FOR COVID-19 VACCINE: ICD-10-CM

## 2024-09-24 DIAGNOSIS — B20 HUMAN IMMUNODEFICIENCY VIRUS (HIV) DISEASE (HCC): Primary | ICD-10-CM

## 2024-09-24 DIAGNOSIS — L02.91 ABSCESS: ICD-10-CM

## 2024-09-24 DIAGNOSIS — Z23 NEED FOR INFLUENZA VACCINATION: ICD-10-CM

## 2024-09-24 DIAGNOSIS — Z00.00 ANNUAL PHYSICAL EXAM: ICD-10-CM

## 2024-09-24 DIAGNOSIS — Z12.5 SCREENING FOR PROSTATE CANCER: ICD-10-CM

## 2024-09-24 DIAGNOSIS — I10 PRIMARY HYPERTENSION: ICD-10-CM

## 2024-09-24 DIAGNOSIS — E11.65 UNCONTROLLED TYPE 2 DIABETES MELLITUS WITH HYPERGLYCEMIA (HCC): ICD-10-CM

## 2024-09-24 PROCEDURE — 99396 PREV VISIT EST AGE 40-64: CPT | Performed by: NURSE PRACTITIONER

## 2024-09-24 RX ORDER — SULFAMETHOXAZOLE AND TRIMETHOPRIM 400; 80 MG/1; MG/1
1 TABLET ORAL EVERY 12 HOURS SCHEDULED
Qty: 14 TABLET | Refills: 0 | Status: SHIPPED | OUTPATIENT
Start: 2024-09-24 | End: 2024-10-01

## 2024-09-24 NOTE — ASSESSMENT & PLAN NOTE
"No results found for: \"US1GXHF\"  CD4 ABS   Date/Time Value Ref Range Status   2024 09:47  359 - 1519 /uL Final     HIV-1 RNA by PCR, Qn   Date/Time Value Ref Range Status   2023 08:04 AM <20 copies/mL Final     Comment:     HIV-1 RNA not detected  The reportable range for this assay is 20 to 10,000,000  copies HIV-1 RNA/mL.     HIV-1 RNA Viral Load Log   Date/Time Value Ref Range Status   2023 08:04 AM COMMENT [id number]/mL Final     Comment:     Unable to calculate result since non-numeric result obtained for  component test.         ART: Biktarvy        Denies side effects. Stressed the importance of adherence.  Continue follow up with ID clinic.       Reviewed most recent labs, including Cd4 and viral load. Discussed the risks and benefits of treatment options, instructions for management, importance of treatment adherence, and reduction of risk factor.Educated on possible  medication side effects.  Reviewed last ID visit.  Collaborated with ID to optimize medical treatment.    Counseled on routes of HIV transmission, including the risk of  infection. Emphasized that viral suppression is the best method to prevent HIV transmission.  At this time pt.denies the need for HIV testing of anyone in their life.     Total encounter time was 45 minutes. Greater then 20 minutes were spent on counseling and patient education. Pt voices understanding and agreement with treatment plan.      "

## 2024-09-24 NOTE — PROGRESS NOTES
"Adult Annual Physical  Name: Charlie Hernandez      : 1975      MRN: [id number]  Encounter Provider: REDD Arthur  Encounter Date: 2024   Encounter department: ASC AT [facility]    Assessment & Plan  Human immunodeficiency virus (HIV) disease (HCC)  No results found for: \"[id number]\"  CD4 ABS   Date/Time Value Ref Range Status   2024 09:47  359 - 1519 /uL Final     HIV-1 RNA by PCR, Qn   Date/Time Value Ref Range Status   2023 08:04 AM <20 copies/mL Final     Comment:     HIV-1 RNA not detected  The reportable range for this assay is 20 to 10,000,000  copies HIV-1 RNA/mL.     HIV-1 RNA Viral Load Log   Date/Time Value Ref Range Status   2023 08:04 AM COMMENT [id number]/mL Final     Comment:     Unable to calculate result since non-numeric result obtained for  component test.         ART: Biktarvy        Denies side effects. Stressed the importance of adherence.  Continue follow up with ID clinic.       Reviewed most recent labs, including Cd4 and viral load. Discussed the risks and benefits of treatment options, instructions for management, importance of treatment adherence, and reduction of risk factor.Educated on possible  medication side effects.  Reviewed last ID visit.  Collaborated with ID to optimize medical treatment.    Counseled on routes of HIV transmission, including the risk of  infection. Emphasized that viral suppression is the best method to prevent HIV transmission.  At this time pt.denies the need for HIV testing of anyone in their life.     Total encounter time was 45 minutes. Greater then 20 minutes were spent on counseling and patient education. Pt voices understanding and agreement with treatment plan.           Uncontrolled type 2 diabetes mellitus with hyperglycemia (HCC)    Lab Results   Component Value Date    HGBA1C 10.1 (H) 2024     Lab Results   Component Value Date/Time    HGBA1C 10.1 (H) 2024 09:47 AM    HGBA1C 13.7 " Recommended observation. (H) 09/15/2021 03:42 PM        Continue current medication.  Did not have repeat labs as instructed.  Will check hemoglobin A1c as soon as possible.  Adjust medication after labs are completed.    Educated to follow diabetic diet, maintain a healthy weight, and exercise regularly. Referred to dietician for additional education.                       Primary hypertension         Need for COVID-19 vaccine    Orders:    COVID-19 Pfizer mRNA vaccine 12 yr and older (Comirnaty pre-filled syringe)    Need for influenza vaccination    Orders:    influenza vaccine, recombinant, PF, 0.5 mL IM (Flublok)    Screening for prostate cancer    Orders:    PSA, total and free; Future    Abscess    Orders:    sulfamethoxazole-trimethoprim (BACTRIM) 400-80 mg per tablet; Take 1 tablet by mouth every 12 (twelve) hours for 7 days    Annual physical exam         Immunizations and preventive care screenings were discussed with patient today. Appropriate education was printed on patient's after visit summary.    Discussed risks and benefits of prostate cancer screening. We discussed the controversial history of PSA screening for prostate cancer in the [place] as well as the risk of over detection and over treatment of prostate cancer by way of PSA screening.  The patient understands that PSA blood testing is an imperfect way to screen for prostate cancer and that elevated PSA levels in the blood may also be caused by infection, inflammation, prostatic trauma or manipulation, urological procedures, or by benign prostatic enlargement.    The role of the digital rectal examination in prostate cancer screening was also discussed and I discussed with him that there is large interobserver variability in the findings of digital rectal examination.    Counseling:  Dental Health: discussed importance of regular tooth brushing, flossing, and dental visits.  Injury prevention: discussed safety/seat belts, safety helmets, smoke detectors, carbon  dioxide detectors, and smoking near bedding or upholstery.  Exercise: the importance of regular exercise/physical activity was discussed. Recommend exercise 3-5 times per week for at least 30 minutes.          History of Present Illness     Adult Annual Physical:  Patient presents for annual physical.     Diet and Physical Activity:  - Diet/Nutrition: frequent junk food.  - Exercise: no formal exercise.    General Health:  - Sleep: sleeps well and > 8 hours of sleep on average.  - Hearing: normal hearing bilateral ears.  - Vision: wears glasses and most recent eye exam > 1 year ago.  - Dental: no dental visits for > 1 year and brushes teeth twice daily.     Health:  - History of STDs: no.   - Urinary symptoms: erectile dysfunction.     Advanced Care Planning:  - Has an advanced directive?: no    - Has a durable medical POA?: no    - ACP document given to patient?: no      Review of Systems   Constitutional:  Negative for chills and fever.   HENT:  Negative for ear pain and sore throat.    Eyes:  Negative for pain and visual disturbance.   Respiratory:  Negative for cough and shortness of breath.    Cardiovascular:  Negative for chest pain and palpitations.   Gastrointestinal:  Negative for abdominal pain and vomiting.   Genitourinary:  Negative for dysuria and hematuria.   Musculoskeletal:  Positive for arthralgias. Negative for back pain.        C/O shoulder pain with numbness radiating down to his fingers   Skin:  Negative for color change and rash.        Underarm abscess   Neurological:  Negative for seizures and syncope.   All other systems reviewed and are negative.    Medical History Reviewed by provider this encounter:  Tobacco  Allergies  Meds  Problems  Med Hx  Surg Hx  Fam Hx       Past Medical History   Past Medical History:   Diagnosis Date    Chlamydia     Diabetes mellitus ([facility])     Type II    Gonorrhea     Herpes     HIV disease ([facility]) 07/2020    mode of transmission: heterosexual     Hyperlipidemia     Hypertension     Neoplasm of kidney     Obesity     BMI 37.2    Syphilis      Past Surgical History:   Procedure Laterality Date    NO PAST SURGERIES       Family History   Problem Relation Age of Onset    Diabetes Mother     Diabetes Father     Diabetes Family      Current Outpatient Medications on File Prior to Visit   Medication Sig Dispense Refill    Aspirin Adult Low Dose 81 MG EC tablet TAKE 1 TABLET BY MOUTH DAILY 30 tablet 2    atorvastatin (LIPITOR) 20 mg tablet TAKE 1 TABLET BY MOUTH DAILY 30 tablet 1    BD Pen Needle Bela U/F 32G X 4 MM MISC Inject under the skin daily 100 each 2    Biktarvy -25 MG tablet TAKE 1 TABLET BY MOUTH DAILY 30 tablet 2    cyclobenzaprine (FLEXERIL) 10 mg tablet TAKE 1 TABLET BY MOUTH THREE TIMES A DAY AS NEEDED FOR MUSCLE SPASM 60 tablet 0    ibuprofen (MOTRIN) 600 mg tablet TAKE 1 TABLET BY MOUTH EVERY SIX HOURS AS NEEDED FOR MODERATE PAIN 30 tablet 2    Insulin Pen Needle (BD Pen Needle Bela U/F) 32G X 4 MM MISC USE IN THE MORNING 90 each 1    Lantus SoloStar 100 units/mL SOPN INJECT 0.4mL (40 UNITS TOTAL) SUBCUTANEOUSLY DAILY AT BEDTIME 15 mL 2    Mounjaro 2.5 MG/0.5ML INJECT 0.5mL UNDER THE SKIN EVERY 7 DAYS 2 mL 2    naproxen (NAPROSYN) 500 mg tablet Take 1 tablet (500 mg total) by mouth 2 (two) times a day with meals for 10 days 20 tablet 0    valACYclovir (VALTREX) 1,000 mg tablet Take 1 tablet (1,000 mg total) by mouth daily 30 tablet 2    tadalafil (CIALIS) 20 MG tablet Take 1 tablet (20 mg total) by mouth daily as needed for erectile dysfunction (Patient not taking: Reported on 9/24/2024) 90 tablet 0     No current facility-administered medications on file prior to visit.     Allergies   Allergen Reactions    Metformin Diarrhea      Current Outpatient Medications on File Prior to Visit   Medication Sig Dispense Refill    Aspirin Adult Low Dose 81 MG EC tablet TAKE 1 TABLET BY MOUTH DAILY 30 tablet 2    atorvastatin (LIPITOR) 20 mg tablet TAKE 1  "TABLET BY MOUTH DAILY 30 tablet 1    BD Pen Needle Bela U/F 32G X 4 MM MISC Inject under the skin daily 100 each 2    Biktarvy -25 MG tablet TAKE 1 TABLET BY MOUTH DAILY 30 tablet 2    cyclobenzaprine (FLEXERIL) 10 mg tablet TAKE 1 TABLET BY MOUTH THREE TIMES A DAY AS NEEDED FOR MUSCLE SPASM 60 tablet 0    ibuprofen (MOTRIN) 600 mg tablet TAKE 1 TABLET BY MOUTH EVERY SIX HOURS AS NEEDED FOR MODERATE PAIN 30 tablet 2    Insulin Pen Needle (BD Pen Needle Bela U/F) 32G X 4 MM MISC USE IN THE MORNING 90 each 1    Lantus SoloStar 100 units/mL SOPN INJECT 0.4mL (40 UNITS TOTAL) SUBCUTANEOUSLY DAILY AT BEDTIME 15 mL 2    Mounjaro 2.5 MG/0.5ML INJECT 0.5mL UNDER THE SKIN EVERY 7 DAYS 2 mL 2    naproxen (NAPROSYN) 500 mg tablet Take 1 tablet (500 mg total) by mouth 2 (two) times a day with meals for 10 days 20 tablet 0    valACYclovir (VALTREX) 1,000 mg tablet Take 1 tablet (1,000 mg total) by mouth daily 30 tablet 2    tadalafil (CIALIS) 20 MG tablet Take 1 tablet (20 mg total) by mouth daily as needed for erectile dysfunction (Patient not taking: Reported on 9/24/2024) 90 tablet 0     No current facility-administered medications on file prior to visit.      Social History     Tobacco Use    Smoking status: Never    Smokeless tobacco: Never   Vaping Use    Vaping status: Never Used   Substance and Sexual Activity    Alcohol use: No    Drug use: Not Currently     Types: Marijuana, Methamphetamines    Sexual activity: Not Currently     Partners: Female     Birth control/protection: None       Objective     /77   Pulse 99   Temp 98.2 °F (36.8 °C)   Ht 5' 11\" (1.803 m)   Wt 84.4 kg (186 lb)   SpO2 97%   BMI 25.94 kg/m²     Physical Exam  Constitutional:       General: He is not in acute distress.     Appearance: He is well-developed.   HENT:      Head: Normocephalic.      Right Ear: External ear normal.      Left Ear: External ear normal.      Nose: Nose normal.      Mouth/Throat:      Pharynx: No " oropharyngeal exudate.   Eyes:      General:         Right eye: No discharge.         Left eye: No discharge.      Conjunctiva/sclera: Conjunctivae normal.      Pupils: Pupils are equal, round, and reactive to light.   Neck:      Thyroid: No thyromegaly.   Cardiovascular:      Rate and Rhythm: Normal rate and regular rhythm.      Heart sounds: Normal heart sounds. No murmur heard.  Pulmonary:      Effort: Pulmonary effort is normal.      Breath sounds: Normal breath sounds. No wheezing.   Abdominal:      General: Bowel sounds are normal.      Palpations: Abdomen is soft. There is no mass.      Tenderness: There is no abdominal tenderness.   Musculoskeletal:         General: No tenderness. Normal range of motion.      Cervical back: Normal range of motion.   Lymphadenopathy:      Cervical: No cervical adenopathy.   Skin:     General: Skin is warm and dry.      Findings: No rash.      Comments: Left underarm has hyperpigmented mobile nodular fluctuant abscess. Scant drainage.  No erythema, no foul smell.   Neurological:      Mental Status: He is alert and oriented to person, place, and time.   Psychiatric:         Behavior: Behavior normal.

## 2024-09-24 NOTE — PROGRESS NOTES
[facility] Annual Sexual Health Assessment     Charlie was seen by Prevention Nurse in conjunction with his PCP appointment.       Pt is established patient.    Explained to patient that we complete a sexual health assessment once yearly.     Diagnosed 4 years ago. On Biktarvy with undetectable viral load with last blood work.    He explains he is not in a sexual relationship at all right now and has not been for a some time. Feels like it is hard to get close to someone and then have to disclose his diagnosis. He knows that U=U but feels like not everyone understands that and potential partners will  and not want to be with him. He feels like his focus now needs to be taking care of his kids. He has full custody of his daughter  and 50/50 custody of his son so that takes up a lot of his time and energy. He hopes maybe someday to have a partner but wonders if that will ever happen. We talked about being patient and open to the possibility of a relationship and that for now he can focus on his kids and staying healthy.

## 2024-09-24 NOTE — PROGRESS NOTES
Pastoral Care Progress Note    2024  Patient: Charlie Hernandez : 1975  Encounter Date & Time: 2024   MRN: [id number]          The  met with  for continued care and support.   advised he is just living life. He reported it has been difficult to make ends meet. He picked up a second job and now he is being penalized by cutting his [employer] benefits and insurance coverage. He shared it is difficult to make ends meet.  The  encouraged  to stay the course and be faithful in doing the right things.  The  offered Scriptures and prayers for . He expressed his gratitude for the encouragement.  The  provided  a devotional book.   shared he really needed something to help him understand God's word.  The  encouraged  to reach out if he wants to talk further.   appears to be seeking for a better way to live and could benefit from encouragement and support.  He appeared to have less anxiety about his circumstance by the end of the visit.      Chaplaincy Interventions Utilized:   Empowerment: Encouraged focus on present, Normalized experience of patient/family, and Reframed experience of patient/family    Exploration: Explored hope and Identified, evaluated & reinforced appropriate coping strategies    Relationship Building: Cultivated a relationship of care and support and Listened empathically    Ritual: Provided prayer and Read sacred text      Chaplaincy Outcomes Achieved:  Distress reduced      Spiritual Coping Strategies Utilized:   Spiritual empowerment

## 2024-09-24 NOTE — ASSESSMENT & PLAN NOTE
Lab Results   Component Value Date    HGBA1C 10.1 (H) 03/18/2024     Lab Results   Component Value Date/Time    HGBA1C 10.1 (H) 03/18/2024 09:47 AM    HGBA1C 13.7 (H) 09/15/2021 03:42 PM        Continue current medication.  Did not have repeat labs as instructed.  Will check hemoglobin A1c as soon as possible.  Adjust medication after labs are completed.    Educated to follow diabetic diet, maintain a healthy weight, and exercise regularly. Referred to dietician for additional education.

## 2024-09-24 NOTE — PROGRESS NOTES
"Ambulatory Visit  Name: Charlie Hernandez      : 1975      MRN: [id number]  Encounter Provider: REDD Arthur  Encounter Date: 2024   Encounter department: ASC AT [facility]    Assessment & Plan  Human immunodeficiency virus (HIV) disease (HCC)         Uncontrolled type 2 diabetes mellitus with hyperglycemia (HCC)    Lab Results   Component Value Date    HGBA1C 10.1 (H) 2024            Primary hypertension         Need for COVID-19 vaccine    Orders:  •  COVID-19 Pfizer mRNA vaccine 12 yr and older (Comirnaty pre-filled syringe)    Need for influenza vaccination    Orders:  •  influenza vaccine, recombinant, PF, 0.5 mL IM (Flublok)    Screening for prostate cancer           History of Present Illness   {Disappearing Hyperlinks I Encounters * My Last Note * Since Last Visit * History :42843}  Charlie Hernandez is a 49 y.o. male who presents ***    {History obtained from (Optional):63548}  Review of Systems  {Select to Display PM (Optional):51298}      Objective   {Disappearing Hyperlinks   Review Vitals * Enter New Vitals * Results Review * Labs * Imaging * Cardiology * Procedures * Lung Cancer Screening * Surgical eConsent :76062}  /77   Pulse 99   Temp 98.2 °F (36.8 °C)   Ht 5' 11\" (1.803 m)   Wt 84.4 kg (186 lb)   SpO2 97%   BMI 25.94 kg/m²     Physical Exam  {Administrative / Billing Section (Optional):94093}  "

## 2024-09-24 NOTE — ASSESSMENT & PLAN NOTE
Hypertension Assessment  See encounter diagnosis  Discussion: stage 2  Cardiovascular risk factors: diabetes mellitus, dyslipidemia, and male gender    Hypertension Plan  Continue current treatment regimen.  Continue current medications.  Patient Education: Reviewed risks of hypertension and principles of   treatment.  Counseling time: not applicable.Blood pressure:   BP Readings from Last 3 Encounters:   09/24/24 129/77   09/03/24 145/93   06/26/24 116/76       Continue current antihypertensive.    Educated on the following lifestyle modifications to lower BP and decrease cardiovascular risk factors.  limit alcohol intake, reduce salt in diet, maintain a healthy weight, engage in 30 minutes of cardiovascular exercise daily, and not smoke.

## 2024-09-24 NOTE — ASSESSMENT & PLAN NOTE
"No results found for: \"[id number]\"  CD4 ABS   Date/Time Value Ref Range Status   2024 09:47  359 - 1519 /uL Final     HIV-1 RNA by PCR, Qn   Date/Time Value Ref Range Status   2023 08:04 AM <20 copies/mL Final     Comment:     HIV-1 RNA not detected  The reportable range for this assay is 20 to 10,000,000  copies HIV-1 RNA/mL.     HIV-1 RNA Viral Load Log   Date/Time Value Ref Range Status   2023 08:04 AM COMMENT [id number]/mL Final     Comment:     Unable to calculate result since non-numeric result obtained for  component test.         ART: Biktarvy        Denies side effects. Stressed the importance of adherence.  Continue follow up with ID clinic.       Reviewed most recent labs, including Cd4 and viral load. Discussed the risks and benefits of treatment options, instructions for management, importance of treatment adherence, and reduction of risk factor.Educated on possible  medication side effects.  Reviewed last ID visit.  Collaborated with ID to optimize medical treatment.    Counseled on routes of HIV transmission, including the risk of  infection. Emphasized that viral suppression is the best method to prevent HIV transmission.  At this time pt.denies the need for HIV testing of anyone in their life.     Total encounter time was 45 minutes. Greater then 20 minutes were spent on counseling and patient education. Pt voices understanding and agreement with treatment plan.           "

## 2024-09-24 NOTE — PATIENT INSTRUCTIONS
"Patient Education     Routine physical for adults   The Basics   Written by the doctors and editors at [facility]   What is a physical? -- A physical is a routine visit, or \"check-up,\" with your doctor. You might also hear it called a \"wellness visit\" or \"preventive visit.\"  During each visit, the doctor will:   Ask about your physical and mental health   Ask about your habits, behaviors, and lifestyle   Do an exam   Give you vaccines if needed   Talk to you about any medicines you take   Give advice about your health   Answer your questions  Getting regular check-ups is an important part of taking care of your health. It can help your doctor find and treat any problems you have. But it's also important for preventing health problems.  A routine physical is different from a \"sick visit.\" A sick visit is when you see a doctor because of a health concern or problem. Since physicals are scheduled ahead of time, you can think about what you want to ask the doctor.  How often should I get a physical? -- It depends on your age and health. In general, for people age 21 years and older:   If you are younger than 50 years, you might be able to get a physical every 3 years.   If you are 50 years or older, your doctor might recommend a physical every year.  If you have an ongoing health condition, like diabetes or high blood pressure, your doctor will probably want to see you more often.  What happens during a physical? -- In general, each visit will include:   Physical exam - The doctor or nurse will check your height, weight, heart rate, and blood pressure. They will also look at your eyes and ears. They will ask about how you are feeling and whether you have any symptoms that bother you.   Medicines - It's a good idea to bring a list of all the medicines you take to each doctor visit. Your doctor will talk to you about your medicines and answer any questions. Tell them if you are having any side effects that bother you. You " "should also tell them if you are having trouble paying for any of your medicines.   Habits and behaviors - This includes:   Your diet   Your exercise habits   Whether you smoke, drink alcohol, or use drugs   Whether you are sexually active   Whether you feel safe at home  Your doctor will talk to you about things you can do to improve your health and lower your risk of health problems. They will also offer help and support. For example, if you want to quit smoking, they can give you advice and might prescribe medicines. If you want to improve your diet or get more physical activity, they can help you with this, too.   Lab tests, if needed - The tests you get will depend on your age and situation. For example, your doctor might want to check your:   Cholesterol   Blood sugar   Iron level   Vaccines - The recommended vaccines will depend on your age, health, and what vaccines you already had. Vaccines are very important because they can prevent certain serious or deadly infections.   Discussion of screening - \"Screening\" means checking for diseases or other health problems before they cause symptoms. Your doctor can recommend screening based on your age, risk, and preferences. This might include tests to check for:   Cancer, such as breast, prostate, cervical, ovarian, colorectal, prostate, lung, or skin cancer   Sexually transmitted infections, such as chlamydia and gonorrhea   Mental health conditions like depression and anxiety  Your doctor will talk to you about the different types of screening tests. They can help you decide which screenings to have. They can also explain what the results might mean.   Answering questions - The physical is a good time to ask the doctor or nurse questions about your health. If needed, they can refer you to other doctors or specialists, too.  Adults older than 65 years often need other care, too. As you get older, your doctor will talk to you about:   How to prevent falling at " home   Hearing or vision tests   Memory testing   How to take your medicines safely   Making sure that you have the help and support you need at home  All topics are updated as new evidence becomes available and our peer review process is complete.  This topic retrieved from [employer] on: May 02, 2024.  Topic 948762 Version 1.0  Release: 32.4.3 - C32.122  © 2024 UpToDate, Inc. and/or its affiliates. All rights reserved.  Consumer Information Use and Disclaimer   Disclaimer: This generalized information is a limited summary of diagnosis, treatment, and/or medication information. It is not meant to be comprehensive and should be used as a tool to help the user understand and/or assess potential diagnostic and treatment options. It does NOT include all information about conditions, treatments, medications, side effects, or risks that may apply to a specific patient. It is not intended to be medical advice or a substitute for the medical advice, diagnosis, or treatment of a health care provider based on the health care provider's examination and assessment of a patient's specific and unique circumstances. Patients must speak with a health care provider for complete information about their health, medical questions, and treatment options, including any risks or benefits regarding use of medications. This information does not endorse any treatments or medications as safe, effective, or approved for treating a specific patient. UpToDate, Inc. and its affiliates disclaim any warranty or liability relating to this information or the use thereof.The use of this information is governed by the Terms of Use, available at https://www.woltersWe Clusteruwer.com/en/know/clinical-effectiveness-terms. 2024© UpToDate, Inc. and its affiliates and/or licensors. All rights reserved.  Copyright   © 2024 UpToDate, Inc. and/or its affiliates. All rights reserved.

## 2024-09-24 NOTE — PROGRESS NOTES
[facility] Nutrition Encounter     Charlie seen by RD in conjunction with PCP f/u     Nutrition Diagnosis    Problem: altered nutrition-related lab values (A1C 10)    Related to: energy intake > energy output over time     As Evidenced By: patient interview       Appetite: Excellent      Weight history:   Wt Readings from Last 3 Encounters:   09/24/24 84.4 kg (186 lb)   09/03/24 83.8 kg (184 lb 11.9 oz)   06/26/24 83 kg (183 lb)        Nutrition-related labs:    Lab Results   Component Value Date    HGBA1C 10.1 (H) 03/18/2024       Collaboration/referral of nutrition care:   [employer] voucher       Goals:  Prior goals: Lowered A1C <7%   Continue to avoid skipping meals  Avoid soda & high fat foods   New Goals:   -Continue Myplate Meals  -Avoid Soda  -Avoid sweets     Visit Summary  No new A1C since last session. Ct reports he is avoiding high sugar drinks and eating vegetables. He continues on Mounjaro for A1C & weight management. Will continue to reinforce prior education.  -Evelyn Deleon RDN,LDN

## 2024-10-03 ENCOUNTER — PATIENT OUTREACH (OUTPATIENT)
Dept: SURGERY | Facility: CLINIC | Age: 49
End: 2024-10-03

## 2024-10-07 ENCOUNTER — TELEPHONE (OUTPATIENT)
Dept: SURGERY | Facility: CLINIC | Age: 49
End: 2024-10-07

## 2024-10-08 ENCOUNTER — TELEPHONE (OUTPATIENT)
Dept: SURGERY | Facility: CLINIC | Age: 49
End: 2024-10-08

## 2024-10-08 ENCOUNTER — APPOINTMENT (OUTPATIENT)
Dept: LAB | Facility: HOSPITAL | Age: 49
End: 2024-10-08
Payer: COMMERCIAL

## 2024-10-08 DIAGNOSIS — Z12.5 SCREENING FOR PROSTATE CANCER: ICD-10-CM

## 2024-10-08 DIAGNOSIS — B20 HUMAN IMMUNODEFICIENCY VIRUS (HIV) DISEASE (HCC): Primary | ICD-10-CM

## 2024-10-08 DIAGNOSIS — Z13.1 SCREENING FOR DIABETES MELLITUS: ICD-10-CM

## 2024-10-08 DIAGNOSIS — Z79.899 OTHER LONG TERM (CURRENT) DRUG THERAPY: ICD-10-CM

## 2024-10-08 LAB
ALBUMIN SERPL BCG-MCNC: 4.4 G/DL (ref 3.5–5)
ALP SERPL-CCNC: 76 U/L (ref 34–104)
ALT SERPL W P-5'-P-CCNC: 11 U/L (ref 7–52)
ANION GAP SERPL CALCULATED.3IONS-SCNC: 9 MMOL/L (ref 4–13)
AST SERPL W P-5'-P-CCNC: 10 U/L (ref 13–39)
BACTERIA UR QL AUTO: NORMAL /HPF
BASOPHILS # BLD AUTO: 0.11 THOUSANDS/ΜL (ref 0–0.1)
BASOPHILS NFR BLD AUTO: 1 % (ref 0–1)
BILIRUB SERPL-MCNC: 0.57 MG/DL (ref 0.2–1)
BILIRUB UR QL STRIP: NEGATIVE
BUN SERPL-MCNC: 15 MG/DL (ref 5–25)
CALCIUM SERPL-MCNC: 9.7 MG/DL (ref 8.4–10.2)
CHLORIDE SERPL-SCNC: 97 MMOL/L (ref 96–108)
CHOLEST SERPL-MCNC: 160 MG/DL
CLARITY UR: ABNORMAL
CO2 SERPL-SCNC: 27 MMOL/L (ref 21–32)
COLOR UR: ABNORMAL
CREAT SERPL-MCNC: 0.84 MG/DL (ref 0.6–1.3)
EOSINOPHIL # BLD AUTO: 0.17 THOUSAND/ΜL (ref 0–0.61)
EOSINOPHIL NFR BLD AUTO: 2 % (ref 0–6)
ERYTHROCYTE [DISTWIDTH] IN BLOOD BY AUTOMATED COUNT: 13.1 % (ref 11.6–15.1)
EST. AVERAGE GLUCOSE BLD GHB EST-MCNC: 263 MG/DL
GFR SERPL CREATININE-BSD FRML MDRD: 102 ML/MIN/1.73SQ M
GLUCOSE P FAST SERPL-MCNC: 371 MG/DL (ref 65–99)
GLUCOSE UR STRIP-MCNC: ABNORMAL MG/DL
HBA1C MFR BLD: 10.8 %
HCT VFR BLD AUTO: 40.5 % (ref 36.5–49.3)
HCV AB SER QL: NORMAL
HDLC SERPL-MCNC: 57 MG/DL
HGB BLD-MCNC: 13.7 G/DL (ref 12–17)
HGB UR QL STRIP.AUTO: NEGATIVE
IMM GRANULOCYTES # BLD AUTO: 0.04 THOUSAND/UL (ref 0–0.2)
IMM GRANULOCYTES NFR BLD AUTO: 0 % (ref 0–2)
KETONES UR STRIP-MCNC: NEGATIVE MG/DL
LDLC SERPL CALC-MCNC: 86 MG/DL (ref 0–100)
LEUKOCYTE ESTERASE UR QL STRIP: 100
LYMPHOCYTES # BLD AUTO: 3.41 THOUSANDS/ΜL (ref 0.6–4.47)
LYMPHOCYTES NFR BLD AUTO: 31 % (ref 14–44)
MCH RBC QN AUTO: 29.7 PG (ref 26.8–34.3)
MCHC RBC AUTO-ENTMCNC: 33.8 G/DL (ref 31.4–37.4)
MCV RBC AUTO: 88 FL (ref 82–98)
MONOCYTES # BLD AUTO: 0.77 THOUSAND/ΜL (ref 0.17–1.22)
MONOCYTES NFR BLD AUTO: 7 % (ref 4–12)
NEUTROPHILS # BLD AUTO: 6.66 THOUSANDS/ΜL (ref 1.85–7.62)
NEUTS SEG NFR BLD AUTO: 59 % (ref 43–75)
NITRITE UR QL STRIP: NEGATIVE
NON-SQ EPI CELLS URNS QL MICRO: NORMAL /HPF
NRBC BLD AUTO-RTO: 0 /100 WBCS
OTHER STN SPEC: NORMAL
PH UR STRIP.AUTO: 6 [PH]
PLATELET # BLD AUTO: 331 THOUSANDS/UL (ref 149–390)
PMV BLD AUTO: 10 FL (ref 8.9–12.7)
POTASSIUM SERPL-SCNC: 4.1 MMOL/L (ref 3.5–5.3)
PROT SERPL-MCNC: 7.8 G/DL (ref 6.4–8.4)
PROT UR STRIP-MCNC: NEGATIVE MG/DL
PSA FREE MFR SERPL: 18.38 %
PSA FREE SERPL-MCNC: 0.04 NG/ML
PSA SERPL-MCNC: 0.23 NG/ML (ref 0–4)
RBC # BLD AUTO: 4.61 MILLION/UL (ref 3.88–5.62)
RBC #/AREA URNS AUTO: NORMAL /HPF
SODIUM SERPL-SCNC: 133 MMOL/L (ref 135–147)
SP GR UR STRIP.AUTO: 1.01 (ref 1–1.04)
TREPONEMA PALLIDUM IGG+IGM AB [PRESENCE] IN SERUM OR PLASMA BY IMMUNOASSAY: REACTIVE
TRIGL SERPL-MCNC: 84 MG/DL
UROBILINOGEN UA: NEGATIVE MG/DL
WBC # BLD AUTO: 11.16 THOUSAND/UL (ref 4.31–10.16)
WBC #/AREA URNS AUTO: NORMAL /HPF

## 2024-10-08 PROCEDURE — 84154 ASSAY OF PSA FREE: CPT

## 2024-10-08 PROCEDURE — 36415 COLL VENOUS BLD VENIPUNCTURE: CPT

## 2024-10-08 PROCEDURE — 84153 ASSAY OF PSA TOTAL: CPT

## 2024-10-09 LAB
BASOPHILS # BLD AUTO: 0.1 X10E3/UL (ref 0–0.2)
BASOPHILS NFR BLD AUTO: 1 %
C TRACH DNA SPEC QL NAA+PROBE: NEGATIVE
CD3+CD4+ CELLS # BLD: 1377 /UL (ref 359–1519)
CD3+CD4+ CELLS NFR BLD: 40.5 % (ref 30.8–58.5)
CD3+CD4+ CELLS/CD3+CD8+ CLL BLD: 1.29 % (ref 0.92–3.72)
CD3+CD8+ CELLS # BLD: 1064 /UL (ref 109–897)
CD3+CD8+ CELLS NFR BLD: 31.3 % (ref 12–35.5)
EOSINOPHIL # BLD AUTO: 0.1 X10E3/UL (ref 0–0.4)
EOSINOPHIL NFR BLD AUTO: 1 %
ERYTHROCYTE [DISTWIDTH] IN BLOOD BY AUTOMATED COUNT: 12.1 % (ref 11.6–15.4)
HCT VFR BLD AUTO: 43.9 % (ref 37.5–51)
HGB BLD-MCNC: 13.8 G/DL (ref 13–17.7)
IMM GRANULOCYTES # BLD: 0 X10E3/UL (ref 0–0.1)
IMM GRANULOCYTES NFR BLD: 0 %
LYMPHOCYTES # BLD AUTO: 3.4 X10E3/UL (ref 0.7–3.1)
LYMPHOCYTES NFR BLD AUTO: 31 %
MCH RBC QN AUTO: 29 PG (ref 26.6–33)
MCHC RBC AUTO-ENTMCNC: 31.4 G/DL (ref 31.5–35.7)
MCV RBC AUTO: 92 FL (ref 79–97)
MONOCYTES # BLD AUTO: 0.7 X10E3/UL (ref 0.1–0.9)
MONOCYTES NFR BLD AUTO: 7 %
N GONORRHOEA DNA SPEC QL NAA+PROBE: NEGATIVE
NEUTROPHILS # BLD AUTO: 6.5 X10E3/UL (ref 1.4–7)
NEUTROPHILS NFR BLD AUTO: 60 %
PLATELET # BLD AUTO: 360 X10E3/UL (ref 150–450)
RBC # BLD AUTO: 4.76 X10E6/UL (ref 4.14–5.8)
RPR SER QL: REACTIVE
RPR SER-TITR: ABNORMAL {TITER}
WBC # BLD AUTO: 10.8 X10E3/UL (ref 3.4–10.8)

## 2024-10-11 LAB
HIV1 RNA # SERPL NAA+PROBE: <20 COPIES/ML
HIV1 RNA SERPL NAA+PROBE-LOG#: NORMAL LOG10COPY/ML

## 2024-10-16 DIAGNOSIS — A60.9 HSV (HERPES SIMPLEX VIRUS) ANOGENITAL INFECTION: ICD-10-CM

## 2024-10-16 DIAGNOSIS — Z21 HIV INFECTION, UNSPECIFIED SYMPTOM STATUS (HCC): ICD-10-CM

## 2024-10-16 RX ORDER — VALACYCLOVIR HYDROCHLORIDE 1 G/1
1000 TABLET, FILM COATED ORAL DAILY
Qty: 30 TABLET | Refills: 2 | Status: SHIPPED | OUTPATIENT
Start: 2024-10-16 | End: 2024-10-22

## 2024-10-16 RX ORDER — BICTEGRAVIR SODIUM, EMTRICITABINE, AND TENOFOVIR ALAFENAMIDE FUMARATE 50; 200; 25 MG/1; MG/1; MG/1
1 TABLET ORAL DAILY
Qty: 30 TABLET | Refills: 2 | Status: SHIPPED | OUTPATIENT
Start: 2024-10-16 | End: 2024-10-22

## 2024-10-17 DIAGNOSIS — Z21 HIV INFECTION, UNSPECIFIED SYMPTOM STATUS (HCC): ICD-10-CM

## 2024-10-17 DIAGNOSIS — A60.9 HSV (HERPES SIMPLEX VIRUS) ANOGENITAL INFECTION: ICD-10-CM

## 2024-10-17 DIAGNOSIS — E11.65 UNCONTROLLED TYPE 2 DIABETES MELLITUS WITH HYPERGLYCEMIA (HCC): ICD-10-CM

## 2024-10-22 RX ORDER — PEN NEEDLE, DIABETIC 32GX 5/32"
NEEDLE, DISPOSABLE MISCELLANEOUS
Qty: 100 EACH | Refills: 2 | Status: SHIPPED | OUTPATIENT
Start: 2024-10-22

## 2024-10-22 RX ORDER — BICTEGRAVIR SODIUM, EMTRICITABINE, AND TENOFOVIR ALAFENAMIDE FUMARATE 50; 200; 25 MG/1; MG/1; MG/1
1 TABLET ORAL DAILY
Qty: 30 TABLET | Refills: 2 | Status: SHIPPED | OUTPATIENT
Start: 2024-10-22

## 2024-10-22 RX ORDER — VALACYCLOVIR HYDROCHLORIDE 1 G/1
1000 TABLET, FILM COATED ORAL DAILY
Qty: 30 TABLET | Refills: 2 | Status: SHIPPED | OUTPATIENT
Start: 2024-10-22 | End: 2025-04-20

## 2024-11-12 ENCOUNTER — PATIENT OUTREACH (OUTPATIENT)
Dept: SURGERY | Facility: CLINIC | Age: 49
End: 2024-11-12

## 2024-11-14 ENCOUNTER — PATIENT OUTREACH (OUTPATIENT)
Dept: SURGERY | Facility: CLINIC | Age: 49
End: 2024-11-14

## 2024-11-14 DIAGNOSIS — E78.2 MIXED HYPERLIPIDEMIA: ICD-10-CM

## 2024-11-14 DIAGNOSIS — E11.65 UNCONTROLLED TYPE 2 DIABETES MELLITUS WITH HYPERGLYCEMIA (HCC): ICD-10-CM

## 2024-11-15 RX ORDER — ATORVASTATIN CALCIUM 20 MG/1
20 TABLET, FILM COATED ORAL DAILY
Qty: 30 TABLET | Refills: 1 | Status: SHIPPED | OUTPATIENT
Start: 2024-11-15 | End: 2024-11-22

## 2024-11-15 RX ORDER — TIRZEPATIDE 2.5 MG/.5ML
INJECTION, SOLUTION SUBCUTANEOUS
Qty: 2 ML | Refills: 2 | Status: SHIPPED | OUTPATIENT
Start: 2024-11-15 | End: 2024-11-22

## 2024-11-18 DIAGNOSIS — E11.65 UNCONTROLLED TYPE 2 DIABETES MELLITUS WITH HYPERGLYCEMIA (HCC): ICD-10-CM

## 2024-11-18 DIAGNOSIS — E78.2 MIXED HYPERLIPIDEMIA: ICD-10-CM

## 2024-11-22 RX ORDER — TIRZEPATIDE 2.5 MG/.5ML
INJECTION, SOLUTION SUBCUTANEOUS
Qty: 2 ML | Refills: 2 | Status: SHIPPED | OUTPATIENT
Start: 2024-11-22

## 2024-11-22 RX ORDER — ATORVASTATIN CALCIUM 20 MG/1
20 TABLET, FILM COATED ORAL DAILY
Qty: 30 TABLET | Refills: 1 | Status: SHIPPED | OUTPATIENT
Start: 2024-11-22

## 2024-11-26 ENCOUNTER — OFFICE VISIT (OUTPATIENT)
Dept: SURGERY | Facility: CLINIC | Age: 49
End: 2024-11-26
Payer: COMMERCIAL

## 2024-11-26 VITALS
BODY MASS INDEX: 25.96 KG/M2 | WEIGHT: 185.4 LBS | HEIGHT: 71 IN | TEMPERATURE: 97.6 F | DIASTOLIC BLOOD PRESSURE: 64 MMHG | SYSTOLIC BLOOD PRESSURE: 133 MMHG | HEART RATE: 99 BPM | OXYGEN SATURATION: 97 %

## 2024-11-26 DIAGNOSIS — E11.65 UNCONTROLLED TYPE 2 DIABETES MELLITUS WITH HYPERGLYCEMIA (HCC): ICD-10-CM

## 2024-11-26 DIAGNOSIS — I10 PRIMARY HYPERTENSION: ICD-10-CM

## 2024-11-26 DIAGNOSIS — B20 HUMAN IMMUNODEFICIENCY VIRUS (HIV) DISEASE (HCC): Primary | ICD-10-CM

## 2024-11-26 DIAGNOSIS — A52.8 SYPHILIS, LATE LATENT: ICD-10-CM

## 2024-11-26 PROCEDURE — 99215 OFFICE O/P EST HI 40 MIN: CPT | Performed by: INTERNAL MEDICINE

## 2024-11-26 NOTE — ASSESSMENT & PLAN NOTE
Recurrent infection with the titer rising to 1:32 and now status post treatment for late latent syphilis once again with benzathine penicillin x 3 doses with a good response with a titer that is dropped to 1:4 consistent with care.  Monitor for recurrence # reinfection with annual titer

## 2024-11-26 NOTE — ASSESSMENT & PLAN NOTE
Still with some optimal glucose control on Mounjaro and insulin.  Continue to tighten the diabetic control and refer back to the primary.  Discussed with the patient at detail the importance of dietary changes and lifestyle modification in addition to the medical interventions.  He continues to eat candy bars on a regular basis.  Will ask for formal dietary evaluation.    Lab Results   Component Value Date    HGBA1C 10.8 (H) 10/08/2024

## 2024-11-26 NOTE — PROGRESS NOTES
Name: Charlie Hernandez      : 1975      MRN: [id number]  Encounter Provider: Harsh Valdes MD  Encounter Date: 2024   Encounter department: ASC AT [facility]  :  Assessment & Plan  Human immunodeficiency virus (HIV) disease (HCC)  Doing well on Biktarvy with undetectable viral load and a CD4 count of over 1300.Continue ART, recheck CBC with differential, CMP, HIV RNA, and CD4 in 5 months to make sure no developing toxicity or treatment failure and follow-up in 6 months.  Stressed adherence.          Uncontrolled type 2 diabetes mellitus with hyperglycemia (HCC)  Still with some optimal glucose control on Mounjaro and insulin.  Continue to tighten the diabetic control and refer back to the primary.  Discussed with the patient at detail the importance of dietary changes and lifestyle modification in addition to the medical interventions.  He continues to eat candy bars on a regular basis.  Will ask for formal dietary evaluation.    Lab Results   Component Value Date    HGBA1C 10.8 (H) 10/08/2024            BMI 25.0-25.9,adult  Continues to lose weight on Mounjaro.  Continue stress lifestyle modification         Primary hypertension  Improved control with weight loss.         Syphilis, late latent  Recurrent infection with the titer rising to 1:32 and now status post treatment for late latent syphilis once again with benzathine penicillin x 3 doses with a good response with a titer that is dropped to 1:4 consistent with care.  Monitor for recurrence # reinfection with annual titer             Patient was provided medication, adherence and prevention education.    History of Present Illness   Routine follow-up for HIV.  Patient claims 100% adherence with Biktarvy . Patient denies any notable side effects.  Overall the feeling well.  The patient denies any fever chills or sweats, denies any nausea vomiting or diarrhea, denies any cough or shortness of breath.    ROS: A complete review of systems are  "negative other than that noted in the HPI        Objective   /64 (Patient Position: Sitting)   Pulse 99   Temp 97.6 °F (36.4 °C)   Ht 5' 11\" (1.803 m)   Wt 84.1 kg (185 lb 6.4 oz)   SpO2 97%   BMI 25.86 kg/m²     General: Alert, interactive, appearing well, nontoxic, no acute distress.  Neck: Supple without lymphadenopathy, no thyromegaly or masses  Throat: Oropharynx moist without lesions.   Lungs: Clear to auscultation bilaterally; no wheezes, rhonchi or rales; respirations unlabored  Heart: RRR; no murmur, rub or gallop  Abdomen: Soft, non-tender, non-distended, positive bowel sounds.    Extremities: No clubbing, cyanosis or edema  Skin: No new rashes or lesions. No draining wounds noted.    Lab Results: I have personally reviewed pertinent labs.  Lab Results   Component Value Date     (L) 04/04/2018    K 4.1 10/08/2024    CL 97 10/08/2024    CO2 27 10/08/2024    ANIONGAP 10 04/04/2018    BUN 15 10/08/2024    CREATININE 0.84 10/08/2024    GLUCOSE 269 (H) 09/10/2018    GLUF 371 (H) 10/08/2024    CALCIUM 9.7 10/08/2024    AST 10 (L) 10/08/2024    ALT 11 10/08/2024    ALKPHOS 76 10/08/2024    PROT 6.9 04/04/2018    BILITOT 0.4 04/04/2018    EGFR 102 10/08/2024     Lab Results   Component Value Date    WBC 11.16 (H) 10/08/2024    WBC 10.8 10/08/2024    HGB 13.7 10/08/2024    HGB 13.8 10/08/2024    HCT 40.5 10/08/2024    HCT 43.9 10/08/2024    MCV 88 10/08/2024    MCV 92 10/08/2024     10/08/2024     10/08/2024     Lab Results   Component Value Date    HEPCAB Non-reactive 10/08/2024     Lab Results   Component Value Date    HAV Reactive (A) 12/11/2021    HEPCAB Non-reactive 10/08/2024     Lab Results   Component Value Date    RPR Reactive (A) 10/08/2024    RPR Reactive 4 dils (A) 10/08/2024     CD4 ABS   Date/Time Value Ref Range Status   10/08/2024 07:20 AM 1377 359 - 1519 /uL Final     HIV-1 RNA by PCR, Qn   Date/Time Value Ref Range Status   10/08/2024 07:20 AM <20 copies/mL Final    "  Comment:     HIV-1 RNA detected  The reportable range for this assay is 20 to 10,000,000  copies HIV-1 RNA/mL.     HIV-1 TARGET   Date/Time Value Ref Range Status   03/18/2024 09:47 AM Not Detected Not Detected Final           Administrative Statements   I have spent a total time of 40 minutes in caring for this patient on the day of the visit/encounter including Instructions for management, Importance of tx compliance, Risk factor reductions, Impressions, Counseling / Coordination of care, Documenting in the medical record, Reviewing / ordering tests, medicine, procedures  , and Obtaining or reviewing history  .

## 2024-11-26 NOTE — ASSESSMENT & PLAN NOTE
Doing well on Biktarvy with undetectable viral load and a CD4 count of over 1300.Continue ART, recheck CBC with differential, CMP, HIV RNA, and CD4 in 5 months to make sure no developing toxicity or treatment failure and follow-up in 6 months.  Stressed adherence.

## 2024-11-27 ENCOUNTER — DOCUMENTATION (OUTPATIENT)
Dept: SURGERY | Facility: CLINIC | Age: 49
End: 2024-11-27

## 2024-11-27 NOTE — PROGRESS NOTES
Charlie contacted via phone to discuss on going nutrition goals & recent A1C levels. Client reports he is in a dark place emotionally and not ready to make changes. He was at work and could not go into detail. Will refer to S and contact at a later time.   -Evelyn Deleon RDN,LDN

## 2024-12-03 ENCOUNTER — DOCUMENTATION (OUTPATIENT)
Dept: SURGERY | Facility: CLINIC | Age: 49
End: 2024-12-03

## 2024-12-03 NOTE — PROGRESS NOTES
11/26/24 [facility] had request from Dr. Valdes to meet with pt due to pts statement of being in a dark place.  Pt stated he feels he cannot get ahead  financially and is drowning in his emotions.  Pt was weeping throughout this meeting.  Pt was scheduled to meet with this [facility] on 12/3/24.

## 2024-12-04 DIAGNOSIS — E11.65 UNCONTROLLED TYPE 2 DIABETES MELLITUS WITH HYPERGLYCEMIA (HCC): ICD-10-CM

## 2024-12-04 RX ORDER — INSULIN GLARGINE 100 [IU]/ML
INJECTION, SOLUTION SUBCUTANEOUS
Qty: 15 ML | Refills: 2 | Status: SHIPPED | OUTPATIENT
Start: 2024-12-04

## 2024-12-12 ENCOUNTER — DOCUMENTATION (OUTPATIENT)
Dept: SURGERY | Facility: CLINIC | Age: 49
End: 2024-12-12

## 2024-12-12 NOTE — PROGRESS NOTES
[facility] called pt to reschedule 2 missed appts., no answer.  [facility] left message requesting a call back.

## 2024-12-23 ENCOUNTER — DOCUMENTATION (OUTPATIENT)
Dept: SURGERY | Facility: CLINIC | Age: 49
End: 2024-12-23

## 2024-12-23 NOTE — PROGRESS NOTES
[facility] [place]  attempted 3rd and final outreach to pt for rescheduling appt.   There was no contact made.

## 2025-01-09 ENCOUNTER — PATIENT OUTREACH (OUTPATIENT)
Dept: SURGERY | Facility: CLINIC | Age: 50
End: 2025-01-09

## 2025-01-10 ENCOUNTER — PATIENT OUTREACH (OUTPATIENT)
Dept: SURGERY | Facility: CLINIC | Age: 50
End: 2025-01-10

## 2025-01-10 NOTE — PROGRESS NOTES
HC text Ct regarding the utility increase that has increased and will follow up at his renewal. Ct was concerned and called to understand.

## 2025-01-14 ENCOUNTER — PATIENT OUTREACH (OUTPATIENT)
Dept: SURGERY | Facility: CLINIC | Age: 50
End: 2025-01-14

## 2025-01-14 NOTE — PROGRESS NOTES
HC called Ct to schedule recert. Ct confirmed for 2- at 1:00pm.     HC reminded Ct of his appointment for next week. Ct states he is aware regarding insurance and can call [employer] 1- between 9-10am with HC Payal. HC did make Ct aware does not need HC to call just needs to follow up on application status.

## 2025-01-15 ENCOUNTER — PATIENT OUTREACH (OUTPATIENT)
Dept: SURGERY | Facility: CLINIC | Age: 50
End: 2025-01-15

## 2025-01-16 ENCOUNTER — PATIENT OUTREACH (OUTPATIENT)
Dept: SURGERY | Facility: CLINIC | Age: 50
End: 2025-01-16

## 2025-01-20 ENCOUNTER — PATIENT OUTREACH (OUTPATIENT)
Dept: SURGERY | Facility: CLINIC | Age: 50
End: 2025-01-20

## 2025-01-21 ENCOUNTER — OFFICE VISIT (OUTPATIENT)
Dept: SURGERY | Facility: CLINIC | Age: 50
End: 2025-01-21
Payer: COMMERCIAL

## 2025-01-21 ENCOUNTER — PATIENT OUTREACH (OUTPATIENT)
Dept: SURGERY | Facility: CLINIC | Age: 50
End: 2025-01-21

## 2025-01-21 ENCOUNTER — DOCUMENTATION (OUTPATIENT)
Dept: SURGERY | Facility: CLINIC | Age: 50
End: 2025-01-21

## 2025-01-21 VITALS
HEART RATE: 97 BPM | BODY MASS INDEX: 25.54 KG/M2 | HEIGHT: 71 IN | TEMPERATURE: 97.5 F | WEIGHT: 182.4 LBS | OXYGEN SATURATION: 100 % | DIASTOLIC BLOOD PRESSURE: 87 MMHG | SYSTOLIC BLOOD PRESSURE: 139 MMHG

## 2025-01-21 DIAGNOSIS — Z21 HIV INFECTION, UNSPECIFIED SYMPTOM STATUS (HCC): Primary | ICD-10-CM

## 2025-01-21 DIAGNOSIS — E78.2 MIXED HYPERLIPIDEMIA: ICD-10-CM

## 2025-01-21 DIAGNOSIS — Z23 NEED FOR SHINGLES VACCINE: ICD-10-CM

## 2025-01-21 DIAGNOSIS — B20 HUMAN IMMUNODEFICIENCY VIRUS (HIV) DISEASE (HCC): ICD-10-CM

## 2025-01-21 DIAGNOSIS — E11.65 UNCONTROLLED TYPE 2 DIABETES MELLITUS WITH HYPERGLYCEMIA (HCC): ICD-10-CM

## 2025-01-21 DIAGNOSIS — I10 PRIMARY HYPERTENSION: ICD-10-CM

## 2025-01-21 PROCEDURE — 99214 OFFICE O/P EST MOD 30 MIN: CPT | Performed by: NURSE PRACTITIONER

## 2025-01-21 RX ORDER — TIRZEPATIDE 5 MG/.5ML
0.5 INJECTION, SOLUTION SUBCUTANEOUS WEEKLY
Qty: 2 ML | Refills: 2 | Status: SHIPPED | OUTPATIENT
Start: 2025-01-21

## 2025-01-21 RX ORDER — ATORVASTATIN CALCIUM 20 MG/1
20 TABLET, FILM COATED ORAL DAILY
Qty: 30 TABLET | Refills: 1 | Status: SHIPPED | OUTPATIENT
Start: 2025-01-21

## 2025-01-21 RX ORDER — BICTEGRAVIR SODIUM, EMTRICITABINE, AND TENOFOVIR ALAFENAMIDE FUMARATE 50; 200; 25 MG/1; MG/1; MG/1
1 TABLET ORAL DAILY
Qty: 30 TABLET | Refills: 2 | Status: SHIPPED | OUTPATIENT
Start: 2025-01-21

## 2025-01-21 NOTE — ASSESSMENT & PLAN NOTE
Hypertension Assessment  See encounter diagnosis  Discussion: stage 2  Cardiovascular risk factors: diabetes mellitus, dyslipidemia, and male gender    Hypertension Plan  Dietary sodium restriction.  Stop smoking.  Patient Education: Reviewed risks of hypertension and principles of   treatment.  Blood pressure:   BP Readings from Last 3 Encounters:   01/21/25 139/87   11/26/24 133/64   09/24/24 129/77       Currently not on medication. Would like to use lifestyle changes to better control blood pressure.     Educated on the following lifestyle modifications to lower BP and decrease cardiovascular risk factors.  limit alcohol intake, reduce salt in diet, maintain a healthy weight, engage in 30 minutes of cardiovascular exercise daily, and not smoke.

## 2025-01-21 NOTE — PATIENT INSTRUCTIONS
"Patient Education     Estrés   Conceptos Básicos   Redactado por los médicos y editores de [employer]   ¿Qué es estrés? -- El estrés es la forma en que weems cuerpo y weems mente responden al entorno que lo rodea. Es algo normal en la bill. Garrick persona puede sentir estrés cuando tiene que afrontar un desafío o garrick exigencia. Por ejemplo, tener garrick fecha límite de trabajo y no sentir que podrá cumplirla puede causar estrés. Otras cosas que pueden causar estrés incluyen problemas de latisha, dinero o relaciones.  El estrés puede ser a corto o jessy plazo:   El estrés a corto plazo o estrés \"jairo\" dura poco tiempo y luego desaparece rápidamente. Laya tipo de estrés es útil cuando garrick persona debe reaccionar rápidamente. Garrick vez pasado el evento, el cuerpo vuelve a la normalidad. El estrés a corto plazo puede darle más energía y ayudarle en momentos en los que weems cuerpo se siente en peligro. El estrés a corto plazo puede hacer que se sienta emocionado, lo puede inspirar o lo puede ayudar a concentrar la energía. Algunas personas lo llaman \"estrés lincoln\". Laya tipo de estrés puede incluso provenir de cosas positivas en weems bill, susan un trabajo nuevo o garrick primera eliza.   El estrés a jessy plazo o estrés \"crónico\" puede durar semanas o meses. Es posible que ni siquiera note que está estresado porque se convierte en parte de weems bill. Otras veces, un cambio difícil en weems bill o un evento traumático puede causar estrés a jessy plazo. Laya tipo de estrés tiene más probabilidades de provocar problemas físicos o emocionales.  Si el estrés es muy grave, puede provocar un padecimiento llamado trastorno de estrés postraumático o \"TEPT\".  ¿Qué causa el estrés? -- El estrés puede provenir de presión mental, emocional, financiera o física. Tanto las cosas positivas susan las negativas pueden provocar estrés. Algunas causas comunes del estrés son:   Comenzar, perder o cambiar de trabajo   Cambios de rutina susan irse de vacaciones   " Enfermedades   Cambios de bill susan garrick boda, un nuevo bebé, un divorcio, garrick mudanza a garrick nueva casa o la muerte de un ser querido.   Acontecimientos traumáticos susan un accidente, un acto de violencia o un desastre natural.   Situaciones nuevas susan hablar en público o ir a un lugar desconocido   Cuestiones monetarias   Problemas o conflictos familiares  Cada brittany reacciona al estrés de manera diferente. Lo que estresa a garrick persona puede no estresar a otra.  ¿Cómo puede afectarme el estrés? -- El estrés no es garrick enfermedad, randi puede empeorar los problemas de latisha existentes o provocar nuevos problemas de latisha. Si el estrés no se maneja de forma saludable, puede provocar:   Problemas de latisha mental susan depresión, preocupación, oscar o baja autoestima.   Problemas de latisha física susan presión arterial jian, infartos, accidentes cerebrovasculares (derrames), ryan de barb o problemas con el azúcar en wu.   Otros problemas susan dolor, falta de sueño o problemas para combatir infecciones.  ¿Qué le pasa a mi cuerpo cuando estoy estresado? -- Weems cuerpo libera hormonas para ayudarlo a enfrentar el estrés. Las hormonas aceleran los latidos del corazón y la respiración. Treva músculos se tensan y weems cuerpo está listo para reaccionar. Estos cambios pueden ser buenos en momentos de estrés a corto plazo. Randi si el estrés continúa, podría notar otros síntomas.  Cada persona experimenta el estrés de manera diferente. A continuación se enumeran algunos de los síntomas del estrés. No todo el sergo tendrá los mismos síntomas.  Síntomas físicos:   Dolor de barb   Dolor de espalda, mandíbula u otro dolor   Músculos tensos   Aceleración de los latidos cardíacos o de la respiración   Presión arterial jian   Náuseas o diarrea   Dificultad para dormir   Sudoración   Problemas sexuales  Síntomas emocionales:   Sentirse inquieto o nervioso   Problemas para prestar atención   Depresión   Sentirse irritable o abrumado    "Cambios repentinos de humor  El estrés también puede llevar a las personas a hacer cosas que no son saludables, susan:   Beber demasiado alcohol   [place] demasiado o muy poco   Consumir drogas   Dificultad para dormir o exceso de horas de sueño   Trabajar demasiado o muy poco   Apostar en juegos de enriqueta   Fumar   Comerse las uñas  ¿Hay algo que pueda hacer por mi cuenta para sentirme mejor? -- Sí. Podría ser útil que harrison lo siguiente:   Limite o evite la cafeína - Reduzca o abandone el consumo de café y otras siddiqi de cafeína. La cafeína puede empeorar el estrés.   Intente dormir lo suficiente - Si tiene problemas para dormir, hable con weems médico o enfermero sobre posibles soluciones.   Aprenda a relajarse - Las técnicas de relajación pueden ayudar a aliviar el estrés y a sentirse más tranquilo. También pueden ayudarle a afrontar emociones alba o situaciones difíciles.   Administre weems tiempo - Enumere las cosas que necesita hacer y decida qué es lo más importante. Divida los grandes proyectos en pequeños pasos. Luego, harrison un plan para abordar las cosas de weems lista.   Practique el pensamiento positivo - Las personas suelen tener conversaciones mentales consigo mismas. Estas conversaciones pueden ser positivas o negativas. Si se siente abrumado, podría pensar: \"Nunca podré terminar todo\". Un pensamiento positivo en esta situación podría ser: \"Tengo mucho que hacer. Puedo empezar hoy y terminar lo que queda mañana\".   Mueva el cuerpo - El ejercicio puede ayudar a muchas personas a sentirse menos ansiosas. Incluso los tipos de ejercicio suaves, susan caminar, son buenos para la latisha. Trate de encontrar actividades físicas que disfrute.   Tómese un descanso - Si es posible, aléjese de la situación por un rato. Dé un pequeño paseo. Distráigase hablando con un amigo, leyendo un libro o escuchando música. Ayude a otra persona. Si no puede salir de donde está, intente cerrar los ojos y respirar profundamente thomas 10 " segundos.   Hable con weems médico o enfermero - A veces, el estrés puede causar problemas en weems bill cotidiana. Weems médico o enfermero podría recomendarle lise medicinas o trabajar con un profesional de la latisha mental.  Todos los artículos se actualizan a medida que se descubre nueva evidencia y culmina nuestro proceso de evaluación por homólogos   Ekn artículo se recuperó de UpToDate el: Apr 03, 2024.  Artículo 978784 Versión [id number].es-419.1  Release: 32.2.4 - C32.92  © 2024 UpToDate, Inc. Todos los derechos reservados.  Exención de responsabilidad y uso de la información del consumidor   Descargo de responsabilidad: esta información generalizada es un resumen limitado de información sobre el diagnóstico, el tratamiento y/o los medicamentos. No pretende ser exhaustiva y se debe utilizar susan herramienta para ayudar al usuario a comprender y/o evaluar las posibles opciones de diagnóstico y tratamiento. No incluye toda la información sobre afecciones, tratamientos, medicamentos, efectos secundarios o riesgos puedan ser aplicables a un paciente específico. No tiene el propósito de servir susan recomendación médica ni de sustituir la recomendación médica, el diagnóstico o el tratamiento de un profesional de atención médica que se base en el examen y la evaluación de ken profesional de la latisha respecto a las circunstancias específicas y únicas del paciente. Los pacientes deben hablar con un profesional de atención médica para obtener información completa sobre weems latisha, cuestiones médicas y opciones de tratamiento, incluidos los riesgos o los beneficios relacionados con el uso de medicamentos. Esta información no certifica que los tratamientos o medicamentos marlen seguros, eficaces o estén aprobados para tratar a un paciente específico. UpToDate, Inc. y marina afiliados renuncian a cualquier garantía o responsabilidad relacionada con esta información o el uso de la misma.El uso de esta información está sujeto a las  Condiciones de uso, disponibles en https://www.Sokoosuwer.com/en/know/clinical-effectiveness-terms. 2024© [employer]. y marina afiliados y/o licenciantes. Todos los derechos reservados.  Copyright   © 2024 [employer]ToDate, Inc. Todos los derechos reservados.

## 2025-01-21 NOTE — ASSESSMENT & PLAN NOTE
Laboratory:  Lab Results   Component Value Date    HGBA1C 10.8 (H) 10/08/2024    HGBA1C 13.7 (H) 09/15/2021       Assessment & Plan     Diabetes mellitus Type II, under poor control..    Discussed general issues about diabetes pathophysiology and management.  Educational material distributed.  Addressed ADA diet.  Suggested low cholesterol diet.  Encouraged aerobic exercise.  Discussed foot care.  Nutritionist referral..          Lab Results   Component Value Date    HGBA1C 10.8 (H) 10/08/2024       Orders:    Albumin / creatinine urine ratio    Tirzepatide (Mounjaro) 5 MG/0.5ML SOAJ; Inject 0.5 mL under the skin once a week

## 2025-01-21 NOTE — PROGRESS NOTES
Diabetic Foot Exam    Patient's shoes and socks removed.    Right Foot/Ankle   Right Foot Inspection  Skin Exam: skin normal and skin intact. No dry skin, no warmth, no callus, no erythema, no maceration, no abnormal color, no pre-ulcer, no ulcer and no callus.     Toe Exam: ROM and strength within normal limits. No swelling, no tenderness, erythema and  no right toe deformity    Sensory   Vibration: intact  Proprioception: intact  Monofilament testing: intact    Vascular  Capillary refills: < 3 seconds  The right DP pulse is 2+. The right PT pulse is 2+.     Left Foot/Ankle  Left Foot Inspection  Skin Exam: skin normal and skin intact. No dry skin, no warmth, no erythema, no maceration, normal color, no pre-ulcer, no ulcer and no callus.     Toe Exam: ROM and strength within normal limits. No swelling, no tenderness, no erythema and no left toe deformity.     Sensory   Vibration: intact  Proprioception: intact  Monofilament testing: intact    Vascular  Capillary refills: < 3 seconds  The left DP pulse is 2+. The left PT pulse is 2+.     Assign Risk Category  No deformity present  No loss of protective sensation  No weak pulses  Risk: 0  Name: Charlie Hernandez      : 1975      MRN: [id number]  Encounter Provider: REDD Arthur  Encounter Date: 2025   Encounter department: ASC AT [facility]  :  Assessment & Plan  HIV infection, unspecified symptom status (HCC)    Orders:    Biktarvy -25 MG tablet; Take 1 tablet by mouth daily    Uncontrolled type 2 diabetes mellitus with hyperglycemia (HCC)      Laboratory:  Lab Results   Component Value Date    HGBA1C 10.8 (H) 10/08/2024    HGBA1C 13.7 (H) 09/15/2021       Assessment & Plan     Diabetes mellitus Type II, under poor control..    Discussed general issues about diabetes pathophysiology and management.  Educational material distributed.  Addressed ADA diet.  Suggested low cholesterol diet.  Encouraged aerobic exercise.  Discussed foot  "care.  Nutritionist referral..          Lab Results   Component Value Date    HGBA1C 10.8 (H) 10/08/2024       Orders:    Albumin / creatinine urine ratio    Tirzepatide (Mounjaro) 5 MG/0.5ML SOAJ; Inject 0.5 mL under the skin once a week    Need for shingles vaccine    Orders:    Zoster Vaccine Recombinant IM    Mixed hyperlipidemia    Orders:    atorvastatin (LIPITOR) 20 mg tablet; Take 1 tablet (20 mg total) by mouth daily    Human immunodeficiency virus (HIV) disease (HCC)  No results found for: \"RH2GIUY\"  CD4 ABS   Date/Time Value Ref Range Status   10/08/2024 07:20 AM 1377 359 - 1519 /uL Final     HIV-1 RNA by PCR, Qn   Date/Time Value Ref Range Status   10/08/2024 07:20 AM <20 copies/mL Final     Comment:     HIV-1 RNA detected  The reportable range for this assay is 20 to 10,000,000  copies HIV-1 RNA/mL.     HIV-1 RNA Viral Load Log   Date/Time Value Ref Range Status   10/08/2024 07:20 AM COMMENT [id number]/mL Final     Comment:     Unable to calculate result since non-numeric result obtained for  component test.         ART: Biktarvy        Denies side effects. Stressed the importance of adherence.  Continue follow up with ID clinic.       Reviewed most recent labs, including Cd4 and viral load. Discussed the risks and benefits of treatment options, instructions for management, importance of treatment adherence, and reduction of risk factor.Educated on possible  medication side effects.  Reviewed last ID visit.  Collaborated with ID to optimize medical treatment.    Counseled on routes of HIV transmission, including the risk of  infection. Emphasized that viral suppression is the best method to prevent HIV transmission.  At this time pt.denies the need for HIV testing of anyone in their life.     Total encounter time was 45 minutes. Greater then 20 minutes were spent on counseling and patient education. Pt voices understanding and agreement with treatment plan.             BMI 25.0-25.9,adult     "     Primary hypertension  Hypertension Assessment  See encounter diagnosis  Discussion: stage 2  Cardiovascular risk factors: diabetes mellitus, dyslipidemia, and male gender    Hypertension Plan  Dietary sodium restriction.  Stop smoking.  Patient Education: Reviewed risks of hypertension and principles of   treatment.  Blood pressure:   BP Readings from Last 3 Encounters:   01/21/25 139/87   11/26/24 133/64   09/24/24 129/77       Currently not on medication. Would like to use lifestyle changes to better control blood pressure.     Educated on the following lifestyle modifications to lower BP and decrease cardiovascular risk factors.  limit alcohol intake, reduce salt in diet, maintain a healthy weight, engage in 30 minutes of cardiovascular exercise daily, and not smoke.                History of Present Illness   Mr. Charlie Hernandez is a 46-year-old male who presents to the clinic today for 3 month f/u PCP visit.  Past medical history significant for HIV, uncontrolled type 2 diabetes, hyperlipidemia, and obesity.     is doing well and has no acute complaints. He did not complete requested lab work prior to visit. He will complete it later this week.       Charlie Hernandez is a 50 y.o. male who presents for PCP follow up.   History obtained from: patient    Review of Systems   Constitutional:  Negative for chills and fever.   HENT:  Negative for ear pain and sore throat.    Eyes:  Negative for pain and visual disturbance.   Respiratory:  Negative for cough and shortness of breath.    Cardiovascular:  Negative for chest pain and palpitations.   Gastrointestinal:  Negative for abdominal pain and vomiting.   Genitourinary:  Negative for dysuria and hematuria.   Musculoskeletal:  Negative for arthralgias and back pain.   Skin:  Negative for color change and rash.   Neurological:  Negative for seizures and syncope.   All other systems reviewed and are negative.    Medical History Reviewed by provider  this encounter:  Tobacco  Allergies  Meds  Problems  Med Hx  Surg Hx  Fam Hx     .  Past Medical History   Past Medical History:   Diagnosis Date    Chlamydia     Diabetes mellitus (HCC)     Type II    Gonorrhea     Herpes     HIV disease ([facility]) 07/2020    mode of transmission: heterosexual    Hyperlipidemia     Hypertension     Neoplasm of kidney     Obesity     BMI 37.2    Syphilis      Past Surgical History:   Procedure Laterality Date    NO PAST SURGERIES       Family History   Problem Relation Age of Onset    Diabetes Mother     Diabetes Father     Diabetes Family       reports that he has never smoked. He has never used smokeless tobacco. He reports that he does not currently use drugs after having used the following drugs: Marijuana and Methamphetamines. He reports that he does not drink alcohol.  Current Outpatient Medications on File Prior to Visit   Medication Sig Dispense Refill    Aspirin Adult Low Dose 81 MG EC tablet TAKE 1 TABLET BY MOUTH DAILY 30 tablet 2    BD Pen Needle Bela U/F 32G X 4 MM MISC Inject under the skin daily 100 each 2    ibuprofen (MOTRIN) 600 mg tablet TAKE 1 TABLET BY MOUTH EVERY SIX HOURS AS NEEDED FOR MODERATE PAIN 30 tablet 2    Insulin Pen Needle (BD Pen Needle Bela U/F) 32G X 4 MM MISC USE IN THE MORNING 100 each 2    Lantus SoloStar 100 units/mL SOPN INJECT 0.4mL (40 UNITS TOTAL) SUBCUTANEOUSLY DAILY AT BEDTIME 15 mL 2    naproxen (NAPROSYN) 500 mg tablet Take 1 tablet (500 mg total) by mouth 2 (two) times a day with meals for 10 days (Patient not taking: Reported on 11/26/2024) 20 tablet 0    [DISCONTINUED] atorvastatin (LIPITOR) 20 mg tablet TAKE 1 TABLET BY MOUTH DAILY (Patient not taking: Reported on 11/26/2024) 30 tablet 1    [DISCONTINUED] Biktarvy -25 MG tablet TAKE 1 TABLET BY MOUTH DAILY 30 tablet 2    [DISCONTINUED] cyclobenzaprine (FLEXERIL) 10 mg tablet TAKE 1 TABLET BY MOUTH THREE TIMES A DAY AS NEEDED FOR MUSCLE SPASM (Patient not taking: Reported on  11/26/2024) 60 tablet 0    [DISCONTINUED] Mounjaro 2.5 MG/0.5ML SOAJ INJECT 0.5mL UNDER THE SKIN EVERY 7 DAYS 2 mL 2    [DISCONTINUED] tadalafil (CIALIS) 20 MG tablet Take 1 tablet (20 mg total) by mouth daily as needed for erectile dysfunction (Patient not taking: Reported on 11/26/2024) 90 tablet 0    [DISCONTINUED] valACYclovir (VALTREX) 1,000 mg tablet Take 1 tablet (1,000 mg total) by mouth daily (Patient not taking: Reported on 11/26/2024) 30 tablet 2     No current facility-administered medications on file prior to visit.     Allergies   Allergen Reactions    Metformin Diarrhea      Current Outpatient Medications on File Prior to Visit   Medication Sig Dispense Refill    Aspirin Adult Low Dose 81 MG EC tablet TAKE 1 TABLET BY MOUTH DAILY 30 tablet 2    BD Pen Needle Bela U/F 32G X 4 MM MISC Inject under the skin daily 100 each 2    ibuprofen (MOTRIN) 600 mg tablet TAKE 1 TABLET BY MOUTH EVERY SIX HOURS AS NEEDED FOR MODERATE PAIN 30 tablet 2    Insulin Pen Needle (BD Pen Needle Bela U/F) 32G X 4 MM MISC USE IN THE MORNING 100 each 2    Lantus SoloStar 100 units/mL SOPN INJECT 0.4mL (40 UNITS TOTAL) SUBCUTANEOUSLY DAILY AT BEDTIME 15 mL 2    naproxen (NAPROSYN) 500 mg tablet Take 1 tablet (500 mg total) by mouth 2 (two) times a day with meals for 10 days (Patient not taking: Reported on 11/26/2024) 20 tablet 0    [DISCONTINUED] atorvastatin (LIPITOR) 20 mg tablet TAKE 1 TABLET BY MOUTH DAILY (Patient not taking: Reported on 11/26/2024) 30 tablet 1    [DISCONTINUED] Biktarvy -25 MG tablet TAKE 1 TABLET BY MOUTH DAILY 30 tablet 2    [DISCONTINUED] cyclobenzaprine (FLEXERIL) 10 mg tablet TAKE 1 TABLET BY MOUTH THREE TIMES A DAY AS NEEDED FOR MUSCLE SPASM (Patient not taking: Reported on 11/26/2024) 60 tablet 0    [DISCONTINUED] Mounjaro 2.5 MG/0.5ML SOAJ INJECT 0.5mL UNDER THE SKIN EVERY 7 DAYS 2 mL 2    [DISCONTINUED] tadalafil (CIALIS) 20 MG tablet Take 1 tablet (20 mg total) by mouth daily as needed for  "erectile dysfunction (Patient not taking: Reported on 11/26/2024) 90 tablet 0    [DISCONTINUED] valACYclovir (VALTREX) 1,000 mg tablet Take 1 tablet (1,000 mg total) by mouth daily (Patient not taking: Reported on 11/26/2024) 30 tablet 2     No current facility-administered medications on file prior to visit.      Social History     Tobacco Use    Smoking status: Never    Smokeless tobacco: Never   Vaping Use    Vaping status: Never Used   Substance and Sexual Activity    Alcohol use: No    Drug use: Not Currently     Types: Marijuana, Methamphetamines    Sexual activity: Not Currently     Partners: Female     Birth control/protection: None        Objective   /87 (BP Location: Right arm, Patient Position: Sitting, Cuff Size: Large)   Pulse 97   Temp 97.5 °F (36.4 °C)   Ht 5' 11\" (1.803 m)   Wt 82.7 kg (182 lb 6.4 oz)   SpO2 100%   BMI 25.44 kg/m²      Physical Exam  Vitals and nursing note reviewed.   Constitutional:       General: He is not in acute distress.     Appearance: He is well-developed.   HENT:      Head: Normocephalic and atraumatic.   Eyes:      Conjunctiva/sclera: Conjunctivae normal.   Cardiovascular:      Rate and Rhythm: Normal rate and regular rhythm.      Pulses: no weak pulses.           Dorsalis pedis pulses are 2+ on the right side and 2+ on the left side.        Posterior tibial pulses are 2+ on the right side and 2+ on the left side.      Heart sounds: No murmur heard.  Pulmonary:      Effort: Pulmonary effort is normal. No respiratory distress.      Breath sounds: Normal breath sounds.   Abdominal:      Palpations: Abdomen is soft.      Tenderness: There is no abdominal tenderness.   Musculoskeletal:         General: No swelling.      Cervical back: Neck supple.   Feet:      Right foot:      Skin integrity: No ulcer, skin breakdown, erythema, warmth, callus or dry skin.      Left foot:      Skin integrity: No ulcer, skin breakdown, erythema, warmth, callus or dry skin.   Skin:    "  General: Skin is warm and dry.      Capillary Refill: Capillary refill takes less than 2 seconds.   Neurological:      Mental Status: He is alert.   Psychiatric:         Mood and Affect: Mood normal.         Administrative Statements   I have spent a total time of 45 minutes in caring for this patient on the day of the visit/encounter including Diagnostic results, Prognosis, Risks and benefits of tx options, Instructions for management, Patient and family education, Importance of tx compliance, Risk factor reductions, Impressions, and Counseling / Coordination of care. Topics discussed with the patient / family include symptom assessment and management, medication review, medication adjustment, and psychosocial support.

## 2025-01-21 NOTE — PROGRESS NOTES
"[facility] Annual Nutrition Assessment    Charlie seen by RD in conjunction with PCP f/u     Patient is established patient (last annual was 06/26/2024)    PMHx:  DMT2, obesity, HTN      Clinical Data/Client History    CD4 count:  1377  Viral load:  <20  ART:   Biktarvy      , Patient Navigator: Payal  : Payal    Food assistance: Food pantry    Living situation: House or apartment     Psychosocial factors:  often stressed    Mobility:  self ambulates    Physical activity: does 1 hour of at home body weight exercise 3 times per week       Oral health concerns: denied oral health concerns       Typical food/beverage intake:    Breakfast milk and pastry or skips   Lunch salad, meat  Dinner Indonesian alberto, vegetables(peppers, lettuce, tomato)  Snacks chips  Beverages water, sometimes soda     Appetite: Fluctuating    OTC vitamin, mineral, herbal supplements: No    Oral/enteral nutrition supplements:  No    GI problems: denied n/v/d/c    Food allergies/intolerances:  NKFA    Weight history:  Wt Readings from Last 3 Encounters:   01/21/25 82.7 kg (182 lb 6.4 oz)   11/26/24 84.1 kg (185 lb 6.4 oz)   09/24/24 84.4 kg (186 lb)     Last annual weight 183(06/26/2024)      Current body weight:  182  Height:  71\"  BMI:  25  IBW:  172  %IBW  105%    Weight change: no significant weight change x 6 months.       Nutrition-related labs:    Lab Results   Component Value Date    HGBA1C 10.8 (H) 10/08/2024     Lab Results   Component Value Date    CHOLESTEROL 160 10/08/2024    CHOLESTEROL 160 02/01/2023    CHOLESTEROL 210 (H) 12/11/2021     Lab Results   Component Value Date    HDL 57 10/08/2024    HDL 43 02/01/2023    HDL 50 12/11/2021     Lab Results   Component Value Date    TRIG 84 10/08/2024    TRIG 65 02/01/2023    TRIG 87 12/11/2021     No results found for: \"NONHDLC\"  Lab Results   Component Value Date    SODIUM 133 (L) 10/08/2024    K 4.1 10/08/2024    CL 97 10/08/2024    CO2 27 10/08/2024    BUN 15 " 10/08/2024    CREATININE 0.84 10/08/2024    GLUC 220 (H) 04/19/2024    CALCIUM 9.7 10/08/2024         Current medications:     Current Outpatient Medications:     Aspirin Adult Low Dose 81 MG EC tablet, TAKE 1 TABLET BY MOUTH DAILY, Disp: 30 tablet, Rfl: 2    atorvastatin (LIPITOR) 20 mg tablet, Take 1 tablet (20 mg total) by mouth daily, Disp: 30 tablet, Rfl: 1    BD Pen Needle Bela U/F 32G X 4 MM MISC, Inject under the skin daily, Disp: 100 each, Rfl: 2    Biktarvy -25 MG tablet, Take 1 tablet by mouth daily, Disp: 30 tablet, Rfl: 2    ibuprofen (MOTRIN) 600 mg tablet, TAKE 1 TABLET BY MOUTH EVERY SIX HOURS AS NEEDED FOR MODERATE PAIN, Disp: 30 tablet, Rfl: 2    Insulin Pen Needle (BD Pen Needle Bela U/F) 32G X 4 MM MISC, USE IN THE MORNING, Disp: 100 each, Rfl: 2    Lantus SoloStar 100 units/mL SOPN, INJECT 0.4mL (40 UNITS TOTAL) SUBCUTANEOUSLY DAILY AT BEDTIME, Disp: 15 mL, Rfl: 2    naproxen (NAPROSYN) 500 mg tablet, Take 1 tablet (500 mg total) by mouth 2 (two) times a day with meals for 10 days (Patient not taking: Reported on 11/26/2024), Disp: 20 tablet, Rfl: 0    Tirzepatide (Mounjaro) 5 MG/0.5ML SOAJ, Inject 0.5 mL under the skin once a week, Disp: 2 mL, Rfl: 2     Physical findings/skin integrity:  Skin intact, no s/s of malnutrition       Nutrition Diagnosis    Problem: altered nutrition-related lab values (A1C 10.8)    Related to: denial of need for change not ready for diet/lifestyle change  unwilling or disinterested in learning/applying information     As Evidenced By: diet recall  patient interview       Estimated Nutritional Needs    [facility] REE: CBW      ~2050 kcal (based on: 1.2 stress factor)  ~67 protein (based on: .08)  ~2080 fluid (based on: 25ml/kg/day)      Current intake estimation: meeting needs       Nutrition Intervention/Recommendations    Nutrition education intervention: no interest  and reinforced previous education     Collaboration/referral of nutrition care:    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 Charlie is still struggling to eat balanced 3 meals a day. He reports being too busy to eat or plan meals. RD reinforced prior education & provided easy breakfast & lunch meals. Charlie made the goal to eat 3 meals a day. He had no nutrition questions or concerns.   -Evelyn Deleon RDN,LDN

## 2025-01-22 ENCOUNTER — DOCUMENTATION (OUTPATIENT)
Dept: SURGERY | Facility: CLINIC | Age: 50
End: 2025-01-22

## 2025-01-22 ENCOUNTER — TELEPHONE (OUTPATIENT)
Dept: SURGERY | Facility: CLINIC | Age: 50
End: 2025-01-22

## 2025-01-22 NOTE — TELEPHONE ENCOUNTER
Ayanna called from [facility] she asked about the valacyclovir being discontiued, and also about the increased dose on Mounjaro. She asked for a callback

## 2025-01-22 NOTE — PROGRESS NOTES
Assessment/Plan: [facility] met with pt to reschedule missed appts.  Pt stated he forgot about the appts.  Pt is scheduled for next week.   Pt stated he is feeling better since we last met.  He shared he is having good positive communication with his sons mother and work is going well.      [facility]     Today patient present with No chief complaint on file.    Patient would likely benefit from individual counseling to address MH concerns, depression and anxiety.  Consider/focus/continue Monitoring of pts emotional , cognitive and behavioral displays of stabilty  Stage of change:   Plan/ Behavioral Recommendations: Ongoing  interventions as per pt's coordinated care and/or pt's request for services.       There are no diagnoses linked to this encounter.      Discussion:         Subjective:     Patient ID: Charlie Hernandez is a 50 y.o. male.    HPI    History of Present Illness:     The patient is seeing the [facility] today for a routine behavioral health follow up.    Review of Systems      Objective:     Physical Exam      [facility]    Orientation     Person: yes    Place: yes    Time: yes    Appearance    Well Developed: yes healthy    Uncomfortable: no    Normal Body Odor: yes    Smells of Feces: no    Smells of Urine: no    Disheveled: no    Well Nourished: yes weight WNL of ideal    Grooming Unkempt: yes    Poor Eye Contact: no    Hirsute: no    Looks Tired: no    Acutely Exhausted: noappearance reflects stated age    Mood and Affect:     Appropriate: yes    Euthymicyes    Irritable: no    Angry: no    Anxious: no    Depressed:no    Blunted:no    Labile: no    Restricted: no    Harm to Self or Others: Pt denies SI/HI, no signs nor symptoms noted during this encounter    Substance Abuse: pt denies current JUAN

## 2025-01-23 ENCOUNTER — PATIENT OUTREACH (OUTPATIENT)
Dept: SURGERY | Facility: CLINIC | Age: 50
End: 2025-01-23

## 2025-01-27 ENCOUNTER — PATIENT OUTREACH (OUTPATIENT)
Dept: SURGERY | Facility: CLINIC | Age: 50
End: 2025-01-27

## 2025-01-27 NOTE — PROGRESS NOTES
HC text Ct to call [facility] and follow up. Ct states he has been calling and is always on hold. HC text Ct to come in or 3 way and we can try together but it needs to be followed up on.

## 2025-02-19 ENCOUNTER — PATIENT OUTREACH (OUTPATIENT)
Dept: SURGERY | Facility: CLINIC | Age: 50
End: 2025-02-19

## 2025-02-19 NOTE — PROGRESS NOTES
WOLF CHUNG DISCUSSED CT UPDATES WITH WOLF DEXTER, WOLF DE LUNA, WOLF CESAR, AND HC AMBAR REGARDING CT'S TRANSFER OF CARE TO [facility].    WOLF CHUNG SENT CT'S DOCUMENTS FOR MA RENEWAL TO BE FAXED TO [facility]. WOLF DEXTER COMPLETED AND SENT SHELDON CONFIRMATION.

## 2025-02-24 ENCOUNTER — PATIENT OUTREACH (OUTPATIENT)
Dept: SURGERY | Facility: CLINIC | Age: 50
End: 2025-02-24

## 2025-02-24 NOTE — PROGRESS NOTES
Client has requested to be moved to [facility] from [facility] due to travel.       HC text Ct to follow up if he has heard from [employer] regarding his insurance. Ct is pending MAWD. Currently has SPBP and RW sliding fee scale.    Charlie Hernandez     ct#[id number]   URN: [id number]  1975  English  CM: Payal Bradford  What services are they going to receive: CM, ID, and Primary Care   6. Transfer clinic to [place]

## 2025-03-03 ENCOUNTER — PATIENT OUTREACH (OUTPATIENT)
Dept: SURGERY | Facility: CLINIC | Age: 50
End: 2025-03-03

## 2025-03-03 NOTE — PROGRESS NOTES
Ct text HC that he received a text from [employer]. HC states that could mean he has insurance. HC checked promise portal and remains to state that it is inactive.     HC discussed Ct's case with HS.    HC text Ct we may need to contact [facility] this week if he doesn't receive mail with card access.

## 2025-03-05 ENCOUNTER — PATIENT OUTREACH (OUTPATIENT)
Dept: SURGERY | Facility: CLINIC | Age: 50
End: 2025-03-05

## 2025-03-06 DIAGNOSIS — E11.65 UNCONTROLLED TYPE 2 DIABETES MELLITUS WITH HYPERGLYCEMIA (HCC): ICD-10-CM

## 2025-03-11 RX ORDER — INSULIN GLARGINE 100 [IU]/ML
INJECTION, SOLUTION SUBCUTANEOUS
Qty: 15 ML | Refills: 2 | OUTPATIENT
Start: 2025-03-11

## 2025-03-12 DIAGNOSIS — E11.65 UNCONTROLLED TYPE 2 DIABETES MELLITUS WITH HYPERGLYCEMIA (HCC): ICD-10-CM

## 2025-03-17 RX ORDER — INSULIN GLARGINE 100 [IU]/ML
INJECTION, SOLUTION SUBCUTANEOUS
Qty: 15 ML | Refills: 2 | OUTPATIENT
Start: 2025-03-17

## 2025-03-27 DIAGNOSIS — E11.65 UNCONTROLLED TYPE 2 DIABETES MELLITUS WITH HYPERGLYCEMIA (HCC): ICD-10-CM

## 2025-03-27 DIAGNOSIS — E78.2 MIXED HYPERLIPIDEMIA: ICD-10-CM

## 2025-04-01 ENCOUNTER — APPOINTMENT (EMERGENCY)
Dept: RADIOLOGY | Facility: HOSPITAL | Age: 50
End: 2025-04-01
Payer: COMMERCIAL

## 2025-04-01 ENCOUNTER — HOSPITAL ENCOUNTER (EMERGENCY)
Facility: HOSPITAL | Age: 50
Discharge: HOME | End: 2025-04-01
Attending: INTERNAL MEDICINE
Payer: COMMERCIAL

## 2025-04-01 VITALS
WEIGHT: 177.69 LBS | BODY MASS INDEX: 24.78 KG/M2 | RESPIRATION RATE: 16 BRPM | SYSTOLIC BLOOD PRESSURE: 111 MMHG | TEMPERATURE: 97.6 F | DIASTOLIC BLOOD PRESSURE: 78 MMHG | HEART RATE: 95 BPM | OXYGEN SATURATION: 100 %

## 2025-04-01 DIAGNOSIS — R07.89 ATYPICAL CHEST PAIN: Primary | ICD-10-CM

## 2025-04-01 DIAGNOSIS — F15.10 METHAMPHETAMINE USE (HCC): ICD-10-CM

## 2025-04-01 DIAGNOSIS — E11.65 HYPERGLYCEMIA DUE TO DIABETES MELLITUS (HCC): ICD-10-CM

## 2025-04-01 LAB
ALBUMIN SERPL BCG-MCNC: 4.4 G/DL (ref 3.5–5)
ALP SERPL-CCNC: 70 U/L (ref 34–104)
ALT SERPL W P-5'-P-CCNC: 12 U/L (ref 7–52)
ANION GAP SERPL CALCULATED.3IONS-SCNC: 9 MMOL/L (ref 4–13)
AST SERPL W P-5'-P-CCNC: 10 U/L (ref 13–39)
ATRIAL RATE: 89 BPM
BASOPHILS # BLD AUTO: 0.07 THOUSANDS/ÂΜL (ref 0–0.1)
BASOPHILS NFR BLD AUTO: 1 % (ref 0–1)
BILIRUB SERPL-MCNC: 0.59 MG/DL (ref 0.2–1)
BUN SERPL-MCNC: 10 MG/DL (ref 5–25)
CALCIUM SERPL-MCNC: 9.4 MG/DL (ref 8.4–10.2)
CARDIAC TROPONIN I PNL SERPL HS: 5 NG/L (ref ?–50)
CHLORIDE SERPL-SCNC: 97 MMOL/L (ref 96–108)
CO2 SERPL-SCNC: 27 MMOL/L (ref 21–32)
CREAT SERPL-MCNC: 0.91 MG/DL (ref 0.6–1.3)
EOSINOPHIL # BLD AUTO: 0.04 THOUSAND/ÂΜL (ref 0–0.61)
EOSINOPHIL NFR BLD AUTO: 1 % (ref 0–6)
ERYTHROCYTE [DISTWIDTH] IN BLOOD BY AUTOMATED COUNT: 12.8 % (ref 11.6–15.1)
GFR SERPL CREATININE-BSD FRML MDRD: 97 ML/MIN/1.73SQ M
GLUCOSE SERPL-MCNC: 348 MG/DL (ref 65–140)
HCT VFR BLD AUTO: 40.1 % (ref 36.5–49.3)
HGB BLD-MCNC: 13.4 G/DL (ref 12–17)
IMM GRANULOCYTES # BLD AUTO: 0.02 THOUSAND/UL (ref 0–0.2)
IMM GRANULOCYTES NFR BLD AUTO: 0 % (ref 0–2)
LYMPHOCYTES # BLD AUTO: 2.52 THOUSANDS/ÂΜL (ref 0.6–4.47)
LYMPHOCYTES NFR BLD AUTO: 35 % (ref 14–44)
MCH RBC QN AUTO: 28.2 PG (ref 26.8–34.3)
MCHC RBC AUTO-ENTMCNC: 33.4 G/DL (ref 31.4–37.4)
MCV RBC AUTO: 84 FL (ref 82–98)
MONOCYTES # BLD AUTO: 0.58 THOUSAND/ÂΜL (ref 0.17–1.22)
MONOCYTES NFR BLD AUTO: 8 % (ref 4–12)
NEUTROPHILS # BLD AUTO: 3.93 THOUSANDS/ÂΜL (ref 1.85–7.62)
NEUTS SEG NFR BLD AUTO: 55 % (ref 43–75)
NRBC BLD AUTO-RTO: 0 /100 WBCS
P AXIS: 69 DEGREES
PLATELET # BLD AUTO: 336 THOUSANDS/UL (ref 149–390)
PMV BLD AUTO: 9.5 FL (ref 8.9–12.7)
POTASSIUM SERPL-SCNC: 3.4 MMOL/L (ref 3.5–5.3)
PR INTERVAL: 148 MS
PROT SERPL-MCNC: 7.6 G/DL (ref 6.4–8.4)
QRS AXIS: 58 DEGREES
QRSD INTERVAL: 84 MS
QT INTERVAL: 368 MS
QTC INTERVAL: 447 MS
RBC # BLD AUTO: 4.75 MILLION/UL (ref 3.88–5.62)
SODIUM SERPL-SCNC: 133 MMOL/L (ref 135–147)
T WAVE AXIS: 61 DEGREES
VENTRICULAR RATE: 89 BPM
WBC # BLD AUTO: 7.16 THOUSAND/UL (ref 4.31–10.16)

## 2025-04-01 PROCEDURE — 71046 X-RAY EXAM CHEST 2 VIEWS: CPT

## 2025-04-01 PROCEDURE — 99285 EMERGENCY DEPT VISIT HI MDM: CPT

## 2025-04-01 PROCEDURE — 85025 COMPLETE CBC W/AUTO DIFF WBC: CPT

## 2025-04-01 PROCEDURE — 84484 ASSAY OF TROPONIN QUANT: CPT

## 2025-04-01 PROCEDURE — 80053 COMPREHEN METABOLIC PANEL: CPT

## 2025-04-01 PROCEDURE — 93010 ELECTROCARDIOGRAM REPORT: CPT | Performed by: INTERNAL MEDICINE

## 2025-04-01 PROCEDURE — 99285 EMERGENCY DEPT VISIT HI MDM: CPT | Performed by: EMERGENCY MEDICINE

## 2025-04-01 PROCEDURE — 36415 COLL VENOUS BLD VENIPUNCTURE: CPT

## 2025-04-01 PROCEDURE — 93005 ELECTROCARDIOGRAM TRACING: CPT

## 2025-04-01 RX ORDER — ATORVASTATIN CALCIUM 20 MG/1
20 TABLET, FILM COATED ORAL DAILY
Qty: 30 TABLET | Refills: 1 | Status: SHIPPED | OUTPATIENT
Start: 2025-04-01

## 2025-04-01 RX ORDER — TIRZEPATIDE 5 MG/.5ML
0.5 INJECTION, SOLUTION SUBCUTANEOUS WEEKLY
Qty: 2 ML | Refills: 2 | Status: SHIPPED | OUTPATIENT
Start: 2025-04-01

## 2025-04-01 NOTE — DISCHARGE INSTRUCTIONS
Seen and evaluated for chest pain.  You had a reassuring cardiac enzyme, reassuring EKG. your blood glucose level is elevated.    Please follow-up with your primary care provider regarding your elevated blood sugar  You are being given a referral to a cardiologist to better manage your cardiac risk factors  Please return to the emergency department if you develop persistent or worsening chest pain, persistent or worsening difficulty breathing, inability to tolerate liquid intake.

## 2025-04-01 NOTE — Clinical Note
Charlie Hernandez was seen and treated in our emergency department on 4/1/2025.                Diagnosis:     Charlie  may return to work on return date.    He may return on this date: 04/02/2025         If you have any questions or concerns, please don't hesitate to call.      Sukh Mitchell, DO    ______________________________           _______________          _______________  Hospital Representative                              Date                                Time

## 2025-04-01 NOTE — ED ATTENDING ATTESTATION
4/1/2025  I, Contreras Cat MD, saw and evaluated the patient. I have discussed the patient with the resident/non-physician practitioner and agree with the resident's/non-physician practitioner's findings, Plan of Care, and MDM as documented in the resident's/non-physician practitioner's note, except where noted. All available labs and Radiology studies were reviewed.  I was present for key portions of any procedure(s) performed by the resident/non-physician practitioner and I was immediately available to provide assistance.       At this point I agree with the current assessment done in the Emergency Department.  I have conducted an independent evaluation of this patient a history and physical is as follows:  49 yo M with DM, HIV, c/o chest pain, non radiating, no back pain,  about 3 hours ago, while at work, recalls he used methamphetamine last  night.  No fever, no chills.  CP currently resolved.  He took aspirin, NTG.      VSS.  NAD.  Chest clear.  Abd S/NT.  Normal pulses.          ED Course         Critical Care Time  Procedures

## 2025-04-01 NOTE — ED PROVIDER NOTES
Time reflects when diagnosis was documented in both MDM as applicable and the Disposition within this note       Time User Action Codes Description Comment    4/1/2025 12:42 PM Sukh Mitchell Add [R07.89] Atypical chest pain     4/1/2025 12:42 PM Sukh Mitchell Add [F15.10] Methamphetamine use (HCC)     4/1/2025  1:46 PM Sukh Mitchell Add [R73.9] Hyperglycemia     4/1/2025  1:46 PM Sukh Mitchell Remove [R73.9] Hyperglycemia     4/1/2025  1:46 PM Sukh Mitchell Add [E11.65] Hyperglycemia due to diabetes mellitus (HCC)           ED Disposition       ED Disposition   Discharge    Condition   Stable    Date/Time   Tue Apr 1, 2025  1:45 PM    Comment   Charlie Hernandez discharge to home/self care.                   Assessment & Plan       Medical Decision Making  Patient is a 50 y.o. male with PMH of uncontrolled DM, HTN, HLD, HIV presenting with chest pain.    Vital signs: Stable    Pertinent physical exam: Lungs clear to auscultation bilaterally, regular rate and rhythm, no calf tenderness, erythema, edema.  No abdominal tenderness.  No CVA tenderness.    Differential diagnosis and plan:   Chest pain likely musculoskeletal, versus due to recent sympathomimetic use.  Low suspicion for ACS, however will obtain troponin and EKG to identify signs of ischemia.  EKG to evaluate for dysrhythmia.  CBC to evaluate for signs of infection, anemia.  CMP to evaluate for electrolyte abnormalities, renal function, liver function  Chest x-ray to evaluate for pneumothorax, pleural effusion  Patient without PE risk factors, low suspicion.  Very low suspicion for dissection.     View ED course above for further discussion on patient workup.     Review of Previous Medical Records: Reviewed patient's previous medical records    All labs reviewed and utilized in the medical decision making process  All radiology studies independently viewed by me and interpreted by the radiologist.  I reviewed all  "testing with the patient.     ED course:  Troponin within normal limits, reassuring EKG.  Elevated blood glucose level on CMP.  Patient without chest pain in the emergency department.  Discussed his current cardiac risk factors.  Heart score 3.  Given referral to cardiology for management of cardiac risk factors, referral to endocrinology for better management of his blood glucose levels.    Reevaluation: Pain-free in the emergency department    Disposition: Discharge    Portions of the record may have been created with voice recognition software. Occasional wrong word or \"sound a like\" substitutions may have occurred due to the inherent limitations of voice recognition software. Read the chart carefully and recognize, using context, where substitutions have occurred.      Amount and/or Complexity of Data Reviewed  Labs: ordered. Decision-making details documented in ED Course.  Radiology: ordered and independent interpretation performed.        ED Course as of 04/01/25 1350   Tue Apr 01, 2025   1216 EKG showing normal sinus rhythm at 89 bpm.  Normal axis.  Normal intervals.  Nonspecific ST-T wave abnormalities.  When compared to previous EKG April 2024, morphology appears similar.   1250 CBC and differential  Within normal limits   1332 GLUCOSE(!): 348   1332 Comprehensive metabolic panel(!)  Mildly hypokalemic, mildly hyponatremic.  Elevated glucose.   1333 hs TnI 0hr: 5       Medications - No data to display    ED Risk Strat Scores   HEART Risk Score      Flowsheet Row Most Recent Value   Heart Score Risk Calculator    History 0 Filed at: 04/01/2025 1346   ECG 0 Filed at: 04/01/2025 1346   Age 1 Filed at: 04/01/2025 1346   Risk Factors 2 Filed at: 04/01/2025 1346   Troponin 0 Filed at: 04/01/2025 1346   HEART Score 3 Filed at: 04/01/2025 1346          HEART Risk Score      Flowsheet Row Most Recent Value   Heart Score Risk Calculator    History 0 Filed at: 04/01/2025 1346   ECG 0 Filed at: 04/01/2025 1346   Age 1 " Filed at: 04/01/2025 1346   Risk Factors 2 Filed at: 04/01/2025 1346   Troponin 0 Filed at: 04/01/2025 1346   HEART Score 3 Filed at: 04/01/2025 1346                              SBIRT 20yo+      Flowsheet Row Most Recent Value   Initial Alcohol Screen: US AUDIT-C     1. How often do you have a drink containing alcohol? 0 Filed at: 04/01/2025 1209   2. How many drinks containing alcohol do you have on a typical day you are drinking?  0 Filed at: 04/01/2025 1209   3a. Male UNDER 65: How often do you have five or more drinks on one occasion? 0 Filed at: 04/01/2025 1209   3b. FEMALE Any Age, or MALE 65+: How often do you have 4 or more drinks on one occassion? 0 Filed at: 04/01/2025 1209   Audit-C Score 0 Filed at: 04/01/2025 1209   MINDY: How many times in the past year have you...    Used an illegal drug or used a prescription medication for non-medical reasons? Never Filed at: 04/01/2025 1209                            History of Present Illness       Chief Complaint   Patient presents with    Chest Pain     Pt evaluated by EMS 2 hours prior at work for chest pain. PT reports he went home and had no pain but mother insisted he take 2 baby ASA, 1 nitro, 1 lisinopril       Past Medical History:   Diagnosis Date    Chlamydia     Diabetes mellitus (HCC)     Type II    Gonorrhea     Herpes     HIV disease (HCC) 07/2020    mode of transmission: heterosexual    Hyperlipidemia     Hypertension     Neoplasm of kidney     Obesity     BMI 37.2    Syphilis       Past Surgical History:   Procedure Laterality Date    NO PAST SURGERIES        Family History   Problem Relation Age of Onset    Diabetes Mother     Diabetes Father     Diabetes Family       Social History     Tobacco Use    Smoking status: Never    Smokeless tobacco: Never   Vaping Use    Vaping status: Never Used   Substance Use Topics    Alcohol use: No    Drug use: Not Currently     Types: Marijuana, Methamphetamines      E-Cigarette/Vaping    E-Cigarette Use Never  "User       E-Cigarette/Vaping Substances    Nicotine No       I have reviewed and agree with the history as documented.     50 y.o. male with PMH of uncontrolled DM, HTN, HLD, HIV presenting with chest pain.  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  No radiation, exacerbated with lifting heavy objects, relieved with rest.  Chest pain nonexertional.  States he did not feel well, so left work.  On the way home, chest pain was worse, so he pulled please officer over to ask for assistance.  Evaluated by paramedics, had EKG, states \"they said it was normal\".  Told EMS that he would go to the emergency department after he picked up his daughter.  Picked up his daughter, went to his mother's house, where his mother gave him 2 Tylenol PMs, 1 dose of nitroglycerin, 1 dose lisinopril, which were prescribed to her.  Patient states after the medication, he felt funny, so he came to the emergency department    Also endorses recent methamphetamine use, last night at 8 PM  Currently denies chest pain or shortness of breath.  No recent travel, immobilization, personal history of cancer, signs or symptoms of DVT.      Chest Pain  Associated symptoms: no abdominal pain, no cough, no fever, no headache, no nausea, no palpitations, no shortness of breath and not vomiting        Review of Systems   Constitutional:  Negative for chills and fever.   HENT:  Negative for congestion, ear pain, rhinorrhea and sore throat.    Respiratory:  Negative for cough and shortness of breath.    Cardiovascular:  Positive for chest pain. Negative for palpitations and leg swelling.   Gastrointestinal:  Negative for abdominal pain, constipation, diarrhea, nausea and vomiting.   Skin:  Negative for rash.   Neurological:  Negative for light-headedness and headaches.   All other systems reviewed and are negative.          Objective       ED " Triage Vitals   Temperature Pulse Blood Pressure Respirations SpO2 Patient Position - Orthostatic VS   04/01/25 1205 04/01/25 1203 04/01/25 1203 04/01/25 1203 04/01/25 1203 04/01/25 1203   97.6 °F (36.4 °C) 95 111/78 16 100 % Lying      Temp Source Heart Rate Source BP Location FiO2 (%) Pain Score    04/01/25 1205 04/01/25 1203 04/01/25 1203 -- 04/01/25 1203    Oral Monitor Left arm  No Pain      Vitals      Date and Time Temp Pulse SpO2 Resp BP Pain Score FACES Pain Rating User   04/01/25 1205 97.6 °F (36.4 °C) -- -- -- -- -- -- AW   04/01/25 1203 -- 95 100 % 16 111/78 No Pain -- AW            Physical Exam  Vitals and nursing note reviewed.   Constitutional:       General: He is not in acute distress.     Appearance: Normal appearance. He is not ill-appearing or diaphoretic.   HENT:      Head: Normocephalic and atraumatic.   Neck:      Vascular: No JVD.   Cardiovascular:      Rate and Rhythm: Normal rate and regular rhythm.      Pulses:           Radial pulses are 2+ on the right side and 2+ on the left side.        Posterior tibial pulses are 2+ on the right side and 2+ on the left side.      Heart sounds: No murmur heard.     No friction rub.   Pulmonary:      Effort: No tachypnea, bradypnea, accessory muscle usage, respiratory distress or retractions.      Breath sounds: No stridor. No decreased breath sounds, wheezing, rhonchi or rales.   Abdominal:      General: There is no distension.      Palpations: Abdomen is soft. There is no pulsatile mass.      Tenderness: There is no abdominal tenderness.   Musculoskeletal:      Right lower leg: No edema.      Left lower leg: No edema.      Comments: No calf tenderness, erythema, edema bilaterally   Skin:     General: Skin is warm and dry.      Capillary Refill: Capillary refill takes less than 2 seconds.   Neurological:      Mental Status: He is alert.   Psychiatric:         Mood and Affect: Mood and affect normal.         Results Reviewed       Procedure Component  Value Units Date/Time    HS Troponin 0hr (reflex protocol) [[id number]]  (Normal) Collected: 04/01/25 1232    Lab Status: Final result Specimen: Blood from Arm, Left Updated: 04/01/25 1300     hs TnI 0hr 5 ng/L     HS Troponin I 2hr [[id number]]     Lab Status: No result Specimen: Blood     Comprehensive metabolic panel [[id number]]  (Abnormal) Collected: 04/01/25 1232    Lab Status: Final result Specimen: Blood from Arm, Left Updated: 04/01/25 1258     Sodium 133 mmol/L      Potassium 3.4 mmol/L      Chloride 97 mmol/L      CO2 27 mmol/L      ANION GAP 9 mmol/L      BUN 10 mg/dL      Creatinine 0.91 mg/dL      Glucose 348 mg/dL      Calcium 9.4 mg/dL      AST 10 U/L      ALT 12 U/L      Alkaline Phosphatase 70 U/L      Total Protein 7.6 g/dL      Albumin 4.4 g/dL      Total Bilirubin 0.59 mg/dL      eGFR 97 ml/min/1.73sq m     Narrative:      National Kid[employer] Foundation guidelines for Chronic Kidney Disease (CKD):     Stage 1 with normal or high GFR (GFR > 90 mL/min/1.73 square meters)    Stage 2 Mild CKD (GFR = 60-89 mL/min/1.73 square meters)    Stage 3A Moderate CKD (GFR = 45-59 mL/min/1.73 square meters)    Stage 3B Moderate CKD (GFR = 30-44 mL/min/1.73 square meters)    Stage 4 Severe CKD (GFR = 15-29 mL/min/1.73 square meters)    Stage 5 End Stage CKD (GFR <15 mL/min/1.73 square meters)  Note: GFR calculation is accurate only with a steady state creatinine    CBC and differential [[id number]] Collected: 04/01/25 1232    Lab Status: Final result Specimen: Blood from Arm, Left Updated: 04/01/25 1238     WBC 7.16 Thousand/uL      RBC 4.75 Million/uL      Hemoglobin 13.4 g/dL      Hematocrit 40.1 %      MCV 84 fL      MCH 28.2 pg      MCHC 33.4 g/dL      RDW 12.8 %      MPV 9.5 fL      Platelets 336 Thousands/uL      nRBC 0 /100 WBCs      Segmented % 55 %      Immature Grans % 0 %      Lymphocytes % 35 %      Monocytes % 8 %      Eosinophils Relative 1 %      Basophils Relative 1 %      Absolute Neutrophils  3.93 Thousands/µL      Absolute Immature Grans 0.02 Thousand/uL      Absolute Lymphocytes 2.52 Thousands/µL      Absolute Monocytes 0.58 Thousand/µL      Eosinophils Absolute 0.04 Thousand/µL      Basophils Absolute 0.07 Thousands/µL             XR chest 2 views   ED Interpretation by Sukh Mitchell DO (04/01 3232)   No pneumothorax, infiltrate, pleural effusion      Final Interpretation by Jud Fierro MD (04/01 5084)   No acute cardiopulmonary disease.            Workstation performed: [id number]             Procedures    ED Medication and Procedure Management   Prior to Admission Medications   Prescriptions Last Dose Informant Patient Reported? Taking?   Aspirin Adult Low Dose 81 MG EC tablet Not Taking  No No   Sig: TAKE 1 TABLET BY MOUTH DAILY   Patient not taking: Reported on 4/1/2025   BD Pen Needle Bela U/F 32G X 4 MM MISC   No No   Sig: Inject under the skin daily   Biktarvy -25 MG tablet 4/1/2025 Morning  No Yes   Sig: Take 1 tablet by mouth daily   Insulin Pen Needle (BD Pen Needle Bela U/F) 32G X 4 MM MISC   No Yes   Sig: USE IN THE MORNING   Lantus SoloStar 100 units/mL SOPN 3/31/2025 Bedtime  No Yes   Sig: INJECT 0.4mL (40 UNITS TOTAL) SUBCUTANEOUSLY DAILY AT BEDTIME   Tirzepatide (Mounjaro) 5 MG/0.5ML SOAJ 3/24/2025  No Yes   Sig: Inject 0.5 mL under the skin once a week   atorvastatin (LIPITOR) 20 mg tablet 3/31/2025 Evening  No Yes   Sig: Take 1 tablet (20 mg total) by mouth daily   ibuprofen (MOTRIN) 600 mg tablet  Self No Yes   Sig: TAKE 1 TABLET BY MOUTH EVERY SIX HOURS AS NEEDED FOR MODERATE PAIN      Facility-Administered Medications: None     Patient's Medications   Discharge Prescriptions    No medications on file       ED SEPSIS DOCUMENTATION   Time reflects when diagnosis was documented in both MDM as applicable and the Disposition within this note       Time User Action Codes Description Comment    4/1/2025 12:42 PM Sukh Mitchell Add [R07.89] Atypical chest pain      4/1/2025 12:42 PM Sukh Mitchell [F15.10] Methamphetamine use (HCC)     4/1/2025  1:46 PM Sukh Mitchell [R73.9] Hyperglycemia     4/1/2025  1:46 PM Sukh Mitchell Remove [R73.9] Hyperglycemia     4/1/2025  1:46 PM Sukh Mitchell [E11.65] Hyperglycemia due to diabetes mellitus (HCC)                  Sukh Mitchell DO  04/01/25 4213

## 2025-04-09 ENCOUNTER — TELEPHONE (OUTPATIENT)
Dept: SURGERY | Facility: CLINIC | Age: 50
End: 2025-04-09

## 2025-04-09 PROCEDURE — PBNCHG PB NO CHARGE PLACEHOLDER

## 2025-04-09 NOTE — TELEPHONE ENCOUNTER
Data: Pt was contacted by S at 8:58 a.m. per pt's request to be linked with services, however, a VM was provided since pt wasn't available at the time.

## 2025-05-01 DIAGNOSIS — Z21 HIV INFECTION, UNSPECIFIED SYMPTOM STATUS (HCC): ICD-10-CM

## 2025-05-02 RX ORDER — BICTEGRAVIR SODIUM, EMTRICITABINE, AND TENOFOVIR ALAFENAMIDE FUMARATE 50; 200; 25 MG/1; MG/1; MG/1
1 TABLET ORAL DAILY
Qty: 30 TABLET | Refills: 2 | Status: SHIPPED | OUTPATIENT
Start: 2025-05-02

## 2025-05-06 DIAGNOSIS — E11.65 UNCONTROLLED TYPE 2 DIABETES MELLITUS WITH HYPERGLYCEMIA (HCC): Primary | ICD-10-CM

## 2025-05-06 DIAGNOSIS — Z12.5 ENCOUNTER FOR PROSTATE CANCER SCREENING: ICD-10-CM

## 2025-05-06 DIAGNOSIS — A52.8 SYPHILIS, LATE LATENT: ICD-10-CM

## 2025-05-06 DIAGNOSIS — Z13.220 ENCOUNTER FOR LIPID SCREENING FOR CARDIOVASCULAR DISEASE: ICD-10-CM

## 2025-05-06 DIAGNOSIS — E11.9 DIABETIC EYE EXAM (HCC): ICD-10-CM

## 2025-05-06 DIAGNOSIS — Z13.6 ENCOUNTER FOR LIPID SCREENING FOR CARDIOVASCULAR DISEASE: ICD-10-CM

## 2025-05-06 DIAGNOSIS — Z01.00 DIABETIC EYE EXAM (HCC): ICD-10-CM

## 2025-05-06 DIAGNOSIS — Z79.899 ON STATIN THERAPY DUE TO RISK OF FUTURE CARDIOVASCULAR EVENT: ICD-10-CM

## 2025-05-19 ENCOUNTER — TELEPHONE (OUTPATIENT)
Dept: SURGERY | Facility: CLINIC | Age: 50
End: 2025-05-19

## 2025-05-19 DIAGNOSIS — Z21 HIV INFECTION, UNSPECIFIED SYMPTOM STATUS (HCC): ICD-10-CM

## 2025-05-19 NOTE — TELEPHONE ENCOUNTER
Spoke with patient and gave a reminded to get labs done for upcoming ID appointment on 5/28. He knows to tell the lab both Providers. He will be going tomorrow.

## 2025-05-21 RX ORDER — BICTEGRAVIR SODIUM, EMTRICITABINE, AND TENOFOVIR ALAFENAMIDE FUMARATE 50; 200; 25 MG/1; MG/1; MG/1
1 TABLET ORAL DAILY
Qty: 30 TABLET | Refills: 2 | Status: SHIPPED | OUTPATIENT
Start: 2025-05-21

## 2025-05-29 DIAGNOSIS — E78.2 MIXED HYPERLIPIDEMIA: ICD-10-CM

## 2025-05-30 ENCOUNTER — PATIENT OUTREACH (OUTPATIENT)
Dept: SURGERY | Facility: CLINIC | Age: 50
End: 2025-05-30

## 2025-05-30 RX ORDER — ATORVASTATIN CALCIUM 20 MG/1
20 TABLET, FILM COATED ORAL DAILY
Qty: 30 TABLET | Refills: 1 | Status: SHIPPED | OUTPATIENT
Start: 2025-05-30

## 2025-05-30 NOTE — PROGRESS NOTES
HC called Ct about missed appointment. Ct states he was going to get his labs done tomorrow. HC explained his appointment has passed and he was under the impression he had a appointment on 6-6-25. HC explains he does but with Cardio but can't attend due to no insurance. HC states we need to call [facility] to follow up. Ct states he will call office to reschedule Lucio appointment. HC confirmed with Ct his appointment with PCP is 6-.

## 2025-06-03 ENCOUNTER — APPOINTMENT (OUTPATIENT)
Dept: LAB | Facility: HOSPITAL | Age: 50
End: 2025-06-03
Payer: COMMERCIAL

## 2025-06-03 DIAGNOSIS — E11.65 UNCONTROLLED TYPE 2 DIABETES MELLITUS WITH HYPERGLYCEMIA (HCC): Primary | ICD-10-CM

## 2025-06-03 DIAGNOSIS — Z12.5 ENCOUNTER FOR PROSTATE CANCER SCREENING: ICD-10-CM

## 2025-06-03 DIAGNOSIS — Z13.6 ENCOUNTER FOR LIPID SCREENING FOR CARDIOVASCULAR DISEASE: ICD-10-CM

## 2025-06-03 DIAGNOSIS — Z79.899 ON STATIN THERAPY DUE TO RISK OF FUTURE CARDIOVASCULAR EVENT: ICD-10-CM

## 2025-06-03 DIAGNOSIS — Z13.220 ENCOUNTER FOR LIPID SCREENING FOR CARDIOVASCULAR DISEASE: ICD-10-CM

## 2025-06-03 DIAGNOSIS — A52.8 SYPHILIS, LATE LATENT: ICD-10-CM

## 2025-06-03 LAB
ALBUMIN SERPL BCG-MCNC: 4.5 G/DL (ref 3.5–5)
ALP SERPL-CCNC: 72 U/L (ref 34–104)
ALT SERPL W P-5'-P-CCNC: 14 U/L (ref 7–52)
ANION GAP SERPL CALCULATED.3IONS-SCNC: 10 MMOL/L (ref 4–13)
AST SERPL W P-5'-P-CCNC: 10 U/L (ref 13–39)
BASOPHILS # BLD AUTO: 0.1 THOUSANDS/ÂΜL (ref 0–0.1)
BASOPHILS NFR BLD AUTO: 1 % (ref 0–1)
BILIRUB SERPL-MCNC: 0.47 MG/DL (ref 0.2–1)
BUN SERPL-MCNC: 10 MG/DL (ref 5–25)
CALCIUM SERPL-MCNC: 9.1 MG/DL (ref 8.4–10.2)
CHLORIDE SERPL-SCNC: 101 MMOL/L (ref 96–108)
CHOLEST SERPL-MCNC: 100 MG/DL (ref ?–200)
CO2 SERPL-SCNC: 26 MMOL/L (ref 21–32)
CREAT SERPL-MCNC: 0.78 MG/DL (ref 0.6–1.3)
CREAT UR-MCNC: 52.9 MG/DL
EOSINOPHIL # BLD AUTO: 0.18 THOUSAND/ÂΜL (ref 0–0.61)
EOSINOPHIL NFR BLD AUTO: 2 % (ref 0–6)
ERYTHROCYTE [DISTWIDTH] IN BLOOD BY AUTOMATED COUNT: 13.1 % (ref 11.6–15.1)
EST. AVERAGE GLUCOSE BLD GHB EST-MCNC: 243 MG/DL
GFR SERPL CREATININE-BSD FRML MDRD: 105 ML/MIN/1.73SQ M
GLUCOSE P FAST SERPL-MCNC: 360 MG/DL (ref 65–99)
HBA1C MFR BLD: 10.1 %
HCT VFR BLD AUTO: 40.9 % (ref 36.5–49.3)
HDLC SERPL-MCNC: 52 MG/DL
HGB BLD-MCNC: 12.8 G/DL (ref 12–17)
IMM GRANULOCYTES # BLD AUTO: 0.03 THOUSAND/UL (ref 0–0.2)
IMM GRANULOCYTES NFR BLD AUTO: 0 % (ref 0–2)
LDLC SERPL CALC-MCNC: 38 MG/DL (ref 0–100)
LYMPHOCYTES # BLD AUTO: 2.27 THOUSANDS/ÂΜL (ref 0.6–4.47)
LYMPHOCYTES NFR BLD AUTO: 27 % (ref 14–44)
MCH RBC QN AUTO: 27.9 PG (ref 26.8–34.3)
MCHC RBC AUTO-ENTMCNC: 31.3 G/DL (ref 31.4–37.4)
MCV RBC AUTO: 89 FL (ref 82–98)
MICROALBUMIN UR-MCNC: 10.2 MG/L
MICROALBUMIN/CREAT 24H UR: 19 MG/G CREATININE (ref 0–30)
MONOCYTES # BLD AUTO: 0.53 THOUSAND/ÂΜL (ref 0.17–1.22)
MONOCYTES NFR BLD AUTO: 6 % (ref 4–12)
NEUTROPHILS # BLD AUTO: 5.46 THOUSANDS/ÂΜL (ref 1.85–7.62)
NEUTS SEG NFR BLD AUTO: 64 % (ref 43–75)
NONHDLC SERPL-MCNC: 48 MG/DL
NRBC BLD AUTO-RTO: 0 /100 WBCS
PLATELET # BLD AUTO: 342 THOUSANDS/UL (ref 149–390)
PMV BLD AUTO: 9.9 FL (ref 8.9–12.7)
POTASSIUM SERPL-SCNC: 4.2 MMOL/L (ref 3.5–5.3)
PROT SERPL-MCNC: 7.2 G/DL (ref 6.4–8.4)
PSA SERPL-MCNC: 0.16 NG/ML (ref 0–4)
RBC # BLD AUTO: 4.58 MILLION/UL (ref 3.88–5.62)
SODIUM SERPL-SCNC: 137 MMOL/L (ref 135–147)
TREPONEMA PALLIDUM IGG+IGM AB [PRESENCE] IN SERUM OR PLASMA BY IMMUNOASSAY: REACTIVE
TRIGL SERPL-MCNC: 49 MG/DL (ref ?–150)
WBC # BLD AUTO: 8.57 THOUSAND/UL (ref 4.31–10.16)

## 2025-06-03 PROCEDURE — 80061 LIPID PANEL: CPT

## 2025-06-03 PROCEDURE — G0103 PSA SCREENING: HCPCS

## 2025-06-03 PROCEDURE — 36415 COLL VENOUS BLD VENIPUNCTURE: CPT

## 2025-06-03 PROCEDURE — 86780 TREPONEMA PALLIDUM: CPT

## 2025-06-03 PROCEDURE — 86593 SYPHILIS TEST NON-TREP QUANT: CPT

## 2025-06-03 PROCEDURE — 86592 SYPHILIS TEST NON-TREP QUAL: CPT

## 2025-06-04 LAB
BASOPHILS # BLD AUTO: 0.1 X10E3/UL (ref 0–0.2)
BASOPHILS NFR BLD AUTO: 1 %
CD3+CD4+ CELLS # BLD: 929 /UL (ref 359–1519)
CD3+CD4+ CELLS NFR BLD: 40.4 % (ref 30.8–58.5)
CD3+CD4+ CELLS/CD3+CD8+ CLL BLD: 1.29 % (ref 0.92–3.72)
CD3+CD8+ CELLS # BLD: 722 /UL (ref 109–897)
CD3+CD8+ CELLS NFR BLD: 31.4 % (ref 12–35.5)
EOSINOPHIL # BLD AUTO: 0.2 X10E3/UL (ref 0–0.4)
EOSINOPHIL NFR BLD AUTO: 2 %
ERYTHROCYTE [DISTWIDTH] IN BLOOD BY AUTOMATED COUNT: 12.9 % (ref 11.6–15.4)
HCT VFR BLD AUTO: 41.2 % (ref 37.5–51)
HGB BLD-MCNC: 12.4 G/DL (ref 13–17.7)
HIV1 RNA # PLAS NAA DL=20: NOT DETECTED {COPIES}/ML
IMM GRANULOCYTES # BLD: 0 X10E3/UL (ref 0–0.1)
IMM GRANULOCYTES NFR BLD: 1 %
LYMPHOCYTES # BLD AUTO: 2.3 X10E3/UL (ref 0.7–3.1)
LYMPHOCYTES NFR BLD AUTO: 26 %
MCH RBC QN AUTO: 27.9 PG (ref 26.6–33)
MCHC RBC AUTO-ENTMCNC: 30.1 G/DL (ref 31.5–35.7)
MCV RBC AUTO: 93 FL (ref 79–97)
MONOCYTES # BLD AUTO: 0.5 X10E3/UL (ref 0.1–0.9)
MONOCYTES NFR BLD AUTO: 6 %
NEUTROPHILS # BLD AUTO: 5.6 X10E3/UL (ref 1.4–7)
NEUTROPHILS NFR BLD AUTO: 64 %
PLATELET # BLD AUTO: 347 X10E3/UL (ref 150–450)
RBC # BLD AUTO: 4.44 X10E6/UL (ref 4.14–5.8)
RPR SER QL: REACTIVE
RPR SER-TITR: ABNORMAL {TITER}
WBC # BLD AUTO: 8.6 X10E3/UL (ref 3.4–10.8)

## 2025-06-10 ENCOUNTER — RESULTS FOLLOW-UP (OUTPATIENT)
Dept: SURGERY | Facility: CLINIC | Age: 50
End: 2025-06-10

## 2025-06-10 ENCOUNTER — OFFICE VISIT (OUTPATIENT)
Dept: SURGERY | Facility: CLINIC | Age: 50
End: 2025-06-10
Payer: COMMERCIAL

## 2025-06-10 ENCOUNTER — TREATMENT (OUTPATIENT)
Dept: SURGERY | Facility: CLINIC | Age: 50
End: 2025-06-10

## 2025-06-10 VITALS
HEIGHT: 71 IN | RESPIRATION RATE: 18 BRPM | DIASTOLIC BLOOD PRESSURE: 70 MMHG | SYSTOLIC BLOOD PRESSURE: 143 MMHG | WEIGHT: 174.4 LBS | TEMPERATURE: 98.6 F | HEART RATE: 114 BPM | BODY MASS INDEX: 24.42 KG/M2 | OXYGEN SATURATION: 95 %

## 2025-06-10 DIAGNOSIS — F33.2 SEVERE EPISODE OF RECURRENT MAJOR DEPRESSIVE DISORDER, WITHOUT PSYCHOTIC FEATURES (HCC): ICD-10-CM

## 2025-06-10 DIAGNOSIS — A52.8 SYPHILIS, LATE LATENT: ICD-10-CM

## 2025-06-10 DIAGNOSIS — E78.5 DYSLIPIDEMIA: ICD-10-CM

## 2025-06-10 DIAGNOSIS — B20 HUMAN IMMUNODEFICIENCY VIRUS (HIV) DISEASE (HCC): Primary | ICD-10-CM

## 2025-06-10 DIAGNOSIS — E11.65 UNCONTROLLED TYPE 2 DIABETES MELLITUS WITH HYPERGLYCEMIA (HCC): ICD-10-CM

## 2025-06-10 DIAGNOSIS — F41.1 GAD (GENERALIZED ANXIETY DISORDER): ICD-10-CM

## 2025-06-10 DIAGNOSIS — I10 PRIMARY HYPERTENSION: ICD-10-CM

## 2025-06-10 DIAGNOSIS — Z23 NEED FOR ZOSTER VACCINATION: ICD-10-CM

## 2025-06-10 DIAGNOSIS — F33.1 MODERATE EPISODE OF RECURRENT MAJOR DEPRESSIVE DISORDER (HCC): Primary | ICD-10-CM

## 2025-06-10 PROCEDURE — 90471 IMMUNIZATION ADMIN: CPT

## 2025-06-10 PROCEDURE — 90750 HZV VACC RECOMBINANT IM: CPT

## 2025-06-10 PROCEDURE — PBNCHG PB NO CHARGE PLACEHOLDER

## 2025-06-10 PROCEDURE — 99214 OFFICE O/P EST MOD 30 MIN: CPT | Performed by: NURSE PRACTITIONER

## 2025-06-10 RX ORDER — BUPROPION HYDROCHLORIDE 150 MG/1
150 TABLET ORAL DAILY
Qty: 90 TABLET | Refills: 1 | Status: SHIPPED | OUTPATIENT
Start: 2025-06-10

## 2025-06-10 RX ORDER — MEDICAL SUPPLY, MISCELLANEOUS
EACH MISCELLANEOUS DAILY
Qty: 1 EACH | Refills: 0 | Status: SHIPPED | OUTPATIENT
Start: 2025-06-10

## 2025-06-10 RX ORDER — TIRZEPATIDE 10 MG/.5ML
10 INJECTION, SOLUTION SUBCUTANEOUS WEEKLY
Qty: 3 ML | Refills: 0 | Status: SHIPPED | OUTPATIENT
Start: 2025-06-10

## 2025-06-10 NOTE — ASSESSMENT & PLAN NOTE
GAD7: 17 positive for anxiety.  Refer to [facility] for talk therapy  Refer to psychiatry  Agree to starting Wellbutrin 150 mg XL daily  F/u virtual visit in 5 weeks   Call office with any worsening behavior or mood changes

## 2025-06-10 NOTE — ASSESSMENT & PLAN NOTE
RPR now at 1:8 dils up from 1:4 dils with a peak in dils at 1:32 11/23 and was treated with bicillin X 3 doses.  This is now considered a 1 long increase and not a new infection.  No treatment is needed at this time however will monitor RPR titer with next set of labs to monitor for reinfection and need for further treatment.  Discussed the importance of wearing condoms    Orders:    RPR (Monitor) W/Refl Titer; Future

## 2025-06-10 NOTE — ASSESSMENT & PLAN NOTE
PHQ-9: 16 positive for severe depression.  GAD7: 17 positive for anxiety.  Refer to [facility] for talk therapy  Refer to psychiatry  Agree to starting Wellbutrin 150 mg XL daily  F/u virtual visit in 5 weeks   Call office with any worsening behavior or mood changes    Orders:    buPROPion (Wellbutrin XL) 150 mg 24 hr tablet; Take 1 tablet (150 mg total) by mouth daily

## 2025-06-10 NOTE — ASSESSMENT & PLAN NOTE
Suboptimal control of A1C with value 10.1 peaked at 10.8.  Reports taking her Mounjaro 5 mg weekly and reports only taking 2 to 3 times per month as he will forget.  He has been without Lantus for the past 6 months or longer.  Suspect patient is nonadherent to taking her journal and Lantus despite reporting he is.  Goal A1c is less than 7%.  He reports tolerating Mounjaro and denies any side effects.  Lab Results   Component Value Date    HGBA1C 10.1 (H) 06/03/2025     Discussed the importance of weekly adherence and scheduled a different time to take since he does not forget to take oral medication daily  Increase Mounjaro to 10 mg weekly  Denies any hypoglycemia episodes  Goal A1C < 7%  Hold Lantus for since he is not interested in taking   Reminder to complete DM eye exam      Orders:    Hemoglobin A1C; Future    Tirzepatide (Mounjaro) 10 MG/0.5ML SOAJ; Inject 10 mg under the skin once a week

## 2025-06-10 NOTE — ASSESSMENT & PLAN NOTE
Doing well on Biktarvy with an undetectable VL.  Pt reports 100% medication compliance on HAART.  Stressed the importance of 100% medication adherence  Monitor CD4. HIV RNA, CMP, and CBCD to monitor for any developing virologic response, treatment failure, or drug toxicities  Follow up with Dr. Valdes or Dr. Templeton   Continue Biktarvy  HIV Counseling:     Viral Load: undetectable      CD4 Count: 929        Denies side effects. Stressed the importance of adherence. Continue follow up in the ID clinic with Dr. Valdes or Dr. Templeton.     Reviewed the most recent labs, including the CD4 and viral load. Discussed the risks and benefits of treatment options, instructions for management, importance of treatment adherence, and reduction of risk factors. Educated on possible medication side effects.     Counseled on routes of HIV transmission, including the risk of  infection. Emphasized that viral suppression is the best method to prevent HIV transmission. At this time the pt denies the need for HIV testing of anyone in their life.     Total encounter time was greater than 35 minutes. Greater than 20 minutes were spent on counseling and patient education. Pt voices understanding and agreement with treatment plan.     Orders:    Zoster Vaccine Recombinant IM

## 2025-06-10 NOTE — PROGRESS NOTES
Data: Pt completed the PHQ-9 screening within PCP's appointment, however, [facility] couldn't discuss the results at the time due to conflicting session. Pt scored was (PHQ-9=16) displaying moderately severe depressive traits without SI or HI. Results will be discussed with pt to address multidimensional stability on a phone f/u, or direct pt contact.

## 2025-06-10 NOTE — ASSESSMENT & PLAN NOTE
LDL at goal: 38.  Continue atorvastatin 20 mg daily  Continue to monitor lipid panel  Continue to focus on lifestyle modifications

## 2025-06-10 NOTE — ASSESSMENT & PLAN NOTE
Last few BP readings indicate that it has been labile 111-139 over 60s to 80s.  Previous documentation shows that patient was not interested in hypertensive medications and wished to focus on lifestyle modifications  He does not monitor his BP at home.   BP:143/70 and pt was late and rushed to visit and meeting provider for the first time as he was anxious.   Discussed the importance of disease prevention given his diabetes, HIV, and hypertension  Discussed the importance of monitoring blood pressure at home several times per week  Discussed the importance of following a low-sodium diet less than 2000 mg eating more fruits and vegetables and avoiding processed foods along with daily exercise  Also discussed the importance of drinking water 64 ounces a daily more and avoiding soda juices or sweetened iced tea  Would recommend low-dose ACE/ARB for renal protection given poorly controlled DM if BP readings at home are elevated  BP cuff ordered and monitor BP at home    Orders:    Blood Pressure Monitoring (B-D ASSURE BPM/AUTO ARM CUFF) MISC; Use in the morning

## 2025-06-10 NOTE — PROGRESS NOTES
Name: Charlie Hernandez      : 1975      MRN: [id number]  Encounter Provider: REDD Clinton  Encounter Date: 6/10/2025   Encounter department: ASC AT [facility]  :  Assessment & Plan  Need for zoster vaccination    Orders:    Zoster Vaccine Recombinant IM    Human immunodeficiency virus (HIV) disease (HCC)  Doing well on Biktarvy with an undetectable VL.  Pt reports 100% medication compliance on HAART.  Stressed the importance of 100% medication adherence  Monitor CD4. HIV RNA, CMP, and CBCD to monitor for any developing virologic response, treatment failure, or drug toxicities  Follow up with Dr. Valdes or Dr. Templeton   Continue Biktarvy  HIV Counseling:     Viral Load: undetectable      CD4 Count: 929        Denies side effects. Stressed the importance of adherence. Continue follow up in the ID clinic with Dr. Valdes or Dr. Templeton.     Reviewed the most recent labs, including the CD4 and viral load. Discussed the risks and benefits of treatment options, instructions for management, importance of treatment adherence, and reduction of risk factors. Educated on possible medication side effects.     Counseled on routes of HIV transmission, including the risk of  infection. Emphasized that viral suppression is the best method to prevent HIV transmission. At this time the pt denies the need for HIV testing of anyone in their life.     Total encounter time was greater than 35 minutes. Greater than 20 minutes were spent on counseling and patient education. Pt voices understanding and agreement with treatment plan.     Orders:    Zoster Vaccine Recombinant IM    Primary hypertension  Last few BP readings indicate that it has been labile 111-139 over 60s to 80s.  Previous documentation shows that patient was not interested in hypertensive medications and wished to focus on lifestyle modifications  He does not monitor his BP at home.   BP:143/70 and pt was late and rushed to visit and  meeting provider for the first time as he was anxious.   Discussed the importance of disease prevention given his diabetes, HIV, and hypertension  Discussed the importance of monitoring blood pressure at home several times per week  Discussed the importance of following a low-sodium diet less than 2000 mg eating more fruits and vegetables and avoiding processed foods along with daily exercise  Also discussed the importance of drinking water 64 ounces a daily more and avoiding soda juices or sweetened iced tea  Would recommend low-dose ACE/ARB for renal protection given poorly controlled DM if BP readings at home are elevated  BP cuff ordered and monitor BP at home    Orders:    Blood Pressure Monitoring (B-D ASSURE BPM/AUTO ARM CUFF) MISC; Use in the morning    Syphilis, late latent  RPR now at 1:8 dils up from 1:4 dils with a peak in dils at 1:32 11/23 and was treated with bicillin X 3 doses.  This is now considered a 1 long increase and not a new infection.  No treatment is needed at this time however will monitor RPR titer with next set of labs to monitor for reinfection and need for further treatment.  Discussed the importance of wearing condoms    Orders:    RPR (Monitor) W/Refl Titer; Future    Uncontrolled type 2 diabetes mellitus with hyperglycemia (HCC)  Suboptimal control of A1C with value 10.1 peaked at 10.8.  Reports taking her Mounjaro 5 mg weekly and reports only taking 2 to 3 times per month as he will forget.  He has been without Lantus for the past 6 months or longer.  Suspect patient is nonadherent to taking her journal and Lantus despite reporting he is.  Goal A1c is less than 7%.  He reports tolerating Mounjaro and denies any side effects.  Lab Results   Component Value Date    HGBA1C 10.1 (H) 06/03/2025     Discussed the importance of weekly adherence and scheduled a different time to take since he does not forget to take oral medication daily  Increase Mounjaro to 10 mg weekly  Denies any  hypoglycemia episodes  Goal A1C < 7%  Hold Lantus for since he is not interested in taking   Reminder to complete DM eye exam      Orders:    Hemoglobin A1C; Future    Tirzepatide (Mounjaro) 10 MG/0.5ML SOAJ; Inject 10 mg under the skin once a week    Dyslipidemia  LDL at goal: 38.  Continue atorvastatin 20 mg daily  Continue to monitor lipid panel  Continue to focus on lifestyle modifications         Severe episode of recurrent major depressive disorder, without psychotic features (HCC)  PHQ-9: 16 positive for severe depression.  GAD7: 17 positive for anxiety.  Refer to [facility]  for talk therapy  Refer to psychiatry  Agree to starting Wellbutrin 150 mg XL daily  F/u virtual visit in 5 weeks   Call office with any worsening behavior or mood changes    Orders:    buPROPion (Wellbutrin XL) 150 mg 24 hr tablet; Take 1 tablet (150 mg total) by mouth daily    ALICE (generalized anxiety disorder)  GAD7: 17 positive for anxiety.  Refer to Hermelinda  for talk therapy  Refer to psychiatry  Agree to starting Wellbutrin 150 mg XL daily  F/u virtual visit in 5 weeks   Call office with any worsening behavior or mood changes             History of Present Illness   HPI  Charlie Hernandez is a 50 y.o. male who presents for 3-month follow-up visit for management of HIV and chronic healthcare conditions.  Charlie is transitioning care from [facility] to [facility] since he is now moved to [place].  Charlie reports his mental healht is not good. He is crying every night when he goes to his room and is by himself.  He over came being incarcerated for 16 yrs for severe offenses.  He is raising 2 children by himself and is antisocial and all he does is work and care for his children.  The 5 year anniversary of his HIV dx is coming up next month.  He is still involved with his baby momma but she is not as committed to him as he is to her.  He admits to struggling and does not have any resources or other support  "systems to help him.  He is also caring for a mom who has lung cancer.  He admits to having low self esteem.      He denies any recent illnesses or hospitalizations.     He does not endorse any fever, chills, nausea, vomiting, cough, SOB, diarrhea, or night sweats.    History obtained from: patient    Review of Systems   Constitutional: Negative.    HENT: Negative.     Respiratory: Negative.     Cardiovascular: Negative.    Gastrointestinal: Negative.    Genitourinary: Negative.    Musculoskeletal: Negative.    Skin: Negative.    Neurological: Negative.    Psychiatric/Behavioral:  Positive for dysphoric mood.         Tearful during visit.      Medications Ordered Prior to Encounter[1]      Objective   /70 (BP Location: Right arm, Patient Position: Sitting, Cuff Size: Standard)   Pulse (!) 114   Temp 98.6 °F (37 °C) (Tympanic)   Resp 18   Ht 5' 11\" (1.803 m)   Wt 79.1 kg (174 lb 6.4 oz)   SpO2 95%   BMI 24.32 kg/m²      Physical Exam  Vitals and nursing note reviewed.   Constitutional:       General: He is not in acute distress.     Appearance: Normal appearance. He is not ill-appearing.     Cardiovascular:      Rate and Rhythm: Normal rate and regular rhythm.      Chest Wall: PMI is not displaced.      Pulses: Normal pulses.      Heart sounds: Normal heart sounds.   Pulmonary:      Effort: Pulmonary effort is normal.      Breath sounds: Normal breath sounds.   Abdominal:      General: Abdomen is flat.      Palpations: Abdomen is soft.     Musculoskeletal:         General: Normal range of motion.     Skin:     General: Skin is warm and dry.      Capillary Refill: Capillary refill takes less than 2 seconds.     Neurological:      Mental Status: He is alert and oriented to person, place, and time.     Psychiatric:         Mood and Affect: Mood normal.         Behavior: Behavior normal.         Thought Content: Thought content normal.         Judgment: Judgment normal.                [1]   Current " Outpatient Medications on File Prior to Visit   Medication Sig Dispense Refill    atorvastatin (LIPITOR) 20 mg tablet TAKE 1 TABLET BY MOUTH DAILY 30 tablet 1    Biktarvy -25 MG tablet TAKE 1 TABLET BY MOUTH DAILY 30 tablet 2    ibuprofen (MOTRIN) 600 mg tablet TAKE 1 TABLET BY MOUTH EVERY SIX HOURS AS NEEDED FOR MODERATE PAIN 30 tablet 2    Insulin Pen Needle (BD Pen Needle Bela U/F) 32G X 4 MM MISC USE IN THE MORNING 100 each 2    [DISCONTINUED] Lantus SoloStar 100 units/mL SOPN INJECT 0.4mL (40 UNITS TOTAL) SUBCUTANEOUSLY DAILY AT BEDTIME 15 mL 2    [DISCONTINUED] Mounjaro 5 MG/0.5ML SOAJ INJECT 0.5ML SUBCUTANEOUSLY ONCE A WEEK 2 mL 2    Aspirin Adult Low Dose 81 MG EC tablet TAKE 1 TABLET BY MOUTH DAILY (Patient not taking: Reported on 6/10/2025) 30 tablet 2    BD Pen Needle Bela U/F 32G X 4 MM MISC Inject under the skin daily 100 each 2     No current facility-administered medications on file prior to visit.

## 2025-06-11 ENCOUNTER — TELEPHONE (OUTPATIENT)
Dept: SURGERY | Facility: CLINIC | Age: 50
End: 2025-06-11

## 2025-06-11 DIAGNOSIS — F33.2 SEVERE EPISODE OF RECURRENT MAJOR DEPRESSIVE DISORDER, WITHOUT PSYCHOTIC FEATURES (HCC): Primary | ICD-10-CM

## 2025-06-11 DIAGNOSIS — F41.1 GAD (GENERALIZED ANXIETY DISORDER): ICD-10-CM

## 2025-06-11 DIAGNOSIS — F43.10 PTSD (POST-TRAUMATIC STRESS DISORDER): ICD-10-CM

## 2025-06-11 PROCEDURE — 90471 IMMUNIZATION ADMIN: CPT

## 2025-06-11 PROCEDURE — 90750 HZV VACC RECOMBINANT IM: CPT

## 2025-06-11 NOTE — TELEPHONE ENCOUNTER
Data: Pt was contacted by [facility] at 3:05 p.m. to coordinate services, however, a VM was provided since pt wasn't available at the time. Pt called immediately at 3:06 p.m. to coordinate a phone f/u for 6/17/25 at 3:00 p.m. No SI or HI were disclosed within contact.

## 2025-06-17 ENCOUNTER — CLINICAL SUPPORT (OUTPATIENT)
Dept: SURGERY | Facility: CLINIC | Age: 50
End: 2025-06-17

## 2025-06-17 ENCOUNTER — TELEPHONE (OUTPATIENT)
Dept: SURGERY | Facility: CLINIC | Age: 50
End: 2025-06-17

## 2025-06-17 DIAGNOSIS — F43.10 PTSD (POST-TRAUMATIC STRESS DISORDER): Primary | ICD-10-CM

## 2025-06-17 DIAGNOSIS — F33.2 SEVERE EPISODE OF RECURRENT MAJOR DEPRESSIVE DISORDER, WITHOUT PSYCHOTIC FEATURES (HCC): ICD-10-CM

## 2025-06-17 PROCEDURE — PBNCHG PB NO CHARGE PLACEHOLDER
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
## 2025-06-17 NOTE — TELEPHONE ENCOUNTER
Data: Pt was contacted by S within coordinated time for requested f/u, however, a VM was provided since pt wasn't available at the time.

## 2025-06-18 ENCOUNTER — PATIENT OUTREACH (OUTPATIENT)
Dept: SURGERY | Facility: CLINIC | Age: 50
End: 2025-06-18

## 2025-06-18 NOTE — PROGRESS NOTES
HC called Ct to follow up to come in and call [facility] to confirm and go over his referral for psych. Ct applied to [facility] received information to move forward but portal showing inactive.

## 2025-06-23 DIAGNOSIS — A52.8 SYPHILIS, LATE LATENT: ICD-10-CM

## 2025-06-23 DIAGNOSIS — E78.5 DYSLIPIDEMIA: ICD-10-CM

## 2025-06-23 DIAGNOSIS — B20 HUMAN IMMUNODEFICIENCY VIRUS (HIV) DISEASE (HCC): Primary | ICD-10-CM

## 2025-06-23 DIAGNOSIS — I10 PRIMARY HYPERTENSION: ICD-10-CM

## 2025-06-23 DIAGNOSIS — E11.65 UNCONTROLLED TYPE 2 DIABETES MELLITUS WITH HYPERGLYCEMIA (HCC): ICD-10-CM

## 2025-06-24 ENCOUNTER — TELEPHONE (OUTPATIENT)
Dept: SURGERY | Facility: CLINIC | Age: 50
End: 2025-06-24

## 2025-06-24 NOTE — TELEPHONE ENCOUNTER
Data: Pt was contacted by S within coordinated time for requested f/u session, however, a VM was provided since pt wasn't available at the time.

## 2025-06-27 ENCOUNTER — TELEPHONE (OUTPATIENT)
Age: 50
End: 2025-06-27

## 2025-06-27 NOTE — TELEPHONE ENCOUNTER
Contacted patient in regards to Routine Referral in attempts to verify patient's needs of services and add patient to proper wait list. LVM for patient to contact intake dept  in regards to routine referral at [phone]. 2nd attempt. Closing referral.

## 2025-07-02 ENCOUNTER — OFFICE VISIT (OUTPATIENT)
Dept: SURGERY | Facility: CLINIC | Age: 50
End: 2025-07-02
Payer: COMMERCIAL

## 2025-07-02 VITALS
RESPIRATION RATE: 18 BRPM | SYSTOLIC BLOOD PRESSURE: 148 MMHG | OXYGEN SATURATION: 96 % | WEIGHT: 172.6 LBS | TEMPERATURE: 99 F | HEART RATE: 106 BPM | BODY MASS INDEX: 24.16 KG/M2 | DIASTOLIC BLOOD PRESSURE: 78 MMHG | HEIGHT: 71 IN

## 2025-07-02 DIAGNOSIS — A52.8 SYPHILIS, LATE LATENT: ICD-10-CM

## 2025-07-02 DIAGNOSIS — E11.65 UNCONTROLLED TYPE 2 DIABETES MELLITUS WITH HYPERGLYCEMIA (HCC): ICD-10-CM

## 2025-07-02 DIAGNOSIS — B20 HUMAN IMMUNODEFICIENCY VIRUS (HIV) DISEASE (HCC): Primary | ICD-10-CM

## 2025-07-02 PROCEDURE — 99214 OFFICE O/P EST MOD 30 MIN: CPT | Performed by: INTERNAL MEDICINE

## 2025-07-02 NOTE — PROGRESS NOTES
"Name: Charlie Hernandez      : 1975      MRN: [id number]  Encounter Provider: Harsh Valdes MD  Encounter Date: 2025   Encounter department: ASC AT [facility]  :  Assessment & Plan  Human immunodeficiency virus (HIV) disease (HCC)  Doing well on Biktarvy with an undetectable viral load and a CD4 count of 929.Continue ART, recheck CBC with differential, CMP, HIV RNA, and CD4 in 5 months to make sure no developing toxicity or treatment failure and follow-up in 6 months.  Stressed adherence.          Uncontrolled type 2 diabetes mellitus with hyperglycemia (HCC)  Suboptimal control despite insulin and Mounjaro.  Needs additional lifestyle modification.  Refer back to the primary to tighten up control.      Lab Results   Component Value Date    HGBA1C 10.1 (H) 2025            Syphilis, late latent  Recurrent infection with the titer rising to 1:32 and now status post treatment for late latent syphilis once again with benzathine penicillin x 3 doses with a good response with a titer that is dropped to 1:4 consistent with care.  Slight bump in the titer to 1: 8 of unclear significance.  Continue to monitor the RPR.             Patient was provided medication, adherence and prevention education.    History of Present Illness   Routine follow-up for HIV.  Patient claims 100% adherence with Biktarvy. Patient denies any notable side effects.  Overall the feeling well.  The patient denies any fever chills or sweats, denies any nausea vomiting or diarrhea, denies any cough or shortness of breath.    ROS: A complete review of systems are negative other than that noted in the HPI        Objective   /78 (BP Location: Right arm, Patient Position: Sitting, Cuff Size: Large)   Pulse (!) 106   Temp 99 °F (37.2 °C) (Tympanic)   Resp 18   Ht 5' 11\" (1.803 m)   Wt 78.3 kg (172 lb 9.6 oz)   SpO2 96%   BMI 24.07 kg/m²     General: Alert, interactive, appearing well, nontoxic, no acute " distress.  Neck: Supple without lymphadenopathy, no thyromegaly or masses  Throat: Oropharynx moist without lesions.   Lungs: Clear to auscultation bilaterally; no wheezes, rhonchi or rales; respirations unlabored  Heart: RRR; no murmur, rub or gallop  Abdomen: Soft, non-tender, non-distended, positive bowel sounds.    Extremities: No clubbing, cyanosis or edema  Skin: No new rashes or lesions. No draining wounds noted.    Lab Results: I have personally reviewed pertinent labs.  Lab Results   Component Value Date     (L) 04/04/2018    K 4.2 06/03/2025     06/03/2025    CO2 26 06/03/2025    ANIONGAP 10 04/04/2018    BUN 10 06/03/2025    CREATININE 0.78 06/03/2025    GLUCOSE 269 (H) 09/10/2018    GLUF 360 (H) 06/03/2025    CALCIUM 9.1 06/03/2025    AST 10 (L) 06/03/2025    ALT 14 06/03/2025    ALKPHOS 72 06/03/2025    PROT 6.9 04/04/2018    BILITOT 0.4 04/04/2018    EGFR 105 06/03/2025     Lab Results   Component Value Date    WBC 8.6 06/03/2025    WBC 8.57 06/03/2025    HGB 12.4 (L) 06/03/2025    HGB 12.8 06/03/2025    HCT 41.2 06/03/2025    HCT 40.9 06/03/2025    MCV 93 06/03/2025    MCV 89 06/03/2025     06/03/2025     06/03/2025     Lab Results   Component Value Date    HEPCAB Non-reactive 10/08/2024     Lab Results   Component Value Date    HAV Reactive (A) 12/11/2021    HEPCAB Non-reactive 10/08/2024     Lab Results   Component Value Date    RPR Reactive (A) 06/03/2025    RPR Reactive 8 dils (A) 06/03/2025     CD4 ABS   Date/Time Value Ref Range Status   06/03/2025 08:28  359 - 1519 /uL Final     HIV-1 T[facility]   Date/Time Value Ref Range Status   06/03/2025 08:28 AM Not Detected Not Detected Final

## 2025-07-02 NOTE — ASSESSMENT & PLAN NOTE
Recurrent infection with the titer rising to 1:32 and now status post treatment for late latent syphilis once again with benzathine penicillin x 3 doses with a good response with a titer that is dropped to 1:4 consistent with care.  Slight bump in the titer to 1: 8 of unclear significance.  Continue to monitor the RPR.

## 2025-07-02 NOTE — ASSESSMENT & PLAN NOTE
Suboptimal control despite insulin and Mounjaro.  Needs additional lifestyle modification.  Refer back to the primary to tighten up control.      Lab Results   Component Value Date    HGBA1C 10.1 (H) 06/03/2025

## 2025-07-02 NOTE — ASSESSMENT & PLAN NOTE
Doing well on Biktarvy with an undetectable viral load and a CD4 count of 929.Continue ART, recheck CBC with differential, CMP, HIV RNA, and CD4 in 5 months to make sure no developing toxicity or treatment failure and follow-up in 6 months.  Stressed adherence.

## 2025-07-07 DIAGNOSIS — D72.89 OTHER SPECIFIED DISORDERS OF WHITE BLOOD CELLS: ICD-10-CM

## 2025-07-07 DIAGNOSIS — Z11.3 ENCOUNTER FOR SCREENING FOR BACTERIAL SEXUALLY TRANSMITTED DISEASE: ICD-10-CM

## 2025-07-07 DIAGNOSIS — Z11.1 SCREENING-PULMONARY TB: ICD-10-CM

## 2025-07-07 DIAGNOSIS — Z72.89 OTHER PROBLEMS RELATED TO LIFESTYLE: ICD-10-CM

## 2025-07-07 DIAGNOSIS — A52.8 SYPHILIS, LATE LATENT: ICD-10-CM

## 2025-07-07 DIAGNOSIS — Z20.2 CONTACT WITH AND (SUSPECTED) EXPOSURE TO INFECTIONS WITH A PREDOMINANTLY SEXUAL MODE OF TRANSMISSION: ICD-10-CM

## 2025-07-07 DIAGNOSIS — I10 PRIMARY HYPERTENSION: ICD-10-CM

## 2025-07-07 DIAGNOSIS — E78.5 DYSLIPIDEMIA: ICD-10-CM

## 2025-07-07 DIAGNOSIS — E11.65 UNCONTROLLED TYPE 2 DIABETES MELLITUS WITH HYPERGLYCEMIA (HCC): ICD-10-CM

## 2025-07-07 DIAGNOSIS — B20 HUMAN IMMUNODEFICIENCY VIRUS (HIV) DISEASE (HCC): Primary | ICD-10-CM

## 2025-07-11 ENCOUNTER — PATIENT OUTREACH (OUTPATIENT)
Dept: SURGERY | Facility: CLINIC | Age: 50
End: 2025-07-11

## 2025-07-31 ENCOUNTER — PATIENT OUTREACH (OUTPATIENT)
Dept: SURGERY | Facility: CLINIC | Age: 50
End: 2025-07-31

## 2025-08-01 ENCOUNTER — PATIENT OUTREACH (OUTPATIENT)
Dept: SURGERY | Facility: CLINIC | Age: 50
End: 2025-08-01

## 2025-08-06 ENCOUNTER — PATIENT OUTREACH (OUTPATIENT)
Dept: SURGERY | Facility: CLINIC | Age: 50
End: 2025-08-06

## 2025-08-07 ENCOUNTER — PATIENT OUTREACH (OUTPATIENT)
Dept: SURGERY | Facility: CLINIC | Age: 50
End: 2025-08-07

## 2025-08-12 ENCOUNTER — PATIENT OUTREACH (OUTPATIENT)
Dept: SURGERY | Facility: CLINIC | Age: 50
End: 2025-08-12

## 2025-08-18 ENCOUNTER — PATIENT OUTREACH (OUTPATIENT)
Dept: SURGERY | Facility: CLINIC | Age: 50
End: 2025-08-18

## 2025-08-19 ENCOUNTER — PATIENT OUTREACH (OUTPATIENT)
Dept: SURGERY | Facility: CLINIC | Age: 50
End: 2025-08-19

## 2025-08-20 ENCOUNTER — PATIENT OUTREACH (OUTPATIENT)
Dept: SURGERY | Facility: CLINIC | Age: 50
End: 2025-08-20